# Patient Record
Sex: FEMALE | Race: BLACK OR AFRICAN AMERICAN | Employment: OTHER | URBAN - METROPOLITAN AREA
[De-identification: names, ages, dates, MRNs, and addresses within clinical notes are randomized per-mention and may not be internally consistent; named-entity substitution may affect disease eponyms.]

---

## 2017-03-29 PROBLEM — I50.9 CHF WITH UNKNOWN LVEF (HCC): Status: ACTIVE | Noted: 2017-03-29

## 2017-03-29 PROBLEM — J96.00 ACUTE RESPIRATORY FAILURE (HCC): Status: ACTIVE | Noted: 2017-03-29

## 2017-03-31 LAB
LEFT VENTRICULAR EJECTION FRACTION HIGH VALUE: 65 %
LEFT VENTRICULAR EJECTION FRACTION MODE: NORMAL
LV EF: 60 %

## 2017-04-17 PROBLEM — R00.1 SINUS BRADYCARDIA: Status: ACTIVE | Noted: 2017-04-17

## 2017-04-17 PROBLEM — I50.32 CHRONIC DIASTOLIC CONGESTIVE HEART FAILURE (HCC): Status: ACTIVE | Noted: 2017-04-17

## 2017-04-17 PROBLEM — I50.9 CHF WITH UNKNOWN LVEF (HCC): Status: RESOLVED | Noted: 2017-03-29 | Resolved: 2017-04-17

## 2017-05-03 PROBLEM — R00.1 SINUS BRADYCARDIA: Status: RESOLVED | Noted: 2017-04-17 | Resolved: 2017-05-03

## 2017-05-03 PROBLEM — I50.33 ACUTE ON CHRONIC DIASTOLIC CONGESTIVE HEART FAILURE (HCC): Status: ACTIVE | Noted: 2017-05-03

## 2017-05-03 PROBLEM — J96.00 ACUTE RESPIRATORY FAILURE (HCC): Status: RESOLVED | Noted: 2017-03-29 | Resolved: 2017-05-03

## 2017-05-03 PROBLEM — I50.32 CHRONIC DIASTOLIC CONGESTIVE HEART FAILURE (HCC): Status: RESOLVED | Noted: 2017-04-17 | Resolved: 2017-05-03

## 2017-05-18 PROBLEM — I16.1 HYPERTENSIVE EMERGENCY: Status: ACTIVE | Noted: 2017-05-18

## 2017-09-01 PROBLEM — I10 HTN (HYPERTENSION): Status: ACTIVE | Noted: 2017-09-01

## 2017-09-01 PROBLEM — E11.9 TYPE 2 DIABETES MELLITUS WITHOUT COMPLICATION, WITHOUT LONG-TERM CURRENT USE OF INSULIN (HCC): Status: ACTIVE | Noted: 2017-09-01

## 2017-09-01 PROBLEM — R07.9 CHEST PAIN: Status: ACTIVE | Noted: 2017-09-01

## 2017-09-02 PROBLEM — I11.9 HYPERTENSIVE HEART DISEASE: Status: ACTIVE | Noted: 2017-09-02

## 2017-09-02 PROBLEM — R00.1 BRADYCARDIA: Status: ACTIVE | Noted: 2017-09-02

## 2017-09-02 PROBLEM — E03.9 HYPOTHYROIDISM: Status: ACTIVE | Noted: 2017-09-02

## 2017-09-12 PROBLEM — I11.9 HYPERTENSIVE HEART DISEASE: Chronic | Status: ACTIVE | Noted: 2017-09-02

## 2017-09-12 PROBLEM — I10 HTN (HYPERTENSION), BENIGN: Chronic | Status: ACTIVE | Noted: 2017-09-01

## 2017-09-12 PROBLEM — I50.32 CHRONIC DIASTOLIC CHF (CONGESTIVE HEART FAILURE) (HCC): Status: ACTIVE | Noted: 2017-05-03

## 2017-09-12 PROBLEM — I20.0 UNSTABLE ANGINA (HCC): Status: ACTIVE | Noted: 2017-09-12

## 2017-09-12 PROBLEM — I50.32 CHRONIC DIASTOLIC CHF (CONGESTIVE HEART FAILURE) (HCC): Chronic | Status: ACTIVE | Noted: 2017-05-03

## 2017-09-12 PROBLEM — E03.9 HYPOTHYROIDISM: Chronic | Status: ACTIVE | Noted: 2017-09-02

## 2017-09-12 PROBLEM — R07.9 CHEST PAIN: Status: ACTIVE | Noted: 2017-09-12

## 2017-09-13 PROBLEM — R07.9 CHEST PAIN: Status: ACTIVE | Noted: 2017-09-13

## 2017-09-13 PROBLEM — I20.0 UNSTABLE ANGINA (HCC): Status: RESOLVED | Noted: 2017-09-12 | Resolved: 2017-09-13

## 2017-10-20 PROBLEM — M15.9 PRIMARY OSTEOARTHRITIS INVOLVING MULTIPLE JOINTS: Status: ACTIVE | Noted: 2017-10-20

## 2017-10-20 PROBLEM — R42 DIZZINESS: Status: ACTIVE | Noted: 2017-10-20

## 2017-10-20 PROBLEM — D72.819 LEUKOPENIA: Status: ACTIVE | Noted: 2017-10-20

## 2017-10-20 PROBLEM — R26.81 UNSTEADINESS: Status: ACTIVE | Noted: 2017-10-20

## 2017-10-20 PROBLEM — I49.5 SSS (SICK SINUS SYNDROME) (HCC): Status: ACTIVE | Noted: 2017-10-20

## 2017-10-20 PROBLEM — M75.52 BILATERAL SHOULDER BURSITIS: Status: ACTIVE | Noted: 2017-10-20

## 2017-10-20 PROBLEM — M54.12 CERVICAL RADICULOPATHY: Status: ACTIVE | Noted: 2017-10-20

## 2017-10-20 PROBLEM — I34.0 NON-RHEUMATIC MITRAL REGURGITATION: Status: ACTIVE | Noted: 2017-10-20

## 2017-10-20 PROBLEM — I51.7 LVH (LEFT VENTRICULAR HYPERTROPHY): Status: ACTIVE | Noted: 2017-10-20

## 2017-10-20 PROBLEM — Z78.9 ASPIRIN INTOLERANCE: Status: ACTIVE | Noted: 2017-10-20

## 2017-10-20 PROBLEM — M75.51 BILATERAL SHOULDER BURSITIS: Status: ACTIVE | Noted: 2017-10-20

## 2017-10-20 PROBLEM — N18.30 CKD (CHRONIC KIDNEY DISEASE) STAGE 3, GFR 30-59 ML/MIN (HCC): Status: ACTIVE | Noted: 2017-10-20

## 2018-03-22 ENCOUNTER — OFFICE VISIT (OUTPATIENT)
Dept: CARDIOLOGY CLINIC | Age: 83
End: 2018-03-22
Payer: MEDICARE

## 2018-03-22 VITALS
HEIGHT: 63 IN | BODY MASS INDEX: 27.64 KG/M2 | DIASTOLIC BLOOD PRESSURE: 60 MMHG | RESPIRATION RATE: 16 BRPM | WEIGHT: 156 LBS | SYSTOLIC BLOOD PRESSURE: 130 MMHG | HEART RATE: 62 BPM

## 2018-03-22 DIAGNOSIS — I49.5 SSS (SICK SINUS SYNDROME) (HCC): ICD-10-CM

## 2018-03-22 DIAGNOSIS — I50.32 CHRONIC DIASTOLIC CHF (CONGESTIVE HEART FAILURE) (HCC): Chronic | ICD-10-CM

## 2018-03-22 DIAGNOSIS — Z01.810 PREOP CARDIOVASCULAR EXAM: Primary | ICD-10-CM

## 2018-03-22 PROCEDURE — G8399 PT W/DXA RESULTS DOCUMENT: HCPCS | Performed by: NURSE PRACTITIONER

## 2018-03-22 PROCEDURE — 1123F ACP DISCUSS/DSCN MKR DOCD: CPT | Performed by: NURSE PRACTITIONER

## 2018-03-22 PROCEDURE — 99213 OFFICE O/P EST LOW 20 MIN: CPT | Performed by: NURSE PRACTITIONER

## 2018-03-22 PROCEDURE — G8427 DOCREV CUR MEDS BY ELIG CLIN: HCPCS | Performed by: NURSE PRACTITIONER

## 2018-03-22 PROCEDURE — 93000 ELECTROCARDIOGRAM COMPLETE: CPT | Performed by: NURSE PRACTITIONER

## 2018-03-22 PROCEDURE — G8419 CALC BMI OUT NRM PARAM NOF/U: HCPCS | Performed by: NURSE PRACTITIONER

## 2018-03-22 PROCEDURE — 1090F PRES/ABSN URINE INCON ASSESS: CPT | Performed by: NURSE PRACTITIONER

## 2018-03-22 PROCEDURE — G8484 FLU IMMUNIZE NO ADMIN: HCPCS | Performed by: NURSE PRACTITIONER

## 2018-03-22 PROCEDURE — 1036F TOBACCO NON-USER: CPT | Performed by: NURSE PRACTITIONER

## 2018-03-22 PROCEDURE — 4040F PNEUMOC VAC/ADMIN/RCVD: CPT | Performed by: NURSE PRACTITIONER

## 2018-03-22 RX ORDER — CLONIDINE HYDROCHLORIDE 0.2 MG/1
0.2 TABLET ORAL 2 TIMES DAILY
COMMUNITY

## 2018-03-22 RX ORDER — LEVOTHYROXINE SODIUM 0.05 MG/1
50 TABLET ORAL DAILY
COMMUNITY

## 2018-03-22 RX ORDER — HYDRALAZINE HYDROCHLORIDE 50 MG/1
50 TABLET, FILM COATED ORAL EVERY 8 HOURS
COMMUNITY
End: 2021-01-01

## 2018-03-22 NOTE — PROGRESS NOTES
OFFICE VISIT        PRIMARY CARE PHYSICIAN:    Lian Calvo MD     ALLERGIES / SENSITIVITIES:    Allergies   Allergen Reactions    Aspirin       REVIEWED MEDICATIONS:      Current Outpatient Prescriptions:     levothyroxine (SYNTHROID) 50 MCG tablet, Take 50 mcg by mouth Daily, Disp: , Rfl:     cloNIDine (CATAPRES) 0.2 MG tablet, Take 0.2 mg by mouth 2 times daily, Disp: , Rfl:     hydrALAZINE (APRESOLINE) 50 MG tablet, Take 50 mg by mouth every 8 hours, Disp: , Rfl:     acetaminophen (TYLENOL) 500 MG tablet, Take 500 mg by mouth every 6 hours as needed for Pain, Disp: , Rfl:     furosemide (LASIX) 20 MG tablet, Take 1 tablet by mouth every other day, Disp: 15 tablet, Rfl: 0    metFORMIN (GLUCOPHAGE) 500 MG tablet, Take 500 mg by mouth 2 times daily (with meals), Disp: , Rfl:     linagliptin (TRADJENTA) 5 MG tablet, Take 1 tablet by mouth daily, Disp: 30 tablet, Rfl: 0    losartan (COZAAR) 100 MG tablet, Take 1 tablet by mouth daily, Disp: 30 tablet, Rfl: 0    potassium chloride (KLOR-CON M) 10 MEQ extended release tablet, Take 1 tablet by mouth daily, Disp: 30 tablet, Rfl: 0    amLODIPine (NORVASC) 10 MG tablet, Take 10 mg by mouth daily, Disp: , Rfl:     S: REASON FOR VISIT:    Hypertension. Valvular heart disease. Chronic diastolic congestive heart failure. Sick sinus syndrome. Suraj Nam is an 81-year-old lady with cardiovascular history as described below. She is followed here by Dr. Maryanna Fothergill. She is here today with her son. She developed pain in her right knee continues to be seen by orthopedic surgery soon. She is using Tylenol for knee pain and can go up 4 steps with a lot of difficulty. She has no chest pain with exertion, palpitations, dyspnea, orthopnea, swelling of the lower extremities, dizziness, presyncope, syncope. She has a sharp pain which is very brief in her left chest occasionally. It can occur with activity and rest and only last a few seconds.         REVIEW OF SYSTEMS: bunionectomy 05/29/2012.  5.  History of cervical radiculopathy. 6.  History of bilateral shoulder bursitis. 7.  Hypothyroidism: On replacement therapy. 8.  Suspect dementia. 9.  Medical noncompliance. FAMILY HISTORY:  Noncontributory. SOCIAL HISTORY:  Denies smoking. Denies alcohol or illicit drug use.      O:  COMPLETE PHYSICAL EXAM:      /60   Pulse 62   Resp 16   Ht 5' 3\" (1.6 m)   Wt 156 lb (70.8 kg)   BMI 27.63 kg/m²      General:  Elderly lady in no acute distress. HEENT: Normocephalic and atraumatic head. No JVD. No carotid bruits. Carotid upstrokes normal bilaterally. No thyromegaly. Sclerae not icteric. No xanthelasmas. Mucous membranes moist.  Chest:  Symmetrical and nontender. No deformities. Lungs:  Clear to auscultation bilaterally. Heart:  Normal S1 and S2. No S3 or S4. No murmurs or rubs. Abdomen: Soft, nontender without organomegaly or masses. No bruits. Normal bowel sounds. Extremities: Trace pitting peritibial and ankle edema bilaterally. Normal pulses. Skin:  Normal turgor. No rashes or ulcers noted. Neurologic: Oriented x3. No motor or sensory deficits detected. REVIEW OF DIAGNOSTIC TESTS:    EKG today sinus rhythm with PACs and nonconducted PACs and LVH and nonspecific ST T wave changes  Labs from December 19, 2017 reviewed     ASSESSMENT / DIAGNOSIS:   1. History of heart failure with preserved EF. She is compensated today . 2.  Mild mitral regurgitation. By 2-D echocardiogram March 2017    3. Chronic kidney disease. 4.  Sick sinus syndrome and history of sinus bradycardia and Wenckebach (while on verapamil and high dose clonidine). 5.  Chronic dizziness/unsteadiness on feet: Doubt secondary to bradycardia. 6.  Suspect dementia. 7.  Medical noncompliance. 8.  Leukopenia. 9.  Anemia. 10.  Hypertension/LVH. blood pressure is well-controlled today   11. Diabetes mellitus. 12.  Hypothyroidism: On replacement therapy.   13.  Osteoarthritis/

## 2018-05-25 ENCOUNTER — HOSPITAL ENCOUNTER (OUTPATIENT)
Age: 83
Discharge: HOME | End: 2018-05-25
Payer: MEDICARE

## 2018-05-25 ENCOUNTER — OFFICE VISIT (OUTPATIENT)
Dept: CARDIOLOGY CLINIC | Age: 83
End: 2018-05-25
Payer: MEDICARE

## 2018-05-25 VITALS
BODY MASS INDEX: 27.64 KG/M2 | HEIGHT: 63 IN | RESPIRATION RATE: 20 BRPM | SYSTOLIC BLOOD PRESSURE: 146 MMHG | DIASTOLIC BLOOD PRESSURE: 60 MMHG | HEART RATE: 55 BPM | WEIGHT: 156 LBS

## 2018-05-25 DIAGNOSIS — Z78.9 ASPIRIN INTOLERANCE: ICD-10-CM

## 2018-05-25 DIAGNOSIS — I49.5 SSS (SICK SINUS SYNDROME) (HCC): ICD-10-CM

## 2018-05-25 DIAGNOSIS — I38 VALVULAR HEART DISEASE: ICD-10-CM

## 2018-05-25 DIAGNOSIS — I50.32 CHRONIC DIASTOLIC CONGESTIVE HEART FAILURE (HCC): ICD-10-CM

## 2018-05-25 DIAGNOSIS — R07.9 CHEST PAIN, UNSPECIFIED TYPE: Primary | ICD-10-CM

## 2018-05-25 PROCEDURE — 83735 ASSAY OF MAGNESIUM: CPT

## 2018-05-25 PROCEDURE — G8427 DOCREV CUR MEDS BY ELIG CLIN: HCPCS | Performed by: NURSE PRACTITIONER

## 2018-05-25 PROCEDURE — 84443 ASSAY THYROID STIM HORMONE: CPT

## 2018-05-25 PROCEDURE — 1036F TOBACCO NON-USER: CPT | Performed by: NURSE PRACTITIONER

## 2018-05-25 PROCEDURE — 82306 VITAMIN D 25 HYDROXY: CPT

## 2018-05-25 PROCEDURE — 1123F ACP DISCUSS/DSCN MKR DOCD: CPT | Performed by: NURSE PRACTITIONER

## 2018-05-25 PROCEDURE — 93000 ELECTROCARDIOGRAM COMPLETE: CPT | Performed by: INTERNAL MEDICINE

## 2018-05-25 PROCEDURE — 83036 HEMOGLOBIN GLYCOSYLATED A1C: CPT

## 2018-05-25 PROCEDURE — 1090F PRES/ABSN URINE INCON ASSESS: CPT | Performed by: NURSE PRACTITIONER

## 2018-05-25 PROCEDURE — G8399 PT W/DXA RESULTS DOCUMENT: HCPCS | Performed by: NURSE PRACTITIONER

## 2018-05-25 PROCEDURE — 80061 LIPID PANEL: CPT

## 2018-05-25 PROCEDURE — 85027 COMPLETE CBC AUTOMATED: CPT

## 2018-05-25 PROCEDURE — 83695 ASSAY OF LIPOPROTEIN(A): CPT

## 2018-05-25 PROCEDURE — 36415 COLL VENOUS BLD VENIPUNCTURE: CPT

## 2018-05-25 PROCEDURE — G8419 CALC BMI OUT NRM PARAM NOF/U: HCPCS | Performed by: NURSE PRACTITIONER

## 2018-05-25 PROCEDURE — 80053 COMPREHEN METABOLIC PANEL: CPT

## 2018-05-25 PROCEDURE — 99213 OFFICE O/P EST LOW 20 MIN: CPT | Performed by: NURSE PRACTITIONER

## 2018-05-25 PROCEDURE — 4040F PNEUMOC VAC/ADMIN/RCVD: CPT | Performed by: NURSE PRACTITIONER

## 2018-05-29 LAB
ALBUMIN SERPL-MCNC: 4.1 G/DL (ref 3.5–5.2)
ALP BLD-CCNC: 62 U/L (ref 35–104)
ALT SERPL-CCNC: 8 U/L (ref 0–32)
ANION GAP SERPL CALCULATED.3IONS-SCNC: 14 MMOL/L (ref 7–16)
AST SERPL-CCNC: 16 U/L (ref 0–31)
BILIRUB SERPL-MCNC: 0.5 MG/DL (ref 0–1.2)
BUN BLDV-MCNC: 26 MG/DL (ref 8–23)
CALCIUM SERPL-MCNC: 9.1 MG/DL (ref 8.6–10.2)
CHLORIDE BLD-SCNC: 104 MMOL/L (ref 98–107)
CHOLESTEROL, TOTAL: 152 MG/DL (ref 0–199)
CO2: 25 MMOL/L (ref 22–29)
CREAT SERPL-MCNC: 1.3 MG/DL (ref 0.5–1)
GFR AFRICAN AMERICAN: 47
GFR NON-AFRICAN AMERICAN: 47 ML/MIN/1.73
GLUCOSE BLD-MCNC: 171 MG/DL (ref 74–109)
HBA1C MFR BLD: 6.5 % (ref 4.8–5.9)
HCT VFR BLD CALC: 33.6 % (ref 34–48)
HDLC SERPL-MCNC: 80 MG/DL
HEMOGLOBIN: 10.6 G/DL (ref 11.5–15.5)
LDL CHOLESTEROL CALCULATED: 58 MG/DL (ref 0–99)
MAGNESIUM: 1.9 MG/DL (ref 1.6–2.6)
MCH RBC QN AUTO: 27.7 PG (ref 26–35)
MCHC RBC AUTO-ENTMCNC: 31.5 % (ref 32–34.5)
MCV RBC AUTO: 88 FL (ref 80–99.9)
PDW BLD-RTO: 13.2 FL (ref 11.5–15)
PLATELET # BLD: 164 E9/L (ref 130–450)
PMV BLD AUTO: 10.3 FL (ref 7–12)
POTASSIUM SERPL-SCNC: 4.4 MMOL/L (ref 3.5–5)
RBC # BLD: 3.82 E12/L (ref 3.5–5.5)
SODIUM BLD-SCNC: 143 MMOL/L (ref 132–146)
TOTAL PROTEIN: 7.5 G/DL (ref 6.4–8.3)
TRIGL SERPL-MCNC: 69 MG/DL (ref 0–149)
TSH SERPL DL<=0.05 MIU/L-ACNC: 1.8 UIU/ML (ref 0.27–4.2)
VITAMIN D 25-HYDROXY: 8 NG/ML (ref 30–100)
VLDLC SERPL CALC-MCNC: 14 MG/DL
WBC # BLD: 4.5 E9/L (ref 4.5–11.5)

## 2018-05-30 LAB — LIPOPROTEIN (A): 14 MG/DL

## 2018-06-15 ENCOUNTER — HOSPITAL ENCOUNTER (EMERGENCY)
Age: 83
Discharge: HOME | End: 2018-06-15
Attending: EMERGENCY MEDICINE
Payer: MEDICARE

## 2018-06-15 ENCOUNTER — APPOINTMENT (OUTPATIENT)
Dept: CT IMAGING | Age: 83
End: 2018-06-15
Payer: MEDICARE

## 2018-06-15 VITALS
WEIGHT: 156 LBS | HEART RATE: 65 BPM | SYSTOLIC BLOOD PRESSURE: 156 MMHG | BODY MASS INDEX: 27.64 KG/M2 | HEIGHT: 63 IN | RESPIRATION RATE: 18 BRPM | OXYGEN SATURATION: 100 % | DIASTOLIC BLOOD PRESSURE: 77 MMHG | TEMPERATURE: 98.2 F

## 2018-06-15 DIAGNOSIS — K59.00 CONSTIPATION, UNSPECIFIED CONSTIPATION TYPE: Primary | ICD-10-CM

## 2018-06-15 DIAGNOSIS — K56.41 FECAL IMPACTION IN RECTUM (HCC): ICD-10-CM

## 2018-06-15 LAB
ALBUMIN SERPL-MCNC: 4.2 G/DL (ref 3.5–5.2)
ALP BLD-CCNC: 62 U/L (ref 35–104)
ALT SERPL-CCNC: 8 U/L (ref 0–32)
ANION GAP SERPL CALCULATED.3IONS-SCNC: 13 MMOL/L (ref 7–16)
AST SERPL-CCNC: 18 U/L (ref 0–31)
BACTERIA: ABNORMAL /HPF
BASOPHILS ABSOLUTE: 0.01 E9/L (ref 0–0.2)
BASOPHILS RELATIVE PERCENT: 0.2 % (ref 0–2)
BILIRUB SERPL-MCNC: 0.4 MG/DL (ref 0–1.2)
BILIRUBIN URINE: NEGATIVE
BLOOD, URINE: ABNORMAL
BUN BLDV-MCNC: 22 MG/DL (ref 8–23)
CALCIUM SERPL-MCNC: 9.3 MG/DL (ref 8.6–10.2)
CHLORIDE BLD-SCNC: 103 MMOL/L (ref 98–107)
CLARITY: CLEAR
CO2: 25 MMOL/L (ref 22–29)
COLOR: YELLOW
CREAT SERPL-MCNC: 1 MG/DL (ref 0.5–1)
EKG ATRIAL RATE: 85 BPM
EKG P AXIS: 58 DEGREES
EKG P-R INTERVAL: 146 MS
EKG Q-T INTERVAL: 386 MS
EKG QRS DURATION: 110 MS
EKG QTC CALCULATION (BAZETT): 459 MS
EKG R AXIS: -44 DEGREES
EKG T AXIS: 71 DEGREES
EKG VENTRICULAR RATE: 85 BPM
EOSINOPHILS ABSOLUTE: 0.07 E9/L (ref 0.05–0.5)
EOSINOPHILS RELATIVE PERCENT: 1.5 % (ref 0–6)
GFR AFRICAN AMERICAN: >60
GFR NON-AFRICAN AMERICAN: >60 ML/MIN/1.73
GLUCOSE BLD-MCNC: 130 MG/DL (ref 74–109)
GLUCOSE URINE: NEGATIVE MG/DL
HCT VFR BLD CALC: 35.7 % (ref 34–48)
HEMOGLOBIN: 11.3 G/DL (ref 11.5–15.5)
IMMATURE GRANULOCYTES #: 0.02 E9/L
IMMATURE GRANULOCYTES %: 0.4 % (ref 0–5)
KETONES, URINE: NEGATIVE MG/DL
LACTIC ACID: 1.5 MMOL/L (ref 0.5–2.2)
LEUKOCYTE ESTERASE, URINE: NEGATIVE
LIPASE: 20 U/L (ref 13–60)
LYMPHOCYTES ABSOLUTE: 1.09 E9/L (ref 1.5–4)
LYMPHOCYTES RELATIVE PERCENT: 22.8 % (ref 20–42)
MCH RBC QN AUTO: 27.4 PG (ref 26–35)
MCHC RBC AUTO-ENTMCNC: 31.7 % (ref 32–34.5)
MCV RBC AUTO: 86.4 FL (ref 80–99.9)
MONOCYTES ABSOLUTE: 0.54 E9/L (ref 0.1–0.95)
MONOCYTES RELATIVE PERCENT: 11.3 % (ref 2–12)
NEUTROPHILS ABSOLUTE: 3.06 E9/L (ref 1.8–7.3)
NEUTROPHILS RELATIVE PERCENT: 63.8 % (ref 43–80)
NITRITE, URINE: NEGATIVE
PDW BLD-RTO: 12.8 FL (ref 11.5–15)
PH UA: 6.5 (ref 5–9)
PLATELET # BLD: 155 E9/L (ref 130–450)
PMV BLD AUTO: 10.2 FL (ref 7–12)
POTASSIUM SERPL-SCNC: 3.6 MMOL/L (ref 3.5–5)
PROTEIN UA: 30 MG/DL
RBC # BLD: 4.13 E12/L (ref 3.5–5.5)
RBC UA: ABNORMAL /HPF (ref 0–2)
RENAL EPITHELIAL, UA: ABNORMAL /HPF
SODIUM BLD-SCNC: 141 MMOL/L (ref 132–146)
SPECIFIC GRAVITY UA: 1.01 (ref 1–1.03)
TOTAL PROTEIN: 7.9 G/DL (ref 6.4–8.3)
TROPONIN: <0.01 NG/ML (ref 0–0.03)
UROBILINOGEN, URINE: 0.2 E.U./DL
WBC # BLD: 4.8 E9/L (ref 4.5–11.5)
WBC UA: ABNORMAL /HPF (ref 0–5)

## 2018-06-15 PROCEDURE — 83605 ASSAY OF LACTIC ACID: CPT

## 2018-06-15 PROCEDURE — 6360000002 HC RX W HCPCS: Performed by: STUDENT IN AN ORGANIZED HEALTH CARE EDUCATION/TRAINING PROGRAM

## 2018-06-15 PROCEDURE — 6360000004 HC RX CONTRAST MEDICATION: Performed by: RADIOLOGY

## 2018-06-15 PROCEDURE — 74177 CT ABD & PELVIS W/CONTRAST: CPT

## 2018-06-15 PROCEDURE — 36415 COLL VENOUS BLD VENIPUNCTURE: CPT

## 2018-06-15 PROCEDURE — 80053 COMPREHEN METABOLIC PANEL: CPT

## 2018-06-15 PROCEDURE — 84484 ASSAY OF TROPONIN QUANT: CPT

## 2018-06-15 PROCEDURE — 81001 URINALYSIS AUTO W/SCOPE: CPT

## 2018-06-15 PROCEDURE — 96374 THER/PROPH/DIAG INJ IV PUSH: CPT

## 2018-06-15 PROCEDURE — 83690 ASSAY OF LIPASE: CPT

## 2018-06-15 PROCEDURE — 93005 ELECTROCARDIOGRAM TRACING: CPT | Performed by: EMERGENCY MEDICINE

## 2018-06-15 PROCEDURE — 99285 EMERGENCY DEPT VISIT HI MDM: CPT

## 2018-06-15 PROCEDURE — 85025 COMPLETE CBC W/AUTO DIFF WBC: CPT

## 2018-06-15 PROCEDURE — 2580000003 HC RX 258: Performed by: RADIOLOGY

## 2018-06-15 PROCEDURE — 6370000000 HC RX 637 (ALT 250 FOR IP): Performed by: STUDENT IN AN ORGANIZED HEALTH CARE EDUCATION/TRAINING PROGRAM

## 2018-06-15 RX ORDER — POLYETHYLENE GLYCOL 3350 17 G/17G
17 POWDER, FOR SOLUTION ORAL DAILY
Qty: 510 G | Refills: 0 | Status: SHIPPED | OUTPATIENT
Start: 2018-06-15 | End: 2018-07-15

## 2018-06-15 RX ORDER — SODIUM CHLORIDE 0.9 % (FLUSH) 0.9 %
10 SYRINGE (ML) INJECTION
Status: COMPLETED | OUTPATIENT
Start: 2018-06-15 | End: 2018-06-15

## 2018-06-15 RX ORDER — HYDRALAZINE HYDROCHLORIDE 20 MG/ML
10 INJECTION INTRAMUSCULAR; INTRAVENOUS ONCE
Status: COMPLETED | OUTPATIENT
Start: 2018-06-15 | End: 2018-06-15

## 2018-06-15 RX ORDER — ACETAMINOPHEN 500 MG
1000 TABLET ORAL ONCE
Status: COMPLETED | OUTPATIENT
Start: 2018-06-15 | End: 2018-06-15

## 2018-06-15 RX ADMIN — IOPAMIDOL 110 ML: 755 INJECTION, SOLUTION INTRAVENOUS at 07:36

## 2018-06-15 RX ADMIN — HYDRALAZINE HYDROCHLORIDE 10 MG: 20 INJECTION INTRAMUSCULAR; INTRAVENOUS at 06:51

## 2018-06-15 RX ADMIN — Medication 10 ML: at 07:35

## 2018-06-15 RX ADMIN — ACETAMINOPHEN 1000 MG: 500 TABLET, FILM COATED ORAL at 08:45

## 2018-06-15 ASSESSMENT — ENCOUNTER SYMPTOMS
ABDOMINAL PAIN: 1
ABDOMINAL DISTENTION: 0
BLOOD IN STOOL: 0
CHEST TIGHTNESS: 0
EYE PAIN: 0
CONSTIPATION: 0
BACK PAIN: 0
RHINORRHEA: 0
COUGH: 0
NAUSEA: 1
HEMATOCHEZIA: 0
EYE REDNESS: 0
DIARRHEA: 0
SORE THROAT: 0
WHEEZING: 0
TROUBLE SWALLOWING: 0
SHORTNESS OF BREATH: 0
PHOTOPHOBIA: 0
SINUS PRESSURE: 0
VOMITING: 0

## 2018-06-15 ASSESSMENT — PAIN DESCRIPTION - PAIN TYPE: TYPE: ACUTE PAIN

## 2018-06-15 ASSESSMENT — PAIN SCALES - GENERAL
PAINLEVEL_OUTOF10: 5
PAINLEVEL_OUTOF10: 7

## 2018-06-15 ASSESSMENT — PAIN DESCRIPTION - LOCATION: LOCATION: ABDOMEN;CHEST

## 2018-06-15 ASSESSMENT — PAIN DESCRIPTION - DESCRIPTORS: DESCRIPTORS: SQUEEZING

## 2018-06-19 ENCOUNTER — HOSPITAL ENCOUNTER (OUTPATIENT)
Age: 83
Discharge: HOME | End: 2018-06-19
Payer: MEDICARE

## 2018-06-19 PROCEDURE — 36415 COLL VENOUS BLD VENIPUNCTURE: CPT

## 2018-06-19 PROCEDURE — 80048 BASIC METABOLIC PNL TOTAL CA: CPT

## 2018-06-20 LAB
ANION GAP SERPL CALCULATED.3IONS-SCNC: 12 MMOL/L (ref 7–16)
BUN BLDV-MCNC: 26 MG/DL (ref 8–23)
CALCIUM SERPL-MCNC: 9.6 MG/DL (ref 8.6–10.2)
CHLORIDE BLD-SCNC: 108 MMOL/L (ref 98–107)
CO2: 25 MMOL/L (ref 22–29)
CREAT SERPL-MCNC: 1.1 MG/DL (ref 0.5–1)
GFR AFRICAN AMERICAN: 58
GFR NON-AFRICAN AMERICAN: 58 ML/MIN/1.73
GLUCOSE BLD-MCNC: 125 MG/DL (ref 74–109)
POTASSIUM SERPL-SCNC: 4.3 MMOL/L (ref 3.5–5)
SODIUM BLD-SCNC: 145 MMOL/L (ref 132–146)

## 2018-08-02 ENCOUNTER — HOSPITAL ENCOUNTER (EMERGENCY)
Age: 83
Discharge: HOME | End: 2018-08-02
Payer: MEDICARE

## 2018-08-02 ENCOUNTER — APPOINTMENT (OUTPATIENT)
Dept: GENERAL RADIOLOGY | Age: 83
End: 2018-08-02
Payer: MEDICARE

## 2018-08-02 VITALS
OXYGEN SATURATION: 96 % | HEART RATE: 83 BPM | HEIGHT: 62 IN | SYSTOLIC BLOOD PRESSURE: 164 MMHG | TEMPERATURE: 98.2 F | RESPIRATION RATE: 17 BRPM | BODY MASS INDEX: 33.13 KG/M2 | DIASTOLIC BLOOD PRESSURE: 78 MMHG | WEIGHT: 180 LBS

## 2018-08-02 DIAGNOSIS — M25.561 CHRONIC PAIN OF RIGHT KNEE: Primary | ICD-10-CM

## 2018-08-02 DIAGNOSIS — G89.29 CHRONIC PAIN OF RIGHT KNEE: Primary | ICD-10-CM

## 2018-08-02 PROCEDURE — 6360000002 HC RX W HCPCS: Performed by: NURSE PRACTITIONER

## 2018-08-02 PROCEDURE — 99283 EMERGENCY DEPT VISIT LOW MDM: CPT

## 2018-08-02 PROCEDURE — 96372 THER/PROPH/DIAG INJ SC/IM: CPT

## 2018-08-02 PROCEDURE — 73562 X-RAY EXAM OF KNEE 3: CPT

## 2018-08-02 RX ORDER — KETOROLAC TROMETHAMINE 30 MG/ML
30 INJECTION, SOLUTION INTRAMUSCULAR; INTRAVENOUS ONCE
Status: COMPLETED | OUTPATIENT
Start: 2018-08-02 | End: 2018-08-02

## 2018-08-02 RX ORDER — HYDROCODONE BITARTRATE AND ACETAMINOPHEN 5; 325 MG/1; MG/1
1 TABLET ORAL EVERY 8 HOURS PRN
Qty: 12 TABLET | Refills: 0 | Status: SHIPPED | OUTPATIENT
Start: 2018-08-02 | End: 2018-08-05

## 2018-08-02 RX ADMIN — KETOROLAC TROMETHAMINE 30 MG: 30 INJECTION, SOLUTION INTRAMUSCULAR at 12:53

## 2018-08-02 ASSESSMENT — PAIN DESCRIPTION - PAIN TYPE: TYPE: ACUTE PAIN

## 2018-08-02 ASSESSMENT — PAIN DESCRIPTION - DESCRIPTORS: DESCRIPTORS: ACHING

## 2018-08-02 ASSESSMENT — PAIN SCALES - GENERAL: PAINLEVEL_OUTOF10: 7

## 2018-08-02 ASSESSMENT — PAIN DESCRIPTION - ORIENTATION: ORIENTATION: RIGHT

## 2018-08-02 ASSESSMENT — PAIN DESCRIPTION - ONSET: ONSET: ON-GOING

## 2018-08-02 ASSESSMENT — PAIN DESCRIPTION - FREQUENCY: FREQUENCY: CONTINUOUS

## 2018-08-02 ASSESSMENT — PAIN DESCRIPTION - LOCATION: LOCATION: KNEE

## 2018-08-04 NOTE — ED PROVIDER NOTES
[facility]       Department of Emergency Medicine   ED  Provider Note  Admit Date/RoomTime: 8/2/2018 12:46 PM  ED Room: 46/[facility]-1  Chief Complaint   Knee Pain (right x 2 years, dx arthritis, no new injury)    History of Present Illness   Source of history provided by:  patient. History/Exam Limitations: none. Rolando Barker is a 81 y.o. old female who has a past medical history of:   Past Medical History:   Diagnosis Date    (HFpEF) heart failure with preserved ejection fraction ([facility] [address] 05/26/2017    3/31/17- echo- LVEF 60-65%, mild LVH, mild MR    Arthritis     CHF (congestive heart failure) (HCC)     Diabetes mellitus ([facility] [address]     Hypertension     Hypothyroidism     Thyroid disease     presents to the emergency department by private vehicle, for stiffness and swelling to right knee  which occured a few day(s) prior to arrival.  The complaint occurred as a result of no trauma, she has a history of OA, she saw Dr. Ara Ward and had injections one time without improvement months ago. Since onset the symptoms have been moderate in degree. Her pain is aggraveated by movement, use and palpation and relieved by nothing. Tetanus Status: up to date. Her weight bearing ability:  Normal. She denies any recent injury. ROS    Pertinent positives and negatives are stated within HPI, all other systems reviewed and are negative. Past Surgical History:   Procedure Laterality Date    FOOT SURGERY      both   Social History:  reports that she has never smoked. She has never used smokeless tobacco. She reports that she does not drink alcohol or use drugs. Family History: family history is not on file.    Allergies: Aspirin    Physical Exam          ED Triage Vitals [08/02/18 1244]   BP Temp Temp Source Pulse Resp SpO2 Height Weight   (!) 164/78 98.2 °F (36.8 °C) Temporal 83 17 96 % 5' 2\" (1.575 m) 180 lb (81.6 kg)       Oxygen Saturation Interpretation: Normal.    Constitutional:  Alert, development consistent with age.  Neck:  Normal ROM. Supple. Knee:  Right medial, patellar:             Tenderness:  moderate. Swelling/Effusion: Moderate. Deformity: no.              ROM: diminished range with pain. Skin:  no erythema, rash or wounds noted. Crepitus: Positive. Hip:            Tenderness:  none. Swelling: None. Deformity: no.              ROM: full range of motion. Skin:  no erythema, rash or wounds noted. Joint(s) Below: ankle. Tenderness:  none. Swelling: No.              Deformity: no.             ROM: full range of motion. Skin:  no erythema, rash or wounds noted. Neurovascular: Motor deficit: none. Sensory deficit: none. Pulse deficit: none. Capillary refill: normal.  Gait:  normal.  Lymphatics: No lymphangitis or adenopathy noted. Neurological:  Oriented. Motor functions intact. Lab / Imaging Results   (All laboratory and radiology results have been personally reviewed by myself)  Labs:  No results found for this visit on 08/02/18. Imaging: All Radiology results interpreted by Radiologist unless otherwise noted. XR KNEE RIGHT (3 VIEWS)   Final Result   1. No acute fracture or dislocation. 2. Mild to moderate degenerative joint disease of the right knee more   pronounced at lateral femorotibial compartment. ED Course / Medical Decision Making     Medications   ketorolac (TORADOL) injection 30 mg (30 mg Intramuscular Given 8/2/18 1253)        Consult(s):   None    Procedure(s):   none. Medical Decision Making:    Films were obtained based on positive suspicion for bony injury as per history/physical findings, negative for any acute findings. No erythema or warmth to suggest septic joint.  Plan is subsequently for symptom control, limited use as tolerated  with appropriate outpatient follow-up with

## 2018-10-05 ENCOUNTER — HOSPITAL ENCOUNTER (OUTPATIENT)
Age: 83
Discharge: HOME | End: 2018-10-05
Payer: MEDICARE

## 2018-10-05 LAB
ALBUMIN SERPL-MCNC: 3.9 G/DL (ref 3.5–5.2)
ALP BLD-CCNC: 67 U/L (ref 35–104)
ALT SERPL-CCNC: 10 U/L (ref 0–32)
ANION GAP SERPL CALCULATED.3IONS-SCNC: 15 MMOL/L (ref 7–16)
AST SERPL-CCNC: 17 U/L (ref 0–31)
BASOPHILS ABSOLUTE: 0.04 E9/L (ref 0–0.2)
BASOPHILS RELATIVE PERCENT: 0.9 % (ref 0–2)
BILIRUB SERPL-MCNC: 0.5 MG/DL (ref 0–1.2)
BUN BLDV-MCNC: 18 MG/DL (ref 8–23)
CALCIUM SERPL-MCNC: 9.4 MG/DL (ref 8.6–10.2)
CHLORIDE BLD-SCNC: 99 MMOL/L (ref 98–107)
CHOLESTEROL, TOTAL: 146 MG/DL (ref 0–199)
CO2: 25 MMOL/L (ref 22–29)
CREAT SERPL-MCNC: 1 MG/DL (ref 0.5–1)
EOSINOPHILS ABSOLUTE: 0.11 E9/L (ref 0.05–0.5)
EOSINOPHILS RELATIVE PERCENT: 2.6 % (ref 0–6)
GFR AFRICAN AMERICAN: >60
GFR NON-AFRICAN AMERICAN: >60 ML/MIN/1.73
GLUCOSE BLD-MCNC: 111 MG/DL (ref 74–109)
HBA1C MFR BLD: 7 % (ref 4–5.6)
HCT VFR BLD CALC: 32.7 % (ref 34–48)
HDLC SERPL-MCNC: 74 MG/DL
HEMOGLOBIN: 10.2 G/DL (ref 11.5–15.5)
HYPOCHROMIA: ABNORMAL
LDL CHOLESTEROL CALCULATED: 61 MG/DL (ref 0–99)
LYMPHOCYTES ABSOLUTE: 0.7 E9/L (ref 1.5–4)
LYMPHOCYTES RELATIVE PERCENT: 15.7 % (ref 20–42)
MAGNESIUM: 1.7 MG/DL (ref 1.6–2.6)
MCH RBC QN AUTO: 27.1 PG (ref 26–35)
MCHC RBC AUTO-ENTMCNC: 31.2 % (ref 32–34.5)
MCV RBC AUTO: 86.7 FL (ref 80–99.9)
MONOCYTES ABSOLUTE: 0.26 E9/L (ref 0.1–0.95)
MONOCYTES RELATIVE PERCENT: 6.1 % (ref 2–12)
NEUTROPHILS ABSOLUTE: 3.3 E9/L (ref 1.8–7.3)
NEUTROPHILS RELATIVE PERCENT: 74.8 % (ref 43–80)
NUCLEATED RED BLOOD CELLS: 0.9 /100 WBC
PDW BLD-RTO: 14 FL (ref 11.5–15)
PLATELET # BLD: 179 E9/L (ref 130–450)
PMV BLD AUTO: 10.4 FL (ref 7–12)
POTASSIUM SERPL-SCNC: 3.7 MMOL/L (ref 3.5–5)
RBC # BLD: 3.77 E12/L (ref 3.5–5.5)
SEDIMENTATION RATE, ERYTHROCYTE: 61 MM/HR (ref 0–20)
SODIUM BLD-SCNC: 139 MMOL/L (ref 132–146)
TOTAL PROTEIN: 7.6 G/DL (ref 6.4–8.3)
TRIGL SERPL-MCNC: 56 MG/DL (ref 0–149)
TSH SERPL DL<=0.05 MIU/L-ACNC: 1.91 UIU/ML (ref 0.27–4.2)
VITAMIN D 25-HYDROXY: 14 NG/ML (ref 30–100)
VLDLC SERPL CALC-MCNC: 11 MG/DL
WBC # BLD: 4.4 E9/L (ref 4.5–11.5)

## 2018-10-05 PROCEDURE — 36415 COLL VENOUS BLD VENIPUNCTURE: CPT

## 2018-10-05 PROCEDURE — 80061 LIPID PANEL: CPT

## 2018-10-05 PROCEDURE — 85025 COMPLETE CBC W/AUTO DIFF WBC: CPT

## 2018-10-05 PROCEDURE — 83735 ASSAY OF MAGNESIUM: CPT

## 2018-10-05 PROCEDURE — 85651 RBC SED RATE NONAUTOMATED: CPT

## 2018-10-05 PROCEDURE — 83036 HEMOGLOBIN GLYCOSYLATED A1C: CPT

## 2018-10-05 PROCEDURE — 80053 COMPREHEN METABOLIC PANEL: CPT

## 2018-10-05 PROCEDURE — 83695 ASSAY OF LIPOPROTEIN(A): CPT

## 2018-10-05 PROCEDURE — 84443 ASSAY THYROID STIM HORMONE: CPT

## 2018-10-05 PROCEDURE — 82306 VITAMIN D 25 HYDROXY: CPT

## 2018-10-08 LAB — LIPOPROTEIN (A): 16 MG/DL

## 2018-10-15 ENCOUNTER — OFFICE VISIT (OUTPATIENT)
Dept: CARDIOLOGY CLINIC | Age: 83
End: 2018-10-15
Payer: MEDICARE

## 2018-10-15 VITALS
BODY MASS INDEX: 27.29 KG/M2 | SYSTOLIC BLOOD PRESSURE: 122 MMHG | DIASTOLIC BLOOD PRESSURE: 62 MMHG | HEART RATE: 80 BPM | HEIGHT: 63 IN | RESPIRATION RATE: 16 BRPM | WEIGHT: 154 LBS | OXYGEN SATURATION: 97 %

## 2018-10-15 DIAGNOSIS — I51.7 LVH (LEFT VENTRICULAR HYPERTROPHY): ICD-10-CM

## 2018-10-15 DIAGNOSIS — D72.819 LEUKOPENIA, UNSPECIFIED TYPE: ICD-10-CM

## 2018-10-15 DIAGNOSIS — R26.81 UNSTEADINESS: ICD-10-CM

## 2018-10-15 DIAGNOSIS — Z01.818 PREOPERATIVE CLEARANCE: ICD-10-CM

## 2018-10-15 DIAGNOSIS — I50.32 CHRONIC DIASTOLIC CHF (CONGESTIVE HEART FAILURE) (HCC): Primary | Chronic | ICD-10-CM

## 2018-10-15 DIAGNOSIS — I34.0 NON-RHEUMATIC MITRAL REGURGITATION: ICD-10-CM

## 2018-10-15 DIAGNOSIS — M54.12 CERVICAL RADICULOPATHY: ICD-10-CM

## 2018-10-15 DIAGNOSIS — E11.9 TYPE 2 DIABETES MELLITUS WITHOUT COMPLICATION, WITHOUT LONG-TERM CURRENT USE OF INSULIN (HCC): ICD-10-CM

## 2018-10-15 DIAGNOSIS — N18.2 CKD (CHRONIC KIDNEY DISEASE) STAGE 2, GFR 60-89 ML/MIN: ICD-10-CM

## 2018-10-15 DIAGNOSIS — M75.51 BILATERAL SHOULDER BURSITIS: ICD-10-CM

## 2018-10-15 DIAGNOSIS — M15.9 PRIMARY OSTEOARTHRITIS INVOLVING MULTIPLE JOINTS: ICD-10-CM

## 2018-10-15 DIAGNOSIS — I49.5 SSS (SICK SINUS SYNDROME) (HCC): ICD-10-CM

## 2018-10-15 DIAGNOSIS — Z78.9 ASPIRIN INTOLERANCE: ICD-10-CM

## 2018-10-15 DIAGNOSIS — M75.52 BILATERAL SHOULDER BURSITIS: ICD-10-CM

## 2018-10-15 DIAGNOSIS — I10 ESSENTIAL HYPERTENSION: ICD-10-CM

## 2018-10-15 DIAGNOSIS — R42 DIZZINESS: ICD-10-CM

## 2018-10-15 DIAGNOSIS — D64.9 ANEMIA, UNSPECIFIED TYPE: ICD-10-CM

## 2018-10-15 DIAGNOSIS — E03.9 HYPOTHYROIDISM, UNSPECIFIED TYPE: Chronic | ICD-10-CM

## 2018-10-15 PROCEDURE — 1101F PT FALLS ASSESS-DOCD LE1/YR: CPT | Performed by: INTERNAL MEDICINE

## 2018-10-15 PROCEDURE — 1123F ACP DISCUSS/DSCN MKR DOCD: CPT | Performed by: INTERNAL MEDICINE

## 2018-10-15 PROCEDURE — G8427 DOCREV CUR MEDS BY ELIG CLIN: HCPCS | Performed by: INTERNAL MEDICINE

## 2018-10-15 PROCEDURE — 1090F PRES/ABSN URINE INCON ASSESS: CPT | Performed by: INTERNAL MEDICINE

## 2018-10-15 PROCEDURE — 93000 ELECTROCARDIOGRAM COMPLETE: CPT | Performed by: INTERNAL MEDICINE

## 2018-10-15 PROCEDURE — 1036F TOBACCO NON-USER: CPT | Performed by: INTERNAL MEDICINE

## 2018-10-15 PROCEDURE — G8484 FLU IMMUNIZE NO ADMIN: HCPCS | Performed by: INTERNAL MEDICINE

## 2018-10-15 PROCEDURE — 4040F PNEUMOC VAC/ADMIN/RCVD: CPT | Performed by: INTERNAL MEDICINE

## 2018-10-15 PROCEDURE — G8419 CALC BMI OUT NRM PARAM NOF/U: HCPCS | Performed by: INTERNAL MEDICINE

## 2018-10-15 PROCEDURE — G8399 PT W/DXA RESULTS DOCUMENT: HCPCS | Performed by: INTERNAL MEDICINE

## 2018-10-15 PROCEDURE — 99214 OFFICE O/P EST MOD 30 MIN: CPT | Performed by: INTERNAL MEDICINE

## 2018-10-15 RX ORDER — GLIMEPIRIDE 1 MG/1
1 TABLET ORAL
COMMUNITY
End: 2021-01-01 | Stop reason: DRUGHIGH

## 2018-10-16 ENCOUNTER — ANESTHESIA EVENT (OUTPATIENT)
Dept: OPERATING ROOM | Age: 83
End: 2018-10-16

## 2018-10-17 ENCOUNTER — HOSPITAL ENCOUNTER (OUTPATIENT)
Dept: PREADMISSION TESTING | Age: 83
Discharge: HOME | End: 2018-10-17
Payer: MEDICARE

## 2018-10-17 VITALS
SYSTOLIC BLOOD PRESSURE: 172 MMHG | WEIGHT: 154 LBS | HEART RATE: 53 BPM | HEIGHT: 64 IN | TEMPERATURE: 97.8 F | RESPIRATION RATE: 16 BRPM | BODY MASS INDEX: 26.29 KG/M2 | DIASTOLIC BLOOD PRESSURE: 74 MMHG | OXYGEN SATURATION: 99 %

## 2018-10-17 LAB
ANION GAP SERPL CALCULATED.3IONS-SCNC: 10 MMOL/L (ref 7–16)
BUN BLDV-MCNC: 21 MG/DL (ref 8–23)
CALCIUM SERPL-MCNC: 9.6 MG/DL (ref 8.6–10.2)
CHLORIDE BLD-SCNC: 106 MMOL/L (ref 98–107)
CO2: 26 MMOL/L (ref 22–29)
CREAT SERPL-MCNC: 1 MG/DL (ref 0.5–1)
GFR AFRICAN AMERICAN: >60
GFR NON-AFRICAN AMERICAN: >60 ML/MIN/1.73
GLUCOSE BLD-MCNC: 108 MG/DL (ref 74–109)
HCT VFR BLD CALC: 33.5 % (ref 34–48)
HEMOGLOBIN: 10.7 G/DL (ref 11.5–15.5)
MCH RBC QN AUTO: 28.5 PG (ref 26–35)
MCHC RBC AUTO-ENTMCNC: 31.9 % (ref 32–34.5)
MCV RBC AUTO: 89.3 FL (ref 80–99.9)
PDW BLD-RTO: 14.3 FL (ref 11.5–15)
PLATELET # BLD: 173 E9/L (ref 130–450)
PMV BLD AUTO: 10.4 FL (ref 7–12)
POTASSIUM REFLEX MAGNESIUM: 4.2 MMOL/L (ref 3.5–5)
RBC # BLD: 3.75 E12/L (ref 3.5–5.5)
SODIUM BLD-SCNC: 142 MMOL/L (ref 132–146)
WBC # BLD: 4.2 E9/L (ref 4.5–11.5)

## 2018-10-17 PROCEDURE — 36415 COLL VENOUS BLD VENIPUNCTURE: CPT

## 2018-10-17 PROCEDURE — 87081 CULTURE SCREEN ONLY: CPT

## 2018-10-17 PROCEDURE — 80048 BASIC METABOLIC PNL TOTAL CA: CPT

## 2018-10-17 PROCEDURE — 85027 COMPLETE CBC AUTOMATED: CPT

## 2018-10-17 RX ORDER — LIDOCAINE HYDROCHLORIDE 10 MG/ML
10 INJECTION, SOLUTION INFILTRATION; PERINEURAL
Status: CANCELLED | OUTPATIENT
Start: 2018-10-17 | End: 2018-10-17

## 2018-10-17 RX ORDER — DEXAMETHASONE SODIUM PHOSPHATE 4 MG/ML
4 INJECTION, SOLUTION INTRA-ARTICULAR; INTRALESIONAL; INTRAMUSCULAR; INTRAVENOUS; SOFT TISSUE ONCE
Status: CANCELLED | OUTPATIENT
Start: 2018-10-17 | End: 2018-10-17

## 2018-10-17 RX ORDER — FENTANYL CITRATE 50 UG/ML
100 INJECTION, SOLUTION INTRAMUSCULAR; INTRAVENOUS ONCE
Status: CANCELLED | OUTPATIENT
Start: 2018-10-17 | End: 2018-10-17

## 2018-10-17 RX ORDER — ROPIVACAINE HYDROCHLORIDE 5 MG/ML
30 INJECTION, SOLUTION EPIDURAL; INFILTRATION; PERINEURAL
Status: CANCELLED | OUTPATIENT
Start: 2018-10-17 | End: 2018-10-17

## 2018-10-17 RX ORDER — ACETAMINOPHEN 500 MG
1000 TABLET ORAL ONCE
Status: CANCELLED | OUTPATIENT
Start: 2018-10-17 | End: 2018-10-17

## 2018-10-17 RX ORDER — MIDAZOLAM HYDROCHLORIDE 1 MG/ML
1 INJECTION INTRAMUSCULAR; INTRAVENOUS EVERY 5 MIN PRN
Status: CANCELLED | OUTPATIENT
Start: 2018-10-17

## 2018-10-17 RX ORDER — CELECOXIB 100 MG/1
200 CAPSULE ORAL ONCE
Status: CANCELLED | OUTPATIENT
Start: 2018-10-17 | End: 2018-10-17

## 2018-10-17 RX ORDER — OXYCODONE HYDROCHLORIDE 5 MG/1
5 TABLET ORAL ONCE
Status: CANCELLED | OUTPATIENT
Start: 2018-10-17 | End: 2018-10-17

## 2018-10-17 RX ORDER — GABAPENTIN 100 MG/1
200 CAPSULE ORAL ONCE
Status: CANCELLED | OUTPATIENT
Start: 2018-10-17 | End: 2018-10-17

## 2018-10-17 ASSESSMENT — KOOS JR
STRAIGHTENING KNEE FULLY: 3
GOING UP OR DOWN STAIRS: 3
HOW SEVERE IS YOUR KNEE STIFFNESS AFTER FIRST WAKING IN MORNING: 3
BENDING TO THE FLOOR TO PICK UP OBJECT: 4
RISING FROM SITTING: 3
TWISING OR PIVOTING ON KNEE: 4
STANDING UPRIGHT: 3

## 2018-10-17 NOTE — ANESTHESIA PRE PROCEDURE
disease     sees Dr. Benjie Garcia        Past Surgical History:        Procedure Laterality Date    FOOT SURGERY      both       Social History:    Social History   Substance Use Topics    Smoking status: Never Smoker    Smokeless tobacco: Never Used    Alcohol use No                                Counseling given: Not Answered      Vital Signs (Current):   Vitals:    10/17/18 1111   BP: (!) 172/74   Pulse: 53   Resp: 16   Temp: 97.8 °F (36.6 °C)   TempSrc: Oral   SpO2: 99%   Weight: 154 lb (69.9 kg)   Height: 5' 4\" (1.626 m)                                              BP Readings from Last 3 Encounters:   10/17/18 (!) 172/74   10/15/18 122/62   08/02/18 (!) 164/78       NPO Status:                                                                                 BMI:   Wt Readings from Last 3 Encounters:   10/17/18 154 lb (69.9 kg)   10/15/18 154 lb (69.9 kg)   08/02/18 180 lb (81.6 kg)     Body mass index is 26.43 kg/m². CBC:   Lab Results   Component Value Date    WBC 4.4 10/05/2018    RBC 3.77 10/05/2018    RBC 3.68 12/19/2017    HGB 10.2 10/05/2018    HCT 32.7 10/05/2018    MCV 86.7 10/05/2018    RDW 14.0 10/05/2018     10/05/2018       CMP:   Lab Results   Component Value Date     10/05/2018    K 3.7 10/05/2018    CL 99 10/05/2018    CO2 25 10/05/2018    BUN 18 10/05/2018    CREATININE 1.0 10/05/2018    GFRAA >60 10/05/2018    LABGLOM >60 10/05/2018    GLUCOSE 111 10/05/2018    GLUCOSE 77 04/18/2012    PROT 7.6 10/05/2018    CALCIUM 9.4 10/05/2018    BILITOT 0.5 10/05/2018    ALKPHOS 67 10/05/2018    AST 17 10/05/2018    ALT 10 10/05/2018       POC Tests: No results for input(s): POCGLU, POCNA, POCK, POCCL, POCBUN, POCHEMO, POCHCT in the last 72 hours.     Coags:   Lab Results   Component Value Date    PROTIME 11.5 09/12/2017    INR 1.1 09/12/2017    APTT 26.3 09/12/2017       HCG (If Applicable): No results found for: PREGTESTUR, PREGSERUM, HCG, HCGQUANT     ABGs: No results found for:

## 2018-10-17 NOTE — PROGRESS NOTES
after surgery we would greatly appreciate your comments    [] Parent/guardian of a minor must accompany their child and remain on the premises  the entire time they are under our care     [] Pediatric patients may bring favorite toy, blanket or comfort item with them    [] A caregiver or family member must remain with the patient during their stay if they are mentally handicapped, have dementia, disoriented or unable to use a call light or would be a safety concern if left unattended    [x] Please notify surgeon if you develop any illness between now and time of surgery (cold, cough, sore throat, fever, nausea, vomiting) or any signs of infections  including skin, wounds, and dental.    [x] Other instructions    EDUCATIONAL MATERIALS PROVIDED:    [x] PAT Preoperative Education Packet/Booklet     [x] Medication List    [] Fluoroscopy Information Pamphlet    [] Transfusion bracelet applied with instructions    [x] Joint replacement video reviewed    [x] Shower with antibacterial soap and use CHG wipes provided the evening before surgery as instructed

## 2018-10-18 ENCOUNTER — PREP FOR PROCEDURE (OUTPATIENT)
Dept: ORTHOPEDIC SURGERY | Age: 83
End: 2018-10-18

## 2018-10-18 DIAGNOSIS — M17.11 PRIMARY OSTEOARTHRITIS OF RIGHT KNEE: Primary | ICD-10-CM

## 2018-10-18 RX ORDER — SODIUM CHLORIDE, SODIUM LACTATE, POTASSIUM CHLORIDE, CALCIUM CHLORIDE 600; 310; 30; 20 MG/100ML; MG/100ML; MG/100ML; MG/100ML
INJECTION, SOLUTION INTRAVENOUS CONTINUOUS
Status: CANCELLED | OUTPATIENT
Start: 2018-10-18

## 2018-10-19 ENCOUNTER — HOSPITAL ENCOUNTER (OUTPATIENT)
Age: 83
Setting detail: OBSERVATION
Discharge: HOME | End: 2018-10-20
Attending: EMERGENCY MEDICINE | Admitting: INTERNAL MEDICINE
Payer: MEDICARE

## 2018-10-19 ENCOUNTER — APPOINTMENT (OUTPATIENT)
Dept: GENERAL RADIOLOGY | Age: 83
End: 2018-10-19
Payer: MEDICARE

## 2018-10-19 ENCOUNTER — HOSPITAL ENCOUNTER (OUTPATIENT)
Age: 83
Discharge: HOME | End: 2018-10-19
Payer: MEDICARE

## 2018-10-19 DIAGNOSIS — R07.9 CHEST PAIN, UNSPECIFIED TYPE: Primary | ICD-10-CM

## 2018-10-19 LAB
ALBUMIN SERPL-MCNC: 4.1 G/DL (ref 3.5–5.2)
ALP BLD-CCNC: 74 U/L (ref 35–104)
ALT SERPL-CCNC: 12 U/L (ref 0–32)
ANION GAP SERPL CALCULATED.3IONS-SCNC: 13 MMOL/L (ref 7–16)
AST SERPL-CCNC: 20 U/L (ref 0–31)
BASOPHILS ABSOLUTE: 0.01 E9/L (ref 0–0.2)
BASOPHILS ABSOLUTE: 0.01 E9/L (ref 0–0.2)
BASOPHILS RELATIVE PERCENT: 0.2 % (ref 0–2)
BASOPHILS RELATIVE PERCENT: 0.2 % (ref 0–2)
BILIRUB SERPL-MCNC: 0.5 MG/DL (ref 0–1.2)
BUN BLDV-MCNC: 19 MG/DL (ref 8–23)
CALCIUM SERPL-MCNC: 10 MG/DL (ref 8.6–10.2)
CHLORIDE BLD-SCNC: 103 MMOL/L (ref 98–107)
CO2: 24 MMOL/L (ref 22–29)
CREAT SERPL-MCNC: 1 MG/DL (ref 0.5–1)
EKG ATRIAL RATE: 64 BPM
EKG P AXIS: 69 DEGREES
EKG P-R INTERVAL: 132 MS
EKG Q-T INTERVAL: 436 MS
EKG QRS DURATION: 112 MS
EKG QTC CALCULATION (BAZETT): 449 MS
EKG R AXIS: -33 DEGREES
EKG T AXIS: 54 DEGREES
EKG VENTRICULAR RATE: 64 BPM
EOSINOPHILS ABSOLUTE: 0.08 E9/L (ref 0.05–0.5)
EOSINOPHILS ABSOLUTE: 0.09 E9/L (ref 0.05–0.5)
EOSINOPHILS RELATIVE PERCENT: 1.8 % (ref 0–6)
EOSINOPHILS RELATIVE PERCENT: 1.9 % (ref 0–6)
FERRITIN: 60 NG/ML
FOLATE: 18 NG/ML (ref 4.8–24.2)
GFR AFRICAN AMERICAN: >60
GFR NON-AFRICAN AMERICAN: >60 ML/MIN/1.73
GLUCOSE BLD-MCNC: 78 MG/DL (ref 74–109)
HCT VFR BLD CALC: 33 % (ref 34–48)
HCT VFR BLD CALC: 35 % (ref 34–48)
HEMOGLOBIN: 10.3 G/DL (ref 11.5–15.5)
HEMOGLOBIN: 10.8 G/DL (ref 11.5–15.5)
IMMATURE GRANULOCYTES #: 0.01 E9/L
IMMATURE GRANULOCYTES #: 0.03 E9/L
IMMATURE GRANULOCYTES %: 0.2 % (ref 0–5)
IMMATURE GRANULOCYTES %: 0.6 % (ref 0–5)
IRON SATURATION: 15 % (ref 15–50)
IRON: 38 MCG/DL (ref 37–145)
LYMPHOCYTES ABSOLUTE: 0.75 E9/L (ref 1.5–4)
LYMPHOCYTES ABSOLUTE: 0.95 E9/L (ref 1.5–4)
LYMPHOCYTES RELATIVE PERCENT: 17.2 % (ref 20–42)
LYMPHOCYTES RELATIVE PERCENT: 19.7 % (ref 20–42)
MCH RBC QN AUTO: 27.1 PG (ref 26–35)
MCH RBC QN AUTO: 27.8 PG (ref 26–35)
MCHC RBC AUTO-ENTMCNC: 30.9 % (ref 32–34.5)
MCHC RBC AUTO-ENTMCNC: 31.2 % (ref 32–34.5)
MCV RBC AUTO: 87.7 FL (ref 80–99.9)
MCV RBC AUTO: 88.9 FL (ref 80–99.9)
MONOCYTES ABSOLUTE: 0.63 E9/L (ref 0.1–0.95)
MONOCYTES ABSOLUTE: 0.66 E9/L (ref 0.1–0.95)
MONOCYTES RELATIVE PERCENT: 13.7 % (ref 2–12)
MONOCYTES RELATIVE PERCENT: 14.5 % (ref 2–12)
MRSA CULTURE ONLY: NORMAL
NEUTROPHILS ABSOLUTE: 2.87 E9/L (ref 1.8–7.3)
NEUTROPHILS ABSOLUTE: 3.09 E9/L (ref 1.8–7.3)
NEUTROPHILS RELATIVE PERCENT: 63.9 % (ref 43–80)
NEUTROPHILS RELATIVE PERCENT: 66.1 % (ref 43–80)
PDW BLD-RTO: 14.1 FL (ref 11.5–15)
PDW BLD-RTO: 14.2 FL (ref 11.5–15)
PLATELET # BLD: 181 E9/L (ref 130–450)
PLATELET # BLD: 182 E9/L (ref 130–450)
PMV BLD AUTO: 10 FL (ref 7–12)
PMV BLD AUTO: 9.7 FL (ref 7–12)
POTASSIUM SERPL-SCNC: 4 MMOL/L (ref 3.5–5)
RBC # BLD: 3.71 E12/L (ref 3.5–5.5)
RBC # BLD: 3.99 E12/L (ref 3.5–5.5)
SODIUM BLD-SCNC: 140 MMOL/L (ref 132–146)
TOTAL IRON BINDING CAPACITY: 256 MCG/DL (ref 250–450)
TOTAL PROTEIN: 8.3 G/DL (ref 6.4–8.3)
TROPONIN: <0.01 NG/ML (ref 0–0.03)
VITAMIN B-12: >2000 PG/ML (ref 211–946)
WBC # BLD: 4.4 E9/L (ref 4.5–11.5)
WBC # BLD: 4.8 E9/L (ref 4.5–11.5)

## 2018-10-19 PROCEDURE — 82746 ASSAY OF FOLIC ACID SERUM: CPT

## 2018-10-19 PROCEDURE — 99285 EMERGENCY DEPT VISIT HI MDM: CPT

## 2018-10-19 PROCEDURE — 83550 IRON BINDING TEST: CPT

## 2018-10-19 PROCEDURE — 93005 ELECTROCARDIOGRAM TRACING: CPT | Performed by: PHYSICIAN ASSISTANT

## 2018-10-19 PROCEDURE — 36415 COLL VENOUS BLD VENIPUNCTURE: CPT

## 2018-10-19 PROCEDURE — 83540 ASSAY OF IRON: CPT

## 2018-10-19 PROCEDURE — 84439 ASSAY OF FREE THYROXINE: CPT

## 2018-10-19 PROCEDURE — 82728 ASSAY OF FERRITIN: CPT

## 2018-10-19 PROCEDURE — 85025 COMPLETE CBC W/AUTO DIFF WBC: CPT

## 2018-10-19 PROCEDURE — 6370000000 HC RX 637 (ALT 250 FOR IP): Performed by: EMERGENCY MEDICINE

## 2018-10-19 PROCEDURE — 84443 ASSAY THYROID STIM HORMONE: CPT

## 2018-10-19 PROCEDURE — 85651 RBC SED RATE NONAUTOMATED: CPT

## 2018-10-19 PROCEDURE — 84484 ASSAY OF TROPONIN QUANT: CPT

## 2018-10-19 PROCEDURE — 82607 VITAMIN B-12: CPT

## 2018-10-19 PROCEDURE — 80053 COMPREHEN METABOLIC PANEL: CPT

## 2018-10-19 PROCEDURE — 71045 X-RAY EXAM CHEST 1 VIEW: CPT

## 2018-10-19 PROCEDURE — 94761 N-INVAS EAR/PLS OXIMETRY MLT: CPT

## 2018-10-19 RX ORDER — ASPIRIN 81 MG/1
324 TABLET, CHEWABLE ORAL ONCE
Status: COMPLETED | OUTPATIENT
Start: 2018-10-19 | End: 2018-10-19

## 2018-10-19 RX ORDER — NITROGLYCERIN 0.4 MG/1
0.4 TABLET SUBLINGUAL EVERY 5 MIN PRN
Status: DISCONTINUED | OUTPATIENT
Start: 2018-10-19 | End: 2018-10-20 | Stop reason: HOSPADM

## 2018-10-19 RX ADMIN — NITROGLYCERIN 0.4 MG: 0.4 TABLET, ORALLY DISINTEGRATING SUBLINGUAL at 22:35

## 2018-10-19 RX ADMIN — ASPIRIN 81 MG CHEWABLE TABLET 324 MG: 81 TABLET CHEWABLE at 22:35

## 2018-10-19 ASSESSMENT — PAIN DESCRIPTION - LOCATION: LOCATION: CHEST

## 2018-10-19 ASSESSMENT — ENCOUNTER SYMPTOMS
ABDOMINAL DISTENTION: 0
VOMITING: 0
RHINORRHEA: 0
ABDOMINAL PAIN: 0
DIARRHEA: 0
EYE DISCHARGE: 0
EYE REDNESS: 0
SINUS PRESSURE: 0
WHEEZING: 0
BLOOD IN STOOL: 0
EYE PAIN: 0
COUGH: 0
NAUSEA: 0
BACK PAIN: 0
SHORTNESS OF BREATH: 0
SORE THROAT: 0

## 2018-10-19 ASSESSMENT — PAIN DESCRIPTION - PAIN TYPE: TYPE: ACUTE PAIN

## 2018-10-19 ASSESSMENT — PAIN SCALES - GENERAL: PAINLEVEL_OUTOF10: 7

## 2018-10-19 NOTE — CARE COORDINATION
Social Work:    Social service met with Mrs. Kathleen Bello and her son Tito Robert in [place] on October 17th. Patient and son were provided information about social work role, PT/OT evaluations after surgery, the goal of returning home with home care therapy, durable medical equipment recommended, skilled rehab. if necessary and eligible under insurance guidelines, and hospital length of stay. Mrs. Kathleen Bello resides alone and prefers [facility].      Electronically signed by JANELLE Hernandez on 10/19/2018 at 4:49 PM

## 2018-10-20 VITALS
TEMPERATURE: 96.8 F | HEART RATE: 70 BPM | BODY MASS INDEX: 25.03 KG/M2 | SYSTOLIC BLOOD PRESSURE: 143 MMHG | OXYGEN SATURATION: 96 % | DIASTOLIC BLOOD PRESSURE: 69 MMHG | HEIGHT: 64 IN | WEIGHT: 146.6 LBS | RESPIRATION RATE: 16 BRPM

## 2018-10-20 LAB
CK MB: 2.9 NG/ML (ref 0–4.3)
PRO-BNP: 394 PG/ML (ref 0–450)
SEDIMENTATION RATE, ERYTHROCYTE: 62 MM/HR (ref 0–20)
T4 FREE: 1.39 NG/DL (ref 0.93–1.7)
T4 FREE: 1.45 NG/DL (ref 0.93–1.7)
TOTAL CK: 184 U/L (ref 20–180)
TSH SERPL DL<=0.05 MIU/L-ACNC: 1.56 UIU/ML (ref 0.27–4.2)
TSH SERPL DL<=0.05 MIU/L-ACNC: 3.22 UIU/ML (ref 0.27–4.2)

## 2018-10-20 PROCEDURE — 2580000003 HC RX 258: Performed by: INTERNAL MEDICINE

## 2018-10-20 PROCEDURE — 82550 ASSAY OF CK (CPK): CPT

## 2018-10-20 PROCEDURE — 6360000002 HC RX W HCPCS: Performed by: INTERNAL MEDICINE

## 2018-10-20 PROCEDURE — 84443 ASSAY THYROID STIM HORMONE: CPT

## 2018-10-20 PROCEDURE — G0378 HOSPITAL OBSERVATION PER HR: HCPCS

## 2018-10-20 PROCEDURE — 83880 ASSAY OF NATRIURETIC PEPTIDE: CPT

## 2018-10-20 PROCEDURE — 99214 OFFICE O/P EST MOD 30 MIN: CPT | Performed by: INTERNAL MEDICINE

## 2018-10-20 PROCEDURE — 6370000000 HC RX 637 (ALT 250 FOR IP): Performed by: INTERNAL MEDICINE

## 2018-10-20 PROCEDURE — 82553 CREATINE MB FRACTION: CPT

## 2018-10-20 PROCEDURE — 36415 COLL VENOUS BLD VENIPUNCTURE: CPT

## 2018-10-20 PROCEDURE — 96372 THER/PROPH/DIAG INJ SC/IM: CPT

## 2018-10-20 PROCEDURE — 84439 ASSAY OF FREE THYROXINE: CPT

## 2018-10-20 RX ORDER — LEVOTHYROXINE SODIUM 0.05 MG/1
50 TABLET ORAL DAILY
Status: DISCONTINUED | OUTPATIENT
Start: 2018-10-20 | End: 2018-10-20 | Stop reason: HOSPADM

## 2018-10-20 RX ORDER — ACETAMINOPHEN 325 MG/1
650 TABLET ORAL EVERY 4 HOURS PRN
Status: DISCONTINUED | OUTPATIENT
Start: 2018-10-20 | End: 2018-10-20 | Stop reason: SDUPTHER

## 2018-10-20 RX ORDER — GLIMEPIRIDE 2 MG/1
1 TABLET ORAL
Status: DISCONTINUED | OUTPATIENT
Start: 2018-10-20 | End: 2018-10-20 | Stop reason: HOSPADM

## 2018-10-20 RX ORDER — MORPHINE SULFATE 2 MG/ML
2 INJECTION, SOLUTION INTRAMUSCULAR; INTRAVENOUS EVERY 4 HOURS PRN
Status: DISCONTINUED | OUTPATIENT
Start: 2018-10-20 | End: 2018-10-20 | Stop reason: HOSPADM

## 2018-10-20 RX ORDER — CLONIDINE HYDROCHLORIDE 0.2 MG/1
0.2 TABLET ORAL 2 TIMES DAILY
Status: DISCONTINUED | OUTPATIENT
Start: 2018-10-20 | End: 2018-10-20 | Stop reason: HOSPADM

## 2018-10-20 RX ORDER — FUROSEMIDE 20 MG/1
20 TABLET ORAL EVERY OTHER DAY
Status: DISCONTINUED | OUTPATIENT
Start: 2018-10-20 | End: 2018-10-20 | Stop reason: HOSPADM

## 2018-10-20 RX ORDER — LOSARTAN POTASSIUM 50 MG/1
100 TABLET ORAL DAILY
Status: DISCONTINUED | OUTPATIENT
Start: 2018-10-20 | End: 2018-10-20 | Stop reason: HOSPADM

## 2018-10-20 RX ORDER — HYDRALAZINE HYDROCHLORIDE 50 MG/1
50 TABLET, FILM COATED ORAL EVERY 8 HOURS
Status: DISCONTINUED | OUTPATIENT
Start: 2018-10-20 | End: 2018-10-20 | Stop reason: HOSPADM

## 2018-10-20 RX ORDER — SODIUM CHLORIDE 0.9 % (FLUSH) 0.9 %
10 SYRINGE (ML) INJECTION EVERY 12 HOURS SCHEDULED
Status: DISCONTINUED | OUTPATIENT
Start: 2018-10-20 | End: 2018-10-20 | Stop reason: HOSPADM

## 2018-10-20 RX ORDER — ACETAMINOPHEN 500 MG
500 TABLET ORAL EVERY 6 HOURS PRN
Status: DISCONTINUED | OUTPATIENT
Start: 2018-10-20 | End: 2018-10-20 | Stop reason: HOSPADM

## 2018-10-20 RX ORDER — AMLODIPINE BESYLATE 10 MG/1
10 TABLET ORAL DAILY
Status: DISCONTINUED | OUTPATIENT
Start: 2018-10-20 | End: 2018-10-20 | Stop reason: HOSPADM

## 2018-10-20 RX ORDER — SODIUM CHLORIDE 0.9 % (FLUSH) 0.9 %
10 SYRINGE (ML) INJECTION EVERY 12 HOURS SCHEDULED
Status: DISCONTINUED | OUTPATIENT
Start: 2018-10-20 | End: 2018-10-20 | Stop reason: SDUPTHER

## 2018-10-20 RX ORDER — HYDROCHLOROTHIAZIDE 25 MG/1
25 TABLET ORAL DAILY
Qty: 30 TABLET | Refills: 3 | Status: ON HOLD | OUTPATIENT
Start: 2018-10-20 | End: 2020-01-01 | Stop reason: HOSPADM

## 2018-10-20 RX ORDER — SODIUM CHLORIDE 0.9 % (FLUSH) 0.9 %
10 SYRINGE (ML) INJECTION PRN
Status: DISCONTINUED | OUTPATIENT
Start: 2018-10-20 | End: 2018-10-20 | Stop reason: SDUPTHER

## 2018-10-20 RX ORDER — SODIUM CHLORIDE 0.9 % (FLUSH) 0.9 %
10 SYRINGE (ML) INJECTION PRN
Status: DISCONTINUED | OUTPATIENT
Start: 2018-10-20 | End: 2018-10-20 | Stop reason: HOSPADM

## 2018-10-20 RX ORDER — ONDANSETRON 2 MG/ML
4 INJECTION INTRAMUSCULAR; INTRAVENOUS EVERY 6 HOURS PRN
Status: DISCONTINUED | OUTPATIENT
Start: 2018-10-20 | End: 2018-10-20 | Stop reason: HOSPADM

## 2018-10-20 RX ORDER — ASPIRIN 81 MG/1
81 TABLET, CHEWABLE ORAL DAILY
Status: DISCONTINUED | OUTPATIENT
Start: 2018-10-21 | End: 2018-10-20 | Stop reason: HOSPADM

## 2018-10-20 RX ORDER — POTASSIUM CHLORIDE 750 MG/1
10 TABLET, EXTENDED RELEASE ORAL DAILY
Status: DISCONTINUED | OUTPATIENT
Start: 2018-10-20 | End: 2018-10-20 | Stop reason: HOSPADM

## 2018-10-20 RX ORDER — HYDROCODONE BITARTRATE AND ACETAMINOPHEN 5; 325 MG/1; MG/1
2 TABLET ORAL EVERY 4 HOURS PRN
Status: DISCONTINUED | OUTPATIENT
Start: 2018-10-20 | End: 2018-10-20 | Stop reason: HOSPADM

## 2018-10-20 RX ORDER — HYDROCODONE BITARTRATE AND ACETAMINOPHEN 5; 325 MG/1; MG/1
1 TABLET ORAL EVERY 4 HOURS PRN
Status: DISCONTINUED | OUTPATIENT
Start: 2018-10-20 | End: 2018-10-20 | Stop reason: HOSPADM

## 2018-10-20 RX ADMIN — CLONIDINE HYDROCHLORIDE 0.2 MG: 0.2 TABLET ORAL at 08:53

## 2018-10-20 RX ADMIN — METFORMIN HYDROCHLORIDE 500 MG: 500 TABLET ORAL at 17:23

## 2018-10-20 RX ADMIN — HYDRALAZINE HYDROCHLORIDE 50 MG: 50 TABLET, FILM COATED ORAL at 07:36

## 2018-10-20 RX ADMIN — METFORMIN HYDROCHLORIDE 500 MG: 500 TABLET ORAL at 08:54

## 2018-10-20 RX ADMIN — LEVOTHYROXINE SODIUM 50 MCG: 50 TABLET ORAL at 07:36

## 2018-10-20 RX ADMIN — POTASSIUM CHLORIDE 10 MEQ: 750 TABLET, EXTENDED RELEASE ORAL at 08:54

## 2018-10-20 RX ADMIN — GLIMEPIRIDE 1 MG: 2 TABLET ORAL at 07:37

## 2018-10-20 RX ADMIN — Medication 10 ML: at 08:53

## 2018-10-20 RX ADMIN — ENOXAPARIN SODIUM 40 MG: 40 INJECTION SUBCUTANEOUS at 08:53

## 2018-10-20 RX ADMIN — FUROSEMIDE 20 MG: 20 TABLET ORAL at 08:53

## 2018-10-20 RX ADMIN — HYDRALAZINE HYDROCHLORIDE 50 MG: 50 TABLET, FILM COATED ORAL at 11:47

## 2018-10-20 RX ADMIN — AMLODIPINE BESYLATE 10 MG: 10 TABLET ORAL at 08:53

## 2018-10-20 RX ADMIN — LOSARTAN POTASSIUM 100 MG: 50 TABLET, FILM COATED ORAL at 08:54

## 2018-10-20 ASSESSMENT — PAIN SCALES - GENERAL
PAINLEVEL_OUTOF10: 0
PAINLEVEL_OUTOF10: 0

## 2018-10-20 NOTE — CONSULTS
Consults   Dictated  Non cardiac ysmptoms  Ok for discharge  Would add daily thiazide diureitc for BP control instead of qod lasix.

## 2018-10-20 NOTE — DISCHARGE SUMMARY
Admitted for evaluation of chest pain   Negative enzymes  Addition of hctz for better bp control  Seen buy cards   No testing planned   [id number] [address] for home

## 2018-10-20 NOTE — H&P
tablet by mouth daily  potassium chloride (KLOR-CON M) 10 MEQ extended release tablet, Take 1 tablet by mouth daily  amLODIPine (NORVASC) 10 MG tablet, Take 10 mg by mouth daily Instructed to take am of procedure    Allergies:  Patient has no known allergies. Social History:   TOBACCO:   reports that she has never smoked. She has never used smokeless tobacco.  ETOH:   reports that she does not drink alcohol. MARITAL STATUS:    OCCUPATION:      Family History:   Family History   Problem Relation Age of Onset    No Known Problems Mother     No Known Problems Father        REVIEW OF SYSTEMS:    General ROS: positive for  - fatigue and malaise  Hematological and Lymphatic ROS: negative  Endocrine ROS: negative  Respiratory ROS: no cough, shortness of breath, or wheezing  Cardiovascular ROS: no chest pain or dyspnea on exertion  Gastrointestinal ROS: no abdominal pain, change in bowel habits, or black or bloody stools  Genito-Urinary ROS: no dysuria, trouble voiding, or hematuria  Neurological ROS: no TIA or stroke symptoms  negative    Vitals:  BP (!) 143/69   Pulse 70   Temp 96.8 °F (36 °C) (Oral)   Resp 16   Ht 5' 4\" (1.626 m)   Wt 146 lb 9.6 oz (66.5 kg)   SpO2 96%   BMI 25.16 kg/m²     PHYSICAL EXAM:  General:  Awake, alert, oriented X 3. Well developed, well nourished, well groomed. No apparent distress. HEENT:  Normocephalic, atraumatic. Pupils equal, round, reactive to light. No scleral icterus. No conjunctival injection. Normal lips, teeth, and gums. No nasal discharge. Neck:  Supple  Heart:  RRR, no murmurs, gallops, rubs, carotid upstroke normal, no carotid bruits  Lungs:  CTA bilaterally, bilat symmetrical expansion, no wheeze, rales, or rhonchi  Abdomen:   Bowel sounds present, soft, nontender, no masses, no organomegaly, no peritoneal signs  Extremities:  No clubbing, cyanosis, or edema  Skin:  Warm and dry, no open lesions or rash  Neuro:  Cranial nerves 2-12 intact, no focal deficits  Breast: deferred  Rectal: deferred  Genitalia:  deferred      DATA:     Recent Labs      10/19/18   1615  10/19/18   1700   WBC  4.4*  4.8   HGB  10.3*  10.8*   PLT  182  181     Recent Labs      10/19/18   1700   NA  140   K  4.0   BUN  19   CREATININE  1.0     Recent Labs      10/19/18   1700   PROT  8.3     Recent Labs      10/19/18   1700   AST  20   ALT  12   ALKPHOS  74   BILITOT  0.5     No results for input(s): BNP in the last 72 hours. Recent Labs      10/19/18   1700  10/20/18   0347   CKTOTAL   --   184*   CKMB   --   2.9   TROPONINI  <0.01   --        ASSESSMENT:      Principal Problem:    Chest pain  Resolved Problems:    * No resolved hospital problems.  *        PLAN:    Admitted to tele for evaluation for chest pain   Negative enzymes and ekg   Cards eval done , no stress planned as pt had one earlier this year  adjust med   [id number] [address] for dischrge    F/ou pcp and cards as scheduled         Devin Manuel MD  10/20/2018  3:10 PM

## 2018-10-20 NOTE — ED PROVIDER NOTES
(L) 20.0 - 42.0 %    Monocytes % 13.7 (H) 2.0 - 12.0 %    Eosinophils % 1.9 0.0 - 6.0 %    Basophils % 0.2 0.0 - 2.0 %    Neutrophils # 3.09 1.80 - 7.30 E9/L    Immature Granulocytes # 0.03 E9/L    Lymphocytes # 0.95 (L) 1.50 - 4.00 E9/L    Monocytes # 0.66 0.10 - 0.95 E9/L    Eosinophils # 0.09 0.05 - 0.50 E9/L    Basophils # 0.01 0.00 - 0.20 E9/L   Troponin   Result Value Ref Range    Troponin <0.01 0.00 - 0.03 ng/mL   TSH without Reflex   Result Value Ref Range    TSH 1.560 0.270 - 4.200 uIU/mL   T4, Free   Result Value Ref Range    T4 Free 1.45 0.93 - 1.70 ng/dL       RADIOLOGY:  XR CHEST PORTABLE   Final Result   Cardiomegaly. No acute pulmonary process. ------------------------- NURSING NOTES AND VITALS REVIEWED ---------------------------  Date / Time Roomed:  10/19/2018  9:50 PM  ED Bed Assignment:  17A/17A-17    The nursing notes within the ED encounter and vital signs as below have been reviewed.      Patient Vitals for the past 24 hrs:   BP Temp Temp src Pulse Resp SpO2 Height Weight   10/20/18 0025 (!) 182/98 98.6 °F (37 °C) Oral 59 16 100 % - -   10/19/18 2245 (!) 137/95 98 °F (36.7 °C) - 66 14 100 % - -   10/19/18 2235 (!) 152/86 98 °F (36.7 °C) Oral 70 14 99 % - -   10/19/18 2157 - - - 65 18 99 % - -   10/19/18 1652 (!) 156/93 97.4 °F (36.3 °C) Temporal 65 18 98 % 5' 4\" (1.626 m) 154 lb (69.9 kg)       Oxygen Saturation Interpretation: Normal    ------------------------------------------ PROGRESS NOTES ------------------------------------------  Re-evaluation(s):  Time: 10:40 PM  Patients symptoms are improving  Repeat physical examination is not changed    Time: 12:40 AM  Patients symptoms show no change  Repeat physical examination is not changed    Time: 1:50 AM  Patients symptoms show no change  Repeat physical examination is not changed    Counseling:  I have spoken with the patient and discussed todays results, in addition to providing specific details for the plan of care and

## 2018-10-22 ASSESSMENT — ENCOUNTER SYMPTOMS
ORTHOPNEA: 1
GASTROINTESTINAL NEGATIVE: 1
SHORTNESS OF BREATH: 1
EYES NEGATIVE: 1
COUGH: 1

## 2018-11-02 ENCOUNTER — TELEPHONE (OUTPATIENT)
Dept: CARDIOLOGY CLINIC | Age: 83
End: 2018-11-02

## 2018-11-04 ENCOUNTER — APPOINTMENT (OUTPATIENT)
Dept: GENERAL RADIOLOGY | Age: 83
End: 2018-11-04
Payer: MEDICARE

## 2018-11-04 ENCOUNTER — APPOINTMENT (OUTPATIENT)
Dept: ULTRASOUND IMAGING | Age: 83
End: 2018-11-04
Payer: MEDICARE

## 2018-11-04 ENCOUNTER — HOSPITAL ENCOUNTER (EMERGENCY)
Age: 83
Discharge: HOME | End: 2018-11-04
Payer: MEDICARE

## 2018-11-04 VITALS
OXYGEN SATURATION: 100 % | RESPIRATION RATE: 18 BRPM | HEART RATE: 82 BPM | WEIGHT: 150 LBS | BODY MASS INDEX: 28.32 KG/M2 | DIASTOLIC BLOOD PRESSURE: 83 MMHG | SYSTOLIC BLOOD PRESSURE: 142 MMHG | HEIGHT: 61 IN | TEMPERATURE: 97.8 F

## 2018-11-04 DIAGNOSIS — L03.115 CELLULITIS OF LEG, RIGHT: Primary | ICD-10-CM

## 2018-11-04 PROCEDURE — 99283 EMERGENCY DEPT VISIT LOW MDM: CPT

## 2018-11-04 PROCEDURE — 73610 X-RAY EXAM OF ANKLE: CPT

## 2018-11-04 PROCEDURE — 93971 EXTREMITY STUDY: CPT

## 2018-11-04 RX ORDER — ACETAMINOPHEN AND CODEINE PHOSPHATE 300; 30 MG/1; MG/1
1 TABLET ORAL EVERY 6 HOURS PRN
Qty: 12 TABLET | Refills: 0 | Status: SHIPPED | OUTPATIENT
Start: 2018-11-04 | End: 2018-11-07

## 2018-11-04 RX ORDER — CEPHALEXIN 500 MG/1
500 CAPSULE ORAL 3 TIMES DAILY
Qty: 21 CAPSULE | Refills: 0 | Status: SHIPPED | OUTPATIENT
Start: 2018-11-04 | End: 2018-11-11

## 2018-11-04 ASSESSMENT — PAIN DESCRIPTION - ORIENTATION: ORIENTATION: RIGHT

## 2018-11-04 ASSESSMENT — PAIN DESCRIPTION - PAIN TYPE: TYPE: ACUTE PAIN

## 2018-11-04 ASSESSMENT — PAIN DESCRIPTION - LOCATION: LOCATION: LEG;KNEE

## 2018-11-04 NOTE — ED PROVIDER NOTES
[facility]       Department of Emergency Medicine   ED  Provider Note  Admit Date/RoomTime: 11/4/2018  1:07 PM  ED Room: 36/36  MRN: [id number]  Chief Complaint: Leg Pain (Left leg pain and left knee pain. Scheduled for a knee replacement. At this time no new injury. Swelling noted. )       History of Present Illness   Source of history provided by:  patient. History/Exam Limitations: none. Chanda Stevens is a 81 y.o. female who has a past medical history of:   Past Medical History:   Diagnosis Date    (HFpEF) heart failure with preserved ejection fraction ([facility] [address] 05/26/2017    3/31/17- echo- LVEF 60-65%, mild LVH, mild MR    Arthritis     CHF (congestive heart failure) (HCC)     Diabetes mellitus (HCC)     GERD (gastroesophageal reflux disease)     [place] (hard of hearing)     Hypertension     Hypothyroidism     Thyroid disease     Valvular heart disease     sees Dr. Conectta Scales     presents to the ED by private car With her son for right leg pain. Patient states that she was told she needs a right knee replacement. She states that she developed right leg swelling and pain over the distal right leg over the past one or 2 weeks. She denies any new fall or injury. She does not normally have swelling in the legs. She denies any fevers or chills. Nothing seems to make it better. She states the palpation over the area that is tender more weightbearing makes the pain worse. She denies any new pain of the knee. It is all in her lower leg. ROS    Pertinent positives and negatives are stated within HPI, all other systems reviewed and are negative. Past Surgical History:   Procedure Laterality Date    FOOT SURGERY      both   Social History:  reports that she has never smoked. She has never used smokeless tobacco. She reports that she does not drink alcohol or use drugs. Family History: family history includes No Known Problems in her father and mother.   Allergies: Aspirin    Physical Exam   Oxygen Saturation Interpretation: Normal.   ED Triage Vitals [11/04/18 1305]   BP Temp Temp Source Pulse Resp SpO2 Height Weight   (!) 142/83 97.8 °F (36.6 °C) Temporal 82 18 100 % 5' 1\" (1.549 m) 150 lb (68 kg)       Physical Exam  · Constitutional/General: Alert and oriented x3, well appearing, non toxic  · HEENT:  NC/NT. PERRLA,  Airway patent. · Neck: Supple, full ROM, non tender to palpation in the midline, no stridor, no crepitus, no meningeal signs  · Respiratory: Lungs clear to auscultation bilaterally, no wheezes, rales, or rhonchi. Not in respiratory distress  · CV:  Regular rate. Regular rhythm. No murmurs, gallops, or rubs. 2+ distal pulses  · Chest: No chest wall tenderness  · GI:  Abdomen Soft, Non tender, Non distended. +BS. No rebound, guarding, or rigidity. No pulsatile masses. · Musculoskeletal: Moves all extremities x 4. Warm and well perfused, no clubbing, cyanosis, or edema. Capillary refill <3 seconds. 2+ dorsalis pedis pulse on the left foot. 2+ pitting edema to the left leg. No calf tenderness, swelling or erythema. Patient has extreme pain to palpation over the right distal medial lower leg. There is overlying erythema and some mild warmth to the area. No fluctuance or sign of abscess formation at this time. · Integument: skin warm and dry. No rashes. · Lymphatic: no lymphadenopathy noted  · Neurologic: GCS 15, no focal deficits, symmetric strength 5/5 in the upper and lower extremities bilaterally  · Psychiatric: Normal Affect    Lab / Imaging Results   (All laboratory and radiology results have been personally reviewed by myself)  Labs:  No results found for this visit on 11/04/18. Imaging: All Radiology results interpreted by Radiologist unless otherwise noted. US DUP LOWER EXTREMITY RIGHT PATI   Final Result   No evidence of a DVT in the right lower extremity. XR ANKLE RIGHT (MIN 3 VIEWS)   Final Result   Nonspecific soft tissue edema of the ankle.           ED Course /

## 2018-11-05 ENCOUNTER — TELEPHONE (OUTPATIENT)
Dept: OTHER | Facility: CLINIC | Age: 83
End: 2018-11-05

## 2018-12-09 ENCOUNTER — APPOINTMENT (OUTPATIENT)
Dept: GENERAL RADIOLOGY | Age: 83
End: 2018-12-09
Payer: MEDICARE

## 2018-12-09 ENCOUNTER — HOSPITAL ENCOUNTER (EMERGENCY)
Age: 83
Discharge: HOME | End: 2018-12-09
Attending: EMERGENCY MEDICINE
Payer: MEDICARE

## 2018-12-09 ENCOUNTER — APPOINTMENT (OUTPATIENT)
Dept: ULTRASOUND IMAGING | Age: 83
End: 2018-12-09
Payer: MEDICARE

## 2018-12-09 VITALS
SYSTOLIC BLOOD PRESSURE: 180 MMHG | TEMPERATURE: 97.9 F | HEART RATE: 88 BPM | BODY MASS INDEX: 28.32 KG/M2 | HEIGHT: 61 IN | OXYGEN SATURATION: 99 % | RESPIRATION RATE: 16 BRPM | WEIGHT: 150 LBS | DIASTOLIC BLOOD PRESSURE: 60 MMHG

## 2018-12-09 DIAGNOSIS — M25.561 CHRONIC PAIN OF RIGHT KNEE: Primary | ICD-10-CM

## 2018-12-09 DIAGNOSIS — M79.89 RIGHT LEG SWELLING: ICD-10-CM

## 2018-12-09 DIAGNOSIS — G89.29 CHRONIC PAIN OF RIGHT KNEE: Primary | ICD-10-CM

## 2018-12-09 PROCEDURE — 93971 EXTREMITY STUDY: CPT

## 2018-12-09 PROCEDURE — 73610 X-RAY EXAM OF ANKLE: CPT

## 2018-12-09 PROCEDURE — 6370000000 HC RX 637 (ALT 250 FOR IP): Performed by: NURSE PRACTITIONER

## 2018-12-09 PROCEDURE — 99283 EMERGENCY DEPT VISIT LOW MDM: CPT

## 2018-12-09 PROCEDURE — 73564 X-RAY EXAM KNEE 4 OR MORE: CPT

## 2018-12-09 RX ORDER — HYDROCODONE BITARTRATE AND ACETAMINOPHEN 5; 325 MG/1; MG/1
1 TABLET ORAL ONCE
Status: COMPLETED | OUTPATIENT
Start: 2018-12-09 | End: 2018-12-09

## 2018-12-09 RX ADMIN — HYDROCODONE BITARTRATE AND ACETAMINOPHEN 1 TABLET: 5; 325 TABLET ORAL at 16:45

## 2018-12-09 ASSESSMENT — PAIN DESCRIPTION - DESCRIPTORS: DESCRIPTORS: ACHING

## 2018-12-09 ASSESSMENT — PAIN DESCRIPTION - LOCATION: LOCATION: LEG;FOOT;KNEE

## 2018-12-09 ASSESSMENT — PAIN DESCRIPTION - ORIENTATION: ORIENTATION: RIGHT

## 2018-12-09 ASSESSMENT — PAIN SCALES - GENERAL: PAINLEVEL_OUTOF10: 10

## 2018-12-09 ASSESSMENT — PAIN DESCRIPTION - PAIN TYPE: TYPE: CHRONIC PAIN

## 2018-12-09 ASSESSMENT — PAIN DESCRIPTION - FREQUENCY: FREQUENCY: CONTINUOUS

## 2018-12-09 ASSESSMENT — PAIN DESCRIPTION - PROGRESSION: CLINICAL_PROGRESSION: GRADUALLY WORSENING

## 2018-12-09 NOTE — ED PROVIDER NOTES
ED Attending  CC: Silvana       Department of Emergency Medicine   ED  Provider Note  Admit Date/RoomTime: 12/9/2018  3:31 PM  ED Room: 17A/17A-17   Chief Complaint:   Knee Pain (complaining of right knee, leg and foot pain chronic problem increased pain today denies new injury )    History of Present Illness   Source of history provided by:  patient. History/Exam Limitations: none. Mary Delaney is a 81 y.o. old female presenting to the emergency department by private vehicle, for Right ankle and knee pain Of which is an acute on chronic issue. Patient has been seen multiple times for this knee pain. Patient states that this is secondary to arthritis. Patient states that she was supposed to have surgery on this however her blood counts are low and did not have this performed. Patient now reporting mild swelling to the right lower extremity and pain surrounding the proximal right ankle. Patient denies any fever. Patient denies any direct injury or trauma. Since onset the symptoms have been marked in degree with ability to bear weight, but with some pain and swelling. Her pain is aggraveated by standing, walking and relieved by rest. Patient denies fever, chills, night sweats, shortness of breath, chest pain, abdominal pain, nausea or vomiting. ROS    Pertinent positives and negatives are stated within HPI, all other systems reviewed and are negative. Past Surgical History:   Procedure Laterality Date    FOOT SURGERY      both   Social History:  reports that she has never smoked. She has never used smokeless tobacco. She reports that she does not drink alcohol or use drugs. Family History: family history includes No Known Problems in her father and mother.    Allergies: Aspirin    Physical Exam           ED Triage Vitals [12/09/18 1520]   BP Temp Temp Source Pulse Resp SpO2 Height Weight   -- 97.9 °F (36.6 °C) Temporal 85 14 99 % 5' 1\" (1.549 m) 150 lb (68 kg)       Oxygen Saturation Interpretation: Normal.    Constitutional:  Alert, development consistent with age. NAD  Neck:  Normal ROM. Supple. No midline or paraspinal tenderness. No step-offs or deformities. No lymphadenopathy. No meningeal signs. Ankle:  Right Anterior, Posterior, Lateral and Medial:              Tenderness:  moderate tenderness noted to palpation of the skin to the lower extremity. No calf tenderness. Negative Homans sign. Swelling: Moderate. Patient has +1 pitting edema noted to the right lower extremity. Deformity: no.             ROM: full range with pain. Skin:  Patient has no signs of cellulitis. There is no discharge or drainage. This area is tender to palpation. No fluctuant and induration. Knee:              Tenderness:  Right knee is tender globally. No joint laxity. Mild crepitus. Swelling: Mild. Deformity: no.             ROM: full range with pain. Skin:  no erythema, rash or wounds noted. No signs of infection. Foot:              Tenderness:  mild. Swelling: Mild. Deformity: no.             ROM: full range of motion. Skin:  no erythema, rash or wounds noted. Neurovascular: Motor deficit: none. Patient has full range of motion in all cardinal directions to the right lower extremity. Sensory deficit:   none. Patient has equal sensation noted to the bilateral lower extremities. Pulse deficit: none. Patient has plus one DP pulses noted to the right foot. Capillary refill: normal. Patient's capillary refill is brisk, less than 3 seconds noted to all digits of the right foot. Gait:  Stable with cane. Lymphatics: No lymphangitis or adenopathy noted. Neurological:  Oriented. Motor functions intact. Cardiac: Regular rate and rhythm. Lungs: Clear to auscultation bilaterally.     Lab / Imaging Results   (All laboratory and radiology results have been personally

## 2019-01-03 ENCOUNTER — HOSPITAL ENCOUNTER (OUTPATIENT)
Age: 84
Discharge: HOME | End: 2019-01-03
Payer: MEDICARE

## 2019-01-03 LAB
BASOPHILS ABSOLUTE: 0 E9/L (ref 0–0.2)
BASOPHILS RELATIVE PERCENT: 0 % (ref 0–2)
EOSINOPHILS ABSOLUTE: 0.04 E9/L (ref 0.05–0.5)
EOSINOPHILS RELATIVE PERCENT: 0.8 % (ref 0–6)
FERRITIN: 58 NG/ML
FOLATE: 16.5 NG/ML (ref 4.8–24.2)
HCT VFR BLD CALC: 34.1 % (ref 34–48)
HEMOGLOBIN: 10.5 G/DL (ref 11.5–15.5)
IMMATURE GRANULOCYTES #: 0.01 E9/L
IMMATURE GRANULOCYTES %: 0.2 % (ref 0–5)
IRON SATURATION: 15 % (ref 15–50)
IRON: 44 MCG/DL (ref 37–145)
LYMPHOCYTES ABSOLUTE: 0.64 E9/L (ref 1.5–4)
LYMPHOCYTES RELATIVE PERCENT: 13.6 % (ref 20–42)
MCH RBC QN AUTO: 27 PG (ref 26–35)
MCHC RBC AUTO-ENTMCNC: 30.8 % (ref 32–34.5)
MCV RBC AUTO: 87.7 FL (ref 80–99.9)
MONOCYTES ABSOLUTE: 0.57 E9/L (ref 0.1–0.95)
MONOCYTES RELATIVE PERCENT: 12.1 % (ref 2–12)
NEUTROPHILS ABSOLUTE: 3.45 E9/L (ref 1.8–7.3)
NEUTROPHILS RELATIVE PERCENT: 73.3 % (ref 43–80)
PDW BLD-RTO: 13.6 FL (ref 11.5–15)
PLATELET # BLD: 183 E9/L (ref 130–450)
PMV BLD AUTO: 10.5 FL (ref 7–12)
RBC # BLD: 3.89 E12/L (ref 3.5–5.5)
TOTAL IRON BINDING CAPACITY: 286 MCG/DL (ref 250–450)
VITAMIN B-12: >2000 PG/ML (ref 211–946)
WBC # BLD: 4.7 E9/L (ref 4.5–11.5)

## 2019-01-03 PROCEDURE — 83540 ASSAY OF IRON: CPT

## 2019-01-03 PROCEDURE — 82607 VITAMIN B-12: CPT

## 2019-01-03 PROCEDURE — 85025 COMPLETE CBC W/AUTO DIFF WBC: CPT

## 2019-01-03 PROCEDURE — 36415 COLL VENOUS BLD VENIPUNCTURE: CPT

## 2019-01-03 PROCEDURE — 83550 IRON BINDING TEST: CPT

## 2019-01-03 PROCEDURE — 82728 ASSAY OF FERRITIN: CPT

## 2019-01-03 PROCEDURE — 82746 ASSAY OF FOLIC ACID SERUM: CPT

## 2019-02-15 ENCOUNTER — APPOINTMENT (OUTPATIENT)
Dept: CT IMAGING | Age: 84
End: 2019-02-15
Payer: MEDICARE

## 2019-02-15 ENCOUNTER — HOSPITAL ENCOUNTER (EMERGENCY)
Age: 84
Discharge: HOME | End: 2019-02-16
Attending: EMERGENCY MEDICINE
Payer: MEDICARE

## 2019-02-15 DIAGNOSIS — Z87.81 H/O CERVICAL FRACTURE: Primary | ICD-10-CM

## 2019-02-15 LAB
ALBUMIN SERPL-MCNC: 4.2 G/DL (ref 3.5–5.2)
ALP BLD-CCNC: 77 U/L (ref 35–104)
ALT SERPL-CCNC: 11 U/L (ref 0–32)
ANION GAP SERPL CALCULATED.3IONS-SCNC: 10 MMOL/L (ref 7–16)
AST SERPL-CCNC: 16 U/L (ref 0–31)
BILIRUB SERPL-MCNC: 0.4 MG/DL (ref 0–1.2)
BUN BLDV-MCNC: 25 MG/DL (ref 8–23)
CALCIUM SERPL-MCNC: 9.3 MG/DL (ref 8.6–10.2)
CHLORIDE BLD-SCNC: 106 MMOL/L (ref 98–107)
CO2: 25 MMOL/L (ref 22–29)
CREAT SERPL-MCNC: 1 MG/DL (ref 0.5–1)
GFR AFRICAN AMERICAN: >60
GFR NON-AFRICAN AMERICAN: >60 ML/MIN/1.73
GLUCOSE BLD-MCNC: 278 MG/DL (ref 74–99)
MAGNESIUM: 1.9 MG/DL (ref 1.6–2.6)
POTASSIUM SERPL-SCNC: 4.1 MMOL/L (ref 3.5–5)
SODIUM BLD-SCNC: 141 MMOL/L (ref 132–146)
TOTAL PROTEIN: 7.8 G/DL (ref 6.4–8.3)

## 2019-02-15 PROCEDURE — 85025 COMPLETE CBC W/AUTO DIFF WBC: CPT

## 2019-02-15 PROCEDURE — 99284 EMERGENCY DEPT VISIT MOD MDM: CPT

## 2019-02-15 PROCEDURE — 83735 ASSAY OF MAGNESIUM: CPT

## 2019-02-15 PROCEDURE — 36415 COLL VENOUS BLD VENIPUNCTURE: CPT

## 2019-02-15 PROCEDURE — 72125 CT NECK SPINE W/O DYE: CPT

## 2019-02-15 PROCEDURE — 80053 COMPREHEN METABOLIC PANEL: CPT

## 2019-02-15 PROCEDURE — 70450 CT HEAD/BRAIN W/O DYE: CPT

## 2019-02-15 ASSESSMENT — PAIN SCALES - GENERAL: PAINLEVEL_OUTOF10: 9

## 2019-02-15 ASSESSMENT — PAIN DESCRIPTION - PAIN TYPE: TYPE: ACUTE PAIN

## 2019-02-15 ASSESSMENT — PAIN DESCRIPTION - DESCRIPTORS: DESCRIPTORS: DISCOMFORT

## 2019-02-15 ASSESSMENT — PAIN DESCRIPTION - LOCATION: LOCATION: HEAD;NECK

## 2019-02-16 VITALS
BODY MASS INDEX: 25.61 KG/M2 | HEART RATE: 90 BPM | RESPIRATION RATE: 17 BRPM | DIASTOLIC BLOOD PRESSURE: 84 MMHG | WEIGHT: 150 LBS | TEMPERATURE: 98.4 F | SYSTOLIC BLOOD PRESSURE: 180 MMHG | OXYGEN SATURATION: 98 % | HEIGHT: 64 IN

## 2019-02-16 LAB
ANISOCYTOSIS: ABNORMAL
BASOPHILS ABSOLUTE: 0.01 E9/L (ref 0–0.2)
BASOPHILS RELATIVE PERCENT: 0.2 % (ref 0–2)
EOSINOPHILS ABSOLUTE: 0.02 E9/L (ref 0.05–0.5)
EOSINOPHILS RELATIVE PERCENT: 0.5 % (ref 0–6)
HCT VFR BLD CALC: 31.9 % (ref 34–48)
HEMOGLOBIN: 10 G/DL (ref 11.5–15.5)
IMMATURE GRANULOCYTES #: 0.02 E9/L
IMMATURE GRANULOCYTES %: 0.5 % (ref 0–5)
LYMPHOCYTES ABSOLUTE: 0.44 E9/L (ref 1.5–4)
LYMPHOCYTES RELATIVE PERCENT: 10.3 % (ref 20–42)
MCH RBC QN AUTO: 27.5 PG (ref 26–35)
MCHC RBC AUTO-ENTMCNC: 31.3 % (ref 32–34.5)
MCV RBC AUTO: 87.6 FL (ref 80–99.9)
MONOCYTES ABSOLUTE: 0.47 E9/L (ref 0.1–0.95)
MONOCYTES RELATIVE PERCENT: 11 % (ref 2–12)
NEUTROPHILS ABSOLUTE: 3.33 E9/L (ref 1.8–7.3)
NEUTROPHILS RELATIVE PERCENT: 77.5 % (ref 43–80)
OVALOCYTES: ABNORMAL
PDW BLD-RTO: 13.7 FL (ref 11.5–15)
PLATELET # BLD: 148 E9/L (ref 130–450)
PMV BLD AUTO: 10.4 FL (ref 7–12)
POIKILOCYTES: ABNORMAL
RBC # BLD: 3.64 E12/L (ref 3.5–5.5)
WBC # BLD: 4.3 E9/L (ref 4.5–11.5)

## 2019-02-16 PROCEDURE — 6360000002 HC RX W HCPCS: Performed by: NURSE PRACTITIONER

## 2019-02-16 PROCEDURE — 96372 THER/PROPH/DIAG INJ SC/IM: CPT

## 2019-02-16 PROCEDURE — 6370000000 HC RX 637 (ALT 250 FOR IP): Performed by: NURSE PRACTITIONER

## 2019-02-16 RX ORDER — ORPHENADRINE CITRATE 30 MG/ML
60 INJECTION INTRAMUSCULAR; INTRAVENOUS ONCE
Status: COMPLETED | OUTPATIENT
Start: 2019-02-16 | End: 2019-02-16

## 2019-02-16 RX ORDER — TIZANIDINE 2 MG/1
2 TABLET ORAL NIGHTLY PRN
Qty: 10 TABLET | Refills: 0 | Status: SHIPPED | OUTPATIENT
Start: 2019-02-16 | End: 2021-01-01 | Stop reason: ALTCHOICE

## 2019-02-16 RX ORDER — OXYCODONE HYDROCHLORIDE AND ACETAMINOPHEN 5; 325 MG/1; MG/1
1 TABLET ORAL ONCE
Status: COMPLETED | OUTPATIENT
Start: 2019-02-16 | End: 2019-02-16

## 2019-02-16 RX ADMIN — ORPHENADRINE CITRATE 60 MG: 30 INJECTION, SOLUTION INTRAMUSCULAR; INTRAVENOUS at 02:15

## 2019-02-16 RX ADMIN — OXYCODONE AND ACETAMINOPHEN 1 TABLET: 5; 325 TABLET ORAL at 02:15

## 2019-02-22 LAB
EKG ATRIAL RATE: 78 BPM
EKG P AXIS: 60 DEGREES
EKG P-R INTERVAL: 160 MS
EKG Q-T INTERVAL: 396 MS
EKG QRS DURATION: 110 MS
EKG QTC CALCULATION (BAZETT): 451 MS
EKG R AXIS: -20 DEGREES
EKG T AXIS: 53 DEGREES
EKG VENTRICULAR RATE: 78 BPM

## 2019-02-27 ENCOUNTER — INITIAL CONSULT (OUTPATIENT)
Dept: NEUROSURGERY | Age: 84
End: 2019-02-27
Payer: MEDICARE

## 2019-02-27 VITALS
SYSTOLIC BLOOD PRESSURE: 134 MMHG | WEIGHT: 150 LBS | BODY MASS INDEX: 25.61 KG/M2 | HEART RATE: 62 BPM | DIASTOLIC BLOOD PRESSURE: 64 MMHG | HEIGHT: 64 IN

## 2019-02-27 DIAGNOSIS — Z87.81 H/O CERVICAL FRACTURE: ICD-10-CM

## 2019-02-27 DIAGNOSIS — G89.29 CHRONIC MIDLINE POSTERIOR NECK PAIN: Primary | ICD-10-CM

## 2019-02-27 DIAGNOSIS — M54.2 CHRONIC MIDLINE POSTERIOR NECK PAIN: Primary | ICD-10-CM

## 2019-02-27 PROCEDURE — 1101F PT FALLS ASSESS-DOCD LE1/YR: CPT | Performed by: PHYSICIAN ASSISTANT

## 2019-02-27 PROCEDURE — G8419 CALC BMI OUT NRM PARAM NOF/U: HCPCS | Performed by: PHYSICIAN ASSISTANT

## 2019-02-27 PROCEDURE — G8484 FLU IMMUNIZE NO ADMIN: HCPCS | Performed by: PHYSICIAN ASSISTANT

## 2019-02-27 PROCEDURE — 99203 OFFICE O/P NEW LOW 30 MIN: CPT | Performed by: PHYSICIAN ASSISTANT

## 2019-02-27 PROCEDURE — 1123F ACP DISCUSS/DSCN MKR DOCD: CPT | Performed by: PHYSICIAN ASSISTANT

## 2019-02-27 PROCEDURE — 1090F PRES/ABSN URINE INCON ASSESS: CPT | Performed by: PHYSICIAN ASSISTANT

## 2019-02-27 PROCEDURE — G8427 DOCREV CUR MEDS BY ELIG CLIN: HCPCS | Performed by: PHYSICIAN ASSISTANT

## 2019-02-27 PROCEDURE — G8399 PT W/DXA RESULTS DOCUMENT: HCPCS | Performed by: PHYSICIAN ASSISTANT

## 2019-02-27 PROCEDURE — 1036F TOBACCO NON-USER: CPT | Performed by: PHYSICIAN ASSISTANT

## 2019-02-27 PROCEDURE — 4040F PNEUMOC VAC/ADMIN/RCVD: CPT | Performed by: PHYSICIAN ASSISTANT

## 2019-02-27 ASSESSMENT — ENCOUNTER SYMPTOMS
TROUBLE SWALLOWING: 0
ABDOMINAL PAIN: 0
PHOTOPHOBIA: 0
SHORTNESS OF BREATH: 0

## 2019-03-08 ENCOUNTER — HOSPITAL ENCOUNTER (OUTPATIENT)
Age: 84
Discharge: HOME | End: 2019-03-08
Payer: MEDICARE

## 2019-03-08 LAB
ALBUMIN SERPL-MCNC: 4.1 G/DL (ref 3.5–5.2)
ALP BLD-CCNC: 74 U/L (ref 35–104)
ALT SERPL-CCNC: 11 U/L (ref 0–32)
ANION GAP SERPL CALCULATED.3IONS-SCNC: 10 MMOL/L (ref 7–16)
AST SERPL-CCNC: 16 U/L (ref 0–31)
BASOPHILS ABSOLUTE: 0 E9/L (ref 0–0.2)
BASOPHILS RELATIVE PERCENT: 0.3 % (ref 0–2)
BILIRUB SERPL-MCNC: 0.5 MG/DL (ref 0–1.2)
BUN BLDV-MCNC: 22 MG/DL (ref 8–23)
CALCIUM SERPL-MCNC: 9.6 MG/DL (ref 8.6–10.2)
CHLORIDE BLD-SCNC: 105 MMOL/L (ref 98–107)
CHOLESTEROL, TOTAL: 141 MG/DL (ref 0–199)
CO2: 26 MMOL/L (ref 22–29)
CREAT SERPL-MCNC: 0.9 MG/DL (ref 0.5–1)
EOSINOPHILS ABSOLUTE: 0 E9/L (ref 0.05–0.5)
EOSINOPHILS RELATIVE PERCENT: 0.3 % (ref 0–6)
GFR AFRICAN AMERICAN: >60
GFR NON-AFRICAN AMERICAN: >60 ML/MIN/1.73
GLUCOSE BLD-MCNC: 99 MG/DL (ref 74–99)
HBA1C MFR BLD: 6.3 % (ref 4–5.6)
HCT VFR BLD CALC: 33.5 % (ref 34–48)
HDLC SERPL-MCNC: 81 MG/DL
HEMOGLOBIN: 10.2 G/DL (ref 11.5–15.5)
LDL CHOLESTEROL CALCULATED: 51 MG/DL (ref 0–99)
LYMPHOCYTES ABSOLUTE: 0.59 E9/L (ref 1.5–4)
LYMPHOCYTES RELATIVE PERCENT: 15.8 % (ref 20–42)
MCH RBC QN AUTO: 26.8 PG (ref 26–35)
MCHC RBC AUTO-ENTMCNC: 30.4 % (ref 32–34.5)
MCV RBC AUTO: 88.2 FL (ref 80–99.9)
MONOCYTES ABSOLUTE: 0.22 E9/L (ref 0.1–0.95)
MONOCYTES RELATIVE PERCENT: 6.1 % (ref 2–12)
MYELOCYTE PERCENT: 1.8 % (ref 0–0)
NEUTROPHILS ABSOLUTE: 2.89 E9/L (ref 1.8–7.3)
NEUTROPHILS RELATIVE PERCENT: 76.3 % (ref 43–80)
OVALOCYTES: ABNORMAL
PDW BLD-RTO: 13.9 FL (ref 11.5–15)
PLATELET # BLD: 168 E9/L (ref 130–450)
PMV BLD AUTO: 10.3 FL (ref 7–12)
POIKILOCYTES: ABNORMAL
POTASSIUM SERPL-SCNC: 4.5 MMOL/L (ref 3.5–5)
RBC # BLD: 3.8 E12/L (ref 3.5–5.5)
SEDIMENTATION RATE, ERYTHROCYTE: 40 MM/HR (ref 0–20)
SODIUM BLD-SCNC: 141 MMOL/L (ref 132–146)
TOTAL PROTEIN: 7.6 G/DL (ref 6.4–8.3)
TRIGL SERPL-MCNC: 44 MG/DL (ref 0–149)
VLDLC SERPL CALC-MCNC: 9 MG/DL
WBC # BLD: 3.7 E9/L (ref 4.5–11.5)

## 2019-03-08 PROCEDURE — 85651 RBC SED RATE NONAUTOMATED: CPT

## 2019-03-08 PROCEDURE — 85025 COMPLETE CBC W/AUTO DIFF WBC: CPT

## 2019-03-08 PROCEDURE — 80053 COMPREHEN METABOLIC PANEL: CPT

## 2019-03-08 PROCEDURE — 83036 HEMOGLOBIN GLYCOSYLATED A1C: CPT

## 2019-03-08 PROCEDURE — 80061 LIPID PANEL: CPT

## 2019-03-08 PROCEDURE — 36415 COLL VENOUS BLD VENIPUNCTURE: CPT

## 2019-03-09 ENCOUNTER — PREP FOR PROCEDURE (OUTPATIENT)
Dept: SURGERY | Age: 84
End: 2019-03-09

## 2019-03-09 DIAGNOSIS — M17.11 PRIMARY OSTEOARTHRITIS OF RIGHT KNEE: Primary | ICD-10-CM

## 2019-03-09 RX ORDER — SODIUM CHLORIDE, SODIUM LACTATE, POTASSIUM CHLORIDE, CALCIUM CHLORIDE 600; 310; 30; 20 MG/100ML; MG/100ML; MG/100ML; MG/100ML
INJECTION, SOLUTION INTRAVENOUS CONTINUOUS
Status: CANCELLED | OUTPATIENT
Start: 2019-03-09

## 2019-03-14 ENCOUNTER — ANESTHESIA EVENT (OUTPATIENT)
Dept: OPERATING ROOM | Age: 84
DRG: 470 | End: 2019-03-14
Payer: MEDICARE

## 2019-03-14 ENCOUNTER — HOSPITAL ENCOUNTER (OUTPATIENT)
Dept: PREADMISSION TESTING | Age: 84
Discharge: HOME | End: 2019-03-14

## 2019-03-14 DIAGNOSIS — M17.11 PRIMARY OSTEOARTHRITIS OF RIGHT KNEE: ICD-10-CM

## 2019-03-18 ENCOUNTER — HOSPITAL ENCOUNTER (OUTPATIENT)
Dept: PREADMISSION TESTING | Age: 84
Discharge: HOME | DRG: 470 | End: 2019-03-18
Payer: MEDICARE

## 2019-03-18 VITALS
HEART RATE: 66 BPM | HEIGHT: 62 IN | SYSTOLIC BLOOD PRESSURE: 143 MMHG | BODY MASS INDEX: 27.97 KG/M2 | RESPIRATION RATE: 16 BRPM | WEIGHT: 152 LBS | OXYGEN SATURATION: 98 % | DIASTOLIC BLOOD PRESSURE: 65 MMHG | TEMPERATURE: 98.2 F

## 2019-03-18 DIAGNOSIS — M17.11 PRIMARY OSTEOARTHRITIS OF RIGHT KNEE: ICD-10-CM

## 2019-03-18 LAB
BILIRUBIN URINE: NEGATIVE
BLOOD, URINE: NEGATIVE
CLARITY: CLEAR
COLOR: YELLOW
GLUCOSE URINE: NEGATIVE MG/DL
HCT VFR BLD CALC: 33.6 % (ref 34–48)
HEMOGLOBIN: 10.3 G/DL (ref 11.5–15.5)
KETONES, URINE: ABNORMAL MG/DL
LEUKOCYTE ESTERASE, URINE: NEGATIVE
MCH RBC QN AUTO: 27.6 PG (ref 26–35)
MCHC RBC AUTO-ENTMCNC: 30.7 % (ref 32–34.5)
MCV RBC AUTO: 90.1 FL (ref 80–99.9)
NITRITE, URINE: NEGATIVE
PDW BLD-RTO: 14 FL (ref 11.5–15)
PH UA: 6 (ref 5–9)
PLATELET # BLD: 156 E9/L (ref 130–450)
PMV BLD AUTO: 10.1 FL (ref 7–12)
PROTEIN UA: NEGATIVE MG/DL
RBC # BLD: 3.73 E12/L (ref 3.5–5.5)
SPECIFIC GRAVITY UA: 1.01 (ref 1–1.03)
UROBILINOGEN, URINE: 0.2 E.U./DL
WBC # BLD: 4.6 E9/L (ref 4.5–11.5)

## 2019-03-18 PROCEDURE — 36415 COLL VENOUS BLD VENIPUNCTURE: CPT

## 2019-03-18 PROCEDURE — 81003 URINALYSIS AUTO W/O SCOPE: CPT

## 2019-03-18 PROCEDURE — 87081 CULTURE SCREEN ONLY: CPT

## 2019-03-18 PROCEDURE — 85027 COMPLETE CBC AUTOMATED: CPT

## 2019-03-18 RX ORDER — CELECOXIB 100 MG/1
200 CAPSULE ORAL ONCE
Status: CANCELLED | OUTPATIENT
Start: 2019-03-18 | End: 2019-03-18

## 2019-03-18 RX ORDER — OXYCODONE HYDROCHLORIDE 5 MG/1
5 TABLET ORAL ONCE
Status: CANCELLED | OUTPATIENT
Start: 2019-03-18 | End: 2019-03-18

## 2019-03-18 RX ORDER — ACETAMINOPHEN 500 MG
1000 TABLET ORAL ONCE
Status: CANCELLED | OUTPATIENT
Start: 2019-03-18 | End: 2019-03-18

## 2019-03-18 RX ORDER — MIDAZOLAM HYDROCHLORIDE 1 MG/ML
1 INJECTION INTRAMUSCULAR; INTRAVENOUS EVERY 5 MIN PRN
Status: CANCELLED | OUTPATIENT
Start: 2019-03-18

## 2019-03-18 RX ORDER — LIDOCAINE HYDROCHLORIDE 10 MG/ML
10 INJECTION, SOLUTION INFILTRATION; PERINEURAL
Status: CANCELLED | OUTPATIENT
Start: 2019-03-18 | End: 2019-03-18

## 2019-03-18 RX ORDER — GABAPENTIN 100 MG/1
200 CAPSULE ORAL ONCE
Status: CANCELLED | OUTPATIENT
Start: 2019-03-18 | End: 2019-03-18

## 2019-03-18 RX ORDER — FENTANYL CITRATE 50 UG/ML
100 INJECTION, SOLUTION INTRAMUSCULAR; INTRAVENOUS ONCE
Status: CANCELLED | OUTPATIENT
Start: 2019-03-18 | End: 2019-03-18

## 2019-03-18 RX ORDER — DEXAMETHASONE SODIUM PHOSPHATE 4 MG/ML
4 INJECTION, SOLUTION INTRA-ARTICULAR; INTRALESIONAL; INTRAMUSCULAR; INTRAVENOUS; SOFT TISSUE ONCE
Status: CANCELLED | OUTPATIENT
Start: 2019-03-18 | End: 2019-03-18

## 2019-03-18 RX ORDER — ROPIVACAINE HYDROCHLORIDE 5 MG/ML
30 INJECTION, SOLUTION EPIDURAL; INFILTRATION; PERINEURAL
Status: CANCELLED | OUTPATIENT
Start: 2019-03-18 | End: 2019-03-18

## 2019-03-18 RX ORDER — NYSTATIN 100000 U/G
CREAM TOPICAL
COMMUNITY
Start: 2019-01-08 | End: 2021-01-01

## 2019-03-18 RX ORDER — AZELASTINE HYDROCHLORIDE 0.5 MG/ML
1 SOLUTION/ DROPS OPHTHALMIC 2 TIMES DAILY
Status: ON HOLD | COMMUNITY
Start: 2019-02-14 | End: 2020-01-01 | Stop reason: ALTCHOICE

## 2019-03-18 ASSESSMENT — PAIN DESCRIPTION - PAIN TYPE: TYPE: CHRONIC PAIN

## 2019-03-18 ASSESSMENT — KOOS JR
HOW SEVERE IS YOUR KNEE STIFFNESS AFTER FIRST WAKING IN MORNING: 2
STANDING UPRIGHT: 3
TWISING OR PIVOTING ON KNEE: 4
BENDING TO THE FLOOR TO PICK UP OBJECT: 4
GOING UP OR DOWN STAIRS: 4
RISING FROM SITTING: 3
STRAIGHTENING KNEE FULLY: 2

## 2019-03-18 ASSESSMENT — PAIN SCALES - GENERAL: PAINLEVEL_OUTOF10: 6

## 2019-03-18 ASSESSMENT — PAIN - FUNCTIONAL ASSESSMENT: PAIN_FUNCTIONAL_ASSESSMENT: PREVENTS OR INTERFERES WITH MANY ACTIVE NOT PASSIVE ACTIVITIES

## 2019-03-18 ASSESSMENT — PAIN DESCRIPTION - ORIENTATION: ORIENTATION: RIGHT

## 2019-03-18 ASSESSMENT — PAIN DESCRIPTION - PROGRESSION: CLINICAL_PROGRESSION: GRADUALLY WORSENING

## 2019-03-18 ASSESSMENT — PAIN DESCRIPTION - LOCATION: LOCATION: KNEE

## 2019-03-18 ASSESSMENT — PAIN DESCRIPTION - ONSET: ONSET: ON-GOING

## 2019-03-18 ASSESSMENT — PAIN DESCRIPTION - DESCRIPTORS: DESCRIPTORS: DISCOMFORT

## 2019-03-18 ASSESSMENT — PAIN DESCRIPTION - FREQUENCY: FREQUENCY: CONTINUOUS

## 2019-03-20 LAB — MRSA CULTURE ONLY: NORMAL

## 2019-03-21 ENCOUNTER — ANESTHESIA (OUTPATIENT)
Dept: OPERATING ROOM | Age: 84
DRG: 470 | End: 2019-03-21
Payer: MEDICARE

## 2019-03-21 ENCOUNTER — APPOINTMENT (OUTPATIENT)
Dept: GENERAL RADIOLOGY | Age: 84
DRG: 470 | End: 2019-03-21
Attending: ORTHOPAEDIC SURGERY
Payer: MEDICARE

## 2019-03-21 ENCOUNTER — HOSPITAL ENCOUNTER (INPATIENT)
Age: 84
LOS: 3 days | Discharge: NURSING, REHAB OR LONG-TERM CARE | DRG: 470 | End: 2019-03-24
Attending: ORTHOPAEDIC SURGERY | Admitting: ORTHOPAEDIC SURGERY
Payer: MEDICARE

## 2019-03-21 VITALS — TEMPERATURE: 97.5 F | DIASTOLIC BLOOD PRESSURE: 70 MMHG | SYSTOLIC BLOOD PRESSURE: 142 MMHG | OXYGEN SATURATION: 95 %

## 2019-03-21 DIAGNOSIS — Z96.651 PRESENCE OF RIGHT ARTIFICIAL KNEE JOINT: ICD-10-CM

## 2019-03-21 DIAGNOSIS — M17.11 PRIMARY OSTEOARTHRITIS OF RIGHT KNEE: Primary | ICD-10-CM

## 2019-03-21 LAB — METER GLUCOSE: 102 MG/DL (ref 74–99)

## 2019-03-21 PROCEDURE — 2500000003 HC RX 250 WO HCPCS: Performed by: ORTHOPAEDIC SURGERY

## 2019-03-21 PROCEDURE — 2580000003 HC RX 258: Performed by: NURSE ANESTHETIST, CERTIFIED REGISTERED

## 2019-03-21 PROCEDURE — 88305 TISSUE EXAM BY PATHOLOGIST: CPT

## 2019-03-21 PROCEDURE — 1200000000 HC SEMI PRIVATE

## 2019-03-21 PROCEDURE — 6370000000 HC RX 637 (ALT 250 FOR IP): Performed by: ANESTHESIOLOGY

## 2019-03-21 PROCEDURE — 6360000002 HC RX W HCPCS: Performed by: PHYSICIAN ASSISTANT

## 2019-03-21 PROCEDURE — 88311 DECALCIFY TISSUE: CPT

## 2019-03-21 PROCEDURE — 6360000002 HC RX W HCPCS: Performed by: ANESTHESIOLOGY

## 2019-03-21 PROCEDURE — 3600000005 HC SURGERY LEVEL 5 BASE: Performed by: ORTHOPAEDIC SURGERY

## 2019-03-21 PROCEDURE — 7100000000 HC PACU RECOVERY - FIRST 15 MIN: Performed by: ORTHOPAEDIC SURGERY

## 2019-03-21 PROCEDURE — 82962 GLUCOSE BLOOD TEST: CPT

## 2019-03-21 PROCEDURE — 3E0T3BZ INTRODUCTION OF ANESTHETIC AGENT INTO PERIPHERAL NERVES AND PLEXI, PERCUTANEOUS APPROACH: ICD-10-PCS | Performed by: ORTHOPAEDIC SURGERY

## 2019-03-21 PROCEDURE — 6360000002 HC RX W HCPCS: Performed by: NURSE ANESTHETIST, CERTIFIED REGISTERED

## 2019-03-21 PROCEDURE — C1729 CATH, DRAINAGE: HCPCS | Performed by: ORTHOPAEDIC SURGERY

## 2019-03-21 PROCEDURE — 3700000000 HC ANESTHESIA ATTENDED CARE: Performed by: ORTHOPAEDIC SURGERY

## 2019-03-21 PROCEDURE — 2580000003 HC RX 258: Performed by: ORTHOPAEDIC SURGERY

## 2019-03-21 PROCEDURE — C1776 JOINT DEVICE (IMPLANTABLE): HCPCS | Performed by: ORTHOPAEDIC SURGERY

## 2019-03-21 PROCEDURE — 6360000002 HC RX W HCPCS: Performed by: ORTHOPAEDIC SURGERY

## 2019-03-21 PROCEDURE — 3600000015 HC SURGERY LEVEL 5 ADDTL 15MIN: Performed by: ORTHOPAEDIC SURGERY

## 2019-03-21 PROCEDURE — 2500000003 HC RX 250 WO HCPCS: Performed by: NURSE ANESTHETIST, CERTIFIED REGISTERED

## 2019-03-21 PROCEDURE — 64447 NJX AA&/STRD FEMORAL NRV IMG: CPT | Performed by: ANESTHESIOLOGY

## 2019-03-21 PROCEDURE — 0SRC0J9 REPLACEMENT OF RIGHT KNEE JOINT WITH SYNTHETIC SUBSTITUTE, CEMENTED, OPEN APPROACH: ICD-10-PCS | Performed by: ORTHOPAEDIC SURGERY

## 2019-03-21 PROCEDURE — 73560 X-RAY EXAM OF KNEE 1 OR 2: CPT

## 2019-03-21 PROCEDURE — 6370000000 HC RX 637 (ALT 250 FOR IP): Performed by: ORTHOPAEDIC SURGERY

## 2019-03-21 PROCEDURE — 7100000001 HC PACU RECOVERY - ADDTL 15 MIN: Performed by: ORTHOPAEDIC SURGERY

## 2019-03-21 PROCEDURE — 2709999900 HC NON-CHARGEABLE SUPPLY: Performed by: ORTHOPAEDIC SURGERY

## 2019-03-21 PROCEDURE — C1713 ANCHOR/SCREW BN/BN,TIS/BN: HCPCS | Performed by: ORTHOPAEDIC SURGERY

## 2019-03-21 PROCEDURE — 3700000001 HC ADD 15 MINUTES (ANESTHESIA): Performed by: ORTHOPAEDIC SURGERY

## 2019-03-21 DEVICE — PEG BNE FIX DST FEM KNEE CO CHROM MOLYBDENUM ALLY VANGUARD: Type: IMPLANTABLE DEVICE | Site: KNEE | Status: FUNCTIONAL

## 2019-03-21 DEVICE — COMPONENT PAT DIA31MM THK8MM KNEE TI ALLY S STL UHMWPE 3 PEG: Type: IMPLANTABLE DEVICE | Site: KNEE | Status: FUNCTIONAL

## 2019-03-21 DEVICE — IMPLANTABLE DEVICE: Type: IMPLANTABLE DEVICE | Site: KNEE | Status: FUNCTIONAL

## 2019-03-21 DEVICE — CEMENT BNE 40GM W/ GENT HI VISC RADPQ FOR REV SURG: Type: IMPLANTABLE DEVICE | Site: KNEE | Status: FUNCTIONAL

## 2019-03-21 DEVICE — IMPL KNEE FEM COMP INTERLOK 62.5MM: Type: IMPLANTABLE DEVICE | Site: KNEE | Status: FUNCTIONAL

## 2019-03-21 DEVICE — TRAY TIB L71MM KNEE CO CHROM I BEAM MOD INTLOK CEM VANGUARD: Type: IMPLANTABLE DEVICE | Site: KNEE | Status: FUNCTIONAL

## 2019-03-21 RX ORDER — OXYCODONE HYDROCHLORIDE 5 MG/1
5 TABLET ORAL ONCE
Status: COMPLETED | OUTPATIENT
Start: 2019-03-21 | End: 2019-03-21

## 2019-03-21 RX ORDER — PROPOFOL 10 MG/ML
INJECTION, EMULSION INTRAVENOUS PRN
Status: DISCONTINUED | OUTPATIENT
Start: 2019-03-21 | End: 2019-03-21 | Stop reason: SDUPTHER

## 2019-03-21 RX ORDER — ROPIVACAINE HYDROCHLORIDE 5 MG/ML
INJECTION, SOLUTION EPIDURAL; INFILTRATION; PERINEURAL
Status: COMPLETED | OUTPATIENT
Start: 2019-03-21 | End: 2019-03-21

## 2019-03-21 RX ORDER — SODIUM CHLORIDE 9 MG/ML
INJECTION, SOLUTION INTRAVENOUS CONTINUOUS
Status: DISCONTINUED | OUTPATIENT
Start: 2019-03-21 | End: 2019-03-24 | Stop reason: HOSPADM

## 2019-03-21 RX ORDER — BUPIVACAINE HYDROCHLORIDE 7.5 MG/ML
INJECTION, SOLUTION INTRASPINAL PRN
Status: DISCONTINUED | OUTPATIENT
Start: 2019-03-21 | End: 2019-03-21 | Stop reason: SDUPTHER

## 2019-03-21 RX ORDER — HYDRALAZINE HYDROCHLORIDE 20 MG/ML
INJECTION INTRAMUSCULAR; INTRAVENOUS PRN
Status: DISCONTINUED | OUTPATIENT
Start: 2019-03-21 | End: 2019-03-21 | Stop reason: SDUPTHER

## 2019-03-21 RX ORDER — PROMETHAZINE HYDROCHLORIDE 25 MG/ML
6.25 INJECTION, SOLUTION INTRAMUSCULAR; INTRAVENOUS
Status: DISCONTINUED | OUTPATIENT
Start: 2019-03-21 | End: 2019-03-21 | Stop reason: HOSPADM

## 2019-03-21 RX ORDER — SODIUM CHLORIDE 0.9 % (FLUSH) 0.9 %
10 SYRINGE (ML) INJECTION EVERY 12 HOURS SCHEDULED
Status: DISCONTINUED | OUTPATIENT
Start: 2019-03-21 | End: 2019-03-24 | Stop reason: HOSPADM

## 2019-03-21 RX ORDER — FENTANYL CITRATE 50 UG/ML
100 INJECTION, SOLUTION INTRAMUSCULAR; INTRAVENOUS ONCE
Status: COMPLETED | OUTPATIENT
Start: 2019-03-21 | End: 2019-03-21

## 2019-03-21 RX ORDER — SODIUM CHLORIDE 0.9 % (FLUSH) 0.9 %
10 SYRINGE (ML) INJECTION PRN
Status: DISCONTINUED | OUTPATIENT
Start: 2019-03-21 | End: 2019-03-24 | Stop reason: HOSPADM

## 2019-03-21 RX ORDER — PROPOFOL 10 MG/ML
INJECTION, EMULSION INTRAVENOUS CONTINUOUS PRN
Status: DISCONTINUED | OUTPATIENT
Start: 2019-03-21 | End: 2019-03-21 | Stop reason: SDUPTHER

## 2019-03-21 RX ORDER — LIDOCAINE HYDROCHLORIDE 10 MG/ML
10 INJECTION, SOLUTION INFILTRATION; PERINEURAL
Status: DISCONTINUED | OUTPATIENT
Start: 2019-03-21 | End: 2019-03-21 | Stop reason: HOSPADM

## 2019-03-21 RX ORDER — ONDANSETRON 2 MG/ML
INJECTION INTRAMUSCULAR; INTRAVENOUS PRN
Status: DISCONTINUED | OUTPATIENT
Start: 2019-03-21 | End: 2019-03-21 | Stop reason: SDUPTHER

## 2019-03-21 RX ORDER — DOCUSATE SODIUM 100 MG/1
100 CAPSULE, LIQUID FILLED ORAL 2 TIMES DAILY
Status: DISCONTINUED | OUTPATIENT
Start: 2019-03-21 | End: 2019-03-24 | Stop reason: HOSPADM

## 2019-03-21 RX ORDER — DEXAMETHASONE SODIUM PHOSPHATE 4 MG/ML
INJECTION, SOLUTION INTRA-ARTICULAR; INTRALESIONAL; INTRAMUSCULAR; INTRAVENOUS; SOFT TISSUE PRN
Status: DISCONTINUED | OUTPATIENT
Start: 2019-03-21 | End: 2019-03-21 | Stop reason: SDUPTHER

## 2019-03-21 RX ORDER — ROPIVACAINE HYDROCHLORIDE 5 MG/ML
30 INJECTION, SOLUTION EPIDURAL; INFILTRATION; PERINEURAL
Status: COMPLETED | OUTPATIENT
Start: 2019-03-21 | End: 2019-03-21

## 2019-03-21 RX ORDER — CELECOXIB 100 MG/1
200 CAPSULE ORAL ONCE
Status: COMPLETED | OUTPATIENT
Start: 2019-03-21 | End: 2019-03-21

## 2019-03-21 RX ORDER — HYDROCHLOROTHIAZIDE 25 MG/1
25 TABLET ORAL DAILY
Status: DISCONTINUED | OUTPATIENT
Start: 2019-03-21 | End: 2019-03-24 | Stop reason: HOSPADM

## 2019-03-21 RX ORDER — CLONIDINE HYDROCHLORIDE 0.2 MG/1
0.2 TABLET ORAL 2 TIMES DAILY
Status: DISCONTINUED | OUTPATIENT
Start: 2019-03-21 | End: 2019-03-24 | Stop reason: HOSPADM

## 2019-03-21 RX ORDER — AMLODIPINE BESYLATE 10 MG/1
10 TABLET ORAL DAILY
Status: DISCONTINUED | OUTPATIENT
Start: 2019-03-22 | End: 2019-03-24 | Stop reason: HOSPADM

## 2019-03-21 RX ORDER — MIDAZOLAM HYDROCHLORIDE 1 MG/ML
1 INJECTION INTRAMUSCULAR; INTRAVENOUS EVERY 5 MIN PRN
Status: DISCONTINUED | OUTPATIENT
Start: 2019-03-21 | End: 2019-03-21 | Stop reason: HOSPADM

## 2019-03-21 RX ORDER — LOSARTAN POTASSIUM 50 MG/1
100 TABLET ORAL DAILY
Status: DISCONTINUED | OUTPATIENT
Start: 2019-03-21 | End: 2019-03-24 | Stop reason: HOSPADM

## 2019-03-21 RX ORDER — SODIUM CHLORIDE, SODIUM LACTATE, POTASSIUM CHLORIDE, CALCIUM CHLORIDE 600; 310; 30; 20 MG/100ML; MG/100ML; MG/100ML; MG/100ML
INJECTION, SOLUTION INTRAVENOUS CONTINUOUS
Status: DISCONTINUED | OUTPATIENT
Start: 2019-03-21 | End: 2019-03-21

## 2019-03-21 RX ORDER — DEXAMETHASONE SODIUM PHOSPHATE 10 MG/ML
4 INJECTION, SOLUTION INTRAMUSCULAR; INTRAVENOUS ONCE
Status: COMPLETED | OUTPATIENT
Start: 2019-03-21 | End: 2019-03-21

## 2019-03-21 RX ORDER — FENTANYL CITRATE 50 UG/ML
INJECTION, SOLUTION INTRAMUSCULAR; INTRAVENOUS PRN
Status: DISCONTINUED | OUTPATIENT
Start: 2019-03-21 | End: 2019-03-21 | Stop reason: SDUPTHER

## 2019-03-21 RX ORDER — MORPHINE SULFATE 2 MG/ML
2 INJECTION, SOLUTION INTRAMUSCULAR; INTRAVENOUS EVERY 4 HOURS PRN
Status: ACTIVE | OUTPATIENT
Start: 2019-03-21 | End: 2019-03-22

## 2019-03-21 RX ORDER — SENNA AND DOCUSATE SODIUM 50; 8.6 MG/1; MG/1
2 TABLET, FILM COATED ORAL NIGHTLY
Status: DISCONTINUED | OUTPATIENT
Start: 2019-03-21 | End: 2019-03-24 | Stop reason: HOSPADM

## 2019-03-21 RX ORDER — FENTANYL CITRATE 50 UG/ML
50 INJECTION, SOLUTION INTRAMUSCULAR; INTRAVENOUS EVERY 5 MIN PRN
Status: DISCONTINUED | OUTPATIENT
Start: 2019-03-21 | End: 2019-03-21 | Stop reason: HOSPADM

## 2019-03-21 RX ORDER — ONDANSETRON 2 MG/ML
4 INJECTION INTRAMUSCULAR; INTRAVENOUS EVERY 6 HOURS PRN
Status: DISCONTINUED | OUTPATIENT
Start: 2019-03-21 | End: 2019-03-24 | Stop reason: HOSPADM

## 2019-03-21 RX ORDER — VANCOMYCIN HYDROCHLORIDE 1 G/20ML
INJECTION, POWDER, LYOPHILIZED, FOR SOLUTION INTRAVENOUS PRN
Status: DISCONTINUED | OUTPATIENT
Start: 2019-03-21 | End: 2019-03-21 | Stop reason: ALTCHOICE

## 2019-03-21 RX ORDER — HYDRALAZINE HYDROCHLORIDE 50 MG/1
50 TABLET, FILM COATED ORAL EVERY 8 HOURS
Status: DISCONTINUED | OUTPATIENT
Start: 2019-03-21 | End: 2019-03-24 | Stop reason: HOSPADM

## 2019-03-21 RX ORDER — MEPERIDINE HYDROCHLORIDE 25 MG/ML
12.5 INJECTION INTRAMUSCULAR; INTRAVENOUS; SUBCUTANEOUS EVERY 10 MIN PRN
Status: DISCONTINUED | OUTPATIENT
Start: 2019-03-21 | End: 2019-03-21 | Stop reason: HOSPADM

## 2019-03-21 RX ORDER — GABAPENTIN 100 MG/1
200 CAPSULE ORAL ONCE
Status: COMPLETED | OUTPATIENT
Start: 2019-03-21 | End: 2019-03-21

## 2019-03-21 RX ORDER — LEVOTHYROXINE SODIUM 0.05 MG/1
50 TABLET ORAL DAILY
Status: DISCONTINUED | OUTPATIENT
Start: 2019-03-21 | End: 2019-03-24 | Stop reason: HOSPADM

## 2019-03-21 RX ORDER — CEFAZOLIN SODIUM 2 G/50ML
2 SOLUTION INTRAVENOUS
Status: COMPLETED | OUTPATIENT
Start: 2019-03-21 | End: 2019-03-21

## 2019-03-21 RX ORDER — HYDROCODONE BITARTRATE AND ACETAMINOPHEN 5; 325 MG/1; MG/1
2 TABLET ORAL EVERY 4 HOURS PRN
Status: DISCONTINUED | OUTPATIENT
Start: 2019-03-21 | End: 2019-03-24 | Stop reason: HOSPADM

## 2019-03-21 RX ORDER — OXYCODONE HYDROCHLORIDE AND ACETAMINOPHEN 5; 325 MG/1; MG/1
1 TABLET ORAL
Status: DISCONTINUED | OUTPATIENT
Start: 2019-03-21 | End: 2019-03-21 | Stop reason: HOSPADM

## 2019-03-21 RX ORDER — HYDROCODONE BITARTRATE AND ACETAMINOPHEN 5; 325 MG/1; MG/1
1 TABLET ORAL EVERY 4 HOURS PRN
Status: DISCONTINUED | OUTPATIENT
Start: 2019-03-21 | End: 2019-03-24 | Stop reason: HOSPADM

## 2019-03-21 RX ORDER — ACETAMINOPHEN 500 MG
1000 TABLET ORAL ONCE
Status: COMPLETED | OUTPATIENT
Start: 2019-03-21 | End: 2019-03-21

## 2019-03-21 RX ORDER — SODIUM CHLORIDE 9 MG/ML
INJECTION, SOLUTION INTRAVENOUS CONTINUOUS PRN
Status: DISCONTINUED | OUTPATIENT
Start: 2019-03-21 | End: 2019-03-21 | Stop reason: SDUPTHER

## 2019-03-21 RX ORDER — CEFAZOLIN SODIUM 2 G/50ML
2 SOLUTION INTRAVENOUS EVERY 8 HOURS
Status: COMPLETED | OUTPATIENT
Start: 2019-03-21 | End: 2019-03-22

## 2019-03-21 RX ORDER — TIZANIDINE 4 MG/1
2 TABLET ORAL NIGHTLY PRN
Status: DISCONTINUED | OUTPATIENT
Start: 2019-03-21 | End: 2019-03-24 | Stop reason: HOSPADM

## 2019-03-21 RX ORDER — GLIMEPIRIDE 2 MG/1
1 TABLET ORAL
Status: DISCONTINUED | OUTPATIENT
Start: 2019-03-22 | End: 2019-03-24 | Stop reason: HOSPADM

## 2019-03-21 RX ADMIN — HYDRALAZINE HYDROCHLORIDE 6 MG: 20 INJECTION INTRAMUSCULAR; INTRAVENOUS at 12:50

## 2019-03-21 RX ADMIN — DEXAMETHASONE SODIUM PHOSPHATE 8 MG: 4 INJECTION, SOLUTION INTRAMUSCULAR; INTRAVENOUS at 12:20

## 2019-03-21 RX ADMIN — FENTANYL CITRATE 25 MCG: 50 INJECTION, SOLUTION INTRAMUSCULAR; INTRAVENOUS at 12:59

## 2019-03-21 RX ADMIN — TRANEXAMIC ACID 1000 MG: 1 INJECTION, SOLUTION INTRAVENOUS at 14:38

## 2019-03-21 RX ADMIN — ONDANSETRON HYDROCHLORIDE 4 MG: 2 INJECTION, SOLUTION INTRAMUSCULAR; INTRAVENOUS at 13:30

## 2019-03-21 RX ADMIN — HYDRALAZINE HYDROCHLORIDE 4 MG: 20 INJECTION INTRAMUSCULAR; INTRAVENOUS at 13:00

## 2019-03-21 RX ADMIN — HYDRALAZINE HYDROCHLORIDE 50 MG: 50 TABLET, FILM COATED ORAL at 18:23

## 2019-03-21 RX ADMIN — BUPIVACAINE HYDROCHLORIDE IN DEXTROSE 1.8 ML: 7.5 INJECTION, SOLUTION SUBARACHNOID at 11:54

## 2019-03-21 RX ADMIN — ROPIVACAINE HYDROCHLORIDE 30 ML: 5 INJECTION, SOLUTION EPIDURAL; INFILTRATION; PERINEURAL at 11:03

## 2019-03-21 RX ADMIN — SODIUM CHLORIDE: 9 INJECTION, SOLUTION INTRAVENOUS at 11:42

## 2019-03-21 RX ADMIN — SODIUM CHLORIDE: 9 INJECTION, SOLUTION INTRAVENOUS at 13:10

## 2019-03-21 RX ADMIN — FENTANYL CITRATE 25 MCG: 50 INJECTION, SOLUTION INTRAMUSCULAR; INTRAVENOUS at 12:50

## 2019-03-21 RX ADMIN — CEFAZOLIN SODIUM 2 G: 2 SOLUTION INTRAVENOUS at 19:02

## 2019-03-21 RX ADMIN — PROPOFOL 30 MG: 10 INJECTION, EMULSION INTRAVENOUS at 12:28

## 2019-03-21 RX ADMIN — HYDRALAZINE HYDROCHLORIDE 6 MG: 20 INJECTION INTRAMUSCULAR; INTRAVENOUS at 12:33

## 2019-03-21 RX ADMIN — FENTANYL CITRATE 25 MCG: 50 INJECTION, SOLUTION INTRAMUSCULAR; INTRAVENOUS at 12:38

## 2019-03-21 RX ADMIN — FENTANYL CITRATE 50 MCG: 50 INJECTION INTRAMUSCULAR; INTRAVENOUS at 14:47

## 2019-03-21 RX ADMIN — CELECOXIB 200 MG: 100 CAPSULE ORAL at 10:31

## 2019-03-21 RX ADMIN — GABAPENTIN 200 MG: 100 CAPSULE ORAL at 10:31

## 2019-03-21 RX ADMIN — MIDAZOLAM HYDROCHLORIDE 0.5 MG: 1 INJECTION, SOLUTION INTRAMUSCULAR; INTRAVENOUS at 10:55

## 2019-03-21 RX ADMIN — ROPIVACAINE HYDROCHLORIDE 30 ML: 5 INJECTION, SOLUTION EPIDURAL; INFILTRATION; PERINEURAL at 11:00

## 2019-03-21 RX ADMIN — HYDRALAZINE HYDROCHLORIDE 4 MG: 20 INJECTION INTRAMUSCULAR; INTRAVENOUS at 12:39

## 2019-03-21 RX ADMIN — PROPOFOL 50 MCG/KG/MIN: 10 INJECTION, EMULSION INTRAVENOUS at 12:01

## 2019-03-21 RX ADMIN — DEXAMETHASONE SODIUM PHOSPHATE 4 MG: 10 INJECTION INTRAMUSCULAR; INTRAVENOUS at 11:00

## 2019-03-21 RX ADMIN — PROPOFOL 60 MG: 10 INJECTION, EMULSION INTRAVENOUS at 12:01

## 2019-03-21 RX ADMIN — FENTANYL CITRATE 25 MCG: 50 INJECTION, SOLUTION INTRAMUSCULAR; INTRAVENOUS at 12:35

## 2019-03-21 RX ADMIN — OXYCODONE HYDROCHLORIDE 5 MG: 5 TABLET ORAL at 10:30

## 2019-03-21 RX ADMIN — CEFAZOLIN SODIUM 2 G: 2 SOLUTION INTRAVENOUS at 11:42

## 2019-03-21 RX ADMIN — SODIUM CHLORIDE: 9 INJECTION, SOLUTION INTRAVENOUS at 18:11

## 2019-03-21 RX ADMIN — DOCUSATE SODIUM 100 MG: 100 CAPSULE, LIQUID FILLED ORAL at 21:27

## 2019-03-21 RX ADMIN — FENTANYL CITRATE 25 MCG: 50 INJECTION INTRAMUSCULAR; INTRAVENOUS at 10:55

## 2019-03-21 RX ADMIN — SENNOSIDES AND DOCUSATE SODIUM 2 TABLET: 8.6; 5 TABLET ORAL at 21:27

## 2019-03-21 RX ADMIN — ACETAMINOPHEN 1000 MG: 500 TABLET ORAL at 10:31

## 2019-03-21 RX ADMIN — CLONIDINE HYDROCHLORIDE 0.2 MG: 0.2 TABLET ORAL at 21:27

## 2019-03-21 ASSESSMENT — PULMONARY FUNCTION TESTS
PIF_VALUE: 0
PIF_VALUE: 0
PIF_VALUE: 1
PIF_VALUE: 0
PIF_VALUE: 0
PIF_VALUE: 1
PIF_VALUE: 1
PIF_VALUE: 0
PIF_VALUE: 1
PIF_VALUE: 0
PIF_VALUE: 1
PIF_VALUE: 0
PIF_VALUE: 0
PIF_VALUE: 1
PIF_VALUE: 0
PIF_VALUE: 1
PIF_VALUE: 0
PIF_VALUE: 1
PIF_VALUE: 0
PIF_VALUE: 1
PIF_VALUE: 0
PIF_VALUE: 1
PIF_VALUE: 1
PIF_VALUE: 0
PIF_VALUE: 1
PIF_VALUE: 0
PIF_VALUE: 1
PIF_VALUE: 0
PIF_VALUE: 1
PIF_VALUE: 0
PIF_VALUE: 1
PIF_VALUE: 0
PIF_VALUE: 1
PIF_VALUE: 1
PIF_VALUE: 0
PIF_VALUE: 1
PIF_VALUE: 0
PIF_VALUE: 0
PIF_VALUE: 1
PIF_VALUE: 0
PIF_VALUE: 0
PIF_VALUE: 1
PIF_VALUE: 0
PIF_VALUE: 1
PIF_VALUE: 1
PIF_VALUE: 0
PIF_VALUE: 0
PIF_VALUE: 1
PIF_VALUE: 1
PIF_VALUE: 0
PIF_VALUE: 0
PIF_VALUE: 1
PIF_VALUE: 0
PIF_VALUE: 1
PIF_VALUE: 1
PIF_VALUE: 0
PIF_VALUE: 1
PIF_VALUE: 0
PIF_VALUE: 1
PIF_VALUE: 0
PIF_VALUE: 1
PIF_VALUE: 0
PIF_VALUE: 0
PIF_VALUE: 1
PIF_VALUE: 0
PIF_VALUE: 1
PIF_VALUE: 0
PIF_VALUE: 1
PIF_VALUE: 0
PIF_VALUE: 1
PIF_VALUE: 0
PIF_VALUE: 1
PIF_VALUE: 0
PIF_VALUE: 1
PIF_VALUE: 1
PIF_VALUE: 0
PIF_VALUE: 1
PIF_VALUE: 0
PIF_VALUE: 1
PIF_VALUE: 1
PIF_VALUE: 0
PIF_VALUE: 1
PIF_VALUE: 0
PIF_VALUE: 0
PIF_VALUE: 1
PIF_VALUE: 0
PIF_VALUE: 1
PIF_VALUE: 1
PIF_VALUE: 0

## 2019-03-21 ASSESSMENT — PAIN DESCRIPTION - PAIN TYPE
TYPE: SURGICAL PAIN

## 2019-03-21 ASSESSMENT — PAIN DESCRIPTION - ORIENTATION
ORIENTATION: RIGHT

## 2019-03-21 ASSESSMENT — PAIN DESCRIPTION - FREQUENCY
FREQUENCY: CONTINUOUS

## 2019-03-21 ASSESSMENT — PAIN DESCRIPTION - DESCRIPTORS
DESCRIPTORS: ACHING

## 2019-03-21 ASSESSMENT — PAIN DESCRIPTION - LOCATION
LOCATION: KNEE

## 2019-03-21 ASSESSMENT — PAIN SCALES - GENERAL
PAINLEVEL_OUTOF10: 5
PAINLEVEL_OUTOF10: 5
PAINLEVEL_OUTOF10: 0
PAINLEVEL_OUTOF10: 0
PAINLEVEL_OUTOF10: 4
PAINLEVEL_OUTOF10: 6
PAINLEVEL_OUTOF10: 0
PAINLEVEL_OUTOF10: 5

## 2019-03-22 PROBLEM — D62 ACUTE BLOOD LOSS ANEMIA: Status: ACTIVE | Noted: 2019-03-22

## 2019-03-22 LAB
ANION GAP SERPL CALCULATED.3IONS-SCNC: 8 MMOL/L (ref 7–16)
BUN BLDV-MCNC: 26 MG/DL (ref 8–23)
CALCIUM SERPL-MCNC: 8.4 MG/DL (ref 8.6–10.2)
CHLORIDE BLD-SCNC: 107 MMOL/L (ref 98–107)
CO2: 23 MMOL/L (ref 22–29)
CREAT SERPL-MCNC: 0.9 MG/DL (ref 0.5–1)
FERRITIN: 54 NG/ML
GFR AFRICAN AMERICAN: >60
GFR NON-AFRICAN AMERICAN: >60 ML/MIN/1.73
GLUCOSE BLD-MCNC: 216 MG/DL (ref 74–99)
HBA1C MFR BLD: 6.1 % (ref 4–5.6)
HCT VFR BLD CALC: 26.1 % (ref 34–48)
HEMOGLOBIN: 8.1 G/DL (ref 11.5–15.5)
IRON SATURATION: 29 % (ref 15–50)
IRON: 73 MCG/DL (ref 37–145)
MCH RBC QN AUTO: 27.7 PG (ref 26–35)
MCHC RBC AUTO-ENTMCNC: 31 % (ref 32–34.5)
MCV RBC AUTO: 89.4 FL (ref 80–99.9)
METER GLUCOSE: 175 MG/DL (ref 74–99)
METER GLUCOSE: 250 MG/DL (ref 74–99)
METER GLUCOSE: 66 MG/DL (ref 74–99)
METER GLUCOSE: 84 MG/DL (ref 74–99)
METER GLUCOSE: 94 MG/DL (ref 74–99)
PDW BLD-RTO: 14 FL (ref 11.5–15)
PLATELET # BLD: 138 E9/L (ref 130–450)
PMV BLD AUTO: 10.7 FL (ref 7–12)
POTASSIUM SERPL-SCNC: 5 MMOL/L (ref 3.5–5)
RBC # BLD: 2.92 E12/L (ref 3.5–5.5)
SODIUM BLD-SCNC: 138 MMOL/L (ref 132–146)
TOTAL IRON BINDING CAPACITY: 249 MCG/DL (ref 250–450)
WBC # BLD: 7.2 E9/L (ref 4.5–11.5)

## 2019-03-22 PROCEDURE — 80048 BASIC METABOLIC PNL TOTAL CA: CPT

## 2019-03-22 PROCEDURE — 82728 ASSAY OF FERRITIN: CPT

## 2019-03-22 PROCEDURE — 85027 COMPLETE CBC AUTOMATED: CPT

## 2019-03-22 PROCEDURE — 6370000000 HC RX 637 (ALT 250 FOR IP): Performed by: ORTHOPAEDIC SURGERY

## 2019-03-22 PROCEDURE — 1200000000 HC SEMI PRIVATE

## 2019-03-22 PROCEDURE — 97530 THERAPEUTIC ACTIVITIES: CPT

## 2019-03-22 PROCEDURE — 97165 OT EVAL LOW COMPLEX 30 MIN: CPT

## 2019-03-22 PROCEDURE — 36415 COLL VENOUS BLD VENIPUNCTURE: CPT

## 2019-03-22 PROCEDURE — 2500000003 HC RX 250 WO HCPCS: Performed by: ORTHOPAEDIC SURGERY

## 2019-03-22 PROCEDURE — 97110 THERAPEUTIC EXERCISES: CPT

## 2019-03-22 PROCEDURE — 6370000000 HC RX 637 (ALT 250 FOR IP): Performed by: INTERNAL MEDICINE

## 2019-03-22 PROCEDURE — 97535 SELF CARE MNGMENT TRAINING: CPT

## 2019-03-22 PROCEDURE — 83550 IRON BINDING TEST: CPT

## 2019-03-22 PROCEDURE — 83540 ASSAY OF IRON: CPT

## 2019-03-22 PROCEDURE — 82962 GLUCOSE BLOOD TEST: CPT

## 2019-03-22 PROCEDURE — 97161 PT EVAL LOW COMPLEX 20 MIN: CPT

## 2019-03-22 PROCEDURE — 6360000002 HC RX W HCPCS: Performed by: ORTHOPAEDIC SURGERY

## 2019-03-22 PROCEDURE — 83036 HEMOGLOBIN GLYCOSYLATED A1C: CPT

## 2019-03-22 PROCEDURE — 2700000000 HC OXYGEN THERAPY PER DAY

## 2019-03-22 RX ORDER — DEXTROSE MONOHYDRATE 25 G/50ML
12.5 INJECTION, SOLUTION INTRAVENOUS PRN
Status: DISCONTINUED | OUTPATIENT
Start: 2019-03-22 | End: 2019-03-24 | Stop reason: HOSPADM

## 2019-03-22 RX ORDER — NICOTINE POLACRILEX 4 MG
15 LOZENGE BUCCAL PRN
Status: DISCONTINUED | OUTPATIENT
Start: 2019-03-22 | End: 2019-03-24 | Stop reason: HOSPADM

## 2019-03-22 RX ORDER — DEXTROSE MONOHYDRATE 50 MG/ML
100 INJECTION, SOLUTION INTRAVENOUS PRN
Status: DISCONTINUED | OUTPATIENT
Start: 2019-03-22 | End: 2019-03-24 | Stop reason: HOSPADM

## 2019-03-22 RX ADMIN — HYDROCODONE BITARTRATE AND ACETAMINOPHEN 2 TABLET: 5; 325 TABLET ORAL at 21:16

## 2019-03-22 RX ADMIN — HYDRALAZINE HYDROCHLORIDE 50 MG: 50 TABLET, FILM COATED ORAL at 09:30

## 2019-03-22 RX ADMIN — CLONIDINE HYDROCHLORIDE 0.2 MG: 0.2 TABLET ORAL at 21:15

## 2019-03-22 RX ADMIN — SENNOSIDES AND DOCUSATE SODIUM 2 TABLET: 8.6; 5 TABLET ORAL at 21:16

## 2019-03-22 RX ADMIN — FAMOTIDINE 20 MG: 10 INJECTION, SOLUTION INTRAVENOUS at 09:30

## 2019-03-22 RX ADMIN — HYDRALAZINE HYDROCHLORIDE 50 MG: 50 TABLET, FILM COATED ORAL at 17:44

## 2019-03-22 RX ADMIN — INSULIN LISPRO 1 UNITS: 100 INJECTION, SOLUTION INTRAVENOUS; SUBCUTANEOUS at 21:17

## 2019-03-22 RX ADMIN — LEVOTHYROXINE SODIUM 50 MCG: 50 TABLET ORAL at 06:14

## 2019-03-22 RX ADMIN — APIXABAN 2.5 MG: 2.5 TABLET, FILM COATED ORAL at 21:15

## 2019-03-22 RX ADMIN — GLIMEPIRIDE 1 MG: 2 TABLET ORAL at 06:13

## 2019-03-22 RX ADMIN — AMLODIPINE BESYLATE 10 MG: 10 TABLET ORAL at 09:30

## 2019-03-22 RX ADMIN — HYDRALAZINE HYDROCHLORIDE 50 MG: 50 TABLET, FILM COATED ORAL at 03:29

## 2019-03-22 RX ADMIN — CLONIDINE HYDROCHLORIDE 0.2 MG: 0.2 TABLET ORAL at 09:30

## 2019-03-22 RX ADMIN — HYDROCHLOROTHIAZIDE 25 MG: 25 TABLET ORAL at 09:30

## 2019-03-22 RX ADMIN — HYDROCODONE BITARTRATE AND ACETAMINOPHEN 2 TABLET: 5; 325 TABLET ORAL at 06:14

## 2019-03-22 RX ADMIN — DOCUSATE SODIUM 100 MG: 100 CAPSULE, LIQUID FILLED ORAL at 21:15

## 2019-03-22 RX ADMIN — LOSARTAN POTASSIUM 100 MG: 50 TABLET, FILM COATED ORAL at 09:30

## 2019-03-22 RX ADMIN — CEFAZOLIN SODIUM 2 G: 2 SOLUTION INTRAVENOUS at 03:29

## 2019-03-22 RX ADMIN — APIXABAN 2.5 MG: 2.5 TABLET, FILM COATED ORAL at 09:30

## 2019-03-22 RX ADMIN — DOCUSATE SODIUM 100 MG: 100 CAPSULE, LIQUID FILLED ORAL at 09:30

## 2019-03-22 RX ADMIN — INSULIN LISPRO 6 UNITS: 100 INJECTION, SOLUTION INTRAVENOUS; SUBCUTANEOUS at 09:29

## 2019-03-22 RX ADMIN — HYDROCODONE BITARTRATE AND ACETAMINOPHEN 2 TABLET: 5; 325 TABLET ORAL at 12:24

## 2019-03-22 ASSESSMENT — PAIN DESCRIPTION - PAIN TYPE
TYPE: SURGICAL PAIN

## 2019-03-22 ASSESSMENT — PAIN DESCRIPTION - DESCRIPTORS
DESCRIPTORS: ACHING
DESCRIPTORS: DISCOMFORT;SORE;TENDER
DESCRIPTORS: ACHING

## 2019-03-22 ASSESSMENT — PAIN SCALES - GENERAL
PAINLEVEL_OUTOF10: 0
PAINLEVEL_OUTOF10: 7
PAINLEVEL_OUTOF10: 0
PAINLEVEL_OUTOF10: 7
PAINLEVEL_OUTOF10: 0
PAINLEVEL_OUTOF10: 0
PAINLEVEL_OUTOF10: 7
PAINLEVEL_OUTOF10: 0

## 2019-03-22 ASSESSMENT — PAIN DESCRIPTION - ORIENTATION
ORIENTATION: LEFT
ORIENTATION: RIGHT
ORIENTATION: RIGHT

## 2019-03-22 ASSESSMENT — PAIN DESCRIPTION - LOCATION
LOCATION: KNEE

## 2019-03-22 ASSESSMENT — PAIN DESCRIPTION - FREQUENCY
FREQUENCY: CONTINUOUS
FREQUENCY: CONTINUOUS
FREQUENCY: INTERMITTENT

## 2019-03-22 ASSESSMENT — PAIN - FUNCTIONAL ASSESSMENT
PAIN_FUNCTIONAL_ASSESSMENT: ACTIVITIES ARE NOT PREVENTED
PAIN_FUNCTIONAL_ASSESSMENT: PREVENTS OR INTERFERES SOME ACTIVE ACTIVITIES AND ADLS
PAIN_FUNCTIONAL_ASSESSMENT: ACTIVITIES ARE NOT PREVENTED
PAIN_FUNCTIONAL_ASSESSMENT: PREVENTS OR INTERFERES SOME ACTIVE ACTIVITIES AND ADLS
PAIN_FUNCTIONAL_ASSESSMENT: ACTIVITIES ARE NOT PREVENTED

## 2019-03-22 ASSESSMENT — PAIN DESCRIPTION - ONSET
ONSET: ON-GOING

## 2019-03-22 ASSESSMENT — PAIN DESCRIPTION - PROGRESSION
CLINICAL_PROGRESSION: GRADUALLY WORSENING
CLINICAL_PROGRESSION: GRADUALLY WORSENING

## 2019-03-23 LAB
ANION GAP SERPL CALCULATED.3IONS-SCNC: 7 MMOL/L (ref 7–16)
BUN BLDV-MCNC: 27 MG/DL (ref 8–23)
CALCIUM SERPL-MCNC: 8.4 MG/DL (ref 8.6–10.2)
CHLORIDE BLD-SCNC: 107 MMOL/L (ref 98–107)
CO2: 24 MMOL/L (ref 22–29)
CREAT SERPL-MCNC: 1 MG/DL (ref 0.5–1)
GFR AFRICAN AMERICAN: >60
GFR NON-AFRICAN AMERICAN: >60 ML/MIN/1.73
GLUCOSE BLD-MCNC: 86 MG/DL (ref 74–99)
HCT VFR BLD CALC: 24.7 % (ref 34–48)
HEMOGLOBIN: 7.6 G/DL (ref 11.5–15.5)
MCH RBC QN AUTO: 27.4 PG (ref 26–35)
MCHC RBC AUTO-ENTMCNC: 30.8 % (ref 32–34.5)
MCV RBC AUTO: 89.2 FL (ref 80–99.9)
METER GLUCOSE: 167 MG/DL (ref 74–99)
METER GLUCOSE: 208 MG/DL (ref 74–99)
METER GLUCOSE: 76 MG/DL (ref 74–99)
METER GLUCOSE: 85 MG/DL (ref 74–99)
PDW BLD-RTO: 14 FL (ref 11.5–15)
PLATELET # BLD: 121 E9/L (ref 130–450)
PMV BLD AUTO: 10.8 FL (ref 7–12)
POTASSIUM SERPL-SCNC: 4.2 MMOL/L (ref 3.5–5)
RBC # BLD: 2.77 E12/L (ref 3.5–5.5)
SODIUM BLD-SCNC: 138 MMOL/L (ref 132–146)
WBC # BLD: 4.2 E9/L (ref 4.5–11.5)

## 2019-03-23 PROCEDURE — 80048 BASIC METABOLIC PNL TOTAL CA: CPT

## 2019-03-23 PROCEDURE — 97535 SELF CARE MNGMENT TRAINING: CPT

## 2019-03-23 PROCEDURE — 97140 MANUAL THERAPY 1/> REGIONS: CPT

## 2019-03-23 PROCEDURE — 82962 GLUCOSE BLOOD TEST: CPT

## 2019-03-23 PROCEDURE — 97530 THERAPEUTIC ACTIVITIES: CPT

## 2019-03-23 PROCEDURE — 6370000000 HC RX 637 (ALT 250 FOR IP): Performed by: ORTHOPAEDIC SURGERY

## 2019-03-23 PROCEDURE — 36415 COLL VENOUS BLD VENIPUNCTURE: CPT

## 2019-03-23 PROCEDURE — 2500000003 HC RX 250 WO HCPCS: Performed by: ORTHOPAEDIC SURGERY

## 2019-03-23 PROCEDURE — 6370000000 HC RX 637 (ALT 250 FOR IP): Performed by: INTERNAL MEDICINE

## 2019-03-23 PROCEDURE — 1200000000 HC SEMI PRIVATE

## 2019-03-23 PROCEDURE — 85027 COMPLETE CBC AUTOMATED: CPT

## 2019-03-23 PROCEDURE — 97116 GAIT TRAINING THERAPY: CPT

## 2019-03-23 PROCEDURE — 97110 THERAPEUTIC EXERCISES: CPT

## 2019-03-23 PROCEDURE — 2580000003 HC RX 258: Performed by: ORTHOPAEDIC SURGERY

## 2019-03-23 RX ORDER — HYDROCODONE BITARTRATE AND ACETAMINOPHEN 5; 325 MG/1; MG/1
1 TABLET ORAL EVERY 6 HOURS PRN
Qty: 28 TABLET | Refills: 0 | Status: SHIPPED | OUTPATIENT
Start: 2019-03-23 | End: 2019-03-30

## 2019-03-23 RX ORDER — SENNA AND DOCUSATE SODIUM 50; 8.6 MG/1; MG/1
2 TABLET, FILM COATED ORAL NIGHTLY
Qty: 60 TABLET | Refills: 0 | Status: SHIPPED | OUTPATIENT
Start: 2019-03-23 | End: 2019-04-22

## 2019-03-23 RX ADMIN — APIXABAN 2.5 MG: 2.5 TABLET, FILM COATED ORAL at 20:47

## 2019-03-23 RX ADMIN — HYDRALAZINE HYDROCHLORIDE 50 MG: 50 TABLET, FILM COATED ORAL at 20:46

## 2019-03-23 RX ADMIN — SENNOSIDES AND DOCUSATE SODIUM 2 TABLET: 8.6; 5 TABLET ORAL at 20:47

## 2019-03-23 RX ADMIN — AMLODIPINE BESYLATE 10 MG: 10 TABLET ORAL at 08:55

## 2019-03-23 RX ADMIN — INSULIN LISPRO 4 UNITS: 100 INJECTION, SOLUTION INTRAVENOUS; SUBCUTANEOUS at 12:38

## 2019-03-23 RX ADMIN — HYDROCODONE BITARTRATE AND ACETAMINOPHEN 2 TABLET: 5; 325 TABLET ORAL at 02:02

## 2019-03-23 RX ADMIN — HYDROCODONE BITARTRATE AND ACETAMINOPHEN 2 TABLET: 5; 325 TABLET ORAL at 20:55

## 2019-03-23 RX ADMIN — INSULIN LISPRO 1 UNITS: 100 INJECTION, SOLUTION INTRAVENOUS; SUBCUTANEOUS at 20:47

## 2019-03-23 RX ADMIN — APIXABAN 2.5 MG: 2.5 TABLET, FILM COATED ORAL at 08:55

## 2019-03-23 RX ADMIN — HYDRALAZINE HYDROCHLORIDE 50 MG: 50 TABLET, FILM COATED ORAL at 08:56

## 2019-03-23 RX ADMIN — CLONIDINE HYDROCHLORIDE 0.2 MG: 0.2 TABLET ORAL at 08:56

## 2019-03-23 RX ADMIN — DOCUSATE SODIUM 100 MG: 100 CAPSULE, LIQUID FILLED ORAL at 20:46

## 2019-03-23 RX ADMIN — LEVOTHYROXINE SODIUM 50 MCG: 50 TABLET ORAL at 06:29

## 2019-03-23 RX ADMIN — HYDROCODONE BITARTRATE AND ACETAMINOPHEN 2 TABLET: 5; 325 TABLET ORAL at 06:23

## 2019-03-23 RX ADMIN — CLONIDINE HYDROCHLORIDE 0.2 MG: 0.2 TABLET ORAL at 20:47

## 2019-03-23 RX ADMIN — Medication 10 ML: at 08:55

## 2019-03-23 RX ADMIN — DOCUSATE SODIUM 100 MG: 100 CAPSULE, LIQUID FILLED ORAL at 08:55

## 2019-03-23 RX ADMIN — FAMOTIDINE 20 MG: 10 INJECTION, SOLUTION INTRAVENOUS at 08:55

## 2019-03-23 RX ADMIN — HYDROCODONE BITARTRATE AND ACETAMINOPHEN 2 TABLET: 5; 325 TABLET ORAL at 16:38

## 2019-03-23 RX ADMIN — HYDROCODONE BITARTRATE AND ACETAMINOPHEN 2 TABLET: 5; 325 TABLET ORAL at 12:38

## 2019-03-23 RX ADMIN — GLIMEPIRIDE 1 MG: 2 TABLET ORAL at 06:24

## 2019-03-23 RX ADMIN — HYDROCHLOROTHIAZIDE 25 MG: 25 TABLET ORAL at 08:56

## 2019-03-23 RX ADMIN — LOSARTAN POTASSIUM 100 MG: 50 TABLET, FILM COATED ORAL at 08:55

## 2019-03-23 RX ADMIN — Medication 10 ML: at 21:33

## 2019-03-23 ASSESSMENT — PAIN SCALES - GENERAL
PAINLEVEL_OUTOF10: 0
PAINLEVEL_OUTOF10: 7
PAINLEVEL_OUTOF10: 7
PAINLEVEL_OUTOF10: 0
PAINLEVEL_OUTOF10: 7
PAINLEVEL_OUTOF10: 9
PAINLEVEL_OUTOF10: 0
PAINLEVEL_OUTOF10: 7
PAINLEVEL_OUTOF10: 0

## 2019-03-23 ASSESSMENT — PAIN DESCRIPTION - ORIENTATION
ORIENTATION: RIGHT

## 2019-03-23 ASSESSMENT — PAIN DESCRIPTION - DESCRIPTORS
DESCRIPTORS: ACHING;DISCOMFORT
DESCRIPTORS: ACHING;DISCOMFORT;PRESSURE
DESCRIPTORS: ACHING
DESCRIPTORS: ACHING;DISCOMFORT

## 2019-03-23 ASSESSMENT — PAIN DESCRIPTION - PROGRESSION
CLINICAL_PROGRESSION: NOT CHANGED
CLINICAL_PROGRESSION: NOT CHANGED
CLINICAL_PROGRESSION: GRADUALLY WORSENING

## 2019-03-23 ASSESSMENT — PAIN DESCRIPTION - LOCATION
LOCATION: KNEE

## 2019-03-23 ASSESSMENT — PAIN DESCRIPTION - ONSET
ONSET: ON-GOING

## 2019-03-23 ASSESSMENT — PAIN - FUNCTIONAL ASSESSMENT
PAIN_FUNCTIONAL_ASSESSMENT: PREVENTS OR INTERFERES SOME ACTIVE ACTIVITIES AND ADLS
PAIN_FUNCTIONAL_ASSESSMENT: ACTIVITIES ARE NOT PREVENTED
PAIN_FUNCTIONAL_ASSESSMENT: PREVENTS OR INTERFERES SOME ACTIVE ACTIVITIES AND ADLS
PAIN_FUNCTIONAL_ASSESSMENT: PREVENTS OR INTERFERES SOME ACTIVE ACTIVITIES AND ADLS

## 2019-03-23 ASSESSMENT — PAIN DESCRIPTION - PAIN TYPE
TYPE: SURGICAL PAIN

## 2019-03-23 ASSESSMENT — PAIN DESCRIPTION - FREQUENCY
FREQUENCY: CONTINUOUS
FREQUENCY: INTERMITTENT

## 2019-03-24 VITALS
SYSTOLIC BLOOD PRESSURE: 116 MMHG | OXYGEN SATURATION: 96 % | BODY MASS INDEX: 30.22 KG/M2 | RESPIRATION RATE: 18 BRPM | HEART RATE: 75 BPM | HEIGHT: 62 IN | WEIGHT: 164.2 LBS | TEMPERATURE: 98 F | DIASTOLIC BLOOD PRESSURE: 70 MMHG

## 2019-03-24 LAB
ANION GAP SERPL CALCULATED.3IONS-SCNC: 9 MMOL/L (ref 7–16)
BUN BLDV-MCNC: 23 MG/DL (ref 8–23)
CALCIUM SERPL-MCNC: 8.7 MG/DL (ref 8.6–10.2)
CHLORIDE BLD-SCNC: 104 MMOL/L (ref 98–107)
CO2: 25 MMOL/L (ref 22–29)
CREAT SERPL-MCNC: 0.9 MG/DL (ref 0.5–1)
GFR AFRICAN AMERICAN: >60
GFR NON-AFRICAN AMERICAN: >60 ML/MIN/1.73
GLUCOSE BLD-MCNC: 119 MG/DL (ref 74–99)
HCT VFR BLD CALC: 28 % (ref 34–48)
HEMOGLOBIN: 8.6 G/DL (ref 11.5–15.5)
MCH RBC QN AUTO: 27.5 PG (ref 26–35)
MCHC RBC AUTO-ENTMCNC: 30.7 % (ref 32–34.5)
MCV RBC AUTO: 89.5 FL (ref 80–99.9)
METER GLUCOSE: 109 MG/DL (ref 74–99)
PDW BLD-RTO: 14.2 FL (ref 11.5–15)
PLATELET # BLD: 145 E9/L (ref 130–450)
PMV BLD AUTO: 10.4 FL (ref 7–12)
POTASSIUM SERPL-SCNC: 4.5 MMOL/L (ref 3.5–5)
RBC # BLD: 3.13 E12/L (ref 3.5–5.5)
SODIUM BLD-SCNC: 138 MMOL/L (ref 132–146)
WBC # BLD: 6.9 E9/L (ref 4.5–11.5)

## 2019-03-24 PROCEDURE — 85027 COMPLETE CBC AUTOMATED: CPT

## 2019-03-24 PROCEDURE — 80048 BASIC METABOLIC PNL TOTAL CA: CPT

## 2019-03-24 PROCEDURE — 6370000000 HC RX 637 (ALT 250 FOR IP): Performed by: ORTHOPAEDIC SURGERY

## 2019-03-24 PROCEDURE — 97110 THERAPEUTIC EXERCISES: CPT

## 2019-03-24 PROCEDURE — 82962 GLUCOSE BLOOD TEST: CPT

## 2019-03-24 PROCEDURE — 97140 MANUAL THERAPY 1/> REGIONS: CPT

## 2019-03-24 PROCEDURE — 36415 COLL VENOUS BLD VENIPUNCTURE: CPT

## 2019-03-24 PROCEDURE — 2500000003 HC RX 250 WO HCPCS: Performed by: ORTHOPAEDIC SURGERY

## 2019-03-24 PROCEDURE — 97530 THERAPEUTIC ACTIVITIES: CPT

## 2019-03-24 PROCEDURE — 97116 GAIT TRAINING THERAPY: CPT

## 2019-03-24 RX ADMIN — GLIMEPIRIDE 1 MG: 2 TABLET ORAL at 06:49

## 2019-03-24 RX ADMIN — HYDROCHLOROTHIAZIDE 25 MG: 25 TABLET ORAL at 08:28

## 2019-03-24 RX ADMIN — LEVOTHYROXINE SODIUM 50 MCG: 50 TABLET ORAL at 06:49

## 2019-03-24 RX ADMIN — HYDRALAZINE HYDROCHLORIDE 50 MG: 50 TABLET, FILM COATED ORAL at 04:05

## 2019-03-24 RX ADMIN — AMLODIPINE BESYLATE 10 MG: 10 TABLET ORAL at 08:28

## 2019-03-24 RX ADMIN — CLONIDINE HYDROCHLORIDE 0.2 MG: 0.2 TABLET ORAL at 08:28

## 2019-03-24 RX ADMIN — FAMOTIDINE 20 MG: 10 INJECTION, SOLUTION INTRAVENOUS at 08:28

## 2019-03-24 RX ADMIN — APIXABAN 2.5 MG: 2.5 TABLET, FILM COATED ORAL at 08:28

## 2019-03-24 RX ADMIN — HYDROCODONE BITARTRATE AND ACETAMINOPHEN 2 TABLET: 5; 325 TABLET ORAL at 04:05

## 2019-03-24 RX ADMIN — HYDROCODONE BITARTRATE AND ACETAMINOPHEN 2 TABLET: 5; 325 TABLET ORAL at 08:28

## 2019-03-24 ASSESSMENT — PAIN DESCRIPTION - FREQUENCY
FREQUENCY: CONTINUOUS
FREQUENCY: CONTINUOUS

## 2019-03-24 ASSESSMENT — PAIN DESCRIPTION - LOCATION
LOCATION: KNEE
LOCATION: KNEE

## 2019-03-24 ASSESSMENT — PAIN SCALES - GENERAL
PAINLEVEL_OUTOF10: 3
PAINLEVEL_OUTOF10: 0
PAINLEVEL_OUTOF10: 8
PAINLEVEL_OUTOF10: 7

## 2019-03-24 ASSESSMENT — PAIN DESCRIPTION - ONSET
ONSET: ON-GOING
ONSET: ON-GOING

## 2019-03-24 ASSESSMENT — PAIN DESCRIPTION - ORIENTATION
ORIENTATION: RIGHT
ORIENTATION: RIGHT

## 2019-03-24 ASSESSMENT — PAIN DESCRIPTION - DESCRIPTORS
DESCRIPTORS: ACHING
DESCRIPTORS: ACHING

## 2019-03-24 ASSESSMENT — PAIN DESCRIPTION - PROGRESSION
CLINICAL_PROGRESSION: GRADUALLY IMPROVING
CLINICAL_PROGRESSION: GRADUALLY WORSENING

## 2019-03-24 ASSESSMENT — PAIN - FUNCTIONAL ASSESSMENT
PAIN_FUNCTIONAL_ASSESSMENT: ACTIVITIES ARE NOT PREVENTED
PAIN_FUNCTIONAL_ASSESSMENT: PREVENTS OR INTERFERES SOME ACTIVE ACTIVITIES AND ADLS

## 2019-03-24 ASSESSMENT — PAIN DESCRIPTION - PAIN TYPE
TYPE: SURGICAL PAIN
TYPE: SURGICAL PAIN

## 2019-03-28 ENCOUNTER — POST-OP TELEPHONE (OUTPATIENT)
Dept: ORTHOPEDICS UNIT | Age: 84
End: 2019-03-28

## 2019-04-01 ENCOUNTER — HOSPITAL ENCOUNTER (EMERGENCY)
Age: 84
Discharge: HOME | End: 2019-04-02
Attending: EMERGENCY MEDICINE
Payer: MEDICARE

## 2019-04-01 VITALS
RESPIRATION RATE: 18 BRPM | SYSTOLIC BLOOD PRESSURE: 153 MMHG | OXYGEN SATURATION: 96 % | HEIGHT: 62 IN | TEMPERATURE: 97.1 F | DIASTOLIC BLOOD PRESSURE: 85 MMHG | BODY MASS INDEX: 30.18 KG/M2 | WEIGHT: 164 LBS | HEART RATE: 70 BPM

## 2019-04-01 DIAGNOSIS — D64.9 POSTOPERATIVE ANEMIA: Primary | ICD-10-CM

## 2019-04-01 LAB
ABO/RH: NORMAL
ANTIBODY SCREEN: NORMAL
APTT: 30.2 SEC (ref 24.5–35.1)
BASOPHILS ABSOLUTE: 0.01 E9/L (ref 0–0.2)
BASOPHILS RELATIVE PERCENT: 0.2 % (ref 0–2)
EOSINOPHILS ABSOLUTE: 0.06 E9/L (ref 0.05–0.5)
EOSINOPHILS RELATIVE PERCENT: 0.9 % (ref 0–6)
HCT VFR BLD CALC: 26.1 % (ref 34–48)
HEMOGLOBIN: 7.9 G/DL (ref 11.5–15.5)
IMMATURE GRANULOCYTES #: 0.05 E9/L
IMMATURE GRANULOCYTES %: 0.8 % (ref 0–5)
INR BLD: 1.1
LYMPHOCYTES ABSOLUTE: 0.66 E9/L (ref 1.5–4)
LYMPHOCYTES RELATIVE PERCENT: 10.1 % (ref 20–42)
MCH RBC QN AUTO: 27.1 PG (ref 26–35)
MCHC RBC AUTO-ENTMCNC: 30.3 % (ref 32–34.5)
MCV RBC AUTO: 89.4 FL (ref 80–99.9)
MONOCYTES ABSOLUTE: 0.7 E9/L (ref 0.1–0.95)
MONOCYTES RELATIVE PERCENT: 10.7 % (ref 2–12)
NEUTROPHILS ABSOLUTE: 5.06 E9/L (ref 1.8–7.3)
NEUTROPHILS RELATIVE PERCENT: 77.3 % (ref 43–80)
PDW BLD-RTO: 14.1 FL (ref 11.5–15)
PLATELET # BLD: 289 E9/L (ref 130–450)
PMV BLD AUTO: 9.3 FL (ref 7–12)
PROTHROMBIN TIME: 12.7 SEC (ref 9.3–12.4)
RBC # BLD: 2.92 E12/L (ref 3.5–5.5)
WBC # BLD: 6.5 E9/L (ref 4.5–11.5)

## 2019-04-01 PROCEDURE — 85610 PROTHROMBIN TIME: CPT

## 2019-04-01 PROCEDURE — 86901 BLOOD TYPING SEROLOGIC RH(D): CPT

## 2019-04-01 PROCEDURE — 85025 COMPLETE CBC W/AUTO DIFF WBC: CPT

## 2019-04-01 PROCEDURE — 6370000000 HC RX 637 (ALT 250 FOR IP): Performed by: EMERGENCY MEDICINE

## 2019-04-01 PROCEDURE — 86900 BLOOD TYPING SEROLOGIC ABO: CPT

## 2019-04-01 PROCEDURE — 85730 THROMBOPLASTIN TIME PARTIAL: CPT

## 2019-04-01 PROCEDURE — 36415 COLL VENOUS BLD VENIPUNCTURE: CPT

## 2019-04-01 PROCEDURE — 99283 EMERGENCY DEPT VISIT LOW MDM: CPT

## 2019-04-01 PROCEDURE — 86850 RBC ANTIBODY SCREEN: CPT

## 2019-04-01 RX ORDER — HYDROCODONE BITARTRATE AND ACETAMINOPHEN 5; 325 MG/1; MG/1
1 TABLET ORAL ONCE
Status: COMPLETED | OUTPATIENT
Start: 2019-04-01 | End: 2019-04-01

## 2019-04-01 RX ADMIN — HYDROCODONE BITARTRATE AND ACETAMINOPHEN 1 TABLET: 5; 325 TABLET ORAL at 22:45

## 2019-04-01 ASSESSMENT — PAIN DESCRIPTION - LOCATION: LOCATION: HEAD

## 2019-04-01 ASSESSMENT — PAIN SCALES - GENERAL
PAINLEVEL_OUTOF10: 4
PAINLEVEL_OUTOF10: 3

## 2019-04-01 ASSESSMENT — PAIN DESCRIPTION - PAIN TYPE: TYPE: ACUTE PAIN

## 2019-04-01 NOTE — ED PROVIDER NOTES
Department of Emergency Medicine   ED  Provider Note  Admit Date/RoomTime: 4/1/2019  7:53 PM  ED Room: 13/13 4/1/19  8:03 PM    History of Present Illness:   Bakari Smith is a 81 y.o. female presenting to the ED for anemia, beginning today. The complaint has been persistent, moderate in severity, and worsened by nothing. Sensitivity EMS from nursing home for acute anemia. Per lab work patient's hemoglobin is 6.7. She is status post total right knee replacement on 3/21. Preoperatively her hemoglobin was 10.1 which dropped to 7.6. She was placed on eliquis post operatively for dvt prevention. She does report some lightheadedness today. She denies black or tarry stools. Review of Systems:   Pertinent positives and negatives are stated within HPI, all other systems reviewed and are negative.          --------------------------------------------- PAST HISTORY ---------------------------------------------  Past Medical History:  has a past medical history of (HFpEF) heart failure with preserved ejection fraction ([address] Arthritis, Bursitis, Cervical radiculopathy, CHF (congestive heart failure) ([address] CKD (chronic kidney disease) stage 3, GFR 30-59 ml/min ([address] Diabetes mellitus ([address] GERD (gastroesophageal reflux disease), [place] (hard of hearing), Hypertension, Hypothyroidism, SSS (sick sinus syndrome) ([address] Thyroid disease, Unsteadiness, and Valvular heart disease. Past Surgical History:  has a past surgical history that includes Foot surgery and Total knee arthroplasty (Right, 3/21/2019). Social History:  reports that she has never smoked. She has never used smokeless tobacco. She reports that she does not drink alcohol or use drugs. Family History: family history includes No Known Problems in her father and mother. The patients home medications have been reviewed.     Allergies: Aspirin    -------------------------------------------------- RESULTS -------------------------------------------------  All laboratory and radiology results have been personally reviewed by myself   LABS:  Results for orders placed or performed during the hospital encounter of 04/01/19   CBC Auto Differential   Result Value Ref Range    WBC 6.5 4.5 - 11.5 E9/L    RBC 2.92 (L) 3.50 - 5.50 E12/L    Hemoglobin 7.9 (L) 11.5 - 15.5 g/dL    Hematocrit 26.1 (L) 34.0 - 48.0 %    MCV 89.4 80.0 - 99.9 fL    MCH 27.1 26.0 - 35.0 pg    MCHC 30.3 (L) 32.0 - 34.5 %    RDW 14.1 11.5 - 15.0 fL    Platelets 442 432 - 846 E9/L    MPV 9.3 7.0 - 12.0 fL    Neutrophils % 77.3 43.0 - 80.0 %    Immature Granulocytes % 0.8 0.0 - 5.0 %    Lymphocytes % 10.1 (L) 20.0 - 42.0 %    Monocytes % 10.7 2.0 - 12.0 %    Eosinophils % 0.9 0.0 - 6.0 %    Basophils % 0.2 0.0 - 2.0 %    Neutrophils # 5.06 1.80 - 7.30 E9/L    Immature Granulocytes # 0.05 E9/L    Lymphocytes # 0.66 (L) 1.50 - 4.00 E9/L    Monocytes # 0.70 0.10 - 0.95 E9/L    Eosinophils # 0.06 0.05 - 0.50 E9/L    Basophils # 0.01 0.00 - 0.20 E9/L   APTT   Result Value Ref Range    aPTT 30.2 24.5 - 35.1 sec   Protime-INR   Result Value Ref Range    Protime 12.7 (H) 9.3 - 12.4 sec    INR 1.1    TYPE AND SCREEN   Result Value Ref Range    ABO/Rh A POS     Antibody Screen NEG        RADIOLOGY:  Interpreted by Radiologist.  No orders to display       ------------------------- NURSING NOTES AND VITALS REVIEWED ---------------------------   The nursing notes within the ED encounter and vital signs as below have been reviewed.    BP (!) 153/85   Pulse 70   Temp 97.1 °F (36.2 °C) (Temporal)   Resp 18   Ht 5' 2\" (1.575 m)   Wt 164 lb (74.4 kg)   SpO2 96%   BMI 30.00 kg/m²   Oxygen Saturation Interpretation: Normal      ---------------------------------------------------PHYSICAL EXAM--------------------------------------    Constitutional/General: Alert and oriented x3, well appearing, non toxic in NAD  Head: Normocephalic and atraumatic  Eyes: PERRL, EOMI, conjunctiva normal, sclera non icteric  Mouth: Oropharynx clear, handling secretions, no trismus, no asymmetry of the posterior oropharynx or uvular edema  Neck: Supple, full ROM, non tender to palpation in the midline, no stridor, no crepitus, no meningeal signs  Respiratory: Lungs clear to auscultation bilaterally, no wheezes, rales, or rhonchi. Not in respiratory distress  Cardiovascular:  Regular rate. Regular rhythm. No murmurs, gallops, or rubs. 2+ distal pulses  Chest: No chest wall tenderness  GI:  Abdomen Soft, Non tender, Non distended. +BS. No organomegaly, no palpable masses,  No rebound, guarding, or rigidity. Musculoskeletal: Moves all extremities x 4. Warm and well perfused, no clubbing, cyanosis. Capillary refill <3 seconds Edema, tenderness to right knee and lower leg. Dressing in place to knee. Integument: skin warm and dry. No rashes. Lymphatic: no lymphadenopathy noted  Neurologic: GCS 15, no focal deficits, symmetric strength 5/5 in the upper and lower extremities bilaterally  Psychiatric: Normal Affect      ------------------------------ ED COURSE/MEDICAL DECISION MAKING----------------------  Medications   HYDROcodone-acetaminophen (NORCO) 5-325 MG per tablet 1 tablet (1 tablet Oral Given 4/1/19 5096)         Medical Decision Making:    Patient prsented for anemia with outpatient lab showing hg 6.7 however it is over 7 here. She does not require transfusion, she is safe for return to her nursing home. Counseling: The emergency provider has spoken with the patient and discussed todays results, in addition to providing specific details for the plan of care and counseling regarding the diagnosis and prognosis. Questions are answered at this time and they are agreeable with the plan.      --------------------------------- IMPRESSION AND DISPOSITION ---------------------------------    IMPRESSION  1.  Postoperative anemia        DISPOSITION  Disposition: Discharge to nursing home  Patient condition is stable               Cookie Ventura DO  Resident  04/01/19 [id number]

## 2019-04-01 NOTE — ED NOTES
Bed: 13  Expected date:   Expected time:   Means of arrival:   Comments:  ems     Lucille Desai RN  04/01/19 [id number]

## 2019-04-02 NOTE — ED NOTES
Assist to bathroom. Steady gait. Nourishment provided.  MD aware of request for pain medication     Charlotte Davis RN  04/01/19 0389

## 2019-07-31 ENCOUNTER — HOSPITAL ENCOUNTER (OUTPATIENT)
Age: 84
Discharge: HOME | End: 2019-07-31
Payer: MEDICARE

## 2019-07-31 LAB
ALBUMIN SERPL-MCNC: 4.1 G/DL (ref 3.5–5.2)
ALP BLD-CCNC: 86 U/L (ref 35–104)
ALT SERPL-CCNC: 10 U/L (ref 0–32)
ANION GAP SERPL CALCULATED.3IONS-SCNC: 14 MMOL/L (ref 7–16)
AST SERPL-CCNC: 17 U/L (ref 0–31)
BILIRUB SERPL-MCNC: 0.6 MG/DL (ref 0–1.2)
BUN BLDV-MCNC: 20 MG/DL (ref 8–23)
CALCIUM SERPL-MCNC: 9.5 MG/DL (ref 8.6–10.2)
CHLORIDE BLD-SCNC: 105 MMOL/L (ref 98–107)
CHOLESTEROL, TOTAL: 155 MG/DL (ref 0–199)
CO2: 23 MMOL/L (ref 22–29)
CREAT SERPL-MCNC: 1.1 MG/DL (ref 0.5–1)
GFR AFRICAN AMERICAN: 57
GFR NON-AFRICAN AMERICAN: 57 ML/MIN/1.73
GLUCOSE BLD-MCNC: 159 MG/DL (ref 74–99)
HBA1C MFR BLD: 6.8 % (ref 4–5.6)
HCT VFR BLD CALC: 32.4 % (ref 34–48)
HDLC SERPL-MCNC: 78 MG/DL
HEMOGLOBIN: 9.8 G/DL (ref 11.5–15.5)
LDL CHOLESTEROL CALCULATED: 68 MG/DL (ref 0–99)
MCH RBC QN AUTO: 25 PG (ref 26–35)
MCHC RBC AUTO-ENTMCNC: 30.2 % (ref 32–34.5)
MCV RBC AUTO: 82.7 FL (ref 80–99.9)
PDW BLD-RTO: 17.3 FL (ref 11.5–15)
PLATELET # BLD: 159 E9/L (ref 130–450)
PMV BLD AUTO: 10.1 FL (ref 7–12)
POTASSIUM SERPL-SCNC: 4.4 MMOL/L (ref 3.5–5)
RBC # BLD: 3.92 E12/L (ref 3.5–5.5)
SODIUM BLD-SCNC: 142 MMOL/L (ref 132–146)
TOTAL PROTEIN: 7.6 G/DL (ref 6.4–8.3)
TRIGL SERPL-MCNC: 47 MG/DL (ref 0–149)
VITAMIN D 25-HYDROXY: 13 NG/ML (ref 30–100)
VLDLC SERPL CALC-MCNC: 9 MG/DL
WBC # BLD: 2.8 E9/L (ref 4.5–11.5)

## 2019-07-31 PROCEDURE — 80061 LIPID PANEL: CPT

## 2019-07-31 PROCEDURE — 83036 HEMOGLOBIN GLYCOSYLATED A1C: CPT

## 2019-07-31 PROCEDURE — 36415 COLL VENOUS BLD VENIPUNCTURE: CPT

## 2019-07-31 PROCEDURE — 83695 ASSAY OF LIPOPROTEIN(A): CPT

## 2019-07-31 PROCEDURE — 82306 VITAMIN D 25 HYDROXY: CPT

## 2019-07-31 PROCEDURE — 80053 COMPREHEN METABOLIC PANEL: CPT

## 2019-07-31 PROCEDURE — 85027 COMPLETE CBC AUTOMATED: CPT

## 2019-08-02 LAB — LIPOPROTEIN (A): 16 MG/DL

## 2019-10-04 ENCOUNTER — HOSPITAL ENCOUNTER (OUTPATIENT)
Dept: GENERAL RADIOLOGY | Age: 84
Discharge: HOME | End: 2019-10-06
Payer: MEDICARE

## 2019-10-04 DIAGNOSIS — N63.0 BREAST LUMP: ICD-10-CM

## 2019-10-04 DIAGNOSIS — Z13.9 VISIT FOR SCREENING: ICD-10-CM

## 2019-10-04 PROCEDURE — G0279 TOMOSYNTHESIS, MAMMO: HCPCS

## 2019-10-16 ENCOUNTER — OFFICE VISIT (OUTPATIENT)
Dept: CARDIOLOGY CLINIC | Age: 84
End: 2019-10-16
Payer: MEDICARE

## 2019-10-16 VITALS
SYSTOLIC BLOOD PRESSURE: 158 MMHG | DIASTOLIC BLOOD PRESSURE: 70 MMHG | HEIGHT: 63 IN | BODY MASS INDEX: 27.11 KG/M2 | RESPIRATION RATE: 16 BRPM | HEART RATE: 79 BPM | WEIGHT: 153 LBS

## 2019-10-16 DIAGNOSIS — Z88.6 ASPIRIN SENSITIVITY: ICD-10-CM

## 2019-10-16 DIAGNOSIS — M54.12 CERVICAL RADICULOPATHY: ICD-10-CM

## 2019-10-16 DIAGNOSIS — E03.9 HYPOTHYROIDISM, UNSPECIFIED TYPE: ICD-10-CM

## 2019-10-16 DIAGNOSIS — I11.0 HYPERTENSIVE LEFT VENTRICULAR HYPERTROPHY WITH HEART FAILURE (HCC): ICD-10-CM

## 2019-10-16 DIAGNOSIS — R42 DIZZINESS: ICD-10-CM

## 2019-10-16 DIAGNOSIS — I50.32 CHRONIC DIASTOLIC CHF (CONGESTIVE HEART FAILURE) (HCC): Primary | Chronic | ICD-10-CM

## 2019-10-16 DIAGNOSIS — I10 ESSENTIAL HYPERTENSION: ICD-10-CM

## 2019-10-16 DIAGNOSIS — M17.0 PRIMARY OSTEOARTHRITIS OF BOTH KNEES: ICD-10-CM

## 2019-10-16 DIAGNOSIS — I49.5 SSS (SICK SINUS SYNDROME) (HCC): ICD-10-CM

## 2019-10-16 DIAGNOSIS — D64.9 CHRONIC ANEMIA: ICD-10-CM

## 2019-10-16 DIAGNOSIS — E11.8 DM (DIABETES MELLITUS), TYPE 2 WITH COMPLICATIONS (HCC): ICD-10-CM

## 2019-10-16 DIAGNOSIS — M75.52 BILATERAL SHOULDER BURSITIS: ICD-10-CM

## 2019-10-16 DIAGNOSIS — F03.90 DEMENTIA WITHOUT BEHAVIORAL DISTURBANCE, UNSPECIFIED DEMENTIA TYPE: ICD-10-CM

## 2019-10-16 DIAGNOSIS — R26.81 UNSTEADY GAIT: ICD-10-CM

## 2019-10-16 DIAGNOSIS — N18.30 CKD (CHRONIC KIDNEY DISEASE) STAGE 3, GFR 30-59 ML/MIN (HCC): ICD-10-CM

## 2019-10-16 DIAGNOSIS — D72.819 LEUKOPENIA, UNSPECIFIED TYPE: ICD-10-CM

## 2019-10-16 DIAGNOSIS — I34.0 NONRHEUMATIC MITRAL VALVE REGURGITATION: ICD-10-CM

## 2019-10-16 DIAGNOSIS — Z96.651 STATUS POST RIGHT KNEE REPLACEMENT: ICD-10-CM

## 2019-10-16 DIAGNOSIS — Z91.199 MEDICAL NON-COMPLIANCE: ICD-10-CM

## 2019-10-16 DIAGNOSIS — M75.51 BILATERAL SHOULDER BURSITIS: ICD-10-CM

## 2019-10-16 PROCEDURE — G8399 PT W/DXA RESULTS DOCUMENT: HCPCS | Performed by: INTERNAL MEDICINE

## 2019-10-16 PROCEDURE — G8427 DOCREV CUR MEDS BY ELIG CLIN: HCPCS | Performed by: INTERNAL MEDICINE

## 2019-10-16 PROCEDURE — G8484 FLU IMMUNIZE NO ADMIN: HCPCS | Performed by: INTERNAL MEDICINE

## 2019-10-16 PROCEDURE — 1036F TOBACCO NON-USER: CPT | Performed by: INTERNAL MEDICINE

## 2019-10-16 PROCEDURE — G8419 CALC BMI OUT NRM PARAM NOF/U: HCPCS | Performed by: INTERNAL MEDICINE

## 2019-10-16 PROCEDURE — 93000 ELECTROCARDIOGRAM COMPLETE: CPT | Performed by: INTERNAL MEDICINE

## 2019-10-16 PROCEDURE — 4040F PNEUMOC VAC/ADMIN/RCVD: CPT | Performed by: INTERNAL MEDICINE

## 2019-10-16 PROCEDURE — 99214 OFFICE O/P EST MOD 30 MIN: CPT | Performed by: INTERNAL MEDICINE

## 2019-10-16 PROCEDURE — 1123F ACP DISCUSS/DSCN MKR DOCD: CPT | Performed by: INTERNAL MEDICINE

## 2019-10-16 PROCEDURE — 1090F PRES/ABSN URINE INCON ASSESS: CPT | Performed by: INTERNAL MEDICINE

## 2019-10-21 ENCOUNTER — TELEPHONE (OUTPATIENT)
Dept: ONCOLOGY | Age: 84
End: 2019-10-21

## 2019-10-25 ENCOUNTER — HOSPITAL ENCOUNTER (OUTPATIENT)
Dept: GENERAL RADIOLOGY | Age: 84
Discharge: HOME | End: 2019-10-27
Payer: MEDICARE

## 2019-10-25 DIAGNOSIS — N63.20 BREAST MASS, LEFT: ICD-10-CM

## 2019-10-25 PROCEDURE — 76642 ULTRASOUND BREAST LIMITED: CPT

## 2020-01-01 ENCOUNTER — APPOINTMENT (OUTPATIENT)
Dept: GENERAL RADIOLOGY | Age: 85
DRG: 291 | End: 2020-01-01
Payer: MEDICARE

## 2020-01-01 ENCOUNTER — TELEPHONE (OUTPATIENT)
Dept: PAIN MANAGEMENT | Age: 85
End: 2020-01-01

## 2020-01-01 ENCOUNTER — HOSPITAL ENCOUNTER (OUTPATIENT)
Dept: GENERAL RADIOLOGY | Age: 85
Discharge: HOME | End: 2020-05-23
Payer: COMMERCIAL

## 2020-01-01 ENCOUNTER — OFFICE VISIT (OUTPATIENT)
Dept: CARDIOLOGY CLINIC | Age: 85
End: 2020-01-01
Payer: MEDICARE

## 2020-01-01 ENCOUNTER — APPOINTMENT (OUTPATIENT)
Dept: ULTRASOUND IMAGING | Age: 85
DRG: 291 | End: 2020-01-01
Payer: MEDICARE

## 2020-01-01 ENCOUNTER — OFFICE VISIT (OUTPATIENT)
Dept: NEUROLOGY | Age: 85
End: 2020-01-01
Payer: MEDICARE

## 2020-01-01 ENCOUNTER — HOSPITAL ENCOUNTER (OUTPATIENT)
Age: 85
DRG: 291 | End: 2020-01-01
Payer: MEDICARE

## 2020-01-01 ENCOUNTER — APPOINTMENT (OUTPATIENT)
Dept: CT IMAGING | Age: 85
End: 2020-01-01
Payer: MEDICARE

## 2020-01-01 ENCOUNTER — HOSPITAL ENCOUNTER (OUTPATIENT)
Age: 85
Discharge: HOME | End: 2020-07-27
Payer: MEDICARE

## 2020-01-01 ENCOUNTER — OFFICE VISIT (OUTPATIENT)
Dept: NEUROSURGERY | Age: 85
End: 2020-01-01
Payer: COMMERCIAL

## 2020-01-01 ENCOUNTER — PROCEDURE VISIT (OUTPATIENT)
Dept: NEUROLOGY | Age: 85
End: 2020-01-01
Payer: MEDICARE

## 2020-01-01 ENCOUNTER — OFFICE VISIT (OUTPATIENT)
Dept: PAIN MANAGEMENT | Age: 85
End: 2020-01-01
Payer: MEDICARE

## 2020-01-01 ENCOUNTER — HOSPITAL ENCOUNTER (OUTPATIENT)
Dept: GENERAL RADIOLOGY | Age: 85
Discharge: HOME | DRG: 291 | End: 2020-06-20
Payer: MEDICARE

## 2020-01-01 ENCOUNTER — HOSPITAL ENCOUNTER (INPATIENT)
Age: 85
LOS: 1 days | Discharge: HOME | DRG: 291 | End: 2020-06-21
Attending: EMERGENCY MEDICINE | Admitting: INTERNAL MEDICINE
Payer: MEDICARE

## 2020-01-01 ENCOUNTER — TELEPHONE (OUTPATIENT)
Dept: NEUROSURGERY | Age: 85
End: 2020-01-01

## 2020-01-01 ENCOUNTER — OFFICE VISIT (OUTPATIENT)
Dept: NEUROSURGERY | Age: 85
End: 2020-01-01
Payer: MEDICARE

## 2020-01-01 ENCOUNTER — HOSPITAL ENCOUNTER (OUTPATIENT)
Age: 85
Discharge: HOME | End: 2020-05-23
Payer: COMMERCIAL

## 2020-01-01 ENCOUNTER — APPOINTMENT (OUTPATIENT)
Dept: CT IMAGING | Age: 85
DRG: 291 | End: 2020-01-01
Payer: MEDICARE

## 2020-01-01 ENCOUNTER — HOSPITAL ENCOUNTER (OUTPATIENT)
Age: 85
Discharge: HOME | End: 2020-12-04
Payer: MEDICARE

## 2020-01-01 ENCOUNTER — HOSPITAL ENCOUNTER (EMERGENCY)
Age: 85
Discharge: HOME | End: 2020-08-31
Payer: MEDICARE

## 2020-01-01 ENCOUNTER — CARE COORDINATION (OUTPATIENT)
Dept: CASE MANAGEMENT | Age: 85
End: 2020-01-01

## 2020-01-01 ENCOUNTER — HOSPITAL ENCOUNTER (OUTPATIENT)
Dept: GENERAL RADIOLOGY | Age: 85
Discharge: HOME | End: 2020-12-04
Payer: MEDICARE

## 2020-01-01 ENCOUNTER — PROCEDURE VISIT (OUTPATIENT)
Dept: PAIN MANAGEMENT | Age: 85
End: 2020-01-01
Payer: MEDICARE

## 2020-01-01 ENCOUNTER — HOSPITAL ENCOUNTER (OUTPATIENT)
Dept: GENERAL RADIOLOGY | Age: 85
Discharge: HOME | End: 2020-12-05
Payer: MEDICARE

## 2020-01-01 ENCOUNTER — HOSPITAL ENCOUNTER (OUTPATIENT)
Age: 85
Discharge: HOME | End: 2020-12-05
Payer: MEDICARE

## 2020-01-01 VITALS
HEART RATE: 85 BPM | SYSTOLIC BLOOD PRESSURE: 146 MMHG | WEIGHT: 159 LBS | DIASTOLIC BLOOD PRESSURE: 76 MMHG | BODY MASS INDEX: 28.17 KG/M2 | TEMPERATURE: 98 F | HEIGHT: 63 IN

## 2020-01-01 VITALS
SYSTOLIC BLOOD PRESSURE: 164 MMHG | HEART RATE: 88 BPM | BODY MASS INDEX: 25.75 KG/M2 | DIASTOLIC BLOOD PRESSURE: 85 MMHG | TEMPERATURE: 97 F | OXYGEN SATURATION: 97 % | RESPIRATION RATE: 18 BRPM | WEIGHT: 150 LBS

## 2020-01-01 VITALS
HEART RATE: 50 BPM | HEIGHT: 64 IN | TEMPERATURE: 97.9 F | SYSTOLIC BLOOD PRESSURE: 142 MMHG | BODY MASS INDEX: 25.61 KG/M2 | WEIGHT: 150 LBS | DIASTOLIC BLOOD PRESSURE: 74 MMHG

## 2020-01-01 VITALS
HEIGHT: 63 IN | WEIGHT: 159 LBS | SYSTOLIC BLOOD PRESSURE: 166 MMHG | HEART RATE: 66 BPM | BODY MASS INDEX: 28.17 KG/M2 | TEMPERATURE: 99.4 F | DIASTOLIC BLOOD PRESSURE: 83 MMHG

## 2020-01-01 VITALS
OXYGEN SATURATION: 98 % | TEMPERATURE: 98.1 F | RESPIRATION RATE: 16 BRPM | BODY MASS INDEX: 25.61 KG/M2 | HEART RATE: 60 BPM | WEIGHT: 150 LBS | HEIGHT: 64 IN | SYSTOLIC BLOOD PRESSURE: 130 MMHG | DIASTOLIC BLOOD PRESSURE: 62 MMHG

## 2020-01-01 VITALS
HEART RATE: 81 BPM | TEMPERATURE: 97 F | WEIGHT: 150 LBS | OXYGEN SATURATION: 100 % | RESPIRATION RATE: 16 BRPM | HEIGHT: 63 IN | BODY MASS INDEX: 26.58 KG/M2 | DIASTOLIC BLOOD PRESSURE: 81 MMHG | SYSTOLIC BLOOD PRESSURE: 169 MMHG

## 2020-01-01 VITALS
TEMPERATURE: 96.9 F | SYSTOLIC BLOOD PRESSURE: 177 MMHG | BODY MASS INDEX: 25.61 KG/M2 | WEIGHT: 150 LBS | RESPIRATION RATE: 16 BRPM | HEART RATE: 71 BPM | DIASTOLIC BLOOD PRESSURE: 86 MMHG | HEIGHT: 64 IN | OXYGEN SATURATION: 97 %

## 2020-01-01 VITALS
HEIGHT: 64 IN | BODY MASS INDEX: 26.46 KG/M2 | TEMPERATURE: 98.1 F | DIASTOLIC BLOOD PRESSURE: 98 MMHG | SYSTOLIC BLOOD PRESSURE: 180 MMHG | WEIGHT: 155 LBS | HEART RATE: 86 BPM | RESPIRATION RATE: 16 BRPM

## 2020-01-01 VITALS
SYSTOLIC BLOOD PRESSURE: 124 MMHG | BODY MASS INDEX: 25.61 KG/M2 | WEIGHT: 150 LBS | HEART RATE: 64 BPM | RESPIRATION RATE: 18 BRPM | HEIGHT: 64 IN | OXYGEN SATURATION: 96 % | TEMPERATURE: 98 F | DIASTOLIC BLOOD PRESSURE: 72 MMHG

## 2020-01-01 VITALS
HEIGHT: 64 IN | BODY MASS INDEX: 27.14 KG/M2 | WEIGHT: 159 LBS | HEART RATE: 63 BPM | RESPIRATION RATE: 16 BRPM | OXYGEN SATURATION: 98 % | TEMPERATURE: 96.3 F | DIASTOLIC BLOOD PRESSURE: 74 MMHG | SYSTOLIC BLOOD PRESSURE: 156 MMHG

## 2020-01-01 VITALS
HEART RATE: 74 BPM | SYSTOLIC BLOOD PRESSURE: 180 MMHG | DIASTOLIC BLOOD PRESSURE: 70 MMHG | TEMPERATURE: 98.5 F | RESPIRATION RATE: 18 BRPM

## 2020-01-01 VITALS
DIASTOLIC BLOOD PRESSURE: 80 MMHG | RESPIRATION RATE: 18 BRPM | HEIGHT: 63 IN | WEIGHT: 158 LBS | SYSTOLIC BLOOD PRESSURE: 152 MMHG | BODY MASS INDEX: 28 KG/M2 | HEART RATE: 70 BPM

## 2020-01-01 LAB
ALBUMIN SERPL-MCNC: 4.2 G/DL (ref 3.5–5.2)
ALBUMIN SERPL-MCNC: 4.3 G/DL (ref 3.5–5.2)
ALP BLD-CCNC: 80 U/L (ref 35–104)
ALP BLD-CCNC: 83 U/L (ref 35–104)
ALT SERPL-CCNC: 10 U/L (ref 0–32)
ALT SERPL-CCNC: 11 U/L (ref 0–32)
ANION GAP SERPL CALCULATED.3IONS-SCNC: 13 MMOL/L (ref 7–16)
ANION GAP SERPL CALCULATED.3IONS-SCNC: 15 MMOL/L (ref 7–16)
ANION GAP SERPL CALCULATED.3IONS-SCNC: 15 MMOL/L (ref 7–16)
AST SERPL-CCNC: 18 U/L (ref 0–31)
AST SERPL-CCNC: 24 U/L (ref 0–31)
BACTERIA: NORMAL /HPF
BASOPHILS ABSOLUTE: 0.01 E9/L (ref 0–0.2)
BASOPHILS ABSOLUTE: 0.01 E9/L (ref 0–0.2)
BASOPHILS RELATIVE PERCENT: 0.2 % (ref 0–2)
BASOPHILS RELATIVE PERCENT: 0.4 % (ref 0–2)
BILIRUB SERPL-MCNC: 0.5 MG/DL (ref 0–1.2)
BILIRUB SERPL-MCNC: 0.6 MG/DL (ref 0–1.2)
BILIRUBIN URINE: NEGATIVE
BLOOD, URINE: NEGATIVE
BUN BLDV-MCNC: 21 MG/DL (ref 8–23)
BUN BLDV-MCNC: 27 MG/DL (ref 8–23)
BUN BLDV-MCNC: 29 MG/DL (ref 8–23)
CALCIUM SERPL-MCNC: 8.9 MG/DL (ref 8.6–10.2)
CALCIUM SERPL-MCNC: 9.6 MG/DL (ref 8.6–10.2)
CALCIUM SERPL-MCNC: 9.7 MG/DL (ref 8.6–10.2)
CHLORIDE BLD-SCNC: 102 MMOL/L (ref 98–107)
CHLORIDE BLD-SCNC: 104 MMOL/L (ref 98–107)
CHLORIDE BLD-SCNC: 105 MMOL/L (ref 98–107)
CHOLESTEROL, TOTAL: 126 MG/DL (ref 0–199)
CHOLESTEROL, TOTAL: 131 MG/DL (ref 0–199)
CLARITY: CLEAR
CO2: 23 MMOL/L (ref 22–29)
CO2: 25 MMOL/L (ref 22–29)
CO2: 25 MMOL/L (ref 22–29)
COLOR: YELLOW
CREAT SERPL-MCNC: 0.9 MG/DL (ref 0.5–1)
CREAT SERPL-MCNC: 1.2 MG/DL (ref 0.5–1)
CREAT SERPL-MCNC: 1.3 MG/DL (ref 0.5–1)
EKG ATRIAL RATE: 90 BPM
EKG P AXIS: 62 DEGREES
EKG P-R INTERVAL: 136 MS
EKG Q-T INTERVAL: 374 MS
EKG QRS DURATION: 104 MS
EKG QTC CALCULATION (BAZETT): 457 MS
EKG R AXIS: -53 DEGREES
EKG T AXIS: 87 DEGREES
EKG VENTRICULAR RATE: 90 BPM
EOSINOPHILS ABSOLUTE: 0.09 E9/L (ref 0.05–0.5)
EOSINOPHILS ABSOLUTE: 0.11 E9/L (ref 0.05–0.5)
EOSINOPHILS RELATIVE PERCENT: 2.1 % (ref 0–6)
EOSINOPHILS RELATIVE PERCENT: 3.9 % (ref 0–6)
EPITHELIAL CELLS, UA: NORMAL /HPF
GFR AFRICAN AMERICAN: 47
GFR AFRICAN AMERICAN: 52
GFR AFRICAN AMERICAN: >60
GFR NON-AFRICAN AMERICAN: 47 ML/MIN/1.73
GFR NON-AFRICAN AMERICAN: 52 ML/MIN/1.73
GFR NON-AFRICAN AMERICAN: >60 ML/MIN/1.73
GLUCOSE BLD-MCNC: 104 MG/DL (ref 74–99)
GLUCOSE BLD-MCNC: 161 MG/DL (ref 74–99)
GLUCOSE BLD-MCNC: 95 MG/DL (ref 74–99)
GLUCOSE URINE: NEGATIVE MG/DL
HBA1C MFR BLD: 6.6 % (ref 4–5.6)
HCT VFR BLD CALC: 32.7 % (ref 34–48)
HCT VFR BLD CALC: 34.1 % (ref 34–48)
HCT VFR BLD CALC: 36.5 % (ref 34–48)
HDLC SERPL-MCNC: 70 MG/DL
HDLC SERPL-MCNC: 73 MG/DL
HEMOGLOBIN: 10.1 G/DL (ref 11.5–15.5)
HEMOGLOBIN: 10.6 G/DL (ref 11.5–15.5)
HEMOGLOBIN: 11.1 G/DL (ref 11.5–15.5)
IMMATURE GRANULOCYTES #: 0.01 E9/L
IMMATURE GRANULOCYTES #: 0.01 E9/L
IMMATURE GRANULOCYTES %: 0.2 % (ref 0–5)
IMMATURE GRANULOCYTES %: 0.4 % (ref 0–5)
INR BLD: 1
IRON SATURATION: 32 % (ref 15–50)
IRON: 101 MCG/DL (ref 37–145)
KETONES, URINE: NEGATIVE MG/DL
LDL CHOLESTEROL CALCULATED: 48 MG/DL (ref 0–99)
LDL CHOLESTEROL CALCULATED: 49 MG/DL (ref 0–99)
LEUKOCYTE ESTERASE, URINE: ABNORMAL
LIPOPROTEIN (A): 19 MG/DL
LV EF: 75 %
LVEF MODALITY: NORMAL
LYMPHOCYTES ABSOLUTE: 0.62 E9/L (ref 1.5–4)
LYMPHOCYTES ABSOLUTE: 0.72 E9/L (ref 1.5–4)
LYMPHOCYTES RELATIVE PERCENT: 14.3 % (ref 20–42)
LYMPHOCYTES RELATIVE PERCENT: 25.7 % (ref 20–42)
MAGNESIUM: 1.9 MG/DL (ref 1.6–2.6)
MAGNESIUM: 2.1 MG/DL (ref 1.6–2.6)
MCH RBC QN AUTO: 27.4 PG (ref 26–35)
MCH RBC QN AUTO: 27.7 PG (ref 26–35)
MCH RBC QN AUTO: 27.9 PG (ref 26–35)
MCHC RBC AUTO-ENTMCNC: 30.4 % (ref 32–34.5)
MCHC RBC AUTO-ENTMCNC: 30.9 % (ref 32–34.5)
MCHC RBC AUTO-ENTMCNC: 31.1 % (ref 32–34.5)
MCV RBC AUTO: 89.3 FL (ref 80–99.9)
MCV RBC AUTO: 90.1 FL (ref 80–99.9)
MCV RBC AUTO: 90.3 FL (ref 80–99.9)
METER GLUCOSE: 102 MG/DL (ref 74–99)
METER GLUCOSE: 106 MG/DL (ref 74–99)
METER GLUCOSE: 112 MG/DL (ref 74–99)
METER GLUCOSE: 132 MG/DL (ref 74–99)
METER GLUCOSE: 137 MG/DL (ref 74–99)
METER GLUCOSE: 162 MG/DL (ref 74–99)
MONOCYTES ABSOLUTE: 0.47 E9/L (ref 0.1–0.95)
MONOCYTES ABSOLUTE: 0.53 E9/L (ref 0.1–0.95)
MONOCYTES RELATIVE PERCENT: 10.9 % (ref 2–12)
MONOCYTES RELATIVE PERCENT: 18.9 % (ref 2–12)
NEUTROPHILS ABSOLUTE: 1.42 E9/L (ref 1.8–7.3)
NEUTROPHILS ABSOLUTE: 3.13 E9/L (ref 1.8–7.3)
NEUTROPHILS RELATIVE PERCENT: 50.7 % (ref 43–80)
NEUTROPHILS RELATIVE PERCENT: 72.3 % (ref 43–80)
NITRITE, URINE: NEGATIVE
PDW BLD-RTO: 13.6 FL (ref 11.5–15)
PDW BLD-RTO: 13.7 FL (ref 11.5–15)
PDW BLD-RTO: 13.8 FL (ref 11.5–15)
PH UA: 8 (ref 5–9)
PLATELET # BLD: 153 E9/L (ref 130–450)
PLATELET # BLD: 155 E9/L (ref 130–450)
PLATELET # BLD: 163 E9/L (ref 130–450)
PMV BLD AUTO: 10.6 FL (ref 7–12)
PMV BLD AUTO: 10.7 FL (ref 7–12)
PMV BLD AUTO: 10.8 FL (ref 7–12)
POTASSIUM REFLEX MAGNESIUM: 4.2 MMOL/L (ref 3.5–5)
POTASSIUM SERPL-SCNC: 4.1 MMOL/L (ref 3.5–5)
POTASSIUM SERPL-SCNC: 4.1 MMOL/L (ref 3.5–5)
PRO-BNP: 240 PG/ML (ref 0–450)
PROTEIN UA: NEGATIVE MG/DL
PROTHROMBIN TIME: 11.4 SEC (ref 9.3–12.4)
RBC # BLD: 3.62 E12/L (ref 3.5–5.5)
RBC # BLD: 3.82 E12/L (ref 3.5–5.5)
RBC # BLD: 4.05 E12/L (ref 3.5–5.5)
RBC UA: NORMAL /HPF (ref 0–2)
SODIUM BLD-SCNC: 140 MMOL/L (ref 132–146)
SODIUM BLD-SCNC: 142 MMOL/L (ref 132–146)
SODIUM BLD-SCNC: 145 MMOL/L (ref 132–146)
SPECIFIC GRAVITY UA: 1.01 (ref 1–1.03)
T4 TOTAL: 6.9 MCG/DL (ref 4.5–11.7)
TOTAL IRON BINDING CAPACITY: 319 MCG/DL (ref 250–450)
TOTAL PROTEIN: 7.9 G/DL (ref 6.4–8.3)
TOTAL PROTEIN: 8.1 G/DL (ref 6.4–8.3)
TRIGL SERPL-MCNC: 40 MG/DL (ref 0–149)
TRIGL SERPL-MCNC: 45 MG/DL (ref 0–149)
TROPONIN: <0.01 NG/ML (ref 0–0.03)
TSH SERPL DL<=0.05 MIU/L-ACNC: 1.67 UIU/ML (ref 0.27–4.2)
TSH SERPL DL<=0.05 MIU/L-ACNC: 3.48 UIU/ML (ref 0.27–4.2)
UROBILINOGEN, URINE: 0.2 E.U./DL
VLDLC SERPL CALC-MCNC: 8 MG/DL
VLDLC SERPL CALC-MCNC: 9 MG/DL
WBC # BLD: 2.8 E9/L (ref 4.5–11.5)
WBC # BLD: 4.3 E9/L (ref 4.5–11.5)
WBC # BLD: 4.5 E9/L (ref 4.5–11.5)
WBC UA: NORMAL /HPF (ref 0–5)

## 2020-01-01 PROCEDURE — 96376 TX/PRO/DX INJ SAME DRUG ADON: CPT

## 2020-01-01 PROCEDURE — 2580000003 HC RX 258: Performed by: RADIOLOGY

## 2020-01-01 PROCEDURE — 96372 THER/PROPH/DIAG INJ SC/IM: CPT

## 2020-01-01 PROCEDURE — 72040 X-RAY EXAM NECK SPINE 2-3 VW: CPT

## 2020-01-01 PROCEDURE — 4040F PNEUMOC VAC/ADMIN/RCVD: CPT | Performed by: NEUROLOGICAL SURGERY

## 2020-01-01 PROCEDURE — 6370000000 HC RX 637 (ALT 250 FOR IP): Performed by: INTERNAL MEDICINE

## 2020-01-01 PROCEDURE — 76942 ECHO GUIDE FOR BIOPSY: CPT | Performed by: PAIN MEDICINE

## 2020-01-01 PROCEDURE — 2580000003 HC RX 258: Performed by: PHYSICIAN ASSISTANT

## 2020-01-01 PROCEDURE — 99213 OFFICE O/P EST LOW 20 MIN: CPT | Performed by: PAIN MEDICINE

## 2020-01-01 PROCEDURE — G0378 HOSPITAL OBSERVATION PER HR: HCPCS

## 2020-01-01 PROCEDURE — 36415 COLL VENOUS BLD VENIPUNCTURE: CPT

## 2020-01-01 PROCEDURE — 6370000000 HC RX 637 (ALT 250 FOR IP): Performed by: PHYSICIAN ASSISTANT

## 2020-01-01 PROCEDURE — 99223 1ST HOSP IP/OBS HIGH 75: CPT | Performed by: INTERNAL MEDICINE

## 2020-01-01 PROCEDURE — 99285 EMERGENCY DEPT VISIT HI MDM: CPT

## 2020-01-01 PROCEDURE — 99213 OFFICE O/P EST LOW 20 MIN: CPT | Performed by: INTERNAL MEDICINE

## 2020-01-01 PROCEDURE — 6370000000 HC RX 637 (ALT 250 FOR IP): Performed by: EMERGENCY MEDICINE

## 2020-01-01 PROCEDURE — 82962 GLUCOSE BLOOD TEST: CPT

## 2020-01-01 PROCEDURE — G8399 PT W/DXA RESULTS DOCUMENT: HCPCS | Performed by: NEUROLOGICAL SURGERY

## 2020-01-01 PROCEDURE — 80053 COMPREHEN METABOLIC PANEL: CPT

## 2020-01-01 PROCEDURE — 70450 CT HEAD/BRAIN W/O DYE: CPT

## 2020-01-01 PROCEDURE — 81001 URINALYSIS AUTO W/SCOPE: CPT

## 2020-01-01 PROCEDURE — 72110 X-RAY EXAM L-2 SPINE 4/>VWS: CPT

## 2020-01-01 PROCEDURE — 93306 TTE W/DOPPLER COMPLETE: CPT

## 2020-01-01 PROCEDURE — 93005 ELECTROCARDIOGRAM TRACING: CPT | Performed by: EMERGENCY MEDICINE

## 2020-01-01 PROCEDURE — 99204 OFFICE O/P NEW MOD 45 MIN: CPT | Performed by: PSYCHIATRY & NEUROLOGY

## 2020-01-01 PROCEDURE — 84436 ASSAY OF TOTAL THYROXINE: CPT

## 2020-01-01 PROCEDURE — 99214 OFFICE O/P EST MOD 30 MIN: CPT | Performed by: NEUROLOGICAL SURGERY

## 2020-01-01 PROCEDURE — 85025 COMPLETE CBC W/AUTO DIFF WBC: CPT

## 2020-01-01 PROCEDURE — 84443 ASSAY THYROID STIM HORMONE: CPT

## 2020-01-01 PROCEDURE — G8417 CALC BMI ABV UP PARAM F/U: HCPCS | Performed by: NEUROLOGICAL SURGERY

## 2020-01-01 PROCEDURE — 72050 X-RAY EXAM NECK SPINE 4/5VWS: CPT

## 2020-01-01 PROCEDURE — 94640 AIRWAY INHALATION TREATMENT: CPT

## 2020-01-01 PROCEDURE — 80048 BASIC METABOLIC PNL TOTAL CA: CPT

## 2020-01-01 PROCEDURE — 72125 CT NECK SPINE W/O DYE: CPT

## 2020-01-01 PROCEDURE — 93010 ELECTROCARDIOGRAM REPORT: CPT | Performed by: INTERNAL MEDICINE

## 2020-01-01 PROCEDURE — 85027 COMPLETE CBC AUTOMATED: CPT

## 2020-01-01 PROCEDURE — 71045 X-RAY EXAM CHEST 1 VIEW: CPT

## 2020-01-01 PROCEDURE — 2140000000 HC CCU INTERMEDIATE R&B

## 2020-01-01 PROCEDURE — 99213 OFFICE O/P EST LOW 20 MIN: CPT | Performed by: NEUROLOGICAL SURGERY

## 2020-01-01 PROCEDURE — 20553 NJX 1/MLT TRIGGER POINTS 3/>: CPT | Performed by: PAIN MEDICINE

## 2020-01-01 PROCEDURE — 6360000002 HC RX W HCPCS: Performed by: EMERGENCY MEDICINE

## 2020-01-01 PROCEDURE — 99282 EMERGENCY DEPT VISIT SF MDM: CPT

## 2020-01-01 PROCEDURE — 84484 ASSAY OF TROPONIN QUANT: CPT

## 2020-01-01 PROCEDURE — 71275 CT ANGIOGRAPHY CHEST: CPT

## 2020-01-01 PROCEDURE — 96374 THER/PROPH/DIAG INJ IV PUSH: CPT

## 2020-01-01 PROCEDURE — L0170 CERVICAL COLLAR MOLDED TO PT: HCPCS | Performed by: PAIN MEDICINE

## 2020-01-01 PROCEDURE — 6360000002 HC RX W HCPCS: Performed by: PHYSICIAN ASSISTANT

## 2020-01-01 PROCEDURE — 6360000004 HC RX CONTRAST MEDICATION: Performed by: RADIOLOGY

## 2020-01-01 PROCEDURE — 80061 LIPID PANEL: CPT

## 2020-01-01 PROCEDURE — 99283 EMERGENCY DEPT VISIT LOW MDM: CPT

## 2020-01-01 PROCEDURE — 83036 HEMOGLOBIN GLYCOSYLATED A1C: CPT

## 2020-01-01 PROCEDURE — 20553 NJX 1/MLT TRIGGER POINTS 3/>: CPT | Performed by: PSYCHIATRY & NEUROLOGY

## 2020-01-01 PROCEDURE — 93000 ELECTROCARDIOGRAM COMPLETE: CPT | Performed by: INTERNAL MEDICINE

## 2020-01-01 PROCEDURE — 83550 IRON BINDING TEST: CPT

## 2020-01-01 PROCEDURE — 94760 N-INVAS EAR/PLS OXIMETRY 1: CPT

## 2020-01-01 PROCEDURE — 83540 ASSAY OF IRON: CPT

## 2020-01-01 PROCEDURE — 83735 ASSAY OF MAGNESIUM: CPT

## 2020-01-01 PROCEDURE — 85610 PROTHROMBIN TIME: CPT

## 2020-01-01 PROCEDURE — 6370000000 HC RX 637 (ALT 250 FOR IP): Performed by: NURSE PRACTITIONER

## 2020-01-01 PROCEDURE — 99213 OFFICE O/P EST LOW 20 MIN: CPT | Performed by: PHYSICIAN ASSISTANT

## 2020-01-01 PROCEDURE — 83695 ASSAY OF LIPOPROTEIN(A): CPT

## 2020-01-01 PROCEDURE — 6360000002 HC RX W HCPCS: Performed by: INTERNAL MEDICINE

## 2020-01-01 PROCEDURE — APPSS60 APP SPLIT SHARED TIME 46-60 MINUTES: Performed by: CLINICAL NURSE SPECIALIST

## 2020-01-01 PROCEDURE — G8427 DOCREV CUR MEDS BY ELIG CLIN: HCPCS | Performed by: NEUROLOGICAL SURGERY

## 2020-01-01 PROCEDURE — 99204 OFFICE O/P NEW MOD 45 MIN: CPT | Performed by: PAIN MEDICINE

## 2020-01-01 PROCEDURE — 1090F PRES/ABSN URINE INCON ASSESS: CPT | Performed by: NEUROLOGICAL SURGERY

## 2020-01-01 PROCEDURE — 1036F TOBACCO NON-USER: CPT | Performed by: NEUROLOGICAL SURGERY

## 2020-01-01 PROCEDURE — 83880 ASSAY OF NATRIURETIC PEPTIDE: CPT

## 2020-01-01 PROCEDURE — 93970 EXTREMITY STUDY: CPT

## 2020-01-01 PROCEDURE — 1123F ACP DISCUSS/DSCN MKR DOCD: CPT | Performed by: NEUROLOGICAL SURGERY

## 2020-01-01 RX ORDER — HYDRALAZINE HYDROCHLORIDE 50 MG/1
50 TABLET, FILM COATED ORAL EVERY 8 HOURS
Status: DISCONTINUED | OUTPATIENT
Start: 2020-01-01 | End: 2020-01-01 | Stop reason: HOSPADM

## 2020-01-01 RX ORDER — AZELASTINE HYDROCHLORIDE 0.5 MG/ML
1 SOLUTION/ DROPS OPHTHALMIC 2 TIMES DAILY
Status: DISCONTINUED | OUTPATIENT
Start: 2020-01-01 | End: 2020-01-01 | Stop reason: HOSPADM

## 2020-01-01 RX ORDER — NICOTINE POLACRILEX 4 MG
15 LOZENGE BUCCAL PRN
Status: DISCONTINUED | OUTPATIENT
Start: 2020-01-01 | End: 2020-01-01 | Stop reason: HOSPADM

## 2020-01-01 RX ORDER — SODIUM CHLORIDE 0.9 % (FLUSH) 0.9 %
10 SYRINGE (ML) INJECTION PRN
Status: DISCONTINUED | OUTPATIENT
Start: 2020-01-01 | End: 2020-01-01 | Stop reason: HOSPADM

## 2020-01-01 RX ORDER — FUROSEMIDE 10 MG/ML
40 INJECTION INTRAMUSCULAR; INTRAVENOUS 2 TIMES DAILY
Status: COMPLETED | OUTPATIENT
Start: 2020-01-01 | End: 2020-01-01

## 2020-01-01 RX ORDER — BUPIVACAINE HYDROCHLORIDE 2.5 MG/ML
10 INJECTION, SOLUTION EPIDURAL; INFILTRATION; INTRACAUDAL ONCE
Status: COMPLETED | OUTPATIENT
Start: 2020-01-01 | End: 2020-01-01

## 2020-01-01 RX ORDER — TRAMADOL HYDROCHLORIDE 50 MG/1
50 TABLET ORAL ONCE
Status: COMPLETED | OUTPATIENT
Start: 2020-01-01 | End: 2020-01-01

## 2020-01-01 RX ORDER — IPRATROPIUM BROMIDE AND ALBUTEROL SULFATE 2.5; .5 MG/3ML; MG/3ML
1 SOLUTION RESPIRATORY (INHALATION)
Status: DISCONTINUED | OUTPATIENT
Start: 2020-01-01 | End: 2020-01-01 | Stop reason: HOSPADM

## 2020-01-01 RX ORDER — ONDANSETRON 2 MG/ML
4 INJECTION INTRAMUSCULAR; INTRAVENOUS EVERY 6 HOURS PRN
Status: DISCONTINUED | OUTPATIENT
Start: 2020-01-01 | End: 2020-01-01 | Stop reason: HOSPADM

## 2020-01-01 RX ORDER — AMOXICILLIN 500 MG/1
500 CAPSULE ORAL 3 TIMES DAILY
Qty: 21 CAPSULE | Refills: 0 | Status: SHIPPED | OUTPATIENT
Start: 2020-01-01 | End: 2020-01-01

## 2020-01-01 RX ORDER — AMOXICILLIN 250 MG/1
500 CAPSULE ORAL ONCE
Status: COMPLETED | OUTPATIENT
Start: 2020-01-01 | End: 2020-01-01

## 2020-01-01 RX ORDER — CLONIDINE HYDROCHLORIDE 0.2 MG/1
0.2 TABLET ORAL 2 TIMES DAILY
Status: DISCONTINUED | OUTPATIENT
Start: 2020-01-01 | End: 2020-01-01 | Stop reason: HOSPADM

## 2020-01-01 RX ORDER — FUROSEMIDE 40 MG/1
40 TABLET ORAL DAILY
Qty: 60 TABLET | Refills: 3 | Status: ON HOLD
Start: 2020-01-01 | End: 2021-01-01 | Stop reason: HOSPADM

## 2020-01-01 RX ORDER — MELOXICAM 7.5 MG/1
15 TABLET ORAL DAILY
Status: DISCONTINUED | OUTPATIENT
Start: 2020-01-01 | End: 2020-01-01 | Stop reason: HOSPADM

## 2020-01-01 RX ORDER — POLYETHYLENE GLYCOL 3350 17 G/17G
17 POWDER, FOR SOLUTION ORAL DAILY PRN
Status: DISCONTINUED | OUTPATIENT
Start: 2020-01-01 | End: 2020-01-01 | Stop reason: HOSPADM

## 2020-01-01 RX ORDER — ACETAMINOPHEN AND CODEINE PHOSPHATE 300; 30 MG/1; MG/1
TABLET ORAL
COMMUNITY
Start: 2020-01-01

## 2020-01-01 RX ORDER — DEXTROSE MONOHYDRATE 50 MG/ML
100 INJECTION, SOLUTION INTRAVENOUS PRN
Status: DISCONTINUED | OUTPATIENT
Start: 2020-01-01 | End: 2020-01-01 | Stop reason: HOSPADM

## 2020-01-01 RX ORDER — BUPIVACAINE HYDROCHLORIDE 5 MG/ML
3 INJECTION, SOLUTION PERINEURAL ONCE
Status: COMPLETED | OUTPATIENT
Start: 2020-01-01 | End: 2020-01-01

## 2020-01-01 RX ORDER — METOPROLOL SUCCINATE 25 MG/1
25 TABLET, EXTENDED RELEASE ORAL DAILY
Qty: 30 TABLET | Refills: 3 | Status: SHIPPED | OUTPATIENT
Start: 2020-01-01

## 2020-01-01 RX ORDER — LEVOTHYROXINE SODIUM 0.05 MG/1
50 TABLET ORAL DAILY
Status: DISCONTINUED | OUTPATIENT
Start: 2020-01-01 | End: 2020-01-01 | Stop reason: HOSPADM

## 2020-01-01 RX ORDER — ACETAMINOPHEN 325 MG/1
650 TABLET ORAL EVERY 6 HOURS PRN
Status: DISCONTINUED | OUTPATIENT
Start: 2020-01-01 | End: 2020-01-01 | Stop reason: HOSPADM

## 2020-01-01 RX ORDER — POTASSIUM CHLORIDE 750 MG/1
TABLET, FILM COATED, EXTENDED RELEASE ORAL
COMMUNITY
Start: 2020-01-01 | End: 2021-01-01 | Stop reason: SDUPTHER

## 2020-01-01 RX ORDER — MELOXICAM 7.5 MG/1
7.5 TABLET ORAL DAILY
Qty: 30 TABLET | Refills: 2 | Status: ON HOLD
Start: 2020-01-01 | End: 2020-01-01 | Stop reason: ALTCHOICE

## 2020-01-01 RX ORDER — METOPROLOL SUCCINATE 25 MG/1
25 TABLET, EXTENDED RELEASE ORAL DAILY
Status: DISCONTINUED | OUTPATIENT
Start: 2020-01-01 | End: 2020-01-01 | Stop reason: HOSPADM

## 2020-01-01 RX ORDER — MELOXICAM 15 MG/1
15 TABLET ORAL DAILY
Qty: 30 TABLET | Refills: 0 | Status: ON HOLD
Start: 2020-01-01 | End: 2021-01-01 | Stop reason: HOSPADM

## 2020-01-01 RX ORDER — AMLODIPINE BESYLATE 10 MG/1
10 TABLET ORAL DAILY
Status: DISCONTINUED | OUTPATIENT
Start: 2020-01-01 | End: 2020-01-01 | Stop reason: HOSPADM

## 2020-01-01 RX ORDER — SODIUM CHLORIDE 0.9 % (FLUSH) 0.9 %
10 SYRINGE (ML) INJECTION
Status: COMPLETED | OUTPATIENT
Start: 2020-01-01 | End: 2020-01-01

## 2020-01-01 RX ORDER — SODIUM CHLORIDE 0.9 % (FLUSH) 0.9 %
10 SYRINGE (ML) INJECTION EVERY 12 HOURS SCHEDULED
Status: DISCONTINUED | OUTPATIENT
Start: 2020-01-01 | End: 2020-01-01 | Stop reason: HOSPADM

## 2020-01-01 RX ORDER — POTASSIUM CHLORIDE 7.45 MG/ML
10 INJECTION INTRAVENOUS PRN
Status: DISCONTINUED | OUTPATIENT
Start: 2020-01-01 | End: 2020-01-01 | Stop reason: HOSPADM

## 2020-01-01 RX ORDER — DEXTROSE MONOHYDRATE 25 G/50ML
12.5 INJECTION, SOLUTION INTRAVENOUS PRN
Status: DISCONTINUED | OUTPATIENT
Start: 2020-01-01 | End: 2020-01-01 | Stop reason: HOSPADM

## 2020-01-01 RX ORDER — TRAMADOL HYDROCHLORIDE 50 MG/1
50 TABLET ORAL EVERY 8 HOURS PRN
Qty: 9 TABLET | Refills: 0 | Status: SHIPPED | OUTPATIENT
Start: 2020-01-01 | End: 2020-01-01

## 2020-01-01 RX ORDER — POTASSIUM CHLORIDE 20 MEQ/1
40 TABLET, EXTENDED RELEASE ORAL PRN
Status: DISCONTINUED | OUTPATIENT
Start: 2020-01-01 | End: 2020-01-01 | Stop reason: HOSPADM

## 2020-01-01 RX ORDER — FUROSEMIDE 10 MG/ML
40 INJECTION INTRAMUSCULAR; INTRAVENOUS ONCE
Status: COMPLETED | OUTPATIENT
Start: 2020-01-01 | End: 2020-01-01

## 2020-01-01 RX ORDER — ACETAMINOPHEN 650 MG/1
650 SUPPOSITORY RECTAL EVERY 6 HOURS PRN
Status: DISCONTINUED | OUTPATIENT
Start: 2020-01-01 | End: 2020-01-01 | Stop reason: HOSPADM

## 2020-01-01 RX ORDER — LOSARTAN POTASSIUM 50 MG/1
100 TABLET ORAL DAILY
Status: DISCONTINUED | OUTPATIENT
Start: 2020-01-01 | End: 2020-01-01 | Stop reason: HOSPADM

## 2020-01-01 RX ORDER — CLONIDINE HYDROCHLORIDE 0.1 MG/1
0.2 TABLET ORAL ONCE
Status: COMPLETED | OUTPATIENT
Start: 2020-01-01 | End: 2020-01-01

## 2020-01-01 RX ORDER — PROMETHAZINE HYDROCHLORIDE 25 MG/1
12.5 TABLET ORAL EVERY 6 HOURS PRN
Status: DISCONTINUED | OUTPATIENT
Start: 2020-01-01 | End: 2020-01-01 | Stop reason: HOSPADM

## 2020-01-01 RX ORDER — TIZANIDINE 4 MG/1
2 TABLET ORAL NIGHTLY PRN
Status: DISCONTINUED | OUTPATIENT
Start: 2020-01-01 | End: 2020-01-01 | Stop reason: HOSPADM

## 2020-01-01 RX ORDER — AMLODIPINE BESYLATE 5 MG/1
10 TABLET ORAL ONCE
Status: COMPLETED | OUTPATIENT
Start: 2020-01-01 | End: 2020-01-01

## 2020-01-01 RX ORDER — FUROSEMIDE 40 MG/1
40 TABLET ORAL DAILY
Status: DISCONTINUED | OUTPATIENT
Start: 2020-01-01 | End: 2020-01-01 | Stop reason: HOSPADM

## 2020-01-01 RX ORDER — LIDOCAINE HYDROCHLORIDE 20 MG/ML
3 INJECTION, SOLUTION INFILTRATION; PERINEURAL ONCE
Status: COMPLETED | OUTPATIENT
Start: 2020-01-01 | End: 2020-01-01

## 2020-01-01 RX ADMIN — IOPAMIDOL 60 ML: 755 INJECTION, SOLUTION INTRAVENOUS at 09:35

## 2020-01-01 RX ADMIN — FUROSEMIDE 40 MG: 10 INJECTION, SOLUTION INTRAMUSCULAR; INTRAVENOUS at 10:09

## 2020-01-01 RX ADMIN — AMLODIPINE BESYLATE 10 MG: 10 TABLET ORAL at 13:32

## 2020-01-01 RX ADMIN — IPRATROPIUM BROMIDE AND ALBUTEROL SULFATE 1 AMPULE: .5; 3 SOLUTION RESPIRATORY (INHALATION) at 16:19

## 2020-01-01 RX ADMIN — MELOXICAM 15 MG: 7.5 TABLET ORAL at 08:52

## 2020-01-01 RX ADMIN — FUROSEMIDE 40 MG: 10 INJECTION, SOLUTION INTRAMUSCULAR; INTRAVENOUS at 17:39

## 2020-01-01 RX ADMIN — IPRATROPIUM BROMIDE AND ALBUTEROL SULFATE 1 AMPULE: .5; 3 SOLUTION RESPIRATORY (INHALATION) at 13:04

## 2020-01-01 RX ADMIN — METOPROLOL SUCCINATE 25 MG: 25 TABLET, EXTENDED RELEASE ORAL at 13:32

## 2020-01-01 RX ADMIN — BUPIVACAINE HYDROCHLORIDE 25 MG: 2.5 INJECTION, SOLUTION EPIDURAL; INFILTRATION; INTRACAUDAL at 15:59

## 2020-01-01 RX ADMIN — TRAMADOL HYDROCHLORIDE 50 MG: 50 TABLET, FILM COATED ORAL at 18:20

## 2020-01-01 RX ADMIN — SODIUM CHLORIDE, PRESERVATIVE FREE 10 ML: 5 INJECTION INTRAVENOUS at 08:54

## 2020-01-01 RX ADMIN — HYDRALAZINE HYDROCHLORIDE 50 MG: 50 TABLET, FILM COATED ORAL at 13:32

## 2020-01-01 RX ADMIN — AMOXICILLIN 500 MG: 250 CAPSULE ORAL at 20:30

## 2020-01-01 RX ADMIN — HYDRALAZINE HYDROCHLORIDE 50 MG: 50 TABLET, FILM COATED ORAL at 21:15

## 2020-01-01 RX ADMIN — HYDRALAZINE HYDROCHLORIDE 50 MG: 50 TABLET, FILM COATED ORAL at 12:37

## 2020-01-01 RX ADMIN — MELOXICAM 15 MG: 7.5 TABLET ORAL at 15:44

## 2020-01-01 RX ADMIN — CLONIDINE HYDROCHLORIDE 0.2 MG: 0.2 TABLET ORAL at 13:32

## 2020-01-01 RX ADMIN — ACETAMINOPHEN 650 MG: 325 TABLET ORAL at 15:09

## 2020-01-01 RX ADMIN — CLONIDINE HYDROCHLORIDE 0.2 MG: 0.1 TABLET ORAL at 07:02

## 2020-01-01 RX ADMIN — AMLODIPINE BESYLATE 10 MG: 5 TABLET ORAL at 07:02

## 2020-01-01 RX ADMIN — AMLODIPINE BESYLATE 10 MG: 10 TABLET ORAL at 08:53

## 2020-01-01 RX ADMIN — INSULIN LISPRO 1 UNITS: 100 INJECTION, SOLUTION INTRAVENOUS; SUBCUTANEOUS at 12:31

## 2020-01-01 RX ADMIN — CLONIDINE HYDROCHLORIDE 0.2 MG: 0.2 TABLET ORAL at 21:14

## 2020-01-01 RX ADMIN — ENOXAPARIN SODIUM 40 MG: 40 INJECTION SUBCUTANEOUS at 08:53

## 2020-01-01 RX ADMIN — FUROSEMIDE 40 MG: 40 TABLET ORAL at 08:53

## 2020-01-01 RX ADMIN — ENOXAPARIN SODIUM 40 MG: 40 INJECTION SUBCUTANEOUS at 13:33

## 2020-01-01 RX ADMIN — CLONIDINE HYDROCHLORIDE 0.2 MG: 0.2 TABLET ORAL at 08:53

## 2020-01-01 RX ADMIN — LOSARTAN POTASSIUM 100 MG: 50 TABLET, FILM COATED ORAL at 08:53

## 2020-01-01 RX ADMIN — LOSARTAN POTASSIUM 100 MG: 50 TABLET, FILM COATED ORAL at 13:34

## 2020-01-01 RX ADMIN — Medication 10 ML: at 09:35

## 2020-01-01 RX ADMIN — LEVOTHYROXINE SODIUM 50 MCG: 0.05 TABLET ORAL at 06:02

## 2020-01-01 RX ADMIN — IPRATROPIUM BROMIDE AND ALBUTEROL SULFATE 1 AMPULE: .5; 3 SOLUTION RESPIRATORY (INHALATION) at 15:45

## 2020-01-01 RX ADMIN — SODIUM CHLORIDE, PRESERVATIVE FREE 10 ML: 5 INJECTION INTRAVENOUS at 21:19

## 2020-01-01 RX ADMIN — BUPIVACAINE HYDROCHLORIDE 15 MG: 5 INJECTION, SOLUTION PERINEURAL at 15:20

## 2020-01-01 RX ADMIN — LIDOCAINE HYDROCHLORIDE 3 ML: 20 INJECTION, SOLUTION INFILTRATION; PERINEURAL at 15:20

## 2020-01-01 ASSESSMENT — ENCOUNTER SYMPTOMS
RESPIRATORY NEGATIVE: 1
EYE PAIN: 0
NAUSEA: 0
COLOR CHANGE: 0
PHOTOPHOBIA: 0
ALLERGIC/IMMUNOLOGIC NEGATIVE: 1
PHOTOPHOBIA: 0
TROUBLE SWALLOWING: 0
CONSTIPATION: 0
SHORTNESS OF BREATH: 0
ABDOMINAL DISTENTION: 0
DIARRHEA: 0
GASTROINTESTINAL NEGATIVE: 1
FACIAL SWELLING: 0
VISUAL CHANGE: 0
NAUSEA: 0
EYES NEGATIVE: 1
RHINORRHEA: 0
TROUBLE SWALLOWING: 0
PHOTOPHOBIA: 0
SHORTNESS OF BREATH: 0
COUGH: 0
EYE REDNESS: 0
VOMITING: 0
CHEST TIGHTNESS: 0

## 2020-01-01 ASSESSMENT — PAIN SCALES - GENERAL
PAINLEVEL_OUTOF10: 6
PAINLEVEL_OUTOF10: 0
PAINLEVEL_OUTOF10: 0
PAINLEVEL_OUTOF10: 3
PAINLEVEL_OUTOF10: 6
PAINLEVEL_OUTOF10: 0
PAINLEVEL_OUTOF10: 3
PAINLEVEL_OUTOF10: 0
PAINLEVEL_OUTOF10: 3

## 2020-01-01 ASSESSMENT — PAIN DESCRIPTION - DESCRIPTORS
DESCRIPTORS: ACHING;DISCOMFORT;NAGGING
DESCRIPTORS: ACHING;DISCOMFORT;NAGGING

## 2020-01-01 ASSESSMENT — PAIN DESCRIPTION - PAIN TYPE
TYPE: ACUTE PAIN
TYPE: CHRONIC PAIN
TYPE: ACUTE PAIN

## 2020-01-01 ASSESSMENT — PAIN DESCRIPTION - ONSET
ONSET: GRADUAL
ONSET: GRADUAL

## 2020-01-01 ASSESSMENT — PAIN DESCRIPTION - ORIENTATION
ORIENTATION: RIGHT
ORIENTATION: MID

## 2020-01-01 ASSESSMENT — PAIN - FUNCTIONAL ASSESSMENT
PAIN_FUNCTIONAL_ASSESSMENT: ACTIVITIES ARE NOT PREVENTED
PAIN_FUNCTIONAL_ASSESSMENT: ACTIVITIES ARE NOT PREVENTED

## 2020-01-01 ASSESSMENT — PAIN DESCRIPTION - PROGRESSION
CLINICAL_PROGRESSION: GRADUALLY WORSENING
CLINICAL_PROGRESSION: GRADUALLY WORSENING

## 2020-01-01 ASSESSMENT — PAIN DESCRIPTION - LOCATION
LOCATION: CHEST
LOCATION: NECK;HEAD
LOCATION: FOOT

## 2020-01-01 ASSESSMENT — PAIN DESCRIPTION - FREQUENCY
FREQUENCY: INTERMITTENT
FREQUENCY: INTERMITTENT

## 2020-01-06 ENCOUNTER — HOSPITAL ENCOUNTER (OUTPATIENT)
Dept: GENERAL RADIOLOGY | Age: 85
Discharge: HOME | End: 2020-01-08
Payer: MEDICARE

## 2020-01-06 ENCOUNTER — HOSPITAL ENCOUNTER (OUTPATIENT)
Age: 85
Discharge: HOME | End: 2020-01-08
Payer: MEDICARE

## 2020-01-06 PROCEDURE — 72040 X-RAY EXAM NECK SPINE 2-3 VW: CPT

## 2020-01-10 ENCOUNTER — OFFICE VISIT (OUTPATIENT)
Dept: NEUROSURGERY | Age: 85
End: 2020-01-10
Payer: MEDICARE

## 2020-01-10 VITALS
HEIGHT: 63 IN | SYSTOLIC BLOOD PRESSURE: 174 MMHG | BODY MASS INDEX: 28.17 KG/M2 | WEIGHT: 159 LBS | HEART RATE: 66 BPM | DIASTOLIC BLOOD PRESSURE: 82 MMHG

## 2020-01-10 PROCEDURE — G8484 FLU IMMUNIZE NO ADMIN: HCPCS | Performed by: PHYSICIAN ASSISTANT

## 2020-01-10 PROCEDURE — G8427 DOCREV CUR MEDS BY ELIG CLIN: HCPCS | Performed by: PHYSICIAN ASSISTANT

## 2020-01-10 PROCEDURE — G8417 CALC BMI ABV UP PARAM F/U: HCPCS | Performed by: PHYSICIAN ASSISTANT

## 2020-01-10 PROCEDURE — 1036F TOBACCO NON-USER: CPT | Performed by: PHYSICIAN ASSISTANT

## 2020-01-10 PROCEDURE — G8399 PT W/DXA RESULTS DOCUMENT: HCPCS | Performed by: PHYSICIAN ASSISTANT

## 2020-01-10 PROCEDURE — 4040F PNEUMOC VAC/ADMIN/RCVD: CPT | Performed by: PHYSICIAN ASSISTANT

## 2020-01-10 PROCEDURE — 1123F ACP DISCUSS/DSCN MKR DOCD: CPT | Performed by: PHYSICIAN ASSISTANT

## 2020-01-10 PROCEDURE — 99214 OFFICE O/P EST MOD 30 MIN: CPT | Performed by: PHYSICIAN ASSISTANT

## 2020-01-10 PROCEDURE — 1090F PRES/ABSN URINE INCON ASSESS: CPT | Performed by: PHYSICIAN ASSISTANT

## 2020-01-10 ASSESSMENT — ENCOUNTER SYMPTOMS
PHOTOPHOBIA: 0
SHORTNESS OF BREATH: 0
BACK PAIN: 1
TROUBLE SWALLOWING: 0
ABDOMINAL PAIN: 0

## 2020-01-10 NOTE — PROGRESS NOTES
kidney disease) stage 3, GFR 30-59 ml/min (HCC)     Diabetes mellitus (HCC)     GERD (gastroesophageal reflux disease)     [place] (hard of hearing)     Hypertension     Hypothyroidism     SSS (sick sinus syndrome) ([facility])     stable, per epic note.  Thyroid disease     Unsteadiness     Valvular heart disease     sees Dr. Gio Guerrero      Past Surgical History:   Procedure Laterality Date    FOOT SURGERY      both    TOTAL KNEE ARTHROPLASTY Right 3/21/2019    RIGHT KNEE TOTAL ARTHROPLASTY (ILENE)++ADDUCTOR CANAL BLOCK++ performed by Dakota Gray MD at [facility] OR      Family History   Problem Relation Age of Onset    No Known Problems Mother     No Known Problems Father       Social History     Socioeconomic History    Marital status:       Spouse name: Not on file    Number of children: Not on file    Years of education: Not on file    Highest education level: Not on file   Occupational History    Not on file   Social Needs    Financial resource strain: Not on file    Food insecurity:     Worry: Not on file     Inability: Not on file    Transportation needs:     Medical: Not on file     Non-medical: Not on file   Tobacco Use    Smoking status: Never Smoker    Smokeless tobacco: Never Used   Substance and Sexual Activity    Alcohol use: No    Drug use: No    Sexual activity: Not on file   Lifestyle    Physical activity:     Days per week: Not on file     Minutes per session: Not on file    Stress: Not on file   Relationships    Social connections:     Talks on phone: Not on file     Gets together: Not on file     Attends Taoist service: Not on file     Active member of club or organization: Not on file     Attends meetings of clubs or organizations: Not on file     Relationship status: Not on file    Intimate partner violence:     Fear of current or ex partner: Not on file     Emotionally abused: Not on file     Physically abused: Not on file     Forced sexual activity: Not on file Other Topics Concern    Not on file   Social History Narrative    ** Merged History Encounter **        Drinks occ pop; no other caffeine. Medications:   Current Outpatient Medications   Medication Sig Dispense Refill    azelastine (OPTIVAR) 0.05 % ophthalmic solution Place 1 drop into both eyes 2 times daily Use morning od OR.  nystatin (MYCOSTATIN) 246455 UNIT/GM cream       tiZANidine (ZANAFLEX) 2 MG tablet Take 1 tablet by mouth nightly as needed (muscle spapsms) 10 tablet 0    Compression Stockings MISC by Does not apply route Measure and fit to patient 1 each 0    hydrochlorothiazide (HYDRODIURIL) 25 MG tablet Take 1 tablet by mouth daily (Patient taking differently: Take 25 mg by mouth every other day ) 30 tablet 3    glimepiride (AMARYL) 1 MG tablet Take 1 mg by mouth every morning (before breakfast)      levothyroxine (SYNTHROID) 50 MCG tablet Take 50 mcg by mouth Daily      cloNIDine (CATAPRES) 0.2 MG tablet Take 0.2 mg by mouth 2 times daily Instructed to take am of procedure      hydrALAZINE (APRESOLINE) 50 MG tablet Take 50 mg by mouth every 8 hours Instructed to take am of procedure      acetaminophen (TYLENOL) 500 MG tablet Take 500 mg by mouth every 6 hours as needed for Pain      metFORMIN (GLUCOPHAGE) 500 MG tablet Take 500 mg by mouth 2 times daily (with meals)      losartan (COZAAR) 100 MG tablet Take 1 tablet by mouth daily 30 tablet 0    potassium chloride (KLOR-CON M) 10 MEQ extended release tablet Take 1 tablet by mouth daily 30 tablet 0    amLODIPine (NORVASC) 10 MG tablet Take 10 mg by mouth daily Instructed to take am of procedure       No current facility-administered medications for this visit. Allergies: Allergies as of 01/10/2020 - Review Complete 01/10/2020   Allergen Reaction Noted    Aspirin Other (See Comments) 04/18/2012          Review of Systems   Constitutional: Negative for fever. HENT: Negative for trouble swallowing.     Eyes: C4 on C5,   and C5 on C6. Prior C1 right posterior arch fracture on CT scans are not visualized   on the current study.          Assessment:   Chronic neck pain   Chronic right C1 posterior arch fx    Plan:   -Obtain MRI cervical spine to evaluate for stenosis  -Cervical flexion/extension x-rays ordered to evaluate for any instability   -Return to neurosurgery clinic with Dr. Laurita Brennan after completion of imaging to discuss results and further treatment plan  -Call/return sooner if symptoms worsen or new issues arise in the interim     Qi Dougherty PA-C

## 2020-01-22 ENCOUNTER — HOSPITAL ENCOUNTER (OUTPATIENT)
Dept: MRI IMAGING | Age: 85
Discharge: HOME | End: 2020-01-24
Payer: MEDICARE

## 2020-01-22 PROCEDURE — 72141 MRI NECK SPINE W/O DYE: CPT

## 2020-04-09 ENCOUNTER — TELEPHONE (OUTPATIENT)
Dept: PHARMACY | Facility: CLINIC | Age: 85
End: 2020-04-09

## 2020-04-09 NOTE — TELEPHONE ENCOUNTER
CLINICAL PHARMACY: ADHERENCE REVIEW AND 90 DAY SUPPLY  Identified care gap per [facility]; fills at [facility]: Diabetes adherence    PHELPS: Provider    Per [address] [phone]  losartan (COZAAR) 100 MG last filled on 4/4/20 for a 30 day supply; SIG:   qd; last picked up on 4/4/20. 0 refills remaining. Billed through [employer] # [id number] [facility])    metFORMIN (GLUCOPHAGE) 500 MG last filled on 4/1/20 for a 60 day supply; SIG: BID; 0 refills remaining. [id number] last filled on 4/1/20 for a 30 day supply; SIG:QD; 0 refills    Melonie at [place] was very familiar with patient. Had offered 90 day supply in the past and never accepted. States she's easily confused. BP Readings from Last 3 Encounters:   01/10/20 (!) 174/82   10/16/19 (!) 158/70   04/01/19 (!) 153/85     CrCl cannot be calculated (Patient's most recent lab result is older than the maximum 120 days allowed. ). Lab Results   Component Value Date    LABA1C 6.8 (H) 07/31/2019    LABA1C 6.1 (H) 03/22/2019    LABA1C 6.3 (H) 03/08/2019       Lab Results   Component Value Date    CHOL 155 07/31/2019    TRIG 47 07/31/2019    HDL 78 07/31/2019    LDLCHOLESTEROL 73 12/19/2017    LDLCALC 68 07/31/2019     ALT   Date Value Ref Range Status   07/31/2019 10 0 - 32 U/L Final     AST   Date Value Ref Range Status   07/31/2019 17 0 - 31 U/L Final     The ASCVD Risk score (Darinel Roy, et al., 2013) failed to calculate for the following reasons: The 2013 ASCVD risk score is only valid for ages 36 to 78     PLAN  Attempting to reach patient to review.  Left message asking for return call.

## 2020-05-21 NOTE — PROGRESS NOTES
Extraocular movements intact. Conjunctiva/sclera: Conjunctivae normal.      Pupils: Pupils are equal, round, and reactive to light. Pulmonary:      Effort: Pulmonary effort is normal. No respiratory distress. Abdominal:      General: Abdomen is flat. There is no distension. Musculoskeletal:         General: No swelling, tenderness, deformity or signs of injury. Right lower leg: No edema. Left lower leg: No edema. Skin:     Capillary Refill: Capillary refill takes less than 2 seconds. Coloration: Skin is not jaundiced or pale. Findings: No bruising, erythema, lesion or rash. Neurological:      General: No focal deficit present. Mental Status: She is alert and oriented to person, place, and time. GCS: GCS eye subscore is 4. GCS verbal subscore is 5. GCS motor subscore is 6. Cranial Nerves: No cranial nerve deficit, dysarthria or facial asymmetry. Sensory: Sensation is intact. No sensory deficit. Motor: Motor function is intact. No weakness, tremor, atrophy, abnormal muscle tone, seizure activity or pronator drift. Coordination: Romberg sign negative. Coordination normal. Finger-Nose-Finger Test and Heel to [facility] Test normal. Rapid alternating movements normal.      Gait: Gait normal.      Deep Tendon Reflexes: Reflexes normal. Babinski sign absent on the right side. Babinski sign absent on the left side. Reflex Scores:       Tricep reflexes are 2+ on the right side and 2+ on the left side. Bicep reflexes are 2+ on the right side and 2+ on the left side. Brachioradialis reflexes are 2+ on the right side and 2+ on the left side. Patellar reflexes are 2+ on the right side and 2+ on the left side. Achilles reflexes are 2+ on the right side and 2+ on the left side. Psychiatric:         Mood and Affect: Mood normal.         Behavior: Behavior normal.         Thought Content:  Thought content normal.         Judgment: Judgment

## 2020-06-20 PROBLEM — I50.9 HF (HEART FAILURE) (HCC): Status: ACTIVE | Noted: 2020-01-01

## 2020-06-20 NOTE — CONSULTS
Patient seen and examined. Chart, labs, imaging studies, EKG and rhythm strips reviewed. Full consult to follow. We are asked to see the patient regarding \"CHF\"     IMPRESSION:  1. Subjective feeling of \"heart racing\". History of sick sinus syndrome and history of sinus bradycardia and Wenckebach (while on verapamil and high dose clonidine). 2. Bilateral lower extremity edema with reported weight gain, likely dependent  3. Lying flat without shortness of breath. CXR not consistent with congestive heart failure and proBNP only 240  4. History of heart failure with preserved EF. 5. Mild mitral regurgitation by 2-D echocardiogram March 2017    6. Chronic dizziness/unsteadiness on feet  7. Hypertension/LVH. blood pressure not adequately controlled   8. Diabetes mellitus. 9. Chronic anemia/leukopenia  10. Osteoarthritis s/p total right knee arthoplasty in 3/2019  11. Cervical radiculopathy. Chronic right posterior arch fracture of C1  12. Bursitis of both shoulders. 13. Hypothyroidism:  On replacement therapy. 14. Sensitivity to aspirin: Causes GI upset  15. Suspect dementia  16. Medical noncompliance. PLAN:   1. Change IV Lasix to PO  2. Discontinue Hydrochlorothiazide as part of home medical regimen   3. Agree with adding Hydralazine: monitor blood pressure and adjust dose accordingly  4. Add low dose beta blocker: monitor heart rate  5. Rest of cardiac medications same  6. Add ANGIE hose  7. Will review echo when done (ordered by primary service)  8. May be discharged home (from cardiology standpoint) in AM  9. Cardiology will sign off: please call if needed    Thank you for allowing me to participate in your patient's care. Please feel free to contact me if you have any questions or concerns.     Electronically signed by Conrad Cm MD on 6/20/2020 at 12:56 PM

## 2020-06-20 NOTE — ED PROVIDER NOTES
Is a 41-year-old female that presents to the emergency department due to having palpitations and rapid heart rate that she states has occurred on and off for months however was persistent this past night. She also states that over the last 2 weeks her lower extremities bilaterally have been swelling increasingly which is new for her. The history is provided by the patient. No  was used. Symptoms are worsened by nothing       Symptoms are improved by nothing    Denies any associated chest pain, shortness of breath, cough, fever    Review of Systems   Constitutional: Negative for activity change, chills, diaphoresis and fatigue. HENT: Negative for congestion, facial swelling, hearing loss and rhinorrhea. Eyes: Negative for photophobia, pain and redness. Respiratory: Negative for cough, chest tightness and shortness of breath. Cardiovascular: Positive for palpitations and leg swelling. Negative for chest pain. Gastrointestinal: Negative for abdominal distention, constipation, diarrhea and nausea. Genitourinary: Negative for difficulty urinating, dysuria, frequency and hematuria. Musculoskeletal: Negative for arthralgias, joint swelling and myalgias. Skin: Negative for color change, pallor and rash. Neurological: Negative for light-headedness, numbness and headaches. Hematological: Negative for adenopathy. Physical Exam  Vitals signs and nursing note reviewed. Constitutional:       Appearance: She is well-developed. She is ill-appearing. HENT:      Head: Normocephalic and atraumatic. Right Ear: Tympanic membrane normal.      Left Ear: Tympanic membrane normal.      Nose: Nose normal. No congestion. Mouth/Throat:      Mouth: Mucous membranes are moist.      Pharynx: Oropharynx is clear. Eyes:      Pupils: Pupils are equal, round, and reactive to light. Neck:      Musculoskeletal: Normal range of motion and neck supple.    Cardiovascular: disease. Past Surgical History:  has a past surgical history that includes Foot surgery and Total knee arthroplasty (Right, 3/21/2019). Social History:  reports that she has never smoked. She has never used smokeless tobacco. She reports that she does not drink alcohol or use drugs. Family History: family history includes No Known Problems in her father and mother. The patients home medications have been reviewed.     Allergies: Aspirin    -------------------------------------------------- RESULTS -------------------------------------------------    LABS:  Results for orders placed or performed during the hospital encounter of 06/20/20   CBC Auto Differential   Result Value Ref Range    WBC 4.3 (L) 4.5 - 11.5 E9/L    RBC 3.82 3.50 - 5.50 E12/L    Hemoglobin 10.6 (L) 11.5 - 15.5 g/dL    Hematocrit 34.1 34.0 - 48.0 %    MCV 89.3 80.0 - 99.9 fL    MCH 27.7 26.0 - 35.0 pg    MCHC 31.1 (L) 32.0 - 34.5 %    RDW 13.8 11.5 - 15.0 fL    Platelets 355 027 - 548 E9/L    MPV 10.6 7.0 - 12.0 fL    Neutrophils % 72.3 43.0 - 80.0 %    Immature Granulocytes % 0.2 0.0 - 5.0 %    Lymphocytes % 14.3 (L) 20.0 - 42.0 %    Monocytes % 10.9 2.0 - 12.0 %    Eosinophils % 2.1 0.0 - 6.0 %    Basophils % 0.2 0.0 - 2.0 %    Neutrophils Absolute 3.13 1.80 - 7.30 E9/L    Immature Granulocytes # 0.01 E9/L    Lymphocytes Absolute 0.62 (L) 1.50 - 4.00 E9/L    Monocytes Absolute 0.47 0.10 - 0.95 E9/L    Eosinophils Absolute 0.09 0.05 - 0.50 E9/L    Basophils Absolute 0.01 0.00 - 0.20 E9/L   Comprehensive Metabolic Panel w/ Reflex to MG   Result Value Ref Range    Sodium 142 132 - 146 mmol/L    Potassium reflex Magnesium 4.2 3.5 - 5.0 mmol/L    Chloride 104 98 - 107 mmol/L    CO2 23 22 - 29 mmol/L    Anion Gap 15 7 - 16 mmol/L    Glucose 161 (H) 74 - 99 mg/dL    BUN 21 8 - 23 mg/dL    CREATININE 0.9 0.5 - 1.0 mg/dL    GFR Non-African American >60 >=60 mL/min/1.73    GFR African American >60     Calcium 9.6 8.6 - 10.2 mg/dL    Total Protein 7.9 6.4 - 8.3 g/dL    Alb 4.3 3.5 - 5.2 g/dL    Total Bilirubin 0.5 0.0 - 1.2 mg/dL    Alkaline Phosphatase 80 35 - 104 U/L    ALT 11 0 - 32 U/L    AST 24 0 - 31 U/L   Troponin   Result Value Ref Range    Troponin <0.01 0.00 - 0.03 ng/mL   Brain Natriuretic Peptide   Result Value Ref Range    Pro- 0 - 450 pg/mL   Urinalysis, reflex to microscopic   Result Value Ref Range    Color, UA Yellow Straw/Yellow    Clarity, UA Clear Clear    Glucose, Ur Negative Negative mg/dL    Bilirubin Urine Negative Negative    Ketones, Urine Negative Negative mg/dL    Specific Gravity, UA 1.015 1.005 - 1.030    Blood, Urine Negative Negative    pH, UA 8.0 5.0 - 9.0    Protein, UA Negative Negative mg/dL    Urobilinogen, Urine 0.2 <2.0 E.U./dL    Nitrite, Urine Negative Negative    Leukocyte Esterase, Urine SMALL (A) Negative   T4   Result Value Ref Range    T4, Total 6.9 4.5 - 11.7 mcg/dL   TSH without Reflex   Result Value Ref Range    TSH 3.480 0.270 - 4.200 uIU/mL   Magnesium   Result Value Ref Range    Magnesium 1.9 1.6 - 2.6 mg/dL   Microscopic Urinalysis   Result Value Ref Range    WBC, UA 1-3 0 - 5 /HPF    RBC, UA NONE 0 - 2 /HPF    Epithelial Cells, UA FEW /HPF    Bacteria, UA NONE SEEN None Seen /HPF   EKG 12 Lead   Result Value Ref Range    Ventricular Rate 90 BPM    Atrial Rate 90 BPM    P-R Interval 136 ms    QRS Duration 104 ms    Q-T Interval 374 ms    QTc Calculation (Bazett) 457 ms    P Axis 62 degrees    R Axis -53 degrees    T Axis 87 degrees       RADIOLOGY:  CTA CHEST W CONTRAST   Final Result      1. No acute pulmonary embolism or intrathoracic disease process is   identified. 2. Stable cardiomegaly. 3. Atherosclerotic calcifications of the coronary arteries and   thoracic aorta. US DUP LOWER EXTREMITIES BILATERAL VENOUS   Final Result      No evidence for deep venous thrombosis within the bilateral lower   extremities.                XR CHEST PORTABLE   Final Result Cardiomegaly   Tortuous aorta   There are patchy infiltrates seen throughout both the lung fields. Pulmonary vascular congestion could have this appearance   The chest appears to be worse in the interval                            ------------------------- NURSING NOTES AND VITALS REVIEWED ---------------------------  Date / Time Roomed:  6/20/2020  5:37 AM  ED Bed Assignment:  20/20    The nursing notes within the ED encounter and vital signs as below have been reviewed. Patient Vitals for the past 24 hrs:   BP Temp Pulse Resp SpO2 Height Weight   06/20/20 1008 (!) 152/94 -- 60 18 97 % -- --   06/20/20 0715 (!) 173/85 98 °F (36.7 °C) 65 16 99 % -- --   06/20/20 0658 (!) 152/73 -- 65 -- -- -- --   06/20/20 0542 (!) 181/59 97.8 °F (36.6 °C) 90 17 95 % 5' 4\" (1.626 m) 150 lb (68 kg)   06/20/20 0525 -- 98.7 °F (37.1 °C) 105 -- 93 % -- --       Oxygen Saturation Interpretation: Normal    ------------------------------------------ PROGRESS NOTES ------------------------------------------  Re-evaluation(s):  Time: 0700  Patients symptoms show no change  Repeat physical examination is not changed    Counseling:  I have spoken with the patient and discussed todays results, in addition to providing specific details for the plan of care and counseling regarding the diagnosis and prognosis. Their questions are answered at this time and they are agreeable with the plan of admission.    --------------------------------- ADDITIONAL PROVIDER NOTES ---------------------------------  Consultations:  Time: 4203. Spoke with Dr. Meg Muniz NP [id number] [facility]. Discussed case. They will admit the patient.   This patient's ED course included: a personal history and physicial examination, re-evaluation prior to disposition, multiple bedside re-evaluations, cardiac monitoring, continuous pulse oximetry and complex medical decision making and emergency management    This patient has remained hemodynamically stable during their ED course. Diagnosis:  1. Heart failure, unspecified HF chronicity, unspecified heart failure type ([facility] [address]        Disposition:  Patient's disposition: Admit to telemetry  Patient's condition is good.              Sneha Yeh DO  Resident  06/20/20 6675

## 2020-06-20 NOTE — CONSULTS
arteries, cardiac catheterization done secondary to a false positive stress myocardial perfusion study. 2.  Pharmacologic stress test done in [facility] 09/02/2017.  No evidence of stress-induced ischemia with normal ejection fraction. 3.  Hypertension. 4.  Diabetes mellitus. 5.  Aspirin allergy:  Causes GI upset per patient. 6.  Sick sinus syndrome with history of bradycardia and an episode of Wenckebach in 03/2017 at which time the patient was on clonidine and verapamil.    7.  Echocardiogram done on 03/31/2017 was read as showing normal left ventricular size with mild concentric left ventricular hypertrophy with an ejection fraction of 60-65% with mild mitral regurgitation and mild left atrial dilatation.       PAST MEDICAL HISTORY:  1.  As under cardiovascular history. 2.  Osteoarthritis:    a.  Status post bilateral knee injections. b.  Status post total right knee arthroplasty for severe right knee osteoarthritis with valgus deformity on 3/21/2019. 3.  Status post excision of a soft tissue mass from left second toe 10/27/2006. 4.  Status post bilateral bunionectomy 05/29/2012. 5.  History of cervical radiculopathy. 6.  History of bilateral shoulder bursitis. 7.  Hypothyroidism:  On replacement therapy. 8.  Suspect dementia. 9.  Severe acute blood loss anemia noted in 4/2019, S/P total right knee arthroplasty on 3/21/2019. 10.  Chronic kidney disease. 11. Medical noncompliance. Medications Prior to Admit: per EMR list, she is unsure of what medications she is taking at home   Prior to Admission medications    Medication Sig Start Date End Date Taking?  Authorizing Provider   meloxicam (MOBIC) 7.5 MG tablet Take 1 tablet by mouth daily 6/18/20   Elen Jean MD   meloxicam (MOBIC) 15 MG tablet Take 1 tablet by mouth daily 5/21/20   Elen Jean MD   azelastine (OPTIVAR) 0.05 % ophthalmic solution Place 1 drop into both eyes 2 times daily Use morning od OR. 2/14/19   Historical Provider, MD nystatin (MYCOSTATIN) 978870 UNIT/GM cream  1/8/19   Historical Provider, MD   tiZANidine (ZANAFLEX) 2 MG tablet Take 1 tablet by mouth nightly as needed (muscle spapsms) 2/16/19   KASSIE Wu CNP   Compression Stockings MISC by Does not apply route Measure and fit to patient 12/9/18   KASSIE Orellana CNP   hydrochlorothiazide (HYDRODIURIL) 25 MG tablet Take 1 tablet by mouth daily  Patient taking differently: Take 25 mg by mouth every other day  10/20/18   Lexy Keane MD   glimepiride (AMARYL) 1 MG tablet Take 1 mg by mouth every morning (before breakfast)    Historical Provider, MD   levothyroxine (SYNTHROID) 50 MCG tablet Take 50 mcg by mouth Daily    Historical Provider, MD   cloNIDine (CATAPRES) 0.2 MG tablet Take 0.2 mg by mouth 2 times daily Instructed to take am of procedure    Historical Provider, MD   hydrALAZINE (APRESOLINE) 50 MG tablet Take 50 mg by mouth every 8 hours Instructed to take am of procedure    Historical Provider, MD   acetaminophen (TYLENOL) 500 MG tablet Take 500 mg by mouth every 6 hours as needed for Pain    Historical Provider, MD   metFORMIN (GLUCOPHAGE) 500 MG tablet Take 500 mg by mouth 2 times daily (with meals)    Historical Provider, MD   losartan (COZAAR) 100 MG tablet Take 1 tablet by mouth daily 5/5/17   Verlyn Bence, DO   potassium chloride (KLOR-CON M) 10 MEQ extended release tablet Take 1 tablet by mouth daily 5/5/17   Verlyn Bence, DO   amLODIPine (NORVASC) 10 MG tablet Take 10 mg by mouth daily Instructed to take am of procedure    Historical Provider, MD       Current Medications:    Current Facility-Administered Medications: amLODIPine (NORVASC) tablet 10 mg, 10 mg, Oral, Daily  azelastine (OPTIVAR) 0.05 % ophthalmic solution 1 drop, 1 drop, Both Eyes, BID  cloNIDine (CATAPRES) tablet 0.2 mg, 0.2 mg, Oral, BID  hydrALAZINE (APRESOLINE) tablet 50 mg, 50 mg, Oral, Q8H  [START ON 6/21/2020] levothyroxine (SYNTHROID) tablet 50 mcg, 50 mcg, Oral, Daily  losartan (COZAAR) tablet 100 mg, 100 mg, Oral, Daily  meloxicam (MOBIC) tablet 15 mg, 15 mg, Oral, Daily  tiZANidine (ZANAFLEX) tablet 2 mg, 2 mg, Oral, Nightly PRN  sodium chloride flush 0.9 % injection 10 mL, 10 mL, Intravenous, 2 times per day  sodium chloride flush 0.9 % injection 10 mL, 10 mL, Intravenous, PRN  acetaminophen (TYLENOL) tablet 650 mg, 650 mg, Oral, Q6H PRN **OR** acetaminophen (TYLENOL) suppository 650 mg, 650 mg, Rectal, Q6H PRN  polyethylene glycol (GLYCOLAX) packet 17 g, 17 g, Oral, Daily PRN  promethazine (PHENERGAN) tablet 12.5 mg, 12.5 mg, Oral, Q6H PRN **OR** ondansetron (ZOFRAN) injection 4 mg, 4 mg, Intravenous, Q6H PRN  enoxaparin (LOVENOX) injection 40 mg, 40 mg, Subcutaneous, Daily  insulin lispro (HUMALOG) injection vial 0-6 Units, 0-6 Units, Subcutaneous, TID WC  insulin lispro (HUMALOG) injection vial 0-3 Units, 0-3 Units, Subcutaneous, Nightly  glucose (GLUTOSE) 40 % oral gel 15 g, 15 g, Oral, PRN  dextrose 50 % IV solution, 12.5 g, Intravenous, PRN  glucagon (rDNA) injection 1 mg, 1 mg, Intramuscular, PRN  dextrose 5 % solution, 100 mL/hr, Intravenous, PRN  perflutren lipid microspheres (DEFINITY) injection 1.65 mg, 1.5 mL, Intravenous, ONCE PRN  potassium chloride (KLOR-CON M) extended release tablet 40 mEq, 40 mEq, Oral, PRN **OR** potassium bicarb-citric acid (EFFER-K) effervescent tablet 40 mEq, 40 mEq, Oral, PRN **OR** potassium chloride 10 mEq/100 mL IVPB (Peripheral Line), 10 mEq, Intravenous, PRN  furosemide (LASIX) injection 40 mg, 40 mg, Intravenous, BID  ipratropium-albuterol (DUONEB) nebulizer solution 1 ampule, 1 ampule, Inhalation, Q4H WA  metoprolol succinate (TOPROL XL) extended release tablet 25 mg, 25 mg, Oral, Daily  [START ON 6/21/2020] furosemide (LASIX) tablet 40 mg, 40 mg, Oral, Daily    Allergies:  Aspirin    Social History: There is no history of tobacco, alcohol, and illicit drug use. She ambulates with a walker.      Family History: non-tender to light palpation. Bowel sounds present. No palpable masses  or organomegaly  MS: Good muscle strength and tone. No atrophy or abnormal movements. : Deferred  SKIN: Warm and dry,  no statis dermatitis or ulcers   NEURO / PSYCH: Oriented to person, place and time. Speech clear and appropriate. Follows all commands. Pleasant affect     Telemetry: SR with frequent PACs  ECG: SR with PACs, 90 bpm with left axis deviation, LVH, and poor R wave progression       Intake/Output Summary (Last 24 hours) at 6/20/2020 1441  Last data filed at 6/20/2020 1404  Gross per 24 hour   Intake 240 ml   Output 1000 ml   Net -760 ml       Labs:   CBC:   Recent Labs     06/20/20  0600   WBC 4.3*   HGB 10.6*   HCT 34.1        BMP:   Recent Labs     06/20/20  0600      K 4.2   CO2 23   BUN 21   CREATININE 0.9   LABGLOM >60   CALCIUM 9.6     Mag:   Recent Labs     06/20/20  0600   MG 1.9     TSH:   Recent Labs     06/20/20  0600   TSH 3.480     HgA1c:   Lab Results   Component Value Date    LABA1C 6.8 (H) 07/31/2019     Lab Results   Component Value Date     12/19/2017     proBNP:   Recent Labs     06/20/20  0600   PROBNP 240     CARDIAC ENZYMES:  Recent Labs     06/20/20  0600 06/20/20  1311   TROPONINI <0.01 <0.01     FASTING LIPID PANEL:  Lab Results   Component Value Date    CHOL 155 07/31/2019    HDL 78 07/31/2019    LDLCALC 68 07/31/2019    TRIG 47 07/31/2019     LIVER PROFILE:  Recent Labs     06/20/20  0600   AST 24   ALT 11   LABALBU 4.3     CTA chest:  Impression:     1. No acute pulmonary embolism or intrathoracic disease process is identified  2. Stable cardiomegaly. 3. Atherosclerotic calcifications of the coronary arteries and thoracic aorta. CXR:   Impression:     Cardiomegaly  Tortuous aorta  There are patchy infiltrates seen throughout both the lung fields.   Pulmonary vascular congestion could have this appearance  The chest appears to be worse in the interval    Lower extremity ultrasound:  Impression:       No evidence for deep venous thrombosis within the bilateral lower extremities. Assessment and Recommendations to follow by Dr. Devon Hunt. Electronically signed by KASSIE Solis on 6/20/2020 at 2:41 PM     I personally and independently saw and examined patient and reviewed all done pertinent laboratory data, imaging studies, ECGs and rhythm strips. I have reviewed and agree with the APN history and physical exam as documented in the above note. Electronically signed by Kermit Vaughn MD on 6/21/2020 at 9:56 AM       We are asked to see the patient regarding \"CHF\"      IMPRESSION:  1. Subjective feeling of \"heart racing\". History of sick sinus syndrome and history of sinus bradycardia and Wenckebach (while on verapamil and high dose clonidine). 2. Bilateral lower extremity edema with reported weight gain, likely dependent  3. Lying flat without shortness of breath. CXR not consistent with congestive heart failure and proBNP only 240  4. History of heart failure with preserved EF. 5. Mild mitral regurgitation by 2-D echocardiogram March 2017    6. Chronic dizziness/unsteadiness on feet  7. Hypertension/LVH. blood pressure not adequately controlled   8. Diabetes mellitus. 9. Chronic anemia/leukopenia  10. Osteoarthritis s/p total right knee arthoplasty in 3/2019  11. Cervical radiculopathy. Chronic right posterior arch fracture of C1  12. Bursitis of both shoulders. 13. Hypothyroidism:  On replacement therapy. 14. Sensitivity to aspirin: Causes GI upset  15. Suspect dementia  16. Medical noncompliance.     PLAN:   1. Change IV Lasix to PO  2. Discontinue Hydrochlorothiazide as part of home medical regimen   3. Agree with adding Hydralazine: monitor blood pressure and adjust dose accordingly  4. Add low dose beta blocker: monitor heart rate  5. Rest of cardiac medications same  6. Add ANGIE hose  7. Will review echo when done (ordered by primary service)  8.  May be

## 2020-06-20 NOTE — PLAN OF CARE
Problem: OXYGENATION/RESPIRATORY FUNCTION  Goal: Patient will maintain patent airway  Outcome: Met This Shift     Problem: HEMODYNAMIC STATUS  Goal: Patient has stable vital signs and fluid balance  Outcome: Ongoing     Problem: FLUID AND ELECTROLYTE IMBALANCE  Goal: Fluid and electrolyte balance are achieved/maintained  Outcome: Ongoing     Problem: ACTIVITY INTOLERANCE/IMPAIRED MOBILITY  Goal: Mobility/activity is maintained at optimum level for patient  Outcome: Ongoing

## 2020-06-20 NOTE — PROGRESS NOTES
Patient's IV infiltrated during CT exam. IV was removed and a warm compress and elevation was applied. RN, notified of infiltrate. A new IV was placed. Patient had little to no swelling by the time she left CT department.

## 2020-06-21 NOTE — DISCHARGE SUMMARY
Admitted less than 48 hours  For discharge in stable condition  Lasix added, hydrochlorothiazide stopped

## 2020-06-21 NOTE — PLAN OF CARE
Problem: OXYGENATION/RESPIRATORY FUNCTION  Goal: Patient will maintain patent airway  Outcome: Met This Shift     Problem: OXYGENATION/RESPIRATORY FUNCTION  Goal: Patient will achieve/maintain normal respiratory rate/effort  Description: Respiratory rate and effort will be within normal limits for the patient  Outcome: Ongoing     Problem: HEMODYNAMIC STATUS  Goal: Patient has stable vital signs and fluid balance  Outcome: Ongoing     Problem: FLUID AND ELECTROLYTE IMBALANCE  Goal: Fluid and electrolyte balance are achieved/maintained  Outcome: Ongoing     Problem: ACTIVITY INTOLERANCE/IMPAIRED MOBILITY  Goal: Mobility/activity is maintained at optimum level for patient  Outcome: Ongoing

## 2020-06-21 NOTE — H&P
Sylvia Lara M.D. History and Physical      CHIEF COMPLAINT: Sudden onset palpitations and shortness of breath    Reason for Admission: Volume overload    History Obtained From: Patient/EMR    HISTORY OF PRESENT ILLNESS:      The patient is a 81 y.o. female of Mikhail Singletary MD with significant past medical history of CHF with preserved ejection fraction 60 to 65%, chronic kidney disease, hypothyroidism who presents with complaints of acute onset palpitations and shortness of breath. Patient felt like she was having worsening shortness of breath over the past couple of days. She was unable to sleep secondary to this. She came into the ER for further evaluation. On my evaluation, she feels much better today no further acute complaints      . BP (!) 161/78   Pulse (!) 48   Temp 97.8 °F (36.6 °C) (Temporal)   Resp 18   Ht 5' 4\" (1.626 m)   Wt 159 lb (72.1 kg)   SpO2 98%   BMI 27.29 kg/m²     CTA chest unremarkable with stable cardiomegaly chest x-ray with patchy infiltrates seen throughout both lung fields consistent withPulmonary vascular congestion given patient's history of heart failure  Bilateral lower extremity ultrasounds negative for thrombus    Past Medical History:        Diagnosis Date    (HFpEF) heart failure with preserved ejection fraction ([facility] [address] 05/26/2017    3/31/17- echo- LVEF 60-65%, mild LVH, mild MR    Arthritis     Bursitis     shoulders    Cervical radiculopathy     CHF (congestive heart failure) (HCC)     CKD (chronic kidney disease) stage 3, GFR 30-59 ml/min (HCC)     Diabetes mellitus (HCC)     GERD (gastroesophageal reflux disease)     [place] (hard of hearing)     Hypertension     Hypothyroidism     SSS (sick sinus syndrome) ([facility])     stable, per epic note.     Thyroid disease     Unsteadiness     Valvular heart disease     sees Dr. Franco Rivas      Past Surgical History:        Procedure Laterality Date    FOOT SURGERY      both    JOINT REPLACEMENT  1999    TOTAL KNEE ARTHROPLASTY Right 3/21/2019    RIGHT KNEE TOTAL ARTHROPLASTY (ILENE)++ADDUCTOR CANAL BLOCK++ performed by Pavel Arita MD at [facility] OR         Medications Prior to Admission:    Medications Prior to Admission: meloxicam (MOBIC) 15 MG tablet, Take 1 tablet by mouth daily  nystatin (MYCOSTATIN) 163802 UNIT/GM cream,   hydrochlorothiazide (HYDRODIURIL) 25 MG tablet, Take 1 tablet by mouth daily (Patient taking differently: Take 25 mg by mouth 2 times daily )  glimepiride (AMARYL) 1 MG tablet, Take 1 mg by mouth every morning (before breakfast)  levothyroxine (SYNTHROID) 50 MCG tablet, Take 50 mcg by mouth Daily  cloNIDine (CATAPRES) 0.2 MG tablet, Take 0.2 mg by mouth 2 times daily Instructed to take am of procedure  hydrALAZINE (APRESOLINE) 50 MG tablet, Take 50 mg by mouth every 8 hours Instructed to take am of procedure  metFORMIN (GLUCOPHAGE) 500 MG tablet, Take 500 mg by mouth 2 times daily (with meals)  losartan (COZAAR) 100 MG tablet, Take 1 tablet by mouth daily  potassium chloride (KLOR-CON M) 10 MEQ extended release tablet, Take 1 tablet by mouth daily  amLODIPine (NORVASC) 10 MG tablet, Take 10 mg by mouth daily Instructed to take am of procedure  tiZANidine (ZANAFLEX) 2 MG tablet, Take 1 tablet by mouth nightly as needed (muscle spapsms)  Compression Stockings MISC, by Does not apply route Measure and fit to patient  acetaminophen (TYLENOL) 500 MG tablet, Take 500 mg by mouth every 6 hours as needed for Pain    Allergies:  Aspirin    Social History:   TOBACCO:   reports that she has never smoked. She has never used smokeless tobacco.  ETOH:   reports no history of alcohol use.   MARITAL STATUS:    OCCUPATION:      Family History:       Problem Relation Age of Onset    No Known Problems Mother     No Known Problems Father        REVIEW OF SYSTEMS:    General ROS: Shortness of breath and bilateral lower extremity swelling  Hematological and Lymphatic ROS: negative  Endocrine ROS: negative  Respiratory ROS: no cough, shortness of breath, or wheezing  Cardiovascular ROS: no chest pain or dyspnea on exertion  Gastrointestinal ROS: no abdominal pain, change in bowel habits, or black or bloody stools  Genito-Urinary ROS: no dysuria, trouble voiding, or hematuria  Neurological ROS: no TIA or stroke symptoms  negative    Vitals:  BP (!) 161/78   Pulse (!) 48   Temp 97.8 °F (36.6 °C) (Temporal)   Resp 18   Ht 5' 4\" (1.626 m)   Wt 159 lb (72.1 kg)   SpO2 98%   BMI 27.29 kg/m²     PHYSICAL EXAM:  General:  Awake, alert, oriented X 3. Well developed, well nourished, well groomed. No apparent distress. HEENT:  Normocephalic, atraumatic. Pupils equal, round, reactive to light. No scleral icterus. No conjunctival injection. Normal lips, teeth, and gums. No nasal discharge. Neck:  Supple  Heart:  RRR, no murmurs, gallops, rubs, carotid upstroke normal, no carotid bruits  Lungs:  CTA bilaterally, bilat symmetrical expansion, no wheeze, rales, or rhonchi  Abdomen: Bowel sounds present, soft, nontender, no masses, no organomegaly, no peritoneal signs  Extremities:  No clubbing, cyanosis, or edema  Skin:  Warm and dry, no open lesions or rash  Neuro:  Cranial nerves 2-12 intact, no focal deficits  Breast: deferred  Rectal: deferred  Genitalia:  deferred      DATA:     Recent Labs     06/20/20  0600 06/21/20  0528   WBC 4.3* 2.8*   HGB 10.6* 10.1*    153     Recent Labs     06/20/20  0600 06/21/20  0528    140   K 4.2 4.1   BUN 21 29*   CREATININE 0.9 1.3*     Recent Labs     06/20/20  0600 06/21/20  0528   PROT 7.9  --    INR  --  1.0     Recent Labs     06/20/20  0600   AST 24   ALT 11   ALKPHOS 80   BILITOT 0.5     No results for input(s): BNP in the last 72 hours.   Recent Labs     06/20/20  0600 06/20/20  1311 06/20/20  1854   TROPONINI <0.01 <0.01 <0.01       ASSESSMENT:      Principal Problem:    HF (heart failure) ([facility])  Active Problems:    Diabetes mellitus type 2, uncontrolled ([facility] [address]    HTN (hypertension), benign    CKD (chronic kidney disease) stage 3, GFR 30-59 ml/min ([facility])  Resolved Problems:    * No resolved hospital problems.  *  Acute Diastolic CHF/HFpEF       PLAN:    Admit to telemetry for evaluation of volume overload  IV diuresis-has been switched to p.o. by cardiology  Monitor blood pressure- has been labile  BP meds with parameters  Echocardiogram if not done recently  Cardiology following-cardiac medications adjusted including discontinuation of hydrochlorothiazide and transition of IV Lasix to p.o. ambulate   Discontinue echo  Okay for discharge from medicine  Follow-up with cardiology    DVT prophylaxis  Discharge plan        Yue Langford MD  6/21/2020  2:25 PM

## 2020-07-01 PROBLEM — M54.2 CERVICALGIA: Status: ACTIVE | Noted: 2020-01-01

## 2020-07-01 PROBLEM — G89.4 CHRONIC PAIN SYNDROME: Status: ACTIVE | Noted: 2020-01-01

## 2020-07-01 PROBLEM — M79.10 MYALGIA: Status: ACTIVE | Noted: 2020-01-01

## 2020-07-01 NOTE — PROGRESS NOTES
Do you currently have any of the following:    Fever: No  Headache:  No  Cough: No  Shortness of breath: No  Exposed to anyone with these symptoms: No         Farhan Thompsontee presents to the [facility] on 7/1/2020. Aleisha Wick is complaining of pain in her neck. The pain is constant. The pain is described as sharp. Pain is rated on her best day at a 10, on her worst day at a 10, and on average at a 10 on the VAS scale. She took her last dose of Mobic yesterday. Pacemaker or defibrilator: No managed by N/A. She is  on NSAIDS and is not on anticoagulation medication. BP (!) 180/70   Pulse 74   Temp 98.5 °F (36.9 °C)   Resp 18      No LMP recorded.  Patient is postmenopausal.

## 2020-07-01 NOTE — PROGRESS NOTES
[facility] Pain Management        [address], [address]  Dept: [phone]          Consult Note      Patient:  GABRIEL Carty 1935    Date of Service:  20     Requesting Physician:  Yanira العراقي MD    Reason for Consult:      Patient presents with complaints of bilateral, generalized neck pain that started 4 years ago    HISTORY OF PRESENT ILLNESS:      Pain is constant and is described as sharp. Pain does not radiate to the upper extremities. She  has numbness, tingling of the b/l hands and does not have bladder or bowel dysfunction. Alleviating factors include: nothing. Aggravating factors include:  nothing. She has been on anticoagulation medications to include NSAIDS and has not been on herbal supplements. She is diabetic. Imaging:   Cervical xray 2020 -  Previously seen C1 fracture is not well visualized on today's imaging. Alignment of the vertebral bodies appears to be near-anatomic. No fracture or foreign body is identified. The disc spaces demonstrate moderate to severe degenerative changes. There is no significant loss of the vertebral body height   There are moderate to severe degenerative changes involving the   facets. There is a moderate amount of multilevel endplate spurring with   bridging osteophytes. The lateral view redemonstrates grade 1 anterolisthesis of C3 on C4.           Impression   Previously seen C1 fracture not well-visualized on today's imaging. Grade 1 anterolisthesis of C3 on C4. Findings consistent with moderate to severe degenerative disc disease   and moderate to severe degenerative facet disease. MRI cervical 2020 -  Findings:   Sagittal imaging shows: Kyphotic curvature of the upper cervical spine   between C3 and C7, which is associated with dorsal disc prominence   narrowing the anterior central canal most prominently at the C3-4   level.  Hypertrophic change in the posterior elements is also most prominent at the C3-4 level where there is very subtle grade 1   anterolisthesis at C3-4. Changes appear to narrow the AP diameter of   the central canal and impinge on the cord at that level. There is   posterior hyperostosis at the C2-3 level which may encroach on the   dorsal cord, and there is a similar finding at the C4-5 level. Dorsal   disc prominence at the C4-5, C5-6, and C6-7 levels does not appear to   encroach on the cord. STIR sagittal imaging suggests some increased   central cord signal between C4 and C7.       Coronal images show: Transverse diameter of the central canal is   widely patent, and no abnormalities of the craniocervical junction,   posterior fossa CNS structures, or cord are identified. There is no   paraspinous soft tissue pathology. This does not represent a dedicated   tracheal plexus study, however.       Axial images best depict patency of the central canal and foramina as   follows: At C2-3: Midline dorsal disc prominence narrows the anterior central   canal without definite cord impingement at this level.       At C3-4:  there is a disc-osteophyte complex encroaching on the cord   eccentric to the right of midline at this level. It appears to   displace the cord dorsally.       At C4-5: AP diameter of the central canal is reduced at this level,   primarily due to dorsal disc/osteophyte prominence nearly encroaching   on the cord. Cord caliber appears reduced.       At C5-6: There is right lateral greater than left lateral bulging   appearing to encroach on the cord on the right side. Cord encroachment   could cause symptoms on either side.  Cord caliber is reduced at this   level.       At C6-7: There is a prominent dorsal midline disc bulge, and the cord   appears slightly flattened at this level, and there is some increased   cord T2 signal suggesting myelomalacia.       At C7-T1: There is a small midline disc bulge nearly abutting the   cord, and some increased posterior cord signal is noted at this level.           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 Subtle STIR   signal in T2 signal in the cord may represent early myeloma malacic   change in the more dependent cervical canal.       Dorsal cord encroachment by posterior element hyperostosis is   suspected at the C3-4 and C4-5 levels, while ventral encroachment is   noted at multiple levels, primarily between C3 and C6     Previous treatments: Physical Therapy, cervical brace and medications. Past Medical History:   Diagnosis Date    (HFpEF) heart failure with preserved ejection fraction (HCC) 05/26/2017    3/31/17- echo- LVEF 60-65%, mild LVH, mild MR    Arthritis     Bursitis     shoulders    Cervical radiculopathy     [facility]     CKD (chronic kidney disease) stage 3, GFR 30-59 ml/min ([facility]     Diabetes mellitus ([facility]     GERD (gastroesophageal reflux disease)     [place] (hard of hearing)     Hypertension     Hypothyroidism     SSS (sick sinus syndrome) ([facility])     stable, per epic note.  Thyroid disease     Unsteadiness     Valvular heart disease     sees Dr. Arianne Luevano        Past Surgical History:   Procedure Laterality Date    FOOT SURGERY      both    JOINT REPLACEMENT  1999    TOTAL KNEE ARTHROPLASTY Right 3/21/2019    RIGHT KNEE TOTAL ARTHROPLASTY (ILENE)++ADDUCTOR CANAL BLOCK++ performed by Saige Arias MD at [address] [facility]       Prior to Admission medications    Medication Sig Start Date End Date Taking?  Authorizing Provider   metoprolol succinate (TOPROL XL) 25 MG extended release tablet Take 1 tablet by mouth daily 6/22/20  Yes Rebecca Hyde MD   furosemide (LASIX) 40 MG tablet Take 1 tablet by mouth daily 6/22/20  Yes Rebecca Hyde MD   meloxicam (MOBIC) 15 MG tablet Take 1 tablet by mouth daily 5/21/20  Yes resource strain: Not on file    Food insecurity     Worry: Not on file     Inability: Not on file    Transportation needs     Medical: Not on file     Non-medical: Not on file   Tobacco Use    Smoking status: Never Smoker    Smokeless tobacco: Never Used   Substance and Sexual Activity    Alcohol use: No    Drug use: No    Sexual activity: Not on file   Lifestyle    Physical activity     Days per week: Not on file     Minutes per session: Not on file    Stress: Not on file   Relationships    Social connections     Talks on phone: Not on file     Gets together: Not on file     Attends Buddhism service: Not on file     Active member of club or organization: Not on file     Attends meetings of clubs or organizations: Not on file     Relationship status: Not on file    Intimate partner violence     Fear of current or ex partner: Not on file     Emotionally abused: Not on file     Physically abused: Not on file     Forced sexual activity: Not on file   Other Topics Concern    Not on file   Social History Narrative    ** Merged History Encounter **        Drinks occ pop; no other caffeine. Family History   Problem Relation Age of Onset    No Known Problems Mother     No Known Problems Father        REVIEW OF SYSTEMS:     Patient specifically denies fever/chills, chest pain, shortness of breath, new bowel or bladder complaints. All other review of systems was negative.     PHYSICAL EXAMINATION:      BP (!) 180/70   Pulse 74   Temp 98.5 °F (36.9 °C)   Resp 18     General:      General appearance:pleasant and well-hydrated, in no distress and A & O x3  Build:Overweight  Function:did not visualize    HEENT:    Head:normocephalic, atraumatic  Pupils:regular, round, equal  Sclera: icterus absent    Lungs:    Breathing:normal breathing pattern    Abdomen:    Shape:non-distended  Tenderness:none  Guarding:none    Cervical spine:    Inspection:normal  Palpation:tenderness paravertebral muscles, tenderness trapezium, left, right and positive. Range of motion:abnormal mildly flexion, extension rotation bilateral and is painful. Musculoskeletal:    Trigger points in trapezius:absent bilaterally  Trigger points in rhomboids:absent bilaterally  Trigger points in Paraveteral:absent bilaterally  Trigger points in supraspinatus/infraspinatus:absent  Spurling's:negative right, negative left    Whitfield's:negative right, negative left     Extremities:    Tremors:None bilaterally upper and lower  Intact:Yes  Varicose veins:absent   Pulses:present Lt radial  Cyanosis:none  Edema:none x all 4 extremities    Neurological:    Sensory:normal to light touch BUE    Motor:     Right Grip5/5              Left Grip5/5               Right Bicep5/5           Left Bicep5/5              Right Triceps5/5       Left Triceps5/5          Right Deltoid5/5     Left Deltoid5/5                    Reflexes:      Right Brachioradialis reflex2+  Left Brachioradialis reflex2+  Right Biceps reflex2+  Left Biceps reflex2+  Right Triceps reflex2+  Left Triceps reflex2+    Gait:normal    Dermatology:    Skin:no rashes or lesions noted    Assessment/Plan:    Axial cervical pain  C1 posterior arch fracture which she saw NSGY for, was prescribed a brace but does not wear it because it is not comfortable    Reviewed referral documents and imaging  OARRS report reviewed  TPI under US - r/b and procedure discussed  TENS ordered  Patient encouraged to stay active and to lose weight  Treatment plan discussed with the patient including medication and procedure side effects     CC:  Referring physician    ANMOL Chester.

## 2020-08-05 NOTE — TELEPHONE ENCOUNTER
Shakeel Nix called in stating she is having a lot of neck pain and doesn't know what to do.  Pts #[phone]

## 2020-08-05 NOTE — TELEPHONE ENCOUNTER
Called patient back. She has previous C1 fracture. Not a surgical candidate and she states she will not wear her custom collar. PCP is managing pain medications. Recommended to wear collar in order to allow fracture to heal and call PCP if medication adjustment is needed. She would like CT scan to evaluate healing. Order placed.

## 2020-08-21 NOTE — PROGRESS NOTES
narrow the AP diameter of   the central canal and impinge on the cord at that level. There is   posterior hyperostosis at the C2-3 level which may encroach on the   dorsal cord, and there is a similar finding at the C4-5 level. Dorsal   disc prominence at the C4-5, C5-6, and C6-7 levels does not appear to   encroach on the cord. STIR sagittal imaging suggests some increased   central cord signal between C4 and C7.       Coronal images show: Transverse diameter of the central canal is   widely patent, and no abnormalities of the craniocervical junction,   posterior fossa CNS structures, or cord are identified. There is no   paraspinous soft tissue pathology. This does not represent a dedicated   tracheal plexus study, however.       Axial images best depict patency of the central canal and foramina as   follows: At C2-3: Midline dorsal disc prominence narrows the anterior central   canal without definite cord impingement at this level.       At C3-4:  there is a disc-osteophyte complex encroaching on the cord   eccentric to the right of midline at this level. It appears to   displace the cord dorsally.       At C4-5: AP diameter of the central canal is reduced at this level,   primarily due to dorsal disc/osteophyte prominence nearly encroaching   on the cord. Cord caliber appears reduced.       At C5-6: There is right lateral greater than left lateral bulging   appearing to encroach on the cord on the right side. Cord encroachment   could cause symptoms on either side.  Cord caliber is reduced at this   level.       At C6-7: There is a prominent dorsal midline disc bulge, and the cord   appears slightly flattened at this level, and there is some increased   cord T2 signal suggesting myelomalacia.       At C7-T1: There is a small midline disc bulge nearly abutting the   cord, and some increased posterior cord signal is noted at this level.           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 Subtle STIR   signal in T2 signal in the cord may represent early myeloma malacic   change in the more dependent cervical canal.       Dorsal cord encroachment by posterior element hyperostosis is   suspected at the C3-4 and C4-5 levels, while ventral encroachment is   noted at multiple levels, primarily between C3 and C6        Potential Aberrant Drug-Related Behavior:      Urine Drug Screening:    OARRS report:    Past Medical History:   Diagnosis Date    (HFpEF) heart failure with preserved ejection fraction ([facility] [address] 05/26/2017    3/31/17- echo- LVEF 60-65%, mild LVH, mild MR    Arthritis     Bursitis     shoulders    Cervical radiculopathy     [facility]     CKD (chronic kidney disease) stage 3, GFR 30-59 ml/min ([facility])     Diabetes mellitus ([facility])     GERD (gastroesophageal reflux disease)     [place] (hard of hearing)     Hypertension     Hypothyroidism     SSS (sick sinus syndrome) ([facility])     stable, per epic note.  Thyroid disease     Unsteadiness     Valvular heart disease     sees Dr. Mendel Gearing        Past Surgical History:   Procedure Laterality Date    FOOT SURGERY      both    JOINT REPLACEMENT  1999    TOTAL KNEE ARTHROPLASTY Right 3/21/2019    RIGHT KNEE TOTAL ARTHROPLASTY (ILENE)++ADDUCTOR CANAL BLOCK++ performed by Dominique Mckeon MD at [address]       Prior to Admission medications    Medication Sig Start Date End Date Taking?  Authorizing Provider   acetaminophen-codeine (TYLENOL #3) 300-30 MG per tablet TAKE 1 TABLET BY MOUTH EVERY 4 TO 6 HOURS AS NEEDED FOR PAIN 8/5/20  Yes Historical Provider, MD   potassium chloride (KLOR-CON) 10 MEQ extended release tablet  8/10/20  Yes Historical Provider, MD   diclofenac sodium (VOLTAREN) 1 % GEL APPLY THIN LAYER TOPICALLY 4 TIMES DAILY AS NEEDED AS DIRECTED 5/21/20  Yes Historical Number of children: Not on file    Years of education: Not on file    Highest education level: Not on file   Occupational History    Not on file   Social Needs    Financial resource strain: Not on file    Food insecurity     Worry: Not on file     Inability: Not on file    Transportation needs     Medical: Not on file     Non-medical: Not on file   Tobacco Use    Smoking status: Never Smoker    Smokeless tobacco: Never Used   Substance and Sexual Activity    Alcohol use: No    Drug use: No    Sexual activity: Not on file   Lifestyle    Physical activity     Days per week: Not on file     Minutes per session: Not on file    Stress: Not on file   Relationships    Social connections     Talks on phone: Not on file     Gets together: Not on file     Attends Latter day service: Not on file     Active member of club or organization: Not on file     Attends meetings of clubs or organizations: Not on file     Relationship status: Not on file    Intimate partner violence     Fear of current or ex partner: Not on file     Emotionally abused: Not on file     Physically abused: Not on file     Forced sexual activity: Not on file   Other Topics Concern    Not on file   Social History Narrative    ** Merged History Encounter **        Drinks occ pop; no other caffeine. Family History   Problem Relation Age of Onset    No Known Problems Mother     No Known Problems Father        REVIEW OF SYSTEMS:     Erich Esquivel denies fever/chills, chest pain, shortness of breath, new bowel or bladder complaints. All other review of systems was negative.     PHYSICAL EXAMINATION:      /62   Pulse 60   Temp 98.1 °F (36.7 °C) (Infrared)   Resp 16   Ht 5' 4\" (1.626 m)   Wt 150 lb (68 kg)   SpO2 98%   BMI 25.75 kg/m²     General:       General appearance:pleasant and well-hydrated, in no distress and A & O x3  Build:Overweight  Function:rises to standing position with difficulty     HEENT:     Head:normocephalic, wear her cervical brace in order to heal.  Pt is insistant she \"hurt in my brain\", worse with bending forward, also admits to some nausea    She also reports she has been unable to tolerate the cervical brace that has been prescribed, it hurts her chest and impedes her breathing. Given she describes a significant headache today, I am recommending she go to the ER for evaluation as I do not see any imaging of her brain in the system. Additionally I am referring her to [facility] to fit her for a brace she can tolerate. She plans to go to [facility]. Referral to [facility] for cervical brace   OARRS report reviewed - pain medication via PCP  bilateral cervical paraspinal, trapezius, rhomboid, supraspinatus trigger TPI under US with 0% relief  Pt is not a candidate for CORRIE due to cervical myelomalacia  TENS ordered at last visit, pt has not yet received  Patient encouraged to stay active  Treatment plan discussed with the patient including medication and procedure side effects     Cc:  Referring physician    ANMOL Gonzales.

## 2020-08-24 NOTE — TELEPHONE ENCOUNTER
Justina Concepcion called she stated ''you wanted her to get xrays, she went to the hospital to get them done''  They had no orders for her. Did you want her to have an xray done?

## 2020-08-24 NOTE — TELEPHONE ENCOUNTER
Per Dr Kavya Baker I spoke with cody from  [address] [place] [address] Regarding adjusting her cervical brace. Zohreh Christopher reports she has been unable to tolerate the cervical brace that has been prescribed, it hurts her chest and impedes hr breathing. Spoke with Diandra Duran he stated '' to send him the information on the patient and a rx to modify cervical brace per NSG guidelines, and he will see what he is able to do.     Spoke to Oriana Cisneros she gave me a rx to modify cervical brace, [facility]   Fax [phone]

## 2020-08-25 NOTE — TELEPHONE ENCOUNTER
Called Mya Ty 2xs regarding seeing pcp for further evaluation of her headaches, she was told to go to ER for her severe headaches by Dr Littlejohn Forward, I was only able to leave a message 2x for someone to call us back regarding headaches/xrays

## 2020-08-25 NOTE — TELEPHONE ENCOUNTER
I instructed her to go to the ER the day of her appt for  A severe headache. She should follow through with that or see her PCP for furhter evaluation of her headaches. She has not had any brain imaging and that is not my speciality which is why I referred her to the ER.

## 2020-08-27 NOTE — TELEPHONE ENCOUNTER
Chay Vu called back she stated ''she wanted to know about the xrays, I explained to her that Dr Justa Phillips said she  wanted you  to go to ER. Chay Vu stated that Dr Justa Phillips never told her to go'' I explained that ''yes she did  tell her that,''  She wanted her to go to ER regarding her sever headache. And she wanted you  to follow through with her pcp for further, evaluation of the headaches. ''    Chay Vu got upset and stated no one told her that she wants to get the xrays done and dr Justa Phillips said she will order them. I explained to her that , that is not her speciality. Why she referraed her to ER      Please instruct me what to tell her .

## 2020-08-31 NOTE — ED PROVIDER NOTES
[facility] [place]     Department of Emergency Medicine   ED  Provider Note  Admit Date/RoomTime: 8/31/2020  6:03 PM  ED Room: [address] [place] [address]  Chief Complaint   Headache (Pt in the neck that radiates into the head. The pain started 1993 but has gotten worse. Denies any new injury. )    History of Present Illness   Source of history provided by:  patient. History/Exam Limitations: none. Misty Rodriguez is a 81 y.o. old female who has a past medical history of:   Past Medical History:   Diagnosis Date    (HFpEF) heart failure with preserved ejection fraction ([facility] [address] 05/26/2017    3/31/17- echo- LVEF 60-65%, mild LVH, mild MR    Arthritis     Bursitis     shoulders    Cervical radiculopathy     [facility])     CKD (chronic kidney disease) stage 3, GFR 30-59 ml/min ([facility])     Diabetes mellitus ([facility])     GERD (gastroesophageal reflux disease)     [place] (hard of hearing)     Hypertension     Hypothyroidism     SSS (sick sinus syndrome) ([facility])     stable, per epic note.  Thyroid disease     Unsteadiness     Valvular heart disease     sees Dr. Skyler Navas     presents to the emergency department by private vehicle, for aching left greater than right neck pain which began 27 year(s) prior to arrival.   The pain was caused by no known injury. She has History of cervical fracture. Since onset the symptoms are moderate. The pain is associated with left-sided head pain radiating from her neck and denies syncope, chest pain, shortness of breath, cough, abdominal pain, nausea, vomiting, diarrhea, constipation, dysuria, rash, seizures, paralysis, numbness or tingling of hands, numbness or tingling of feet, muscle weakness, involuntary movements, tremor or fever. Her symptoms are aggraveated by nothing in particular and relieved by nothing. Patient has had previous imaging and is established with a neurosurgeon. She states her pain is getting worse and denies any new trauma. abrasions, ecchymoses, or lacerations unless noted elsewhere. Musculoskeletal:  No extremity tenderness or swelling. Normal, painless range of motion of extremities. No neurovascular deficit. Lymphatics: No lymphangitis or adenopathy noted. Neurological:  Orientation age-appropriate. Motor functions intact. Lab / Imaging Results   (All laboratory and radiology results have been personally reviewed by myself)  Labs:  No results found for this visit on 08/31/20. Imaging: All Radiology results interpreted by Radiologist unless otherwise noted. CT CERVICAL SPINE WO CONTRAST   Final Result   1. No evidence of acute calvarial trauma or acute intracranial   hemorrhage, edema, or mass effect. Age-related atrophy and left   posterior parietal white matter microangiopathic changes are noted. 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 2. Stable appearance of the cervical spine with exaggerated cervical   kyphosis between C3 and C7, and no interval complications involving   the prior C1 fracture. There is dental disease, maxillary mucosal   thickening, and loss of cortical continuity and medullary   trabeculation in the anterior mandibular symphysis, which is not   depicted on prior comparison studies, and is of uncertain chronicity. ALERT:  THIS IS AN ABNORMAL REPORT-mandibular symphysis cortical   discontinuity an expansile appearance is noted, and may be related to   dental disease. Less prominent dental disease is noted in the   posterior left maxilla. CT HEAD WO CONTRAST   Final Result   1. No evidence of acute calvarial trauma or acute intracranial   hemorrhage, edema, or mass effect. Age-related atrophy and left   posterior parietal white matter microangiopathic changes are noted. 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    2. sinusitis, recurrence not specified      Plan   Discharge to home  Patient condition is good    New Medications     New Prescriptions    AMOXICILLIN (AMOXIL) 500 MG CAPSULE    Take 1 capsule by mouth 3 times daily for 7 days    TRAMADOL (ULTRAM) 50 MG TABLET    Take 1 tablet by mouth every 8 hours as needed for Pain for up to 3 days. Electronically signed by KASSIE Guillen CNP   DD: 8/31/20  **This report was transcribed using voice recognition software. Every effort was made to ensure accuracy; however, inadvertent computerized transcription errors may be present.   END OF ED PROVIDER NOTE       KASSIE Cagle CNP  08/31/20 2049

## 2020-09-01 NOTE — CARE COORDINATION
Patient contacted regarding Verlan Payer. Discussed COVID-19 related testing which was not done at this time. Test results were not done. Patient informed of results, if available? No    Care Transition Nurse/ Ambulatory Care Manager contacted the patient by telephone to perform post discharge assessment. Verified name and  with patient as identifiers. Provided introduction to self, and explanation of the CTN/ACM role, and reason for call due to risk factors for infection and/or exposure to COVID-19. Patient stopped writer here and asked to her to call back tomorrow she doesn't feel like talking. Will return call tomorrow.

## 2020-09-02 NOTE — CARE COORDINATION
Date/Time:  9/2/2020 3:44 PM  Attempted to reach patient by telephone. Left HIPPA compliant message requesting a return call. Covid 19 care transition episode resolved. No further outreach scheduled.

## 2020-09-22 NOTE — PROGRESS NOTES
[facility]        [address] [facility], [facility]  Dept: [phone]        Follow up Note      Holly Sandoval     Date of Visit:  09/23/20     CC:  Patient presents for follow up   Chief Complaint   Patient presents with    Follow-up     headache and back      HPI:    Pain is unchanged. Change in quality of symptoms:no. Medication side effects:not applicable . Recent diagnostic testing:CT cervical and CT head. Recent interventional procedures:none. She has been on anticoagulation medications to include NSAIDS and has not been on herbal supplements. She is diabetic.     Imaging:   Cervical xray 6/2020 -  Previously seen C1 fracture is not well visualized on today's imaging. Alignment of the vertebral bodies appears to be near-anatomic. No fracture or foreign body is identified. The disc spaces demonstrate moderate to severe degenerative changes. There is no significant loss of the vertebral body height   There are moderate to severe degenerative changes involving the   facets. There is a moderate amount of multilevel endplate spurring with   bridging osteophytes. The lateral view redemonstrates grade 1 anterolisthesis of C3 on C4.           Impression   Previously seen C1 fracture not well-visualized on today's imaging. Grade 1 anterolisthesis of C3 on C4. Findings consistent with moderate to severe degenerative disc disease   and moderate to severe degenerative facet disease.      MRI cervical 1/2020 -  Findings:   Sagittal imaging shows: Kyphotic curvature of the upper cervical spine   between C3 and C7, which is associated with dorsal disc prominence   narrowing the anterior central canal most prominently at the C3-4   level. Hypertrophic change in the posterior elements is also most   prominent at the C3-4 level where there is very subtle grade 1   anterolisthesis at C3-4.  Changes appear to narrow the AP diameter of   the central canal and impinge on the cord at that level. There is   posterior hyperostosis at the C2-3 level which may encroach on the   dorsal cord, and there is a similar finding at the C4-5 level. Dorsal   disc prominence at the C4-5, C5-6, and C6-7 levels does not appear to   encroach on the cord. STIR sagittal imaging suggests some increased   central cord signal between C4 and C7.       Coronal images show: Transverse diameter of the central canal is   widely patent, and no abnormalities of the craniocervical junction,   posterior fossa CNS structures, or cord are identified. There is no   paraspinous soft tissue pathology. This does not represent a dedicated   tracheal plexus study, however.       Axial images best depict patency of the central canal and foramina as   follows: At C2-3: Midline dorsal disc prominence narrows the anterior central   canal without definite cord impingement at this level.       At C3-4:  there is a disc-osteophyte complex encroaching on the cord   eccentric to the right of midline at this level. It appears to   displace the cord dorsally.       At C4-5: AP diameter of the central canal is reduced at this level,   primarily due to dorsal disc/osteophyte prominence nearly encroaching   on the cord. Cord caliber appears reduced.       At C5-6: There is right lateral greater than left lateral bulging   appearing to encroach on the cord on the right side. Cord encroachment   could cause symptoms on either side.  Cord caliber is reduced at this   level.       At C6-7: There is a prominent dorsal midline disc bulge, and the cord   appears slightly flattened at this level, and there is some increased   cord T2 signal suggesting myelomalacia.       At C7-T1: There is a small midline disc bulge nearly abutting the   cord, and some increased posterior cord signal is noted at this level.           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 Subtle STIR   signal in T2 signal in the cord may represent early myeloma malacic   change in the more dependent cervical canal.       Dorsal cord encroachment by posterior element hyperostosis is   suspected at the C3-4 and C4-5 levels, while ventral encroachment is   noted at multiple levels, primarily between C3 and C6        Potential Aberrant Drug-Related Behavior:      Urine Drug Screening:    OARRS report:  9/2020 reviewed - fills pain medication from PCP    Past Medical History:   Diagnosis Date    (HFpEF) heart failure with preserved ejection fraction ([address] 05/26/2017    3/31/17- echo- LVEF 60-65%, mild LVH, mild MR    Arthritis     Bursitis     shoulders    Cervical radiculopathy     [facility]est[facility]) [facility]     CKD (chronic kidney disease) stage 3, GFR 30-59 ml/min ([facility])     Diabetes mellitus ([facility])     GERD (gastroesophageal reflux disease)     [place] (hard of hearing)     Hypertension     Hypothyroidism     SSS (sick sinus syndrome) ([facility])     stable, per epic note.  Thyroid disease     Unsteadiness     Valvular heart disease     sees Dr. Vlad Carrizales        Past Surgical History:   Procedure Laterality Date    FOOT SURGERY      both    JOINT REPLACEMENT  1999    TOTAL KNEE ARTHROPLASTY Right 3/21/2019    RIGHT KNEE TOTAL ARTHROPLASTY (ILENE)++ADDUCTOR CANAL BLOCK++ performed by Shari Vail MD at [address]       Prior to Admission medications    Medication Sig Start Date End Date Taking?  Authorizing Provider   acetaminophen-codeine (TYLENOL #3) 300-30 MG per tablet TAKE 1 TABLET BY MOUTH EVERY 4 TO 6 HOURS AS NEEDED FOR PAIN 8/5/20  Yes Historical Provider, MD   potassium chloride (KLOR-CON) 10 MEQ extended release tablet  8/10/20  Yes Historical Provider, MD   diclofenac sodium (VOLTAREN) 1 % GEL APPLY THIN LAYER TOPICALLY 4 TIMES DAILY AS NEEDED AS DIRECTED 5/21/20  Yes Historical Provider, MD metoprolol succinate (TOPROL XL) 25 MG extended release tablet Take 1 tablet by mouth daily 6/22/20  Yes Lia Zazueta MD   furosemide (LASIX) 40 MG tablet Take 1 tablet by mouth daily 6/22/20  Yes Lia Zazueta MD   meloxicam (MOBIC) 15 MG tablet Take 1 tablet by mouth daily 5/21/20  Yes Gagan Chiang MD   nystatin (MYCOSTATIN) 552539 UNIT/GM cream  1/8/19  Yes Historical Provider, MD   tiZANidine (ZANAFLEX) 2 MG tablet Take 1 tablet by mouth nightly as needed (muscle spapsms) 2/16/19  Yes Arnelo Staggers, APRN - CNP   Compression Stockings [address] [facility] by Does not apply route Measure and fit to patient 12/9/18  Yes KASSIE Cornejo CNP   glimepiride (AMARYL) 1 MG tablet Take 1 mg by mouth every morning (before breakfast)   Yes Historical Provider, MD   levothyroxine (SYNTHROID) 50 MCG tablet Take 50 mcg by mouth Daily   Yes Historical Provider, MD   cloNIDine (CATAPRES) 0.2 MG tablet Take 0.2 mg by mouth 2 times daily Instructed to take am of procedure   Yes Historical Provider, MD   hydrALAZINE (APRESOLINE) 50 MG tablet Take 50 mg by mouth every 8 hours Instructed to take am of procedure   Yes Historical Provider, MD   acetaminophen (TYLENOL) 500 MG tablet Take 500 mg by mouth every 6 hours as needed for Pain   Yes Historical Provider, MD   metFORMIN (GLUCOPHAGE) 500 MG tablet Take 500 mg by mouth 2 times daily (with meals)   Yes Historical Provider, MD   losartan (COZAAR) 100 MG tablet Take 1 tablet by mouth daily 5/5/17  Yes Dom Lima,    potassium chloride (KLOR-CON M) 10 MEQ extended release tablet Take 1 tablet by mouth daily 5/5/17  Yes Luz Marina Wray,    amLODIPine (NORVASC) 10 MG tablet Take 10 mg by mouth daily Instructed to take am of procedure   Yes Historical Provider, MD       Allergies   Allergen Reactions    Aspirin Other (See Comments)     \"tears up my stomach\"       Social History     Socioeconomic History    Marital status:      Spouse name: Not on file    Number of children: Not on file    Years of education: Not on file    Highest education level: Not on file   Occupational History    Not on file   Social Needs    Financial resource strain: Not on file    Food insecurity     Worry: Not on file     Inability: Not on file    Transportation needs     Medical: Not on file     Non-medical: Not on file   Tobacco Use    Smoking status: Never Smoker    Smokeless tobacco: Never Used   Substance and Sexual Activity    Alcohol use: No    Drug use: No    Sexual activity: Not on file   Lifestyle    Physical activity     Days per week: Not on file     Minutes per session: Not on file    Stress: Not on file   Relationships    Social connections     Talks on phone: Not on file     Gets together: Not on file     Attends Congregation service: Not on file     Active member of club or organization: Not on file     Attends meetings of clubs or organizations: Not on file     Relationship status: Not on file    Intimate partner violence     Fear of current or ex partner: Not on file     Emotionally abused: Not on file     Physically abused: Not on file     Forced sexual activity: Not on file   Other Topics Concern    Not on file   Social History Narrative    ** Merged History Encounter **        Drinks occ pop; no other caffeine. Family History   Problem Relation Age of Onset    No Known Problems Mother     No Known Problems Father        REVIEW OF SYSTEMS:     Bertha Paez denies fever/chills, chest pain, shortness of breath, new bowel or bladder complaints. All other review of systems was negative.     PHYSICAL EXAMINATION:      BP (!) 180/98   Pulse 86   Temp 98.1 °F (36.7 °C) (Oral)   Resp 16   Ht 5' 4\" (1.626 m)   Wt 155 lb (70.3 kg)   BMI 26.61 kg/m²     General:       General appearance:pleasant and well-hydrated, in no distress and A & O x3  Build:Overweight  Function:rises to standing position with difficulty     HEENT:     Head:normocephalic, atraumatic  Pupils:regular, round, equal  Sclera: icterus absent     Lungs:     Breathing:normal breathing pattern     Abdomen:     Shape:non-distended  Tenderness:none  Guarding:none     Cervical spine:     Inspection:+ decreased lordosis, + paraspinal muscle tension increased, no CALLI tenderness  Palpation:tenderness paravertebral muscles, tenderness trapezium, left, right and positive.   Range of motion:abnormal moderately in flexion, extension rotation bilateral and is painful.     Musculoskeletal:     Trigger points in trapezius:absent bilaterally  Trigger points in rhomboids:absent bilaterally  Trigger points in Paraveteral:absent bilaterally  Trigger points in supraspinatus/infraspinatus:absent  Spurling's:negative right, negative left    Whitfield's:negative right, negative left      Extremities:     Tremors:None bilaterally upper and lower  Intact:Yes  Varicose veins:absent   Pulses:present Lt radial  Cyanosis:none  Edema:none x all 4 extremities     Neurological:   CN II-XII grossly intact  Sensory:normal to light touch BUE     Motor:      Right Grip5/5              Left Grip5/5               Right Bicep5/5           Left Bicep5/5              Right Triceps5/5       Left Triceps5/5          Right Deltoid5/5     Left Deltoid5/5                     Reflexes:       Right Brachioradialis reflex2+  Left Brachioradialis reflex2+  Right Biceps reflex2+  Left Biceps reflex2+  Right Triceps reflex2+  Left Triceps reflex2+     Gait:normal station with walker     Dermatology:     Skin:no rashes or lesions noted     Assessment/Plan:     Axial cervical pain  C1 posterior arch fracture which she saw NSGY for, was prescribed a brace but does not wear it because it is not comfortable    Today pt reports having a significant left-sided supraorbital headache pain she has had this x 3-4 months, no relief of cervical pain with TPI  She called NSGY about the increased \"neck\" pain requesting updated imaging and a CT scan was ordered, they also again suggested she wear her cervical brace in order to heal.  Pt is insistant she \"hurt in my brain\", worse with bending forward, also admits to some nausea    She also reports she has been unable to tolerate the cervical brace that has been prescribed, it hurts her chest and impedes her breathing. Since the last visit she did eventually get to the ER, she had CT scan of the cervical and head and was found to have some healing of the C1 fracture and she was also noted to have significant dental disease and is working on finding a dentist.      Referral to neurology for further eval and tx of HA's  Referral to [facility] for cervical brace, she states she is not happy with her first brace and is trying to get her money back for it  OARRS report reviewed - pain medication via PCP  bilateral cervical paraspinal, trapezius, rhomboid, supraspinatus trigger TPI under US with 0% relief thus I do not suggest repeat  Pt is not a candidate for CORRIE due to cervical myelomalacia  TENS ordered at last visit, pt has not yet received - will help coordinate (upon further investigation at . [address], pt received her TENS unit and it is in an unopened box at home)  Patient encouraged to stay active  Treatment plan discussed with the patient including medication and procedure side effects     Cc:  Referring physician    ANMOL Quiñonez.

## 2020-09-23 PROBLEM — R51.9 INTRACTABLE HEADACHE: Status: ACTIVE | Noted: 2020-01-01

## 2020-09-23 NOTE — PROGRESS NOTES
Do you currently have any of the following:    Fever: No  Headache:  No  Cough: No  Shortness of breath: No  Exposed to anyone with these symptoms: No         Denae Cobos presents to the [facility] on 9/23/2020. Cece Oakley is complaining of pain head and back The pain is constant. The pain is described as aching, throbbing, shooting and stabbing. Pain is rated on her best day at a 6, on her worst day at a 10, and on average at a 7 on the VAS scale. She took her last dose of Tylenol with codeine  PCP     Any procedures since your last visit:     Pacemaker or defibrilator: No managed by . She is not on NSAIDS and is not on anticoagulation medications to include none and is managed by .     BP (!) 180/98   Pulse 86   Temp 98.1 °F (36.7 °C) (Oral)   Resp 16   Ht 5' 4\" (1.626 m)   Wt 155 lb (70.3 kg)   BMI 26.61 kg/m²      No LMP recorded.  Patient is postmenopausal.

## 2020-10-21 NOTE — PROGRESS NOTES
OFFICE VISIT        PRIMARY CARE PHYSICIAN:    Karen Mcgill MD       ALLERGIES / SENSITIVITIES:    Allergies   Allergen Reactions    Aspirin Other (See Comments)     \"tears up my stomach\"        REVIEWED MEDICATIONS:      Current Outpatient Medications:     acetaminophen-codeine (TYLENOL #3) 300-30 MG per tablet, TAKE 1 TABLET BY MOUTH EVERY 4 TO 6 HOURS AS NEEDED FOR PAIN, Disp: , Rfl:     potassium chloride (KLOR-CON) 10 MEQ extended release tablet, , Disp: , Rfl:     metoprolol succinate (TOPROL XL) 25 MG extended release tablet, Take 1 tablet by mouth daily, Disp: 30 tablet, Rfl: 3    furosemide (LASIX) 40 MG tablet, Take 1 tablet by mouth daily, Disp: 60 tablet, Rfl: 3    meloxicam (MOBIC) 15 MG tablet, Take 1 tablet by mouth daily, Disp: 30 tablet, Rfl: 0    nystatin (MYCOSTATIN) 297169 UNIT/GM cream, , Disp: , Rfl:     tiZANidine (ZANAFLEX) 2 MG tablet, Take 1 tablet by mouth nightly as needed (muscle spapsms), Disp: 10 tablet, Rfl: 0    Compression Stockings MISC, by Does not apply route Measure and fit to patient, Disp: 1 each, Rfl: 0    glimepiride (AMARYL) 1 MG tablet, Take 1 mg by mouth every morning (before breakfast), Disp: , Rfl:     levothyroxine (SYNTHROID) 50 MCG tablet, Take 50 mcg by mouth Daily, Disp: , Rfl:     cloNIDine (CATAPRES) 0.2 MG tablet, Take 0.2 mg by mouth 2 times daily Instructed to take am of procedure, Disp: , Rfl:     hydrALAZINE (APRESOLINE) 50 MG tablet, Take 50 mg by mouth every 8 hours Instructed to take am of procedure, Disp: , Rfl:     acetaminophen (TYLENOL) 500 MG tablet, Take 500 mg by mouth every 6 hours as needed for Pain, Disp: , Rfl:     metFORMIN (GLUCOPHAGE) 500 MG tablet, Take 500 mg by mouth 2 times daily (with meals), Disp: , Rfl:     losartan (COZAAR) 100 MG tablet, Take 1 tablet by mouth daily, Disp: 30 tablet, Rfl: 0    amLODIPine (NORVASC) 10 MG tablet, Take 10 mg by mouth daily Instructed to take am of procedure, Disp: , Rfl:    diclofenac sodium (VOLTAREN) 1 % GEL, APPLY THIN LAYER TOPICALLY 4 TIMES DAILY AS NEEDED AS DIRECTED, Disp: , Rfl:     potassium chloride (KLOR-CON M) 10 MEQ extended release tablet, Take 1 tablet by mouth daily, Disp: 30 tablet, Rfl: 0      S: REASON FOR VISIT:    Chronic diastolic congestive heart failure, valvular heart disease, sick sinus syndrome and hypertension. Zana Pope is an 52-year-old lady with cardiovascular history as described below. She is a poor historian. She was seen in the hospital in June for palpitations and reported worsening lower extremity swelling. Upon my evaluation, she was not in decompensated congestive heart failure. She was not noted to have any arrhythmias. She returns today for follow up. She continues to report occasional poorly characterized chest discomfort, which is nonexertional and which lasts a few seconds at a time with no other associated symptoms. She is limited with her activities, but denies any significant shortness of breath. She reports occasional worsening of her chronic lower extremity swelling. She denies any recent palpitations. She denies dizziness, presyncope or syncope. REVIEW OF SYSTEMS:    CONSTITUTIONAL:  Denies fevers, chills, night sweats or fatigue. HEENT:  Denies any unusual headaches.  Denies recent changes in hearing or vision.  Denies dysphagia, hoarseness, hemoptysis, hematemesis or epistaxis. ENDOCRINE:  Denies polyphagia, polydipsia or polyuria.  Denies heat or cold intolerance. MUSCULOSKELETAL:  She complains of right knee pain.  She also complains of neck pain and back pain.    SKIN:  Denies rashes, ulcers or itching. HEME/LYMPH:  Denies any palpable lymph nodes, bleeding or easy bruisability. HEART:  As above. LUNGS:  Denies cough or sputum production. GI: Denies abdominal pain, nausea, vomiting,diarrhea, constipation, rectal bleeding or tarry stools. :  Denies hematuria or dysuria.   PSYCHIATRIC:  Denies mood changes, anxiety or depression. NEUROLOGIC:  Denies memory loss, motor weakness, numbness, tingling or tremors.                    CARDIOVASCULAR HISTORY:    1.  Cardiac catheterization June 2005 (Dr. Henry Norris) showed normal left ventricular ejection fraction, normal coronary arteries, cardiac catheterization done secondary to a false positive stress myocardial perfusion study. 2.  Pharmacologic stress test done in [facility] 09/02/2017.  No evidence of stress-induced ischemia with normal ejection fraction. 3.  Hypertension. 4.  Diabetes mellitus. 5.  Aspirin allergy:  Causes GI upset per patient. 6.  Sick sinus syndrome with history of bradycardia and an episode of Wenckebach in 03/2017 at which time the patient was on clonidine and verapamil.    7.  Echocardiogram done on 03/31/2017 was read as showing normal left ventricular size with mild concentric left ventricular hypertrophy with an ejection fraction of 60-65% with mild mitral regurgitation and mild left atrial dilatation.    8.  Echocardiogram done on 6/20/2020 showed normal left ventricular size with mild concentric left ventricular hypertrophy with no regional wall motion abnormality with an estimated ejection fraction of 75 +/- 5% with stage 2 left ventricular diastolic dysfunction. Normal right ventricular size and function. Mildly dilated left atrium. Mild aortic stenosis. Mild mitral regurgitation. Mild tricuspid regurgitation. Mild pulmonary hypertension.       PAST MEDICAL HISTORY:  1.  As under cardiovascular history. 2.  Osteoarthritis:    a.  Status post bilateral knee injections. b.  Status post total right knee arthroplasty for severe right knee osteoarthritis with valgus deformity on 3/21/2019. 3.  Status post excision of a soft tissue mass from left second toe 10/27/2006. 4.  Status post bilateral bunionectomy 05/29/2012. 5.  History of cervical radiculopathy. 6.  History of bilateral shoulder bursitis.   7.  Hypothyroidism:  On replacement therapy. 8.  Suspect dementia. 9.  Severe acute blood loss anemia noted in 4/2019, S/P total right knee arthroplasty on 3/21/2019. 10.  Chronic kidney disease. 11. Medical noncompliance.     FAMILY HISTORY:  Noncontributory.     SOCIAL HISTORY:  Denies smoking.  Denies alcohol or illicit drug use. O:  COMPLETE PHYSICAL EXAM:      BP (!) 152/80   Pulse 70   Resp 18   Ht 5' 3\" (1.6 m)   Wt 158 lb (71.7 kg)   BMI 27.99 kg/m²      General:          Elderly lady in no acute distress. HEENT:           Normocephalic and atraumatic head. No JVD. No carotid bruits. Carotid upstrokes normal bilaterally.  No thyromegaly.  Sclerae not icteric.  No xanthelasmas.  Mucous membranes moist.  Chest:              Symmetrical and nontender.  No deformities. Lungs:             Clear to auscultation bilaterally. Heart:              Normal S1 and S2.  No S3 or S4.  Grade 2/6 systolic murmur heard at the right upper sternal border.    Abdomen:        Soft, nontender without organomegaly or masses.  No bruits.  Normal bowel sounds. Extremities:     1-2+ pitting peritibial and ankle edema bilaterally.  Tender shins.  Normal pulses.  Right knee replacement scar healing well. Skin:                Normal turgor. No rashes or ulcers noted. Right knee replacement scar healing well. Neurologic:      Oriented x3.  No motor or sensory deficits detected.        REVIEW OF DIAGNOSTIC TESTS:    1. Blood tests from 7/27/2020 reviewed:  BUN 27, creatinine 1.2, GFR 52, potassium 4.1, total cholesterol 131, triglycerides 45, HDL 73, LDL 49, hemoglobin 11.1, hematocrit 36.5, hemoglobin A1C 6.6, TSH normal.    2.  EKG done today showed sinus rhythm with left ventricular hypertrophy and nonspecific T wave abnormality. ASSESSMENT / DIAGNOSIS:   1.  Heart failure with preserved EF: Compensated.    2.  Valvular heart disease with mild aortic stenosis, mild mitral regurgitation, mild tricuspid regurgitation and mild pulmonary hypertension on echo in 6/2020. 3.  Chronic kidney disease. 4.  Sick sinus syndrome and history of sinus bradycardia and Wenckebach (while on verapamil and high dose clonidine). Stable  5.  Chronic dizziness/unsteadiness on feet. 6.  Suspect dementia. 7.  Medical noncompliance. 8.  Mild anemia. 9.  Hypertension/LVH. 10.  Diabetes mellitus. 11.  Hypothyroidism:  On replacement therapy. 12.  Osteoarthritis/ knees:  S/P total right knee arthroplasty in 3/2019. 13.  Cervical radiculopathy. 14.  Bursitis of both shoulders. 15.  Sensitivity to aspirin: Causes GI upset.        TREATMENT PLAN:  1. Reassure. 2.  Continue current medications. 3.  Patient instructed on when to take an extra Lasix dose for signs/symptoms of fluid overload. 4.  Follow up with Cardiology in 6 months or on a prn basis. [facility] [address] [place] [address].  [place] [phone]

## 2020-10-29 NOTE — PROGRESS NOTES
NEUROLOGY NEW PATIENT NOTE     Date: 10/29/2020  Name: Cele España  MRN: [id number]  Patient's PCP: Sulema Buenrostro MD     Dear, Dr. Sulema Buenrostro MD, Dr Elena Brink VISIT/CHIEF COMPLAINT: Headache     HISTORY OF PRESENT ILLNESS: Cele España is a 81 y.o. -American female past medical history of kyphosis of upper cervical spine, cervical spinal stenosis, C1 fracture currently is not wearing brace. Is coming in for headaches. The patient is a poor historian and is unable to provide complete history. She is accompanied by her son who does not know much about her history. The patient reports that she has been handing headaches associated with neck pain. Pain starts in the neck and then radiates all over the head. She does not wear a neck brace for a C1 fracture because of the headache. Report that that makes her headache worse. The headache is severe, 8/10, ongoing for her many months. Also reports associated dizziness with it. She has extensive dental disease and currently trying to find a dentist.  The headache is sharp dull achy throbbing type  She has been evaluated by pain medicine and neurosurgery. Currently she is not wearing a neck brace. As per pain medicine she is not a candidate for epidural steroid injections and she had 0 relief with trigger point injections. She does not have any history of headaches. No aggravating or relieving factors  No other associated symptoms  No nausea vomiting no photophobia phonophobia. MRI cervical spine:01/20    Findings:    Sagittal imaging shows: Kyphotic curvature of the upper cervical spine    between C3 and C7, which is associated with dorsal disc prominence    narrowing the anterior central canal most prominently at the C3-4    level. Hypertrophic change in the posterior elements is also most    prominent at the C3-4 level where there is very subtle grade 1    anterolisthesis at C3-4.  Changes appear to narrow the AP diameter of    the central canal and impinge on the cord at that level. There is    posterior hyperostosis at the C2-3 level which may encroach on the    dorsal cord, and there is a similar finding at the C4-5 level. Dorsal    disc prominence at the C4-5, C5-6, and C6-7 levels does not appear to    encroach on the cord. STIR sagittal imaging suggests some increased    central cord signal between C4 and C7.         Coronal images show: Transverse diameter of the central canal is    widely patent, and no abnormalities of the craniocervical junction,    posterior fossa CNS structures, or cord are identified. There is no    paraspinous soft tissue pathology. This does not represent a dedicated    tracheal plexus study, however.         Axial images best depict patency of the central canal and foramina as    follows: At C2-3: Midline dorsal disc prominence narrows the anterior central    canal without definite cord impingement at this level.         At C3-4:  there is a disc-osteophyte complex encroaching on the cord    eccentric to the right of midline at this level. It appears to    displace the cord dorsally.         At C4-5: AP diameter of the central canal is reduced at this level,    primarily due to dorsal disc/osteophyte prominence nearly encroaching    on the cord. Cord caliber appears reduced.         At C5-6: There is right lateral greater than left lateral bulging    appearing to encroach on the cord on the right side. Cord encroachment    could cause symptoms on either side.  Cord caliber is reduced at this    level.         At C6-7: There is a prominent dorsal midline disc bulge, and the cord    appears slightly flattened at this level, and there is some increased    cord T2 signal suggesting myelomalacia.         At C7-T1: There is a small midline disc bulge nearly abutting the    cord, and some increased posterior cord signal is noted at this level.              Impression    Kyphotic curvature of the cervical spinal canal with ventral and    dorsal degenerative encroachment on the cord is noted between C3 and    C7, most prominent in the right paramedian C3-4 level. Subtle STIR    signal in T2 signal in the cord may represent early myeloma malacic    change in the more dependent cervical canal.         Dorsal cord encroachment by posterior element hyperostosis is    suspected at the C3-4 and C4-5 levels, while ventral encroachment is    noted at multiple levels, primarily between C3 and C6      CT head: 8/20:    1. No evidence of acute calvarial trauma or acute intracranial    hemorrhage, edema, or mass effect. Age-related atrophy and left    posterior parietal white matter microangiopathic changes are noted. 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 2. Stable appearance of the cervical spine with exaggerated cervical    kyphosis between C3 and C7, and no interval complications involving    the prior C1 fracture. There is dental disease, maxillary mucosal    thickening, and loss of cortical continuity and medullary    trabeculation in the anterior mandibular symphysis, which is not    depicted on prior comparison studies, and is of uncertain chronicity.              ALERT:  THIS IS AN ABNORMAL REPORT-mandibular symphysis cortical    discontinuity an expansile appearance is noted, and may be related to    dental disease. Less prominent dental disease is noted in the    posterior left maxilla.         PAST MEDICAL HISTORY:   Past Medical History:   Diagnosis Date    (HFpEF) heart failure with preserved ejection fraction ([facility] [address] 05/26/2017    3/31/17- echo- LVEF 60-65%, mild LVH, mild MR    Arthritis     Bursitis     shoulders    Cervical radiculopathy     CHF (congestive heart failure) (HCC)     CKD (chronic kidney disease) stage 3, GFR 30-59 ml/min     Diabetes mellitus (HCC)     GERD (gastroesophageal reflux disease)     [place] (hard of hearing)     Hypertension     Hypothyroidism     SSS (sick sinus syndrome) ([facility] [address]     stable, per epic note.  Thyroid disease     Unsteadiness     Valvular heart disease     sees Dr. Marbin Mayorga      PAST SURGICAL HISTORY:   Past Surgical History:   Procedure Laterality Date    FOOT SURGERY      both    JOINT REPLACEMENT  1999    TOTAL KNEE ARTHROPLASTY Right 3/21/2019    RIGHT KNEE TOTAL ARTHROPLASTY (ILENE)++ADDUCTOR CANAL BLOCK++ performed by Boo Melo MD at [facility]:   Family History   Problem Relation Age of Onset    No Known Problems Mother     No Known Problems Father      SOCIAL HISTORY:   Social History     Socioeconomic History    Marital status:      Spouse name: None    Number of children: None    Years of education: None    Highest education level: None   Occupational History    None   Social Needs    Financial resource strain: None    Food insecurity     Worry: None     Inability: None    Transportation needs     Medical: None     Non-medical: None   Tobacco Use    Smoking status: Never Smoker    Smokeless tobacco: Never Used   Substance and Sexual Activity    Alcohol use: No    Drug use: No    Sexual activity: None   Lifestyle    Physical activity     Days per week: None     Minutes per session: None    Stress: None   Relationships    Social connections     Talks on phone: None     Gets together: None     Attends [employer] service: None     Active member of club or organization: None     Attends meetings of clubs or organizations: None     Relationship status: None    Intimate partner violence     Fear of current or ex partner: None     Emotionally abused: None     Physically abused: None     Forced sexual activity: None   Other Topics Concern    None   Social History Narrative    ** Merged History Encounter **        Drinks occ pop; no other caffeine.             E-Cigarettes Vaping or Juuling     Questions Responses    Vaping Use Never User    Start Date     Does device contain nicotine?      Quit Date     Vaping Type          Allergy:   Allergies   Allergen Reactions    Aspirin Other (See Comments)     \"tears up my stomach\"     MEDS:   Current Outpatient Medications:     acetaminophen-codeine (TYLENOL #3) 300-30 MG per tablet, TAKE 1 TABLET BY MOUTH EVERY 4 TO 6 HOURS AS NEEDED FOR PAIN, Disp: , Rfl:     potassium chloride (KLOR-CON) 10 MEQ extended release tablet, , Disp: , Rfl:     diclofenac sodium (VOLTAREN) 1 % GEL, APPLY THIN LAYER TOPICALLY 4 TIMES DAILY AS NEEDED AS DIRECTED, Disp: , Rfl:     metoprolol succinate (TOPROL XL) 25 MG extended release tablet, Take 1 tablet by mouth daily, Disp: 30 tablet, Rfl: 3    furosemide (LASIX) 40 MG tablet, Take 1 tablet by mouth daily, Disp: 60 tablet, Rfl: 3    meloxicam (MOBIC) 15 MG tablet, Take 1 tablet by mouth daily, Disp: 30 tablet, Rfl: 0    nystatin (MYCOSTATIN) 049303 UNIT/GM cream, , Disp: , Rfl:     tiZANidine (ZANAFLEX) 2 MG tablet, Take 1 tablet by mouth nightly as needed (muscle spapsms), Disp: 10 tablet, Rfl: 0    Compression Stockings MISC, by Does not apply route Measure and fit to patient, Disp: 1 each, Rfl: 0    glimepiride (AMARYL) 1 MG tablet, Take 1 mg by mouth every morning (before breakfast), Disp: , Rfl:     levothyroxine (SYNTHROID) 50 MCG tablet, Take 50 mcg by mouth Daily, Disp: , Rfl:     cloNIDine (CATAPRES) 0.2 MG tablet, Take 0.2 mg by mouth 2 times daily Instructed to take am of procedure, Disp: , Rfl:     hydrALAZINE (APRESOLINE) 50 MG tablet, Take 50 mg by mouth every 8 hours Instructed to take am of procedure, Disp: , Rfl:     acetaminophen (TYLENOL) 500 MG tablet, Take 500 mg by mouth every 6 hours as needed for Pain, Disp: , Rfl:     metFORMIN (GLUCOPHAGE) 500 MG tablet, Take 500 mg by mouth 2 times daily (with meals), Disp: , Rfl:     losartan (COZAAR) 100 MG tablet, Take 1 tablet by mouth daily, Disp: 30 tablet, Rfl: 0    potassium chloride (KLOR-CON M) 10 MEQ extended release tablet, Take 1 tablet by mouth daily, Disp: 30 tablet, Rfl: 0    amLODIPine (NORVASC) 10 MG tablet, Take 10 mg by mouth daily Instructed to take am of procedure, Disp: , Rfl:     REVIEW OF SYSTEMS  Review of Systems   Constitutional: Negative for appetite change, fatigue and unexpected weight change. HENT: Negative for drooling, hearing loss, tinnitus and trouble swallowing. Eyes: Negative for photophobia and visual disturbance. Respiratory: Negative for shortness of breath. Cardiovascular: Negative for palpitations. Gastrointestinal: Negative for nausea and vomiting. Endocrine: Negative for polyuria. Genitourinary: Negative for flank pain. Musculoskeletal: Negative for neck pain and neck stiffness. Skin: Negative for rash. Allergic/Immunologic: Negative for food allergies. Neurological: Positive for headaches. Negative for dizziness, tremors, seizures, syncope, speech difficulty, weakness, light-headedness and numbness. Hematological: Negative for adenopathy. Psychiatric/Behavioral: Negative for agitation, behavioral problems and sleep disturbance. PHYSICAL EXAM:   BP (!) 169/81   Pulse 81   Temp 97 °F (36.1 °C) (Temporal)   Resp 16   Ht 5' 3\" (1.6 m)   Wt 150 lb (68 kg)   SpO2 100%   BMI 26.57 kg/m²   GENERAL APPEARANCE: Alert, well-developed, well-nourished female in no acute distress. HEENT: Normocephalic and atraumatic. PERRL. Oropharynx unremarkable. PULM: Normal respiratory effort. No accessory muscle use. CV: RRR. ABDOMEN: Soft, nontender. EXTREMITIES: No obvious signs of vascular compromise. Pulses present. No cyanosis, clubbing or edema. SKIN: Clear; no rashes, lesions or skin breaks in exposed areas. NEURO:     Neurological examination     MENTAL STATUS: Patient awake and oriented to time, place, and person. Recent/remote memory normal. Attention span/concentration normal. Speech fluent.  Good comprehension, naming, and repetition. Fund of knowledge appropriate for patient's level of education. Affect normal.    CRANIAL NERVES:  CN I: Not tested. CN II: Fundoscopic exam not performed. CN III, IV, VI: Pupils equal, round and reactive to light; extra ocular movements full and intact. CN V: Facial sensation normal.  CN VII: No facial asymmetry. CN VIII:  Hearing grossly normal bilaterally. No pathologic nystagmus or skew deviation. CN IX, X: Palate elevates symmetrically. CN XI: Shoulder shrug and chin rotation equal with intact strength. CN XII: Tongue protrusion midline. MOTOR: Normal bulk. Tone normal and symmetric throughout. Strength 5/5 throughout. ABNORMAL MOVEMENTS/TREMORS: No     REFLEXES: DTRs 2+; normal and symmetric throughout. Plantar response downgoing. SENSATION: Sensation grossly intact to fine touch, pain/temperature, vibration and position. COORDINATION: Finger-to-nose and heel to shin normal for age and symmetric. Finger tapping and alternating movements normal.    STATION: Negative Romberg. GAIT:  Normal heel, toe and tandem; no ataxia.      DIAGNOSTIC TESTS:     I have personally reviewed the most recent lab results:    Sodium   Date Value Ref Range Status   07/27/2020 145 132 - 146 mmol/L Final   06/21/2020 140 132 - 146 mmol/L Final   06/20/2020 142 132 - 146 mmol/L Final     Potassium   Date Value Ref Range Status   07/27/2020 4.1 3.5 - 5.0 mmol/L Final   06/21/2020 4.1 3.5 - 5.0 mmol/L Final   07/31/2019 4.4 3.5 - 5.0 mmol/L Final     Potassium reflex Magnesium   Date Value Ref Range Status   06/20/2020 4.2 3.5 - 5.0 mmol/L Final   10/17/2018 4.2 3.5 - 5.0 mmol/L Final     Chloride   Date Value Ref Range Status   07/27/2020 105 98 - 107 mmol/L Final   06/21/2020 102 98 - 107 mmol/L Final   06/20/2020 104 98 - 107 mmol/L Final     CO2   Date Value Ref Range Status   07/27/2020 25 22 - 29 mmol/L Final   06/21/2020 25 22 - 29 mmol/L Final   06/20/2020 23 22 - 29 mmol/L Final     BUN   Date Value Ref Range Status   07/27/2020 27 (H) 8 - 23 mg/dL Final   06/21/2020 29 (H) 8 - 23 mg/dL Final   06/20/2020 21 8 - 23 mg/dL Final     CREATININE   Date Value Ref Range Status   07/27/2020 1.2 (H) 0.5 - 1.0 mg/dL Final   06/21/2020 1.3 (H) 0.5 - 1.0 mg/dL Final   06/20/2020 0.9 0.5 - 1.0 mg/dL Final     GFR Non-   Date Value Ref Range Status   07/27/2020 52 >=60 mL/min/1.73 Final     Comment:     Chronic Kidney Disease: less than 60 ml/min/1.73 sq.m. Kidney Failure: less than 15 ml/min/1.73 sq.m. Results valid for patients 18 years and older. 06/21/2020 47 >=60 mL/min/1.73 Final     Comment:     Chronic Kidney Disease: less than 60 ml/min/1.73 sq.m. Kidney Failure: less than 15 ml/min/1.73 sq.m. Results valid for patients 18 years and older. 06/20/2020 >60 >=60 mL/min/1.73 Final     Comment:     Chronic Kidney Disease: less than 60 ml/min/1.73 sq.m. Kidney Failure: less than 15 ml/min/1.73 sq.m. Results valid for patients 18 years and older.        Calcium   Date Value Ref Range Status   07/27/2020 9.7 8.6 - 10.2 mg/dL Final   06/21/2020 8.9 8.6 - 10.2 mg/dL Final   06/20/2020 9.6 8.6 - 10.2 mg/dL Final     Phosphorus   Date Value Ref Range Status   05/26/2017 3.5 2.5 - 4.5 mg/dL Final     Magnesium   Date Value Ref Range Status   06/21/2020 2.1 1.6 - 2.6 mg/dL Final   06/20/2020 1.9 1.6 - 2.6 mg/dL Final   02/15/2019 1.9 1.6 - 2.6 mg/dL Final     WBC   Date Value Ref Range Status   07/27/2020 4.5 4.5 - 11.5 E9/L Final   06/21/2020 2.8 (L) 4.5 - 11.5 E9/L Final   06/20/2020 4.3 (L) 4.5 - 11.5 E9/L Final     Hemoglobin   Date Value Ref Range Status   07/27/2020 11.1 (L) 11.5 - 15.5 g/dL Final   06/21/2020 10.1 (L) 11.5 - 15.5 g/dL Final   06/20/2020 10.6 (L) 11.5 - 15.5 g/dL Final     Hematocrit   Date Value Ref Range Status   07/27/2020 36.5 34.0 - 48.0 % Final   06/21/2020 32.7 (L) 34.0 - 48.0 % Final   06/20/2020 34.1 34.0 - 48.0 % Final     Platelets   Date Value Ref Range Status   07/27/2020 163 130 - 450 E9/L Final   06/21/2020 153 130 - 450 E9/L Final   06/20/2020 155 130 - 450 E9/L Final     Neutrophils %   Date Value Ref Range Status   06/21/2020 50.7 43.0 - 80.0 % Final   06/20/2020 72.3 43.0 - 80.0 % Final   04/01/2019 77.3 43.0 - 80.0 % Final     Monocytes %   Date Value Ref Range Status   06/21/2020 18.9 (H) 2.0 - 12.0 % Final   06/20/2020 10.9 2.0 - 12.0 % Final   04/01/2019 10.7 2.0 - 12.0 % Final     Total Protein   Date Value Ref Range Status   07/27/2020 8.1 6.4 - 8.3 g/dL Final   06/20/2020 7.9 6.4 - 8.3 g/dL Final   07/31/2019 7.6 6.4 - 8.3 g/dL Final     Total Bilirubin   Date Value Ref Range Status   07/27/2020 0.6 0.0 - 1.2 mg/dL Final   06/20/2020 0.5 0.0 - 1.2 mg/dL Final   07/31/2019 0.6 0.0 - 1.2 mg/dL Final     Alkaline Phosphatase   Date Value Ref Range Status   07/27/2020 83 35 - 104 U/L Final   06/20/2020 80 35 - 104 U/L Final   07/31/2019 86 35 - 104 U/L Final     ALT   Date Value Ref Range Status   07/27/2020 10 0 - 32 U/L Final   06/20/2020 11 0 - 32 U/L Final   07/31/2019 10 0 - 32 U/L Final     AST   Date Value Ref Range Status   07/27/2020 18 0 - 31 U/L Final   06/20/2020 24 0 - 31 U/L Final     Comment:     Specimen is slightly Hemolyzed. Result may be artificially increased. 07/31/2019 17 0 - 31 U/L Final     Lab Results   Component Value Date    INR 1.0 06/21/2020     Lab Results   Component Value Date    TRIG 45 07/27/2020    HDL 73 07/27/2020     No components found for: HGBA1C  No results found for: PROTEINCSF, GLUCCSF, WBCCSF    Controlled Substance Monitoring:    Acute and Chronic Pain Monitoring:   RX Monitoring 1/10/2020   Attestation -   Periodic Controlled Substance Monitoring No signs of potential drug abuse or diversion identified.        MEDICAL DECISION MAKING  ASSESSMENT/PLAN    Chronic migraine with status migrainosus  New daily persistent headache  Cervicalgia  Temporal pain  Occipital neuralgia  Supraorbital pain         · Etiology of the headache: Recommend, at this time it appears that her severe cervical spinal stenosis and cervical fracture, underlying dental disease is attributing to her occipital and neck pain which is triggering her headaches. · Try trigger point injections for her neck without any significant relief: performed by pain medicine. · At this time she will benefit with pericranial nerve blocks for abortive treatment of her headache. · Consider adding trophic medication for prevention of her headache like gabapentin. · If she continues to have worsening of her headache, she will also need a brain MRI. Follow-up as needed. Thank you for involving me in the care of your patient. Today, I personally spent a great amount of time directly face-to-face time with the patient, of which greater than 50% was spent in patient education, counseling,about etiology management and diagnosis of migraines, chronic daily headache, supraorbital headache, cervicalgia. Side effects of medications including gabapentin were discussed in detail with the patient, verbalizes understanding and agrees to it. And coordination of care as described above. Patient's current medication list, allergies, problem list and results of all previously ordered testing and scans were reviewed at today's visit.       Coolidge Jeans, MD    [facility]  [address] [place] [address]

## 2020-12-02 NOTE — TELEPHONE ENCOUNTER
Patient states she has terrible neck and head pain. She has also been dizzy. Recommended calling her PCP regarding dizziness and she said she did and he told her to call our office. Patient is requesting an order for an xray so she can get it done today.  Please call Meenu Foley at [phone]

## 2020-12-10 NOTE — PROGRESS NOTES
Trigger point injection    Mixture of 50/50 proportion of Lidocaine 2% and Bupivacaine 0.5% was applied as a subcutaneous injections to the trigger points in the area of 3 paraspinal muscles: Semispinalis capitis muscle, Splenius capitis and Trapezius muscles bilaterally, using 0.5cc in each point, total of 6cc. Pain and stiffness before the procedure: 9/10  Pain and stiffness after the procedure: 5/10    Date/Time: 12/10/20  Performed by: Alvarez KERN  Authorized by:DORIAN KERN  Consent: Verbal consent obtained. Written consent obtained. Risks and benefits: risks, benefits and alternatives were discussed  Consent given by: patient  Patient understanding: patient states understanding of the procedure being performed  Body area: Neck and Back  Laterality: bilateral   Sedation:  Patient sedated: no  Local Anesthetic: lidocaine 2% without epinephrine and bupivacaine 0.5% without epinephrine  Outcome: pain improved  Patient tolerance: Patient tolerated the procedure well with no immediate complications    The patient was here for pericranial nerve block and trigger injections however the pericranial nerve blocks were not performed as the patient did not wanted to take off her head cap.

## 2020-12-15 NOTE — PROGRESS NOTES
Patient is here for follow up from a hospital consult for: neck pain and headaches. Physical exam  Alert and Oriented X3  PERRLA, EOMI  QUIGLEY 4/5  Sensation intact to LT and PP  Reflexes are 2+ and symmetric    A/P: patient is here for follow up for: neck and headaches. We will refer to physical therapy and have her follow up with pain management.   She may continue with NSAIDS if beneficial.

## 2021-01-01 ENCOUNTER — APPOINTMENT (OUTPATIENT)
Dept: GENERAL RADIOLOGY | Age: 86
DRG: 280 | End: 2021-01-01
Payer: MEDICARE

## 2021-01-01 ENCOUNTER — HOSPITAL ENCOUNTER (INPATIENT)
Age: 86
LOS: 20 days | Discharge: HOSPICE | DRG: 280 | End: 2021-04-19
Attending: EMERGENCY MEDICINE | Admitting: INTERNAL MEDICINE
Payer: MEDICARE

## 2021-01-01 ENCOUNTER — APPOINTMENT (OUTPATIENT)
Dept: GENERAL RADIOLOGY | Age: 86
DRG: 291 | End: 2021-01-01
Payer: MEDICARE

## 2021-01-01 ENCOUNTER — APPOINTMENT (OUTPATIENT)
Dept: CT IMAGING | Age: 86
DRG: 291 | End: 2021-01-01
Payer: MEDICARE

## 2021-01-01 ENCOUNTER — APPOINTMENT (OUTPATIENT)
Dept: CT IMAGING | Age: 86
DRG: 280 | End: 2021-01-01
Payer: MEDICARE

## 2021-01-01 ENCOUNTER — APPOINTMENT (OUTPATIENT)
Dept: ULTRASOUND IMAGING | Age: 86
DRG: 291 | End: 2021-01-01
Payer: MEDICARE

## 2021-01-01 ENCOUNTER — OFFICE VISIT (OUTPATIENT)
Dept: CARDIOLOGY CLINIC | Age: 86
End: 2021-01-01
Payer: MEDICARE

## 2021-01-01 ENCOUNTER — HOSPITAL ENCOUNTER (INPATIENT)
Age: 86
LOS: 5 days | Discharge: HOME | DRG: 291 | End: 2021-03-08
Attending: EMERGENCY MEDICINE | Admitting: FAMILY MEDICINE
Payer: MEDICARE

## 2021-01-01 ENCOUNTER — HOSPITAL ENCOUNTER (INPATIENT)
Age: 86
LOS: 3 days | Discharge: HOME | DRG: 291 | End: 2021-03-26
Attending: EMERGENCY MEDICINE | Admitting: FAMILY MEDICINE
Payer: MEDICARE

## 2021-01-01 ENCOUNTER — TELEPHONE (OUTPATIENT)
Dept: NEUROSURGERY | Age: 86
End: 2021-01-01

## 2021-01-01 ENCOUNTER — TELEPHONE (OUTPATIENT)
Dept: CARDIOLOGY CLINIC | Age: 86
End: 2021-01-01

## 2021-01-01 ENCOUNTER — APPOINTMENT (OUTPATIENT)
Dept: MRI IMAGING | Age: 86
DRG: 291 | End: 2021-01-01
Payer: MEDICARE

## 2021-01-01 ENCOUNTER — APPOINTMENT (OUTPATIENT)
Dept: ULTRASOUND IMAGING | Age: 86
DRG: 280 | End: 2021-01-01
Payer: MEDICARE

## 2021-01-01 ENCOUNTER — TELEPHONE (OUTPATIENT)
Dept: OTHER | Facility: CLINIC | Age: 86
End: 2021-01-01

## 2021-01-01 VITALS
SYSTOLIC BLOOD PRESSURE: 123 MMHG | HEIGHT: 64 IN | RESPIRATION RATE: 20 BRPM | WEIGHT: 169.8 LBS | DIASTOLIC BLOOD PRESSURE: 60 MMHG | BODY MASS INDEX: 28.99 KG/M2 | TEMPERATURE: 99.5 F | HEART RATE: 87 BPM | OXYGEN SATURATION: 90 %

## 2021-01-01 VITALS
DIASTOLIC BLOOD PRESSURE: 52 MMHG | WEIGHT: 170.4 LBS | HEART RATE: 80 BPM | BODY MASS INDEX: 30.19 KG/M2 | SYSTOLIC BLOOD PRESSURE: 116 MMHG | HEIGHT: 63 IN | OXYGEN SATURATION: 99 % | RESPIRATION RATE: 20 BRPM

## 2021-01-01 VITALS
WEIGHT: 159.2 LBS | BODY MASS INDEX: 27.18 KG/M2 | TEMPERATURE: 98.5 F | RESPIRATION RATE: 16 BRPM | SYSTOLIC BLOOD PRESSURE: 130 MMHG | HEIGHT: 64 IN | DIASTOLIC BLOOD PRESSURE: 65 MMHG | HEART RATE: 72 BPM | OXYGEN SATURATION: 95 %

## 2021-01-01 VITALS
RESPIRATION RATE: 16 BRPM | DIASTOLIC BLOOD PRESSURE: 60 MMHG | OXYGEN SATURATION: 98 % | HEIGHT: 63 IN | BODY MASS INDEX: 29.23 KG/M2 | HEART RATE: 78 BPM | SYSTOLIC BLOOD PRESSURE: 140 MMHG | WEIGHT: 165 LBS

## 2021-01-01 VITALS
HEART RATE: 67 BPM | TEMPERATURE: 99.1 F | OXYGEN SATURATION: 94 % | WEIGHT: 168.44 LBS | SYSTOLIC BLOOD PRESSURE: 154 MMHG | RESPIRATION RATE: 18 BRPM | BODY MASS INDEX: 28.76 KG/M2 | HEIGHT: 64 IN | DIASTOLIC BLOOD PRESSURE: 74 MMHG

## 2021-01-01 DIAGNOSIS — E11.8 DM (DIABETES MELLITUS), TYPE 2 WITH COMPLICATIONS (HCC): ICD-10-CM

## 2021-01-01 DIAGNOSIS — I10 ESSENTIAL HYPERTENSION: ICD-10-CM

## 2021-01-01 DIAGNOSIS — J96.01 ACUTE RESPIRATORY FAILURE WITH HYPOXIA (HCC): Primary | ICD-10-CM

## 2021-01-01 DIAGNOSIS — E03.9 HYPOTHYROIDISM, UNSPECIFIED TYPE: ICD-10-CM

## 2021-01-01 DIAGNOSIS — R42 DIZZINESS: ICD-10-CM

## 2021-01-01 DIAGNOSIS — R07.9 CHEST PAIN, UNSPECIFIED TYPE: Primary | ICD-10-CM

## 2021-01-01 DIAGNOSIS — I50.9 CONGESTIVE HEART FAILURE OF UNKNOWN ETIOLOGY (HCC): ICD-10-CM

## 2021-01-01 DIAGNOSIS — M54.12 CERVICAL RADICULOPATHY: ICD-10-CM

## 2021-01-01 DIAGNOSIS — I38 VHD (VALVULAR HEART DISEASE): ICD-10-CM

## 2021-01-01 DIAGNOSIS — M75.51 BURSITIS OF BOTH SHOULDERS: ICD-10-CM

## 2021-01-01 DIAGNOSIS — M48.02 CERVICAL STENOSIS OF SPINAL CANAL: Primary | ICD-10-CM

## 2021-01-01 DIAGNOSIS — I50.9 CONGESTIVE HEART FAILURE, UNSPECIFIED HF CHRONICITY, UNSPECIFIED HEART FAILURE TYPE (HCC): ICD-10-CM

## 2021-01-01 DIAGNOSIS — M54.2 CHRONIC NECK PAIN: ICD-10-CM

## 2021-01-01 DIAGNOSIS — M75.52 BURSITIS OF BOTH SHOULDERS: ICD-10-CM

## 2021-01-01 DIAGNOSIS — R09.02 HYPOXIA: ICD-10-CM

## 2021-01-01 DIAGNOSIS — M17.0 PRIMARY OSTEOARTHRITIS OF BOTH KNEES: ICD-10-CM

## 2021-01-01 DIAGNOSIS — G89.29 CHRONIC NECK PAIN: ICD-10-CM

## 2021-01-01 DIAGNOSIS — Z88.6 ASPIRIN SENSITIVITY: ICD-10-CM

## 2021-01-01 DIAGNOSIS — N18.31 STAGE 3A CHRONIC KIDNEY DISEASE (HCC): ICD-10-CM

## 2021-01-01 DIAGNOSIS — I50.43 ACUTE ON CHRONIC COMBINED SYSTOLIC AND DIASTOLIC CHF (CONGESTIVE HEART FAILURE) (HCC): Primary | ICD-10-CM

## 2021-01-01 DIAGNOSIS — I50.9 ACUTE ON CHRONIC CONGESTIVE HEART FAILURE, UNSPECIFIED HEART FAILURE TYPE (HCC): Primary | ICD-10-CM

## 2021-01-01 DIAGNOSIS — R77.8 ELEVATED TROPONIN: ICD-10-CM

## 2021-01-01 DIAGNOSIS — R26.81 GAIT INSTABILITY: ICD-10-CM

## 2021-01-01 DIAGNOSIS — N17.9 AKI (ACUTE KIDNEY INJURY) (HCC): ICD-10-CM

## 2021-01-01 DIAGNOSIS — I50.32 CHRONIC HEART FAILURE WITH PRESERVED EJECTION FRACTION (HCC): Primary | ICD-10-CM

## 2021-01-01 DIAGNOSIS — Z96.651 HISTORY OF TOTAL KNEE ARTHROPLASTY, RIGHT: ICD-10-CM

## 2021-01-01 DIAGNOSIS — I49.5 SSS (SICK SINUS SYNDROME) (HCC): ICD-10-CM

## 2021-01-01 LAB
ADENOVIRUS BY PCR: NOT DETECTED
ALBUMIN SERPL-MCNC: 2.2 G/DL (ref 3.5–5.2)
ALBUMIN SERPL-MCNC: 2.4 G/DL (ref 3.5–5.2)
ALBUMIN SERPL-MCNC: 2.5 G/DL (ref 3.5–5.2)
ALBUMIN SERPL-MCNC: 2.6 G/DL (ref 3.5–5.2)
ALBUMIN SERPL-MCNC: 2.6 G/DL (ref 3.5–5.2)
ALBUMIN SERPL-MCNC: 2.7 G/DL (ref 3.5–5.2)
ALBUMIN SERPL-MCNC: 2.8 G/DL (ref 3.5–5.2)
ALBUMIN SERPL-MCNC: 2.8 G/DL (ref 3.5–5.2)
ALBUMIN SERPL-MCNC: 3.3 G/DL (ref 3.5–5.2)
ALBUMIN SERPL-MCNC: 3.4 G/DL (ref 3.5–5.2)
ALP BLD-CCNC: 47 U/L (ref 35–104)
ALP BLD-CCNC: 48 U/L (ref 35–104)
ALP BLD-CCNC: 49 U/L (ref 35–104)
ALP BLD-CCNC: 49 U/L (ref 35–104)
ALP BLD-CCNC: 50 U/L (ref 35–104)
ALP BLD-CCNC: 52 U/L (ref 35–104)
ALP BLD-CCNC: 52 U/L (ref 35–104)
ALP BLD-CCNC: 53 U/L (ref 35–104)
ALP BLD-CCNC: 54 U/L (ref 35–104)
ALP BLD-CCNC: 54 U/L (ref 35–104)
ALP BLD-CCNC: 55 U/L (ref 35–104)
ALP BLD-CCNC: 56 U/L (ref 35–104)
ALP BLD-CCNC: 56 U/L (ref 35–104)
ALP BLD-CCNC: 59 U/L (ref 35–104)
ALP BLD-CCNC: 74 U/L (ref 35–104)
ALP BLD-CCNC: 80 U/L (ref 35–104)
ALT SERPL-CCNC: 13 U/L (ref 0–32)
ALT SERPL-CCNC: 14 U/L (ref 0–32)
ALT SERPL-CCNC: 15 U/L (ref 0–32)
ALT SERPL-CCNC: 15 U/L (ref 0–32)
ALT SERPL-CCNC: 16 U/L (ref 0–32)
ALT SERPL-CCNC: 17 U/L (ref 0–32)
ALT SERPL-CCNC: 17 U/L (ref 0–32)
ALT SERPL-CCNC: 18 U/L (ref 0–32)
ALT SERPL-CCNC: 18 U/L (ref 0–32)
ALT SERPL-CCNC: 19 U/L (ref 0–32)
ALT SERPL-CCNC: 20 U/L (ref 0–32)
ALT SERPL-CCNC: 22 U/L (ref 0–32)
ALT SERPL-CCNC: 23 U/L (ref 0–32)
ALT SERPL-CCNC: 25 U/L (ref 0–32)
AMORPHOUS: ABNORMAL
AMORPHOUS: PRESENT
AMYLASE FLUID: 42 U/L
ANION GAP SERPL CALCULATED.3IONS-SCNC: 10 MMOL/L (ref 7–16)
ANION GAP SERPL CALCULATED.3IONS-SCNC: 11 MMOL/L (ref 7–16)
ANION GAP SERPL CALCULATED.3IONS-SCNC: 11 MMOL/L (ref 7–16)
ANION GAP SERPL CALCULATED.3IONS-SCNC: 12 MMOL/L (ref 7–16)
ANION GAP SERPL CALCULATED.3IONS-SCNC: 14 MMOL/L (ref 7–16)
ANION GAP SERPL CALCULATED.3IONS-SCNC: 14 MMOL/L (ref 7–16)
ANION GAP SERPL CALCULATED.3IONS-SCNC: 15 MMOL/L (ref 7–16)
ANION GAP SERPL CALCULATED.3IONS-SCNC: 4 MMOL/L (ref 7–16)
ANION GAP SERPL CALCULATED.3IONS-SCNC: 4 MMOL/L (ref 7–16)
ANION GAP SERPL CALCULATED.3IONS-SCNC: 5 MMOL/L (ref 7–16)
ANION GAP SERPL CALCULATED.3IONS-SCNC: 5 MMOL/L (ref 7–16)
ANION GAP SERPL CALCULATED.3IONS-SCNC: 6 MMOL/L (ref 7–16)
ANION GAP SERPL CALCULATED.3IONS-SCNC: 7 MMOL/L (ref 7–16)
ANION GAP SERPL CALCULATED.3IONS-SCNC: 8 MMOL/L (ref 7–16)
ANION GAP SERPL CALCULATED.3IONS-SCNC: 9 MMOL/L (ref 7–16)
ANISOCYTOSIS: ABNORMAL
ANTI-NUCLEAR ANTIBODY (ANA): NEGATIVE
APPEARANCE FLUID: NORMAL
APTT: 23.6 SEC (ref 24.5–35.1)
AST SERPL-CCNC: 22 U/L (ref 0–31)
AST SERPL-CCNC: 30 U/L (ref 0–31)
AST SERPL-CCNC: 31 U/L (ref 0–31)
AST SERPL-CCNC: 31 U/L (ref 0–31)
AST SERPL-CCNC: 34 U/L (ref 0–31)
AST SERPL-CCNC: 36 U/L (ref 0–31)
AST SERPL-CCNC: 38 U/L (ref 0–31)
AST SERPL-CCNC: 39 U/L (ref 0–31)
AST SERPL-CCNC: 40 U/L (ref 0–31)
AST SERPL-CCNC: 42 U/L (ref 0–31)
AST SERPL-CCNC: 42 U/L (ref 0–31)
AST SERPL-CCNC: 43 U/L (ref 0–31)
AST SERPL-CCNC: 45 U/L (ref 0–31)
AST SERPL-CCNC: 46 U/L (ref 0–31)
AST SERPL-CCNC: 46 U/L (ref 0–31)
AST SERPL-CCNC: 47 U/L (ref 0–31)
ATYPICAL LYMPHOCYTE RELATIVE PERCENT: 0.9 % (ref 0–4)
ATYPICAL LYMPHOCYTE RELATIVE PERCENT: 1.7 % (ref 0–4)
B.E.: -2.9 MMOL/L (ref -3–3)
B.E.: 1.2 MMOL/L (ref -3–3)
B.E.: 5 MMOL/L (ref -3–3)
BACTERIA: ABNORMAL /HPF
BASOPHILS ABSOLUTE: 0 E9/L (ref 0–0.2)
BASOPHILS ABSOLUTE: 0.01 E9/L (ref 0–0.2)
BASOPHILS ABSOLUTE: 0.02 E9/L (ref 0–0.2)
BASOPHILS ABSOLUTE: 0.03 E9/L (ref 0–0.2)
BASOPHILS ABSOLUTE: 0.03 E9/L (ref 0–0.2)
BASOPHILS ABSOLUTE: 0.04 E9/L (ref 0–0.2)
BASOPHILS RELATIVE PERCENT: 0 % (ref 0–2)
BASOPHILS RELATIVE PERCENT: 0.1 % (ref 0–2)
BASOPHILS RELATIVE PERCENT: 0.1 % (ref 0–2)
BASOPHILS RELATIVE PERCENT: 0.2 % (ref 0–2)
BASOPHILS RELATIVE PERCENT: 0.3 % (ref 0–2)
BASOPHILS RELATIVE PERCENT: 0.4 % (ref 0–2)
BETA-HYDROXYBUTYRATE: 0.14 MMOL/L (ref 0.02–0.27)
BILIRUB SERPL-MCNC: 0.3 MG/DL (ref 0–1.2)
BILIRUB SERPL-MCNC: 0.4 MG/DL (ref 0–1.2)
BILIRUB SERPL-MCNC: 0.5 MG/DL (ref 0–1.2)
BILIRUB SERPL-MCNC: 0.6 MG/DL (ref 0–1.2)
BILIRUB SERPL-MCNC: 0.8 MG/DL (ref 0–1.2)
BILIRUBIN URINE: NEGATIVE
BLOOD CULTURE, ROUTINE: NORMAL
BLOOD CULTURE, ROUTINE: NORMAL
BLOOD, URINE: ABNORMAL
BLOOD, URINE: ABNORMAL
BLOOD, URINE: NEGATIVE
BODY FLUID CULTURE, STERILE: NORMAL
BORDETELLA PARAPERTUSSIS BY PCR: NOT DETECTED
BORDETELLA PERTUSSIS BY PCR: NOT DETECTED
BUN BLDV-MCNC: 20 MG/DL (ref 8–23)
BUN BLDV-MCNC: 21 MG/DL (ref 8–23)
BUN BLDV-MCNC: 22 MG/DL (ref 8–23)
BUN BLDV-MCNC: 23 MG/DL (ref 8–23)
BUN BLDV-MCNC: 24 MG/DL (ref 8–23)
BUN BLDV-MCNC: 25 MG/DL (ref 8–23)
BUN BLDV-MCNC: 26 MG/DL (ref 8–23)
BUN BLDV-MCNC: 27 MG/DL (ref 8–23)
BUN BLDV-MCNC: 30 MG/DL (ref 8–23)
BUN BLDV-MCNC: 32 MG/DL (ref 8–23)
BUN BLDV-MCNC: 32 MG/DL (ref 8–23)
BUN BLDV-MCNC: 34 MG/DL (ref 8–23)
BUN BLDV-MCNC: 35 MG/DL (ref 8–23)
BUN BLDV-MCNC: 36 MG/DL (ref 8–23)
BUN BLDV-MCNC: 37 MG/DL (ref 8–23)
BUN BLDV-MCNC: 39 MG/DL (ref 8–23)
BUN BLDV-MCNC: 43 MG/DL (ref 8–23)
BUN BLDV-MCNC: 44 MG/DL (ref 8–23)
BUN BLDV-MCNC: 54 MG/DL (ref 8–23)
BUN BLDV-MCNC: 58 MG/DL (ref 8–23)
BUN BLDV-MCNC: 64 MG/DL (ref 8–23)
BUN BLDV-MCNC: 68 MG/DL (ref 8–23)
BUN BLDV-MCNC: 76 MG/DL (ref 8–23)
BUN BLDV-MCNC: 84 MG/DL (ref 8–23)
BUN BLDV-MCNC: 89 MG/DL (ref 8–23)
BURR CELLS: ABNORMAL
C-REACTIVE PROTEIN: 6 MG/DL (ref 0–0.4)
CALCIUM IONIZED: 1.17 MMOL/L (ref 1.15–1.33)
CALCIUM IONIZED: 1.21 MMOL/L (ref 1.15–1.33)
CALCIUM IONIZED: 1.21 MMOL/L (ref 1.15–1.33)
CALCIUM SERPL-MCNC: 8 MG/DL (ref 8.6–10.2)
CALCIUM SERPL-MCNC: 8.1 MG/DL (ref 8.6–10.2)
CALCIUM SERPL-MCNC: 8.2 MG/DL (ref 8.6–10.2)
CALCIUM SERPL-MCNC: 8.2 MG/DL (ref 8.6–10.2)
CALCIUM SERPL-MCNC: 8.3 MG/DL (ref 8.6–10.2)
CALCIUM SERPL-MCNC: 8.4 MG/DL (ref 8.6–10.2)
CALCIUM SERPL-MCNC: 8.5 MG/DL (ref 8.6–10.2)
CALCIUM SERPL-MCNC: 8.6 MG/DL (ref 8.6–10.2)
CALCIUM SERPL-MCNC: 8.7 MG/DL (ref 8.6–10.2)
CALCIUM SERPL-MCNC: 8.8 MG/DL (ref 8.6–10.2)
CALCIUM SERPL-MCNC: 8.9 MG/DL (ref 8.6–10.2)
CALCIUM SERPL-MCNC: 8.9 MG/DL (ref 8.6–10.2)
CEA: 2.2 NG/ML (ref 0–5.2)
CELL COUNT FLUID TYPE: NORMAL
CHLAMYDOPHILIA PNEUMONIAE BY PCR: NOT DETECTED
CHLORIDE BLD-SCNC: 100 MMOL/L (ref 98–107)
CHLORIDE BLD-SCNC: 101 MMOL/L (ref 98–107)
CHLORIDE BLD-SCNC: 102 MMOL/L (ref 98–107)
CHLORIDE BLD-SCNC: 103 MMOL/L (ref 98–107)
CHLORIDE BLD-SCNC: 105 MMOL/L (ref 98–107)
CHLORIDE BLD-SCNC: 105 MMOL/L (ref 98–107)
CHLORIDE BLD-SCNC: 106 MMOL/L (ref 98–107)
CHLORIDE BLD-SCNC: 107 MMOL/L (ref 98–107)
CHLORIDE BLD-SCNC: 107 MMOL/L (ref 98–107)
CHLORIDE BLD-SCNC: 109 MMOL/L (ref 98–107)
CHLORIDE BLD-SCNC: 99 MMOL/L (ref 98–107)
CHLORIDE URINE RANDOM: 49 MMOL/L
CHOLESTEROL, TOTAL: 106 MG/DL (ref 0–199)
CHOLESTEROL, TOTAL: 134 MG/DL (ref 0–199)
CK MB: 6 NG/ML (ref 0–4.3)
CLARITY: ABNORMAL
CLARITY: CLEAR
CLARITY: CLEAR
CO2: 19 MMOL/L (ref 22–29)
CO2: 20 MMOL/L (ref 22–29)
CO2: 22 MMOL/L (ref 22–29)
CO2: 23 MMOL/L (ref 22–29)
CO2: 24 MMOL/L (ref 22–29)
CO2: 24 MMOL/L (ref 22–29)
CO2: 25 MMOL/L (ref 22–29)
CO2: 26 MMOL/L (ref 22–29)
CO2: 27 MMOL/L (ref 22–29)
CO2: 28 MMOL/L (ref 22–29)
CO2: 29 MMOL/L (ref 22–29)
CO2: 30 MMOL/L (ref 22–29)
CO2: 30 MMOL/L (ref 22–29)
CO2: 33 MMOL/L (ref 22–29)
COARSE CASTS, UA: ABNORMAL /LPF (ref 0–2)
COHB: 0.8 % (ref 0–1.5)
COHB: 0.8 % (ref 0–1.5)
COHB: 1.2 % (ref 0–1.5)
COLOR FLUID: NORMAL
COLOR: YELLOW
CORONAVIRUS 229E BY PCR: NOT DETECTED
CORONAVIRUS HKU1 BY PCR: NOT DETECTED
CORONAVIRUS NL63 BY PCR: NOT DETECTED
CORONAVIRUS OC43 BY PCR: NOT DETECTED
CREAT SERPL-MCNC: 1 MG/DL (ref 0.5–1)
CREAT SERPL-MCNC: 1.1 MG/DL (ref 0.5–1)
CREAT SERPL-MCNC: 1.2 MG/DL (ref 0.5–1)
CREAT SERPL-MCNC: 1.3 MG/DL (ref 0.5–1)
CREAT SERPL-MCNC: 1.4 MG/DL (ref 0.5–1)
CREAT SERPL-MCNC: 1.5 MG/DL (ref 0.5–1)
CREAT SERPL-MCNC: 1.5 MG/DL (ref 0.5–1)
CREAT SERPL-MCNC: 1.9 MG/DL (ref 0.5–1)
CREAT SERPL-MCNC: 2.1 MG/DL (ref 0.5–1)
CREAT SERPL-MCNC: 2.3 MG/DL (ref 0.5–1)
CREAT SERPL-MCNC: 2.6 MG/DL (ref 0.5–1)
CREAT SERPL-MCNC: 2.6 MG/DL (ref 0.5–1)
CREAT SERPL-MCNC: 3 MG/DL (ref 0.5–1)
CREAT SERPL-MCNC: 3.4 MG/DL (ref 0.5–1)
CREAT SERPL-MCNC: 3.5 MG/DL (ref 0.5–1)
CREAT SERPL-MCNC: 3.6 MG/DL (ref 0.5–1)
CREAT SERPL-MCNC: 3.7 MG/DL (ref 0.5–1)
CREAT SERPL-MCNC: 4.1 MG/DL (ref 0.5–1)
CREAT SERPL-MCNC: 4.6 MG/DL (ref 0.5–1)
CREAT SERPL-MCNC: 4.6 MG/DL (ref 0.5–1)
CREAT SERPL-MCNC: 5.7 MG/DL (ref 0.5–1)
CREATININE URINE: 118 MG/DL (ref 29–226)
CREATININE URINE: 119 MG/DL (ref 29–226)
CRITICAL: ABNORMAL
CULTURE, BLOOD 2: ABNORMAL
CULTURE, BLOOD 2: NORMAL
D DIMER: 701 NG/ML DDU
D DIMER: 786 NG/ML DDU
D DIMER: 808 NG/ML DDU
DATE ANALYZED: ABNORMAL
DATE OF COLLECTION: ABNORMAL
EKG ATRIAL RATE: 258 BPM
EKG ATRIAL RATE: 62 BPM
EKG ATRIAL RATE: 68 BPM
EKG ATRIAL RATE: 77 BPM
EKG ATRIAL RATE: 77 BPM
EKG ATRIAL RATE: 95 BPM
EKG P AXIS: 39 DEGREES
EKG P AXIS: 48 DEGREES
EKG P AXIS: 56 DEGREES
EKG P AXIS: 62 DEGREES
EKG P AXIS: 67 DEGREES
EKG P-R INTERVAL: 136 MS
EKG P-R INTERVAL: 138 MS
EKG P-R INTERVAL: 142 MS
EKG P-R INTERVAL: 148 MS
EKG P-R INTERVAL: 152 MS
EKG Q-T INTERVAL: 360 MS
EKG Q-T INTERVAL: 374 MS
EKG Q-T INTERVAL: 392 MS
EKG Q-T INTERVAL: 402 MS
EKG Q-T INTERVAL: 418 MS
EKG Q-T INTERVAL: 422 MS
EKG QRS DURATION: 100 MS
EKG QRS DURATION: 102 MS
EKG QRS DURATION: 104 MS
EKG QRS DURATION: 96 MS
EKG QTC CALCULATION (BAZETT): 428 MS
EKG QTC CALCULATION (BAZETT): 443 MS
EKG QTC CALCULATION (BAZETT): 444 MS
EKG QTC CALCULATION (BAZETT): 454 MS
EKG QTC CALCULATION (BAZETT): 464 MS
EKG QTC CALCULATION (BAZETT): 469 MS
EKG R AXIS: -149 DEGREES
EKG R AXIS: -19 DEGREES
EKG R AXIS: -23 DEGREES
EKG R AXIS: -24 DEGREES
EKG R AXIS: -33 DEGREES
EKG R AXIS: 98 DEGREES
EKG T AXIS: 126 DEGREES
EKG T AXIS: 37 DEGREES
EKG T AXIS: 4 DEGREES
EKG T AXIS: 44 DEGREES
EKG T AXIS: 72 DEGREES
EKG T AXIS: 85 DEGREES
EKG VENTRICULAR RATE: 100 BPM
EKG VENTRICULAR RATE: 62 BPM
EKG VENTRICULAR RATE: 68 BPM
EKG VENTRICULAR RATE: 77 BPM
EKG VENTRICULAR RATE: 77 BPM
EKG VENTRICULAR RATE: 95 BPM
EOSINOPHILS ABSOLUTE: 0 E9/L (ref 0.05–0.5)
EOSINOPHILS ABSOLUTE: 0.04 E9/L (ref 0.05–0.5)
EOSINOPHILS ABSOLUTE: 0.06 E9/L (ref 0.05–0.5)
EOSINOPHILS ABSOLUTE: 0.08 E9/L (ref 0.05–0.5)
EOSINOPHILS ABSOLUTE: 0.09 E9/L (ref 0.05–0.5)
EOSINOPHILS ABSOLUTE: 0.1 E9/L (ref 0.05–0.5)
EOSINOPHILS ABSOLUTE: 0.14 E9/L (ref 0.05–0.5)
EOSINOPHILS ABSOLUTE: 0.15 E9/L (ref 0.05–0.5)
EOSINOPHILS ABSOLUTE: 0.15 E9/L (ref 0.05–0.5)
EOSINOPHILS ABSOLUTE: 0.16 E9/L (ref 0.05–0.5)
EOSINOPHILS ABSOLUTE: 0.2 E9/L (ref 0.05–0.5)
EOSINOPHILS ABSOLUTE: 0.21 E9/L (ref 0.05–0.5)
EOSINOPHILS ABSOLUTE: 0.26 E9/L (ref 0.05–0.5)
EOSINOPHILS ABSOLUTE: 0.28 E9/L (ref 0.05–0.5)
EOSINOPHILS ABSOLUTE: 0.3 E9/L (ref 0.05–0.5)
EOSINOPHILS ABSOLUTE: 0.31 E9/L (ref 0.05–0.5)
EOSINOPHILS RELATIVE PERCENT: 0 % (ref 0–6)
EOSINOPHILS RELATIVE PERCENT: 0.2 % (ref 0–6)
EOSINOPHILS RELATIVE PERCENT: 0.9 % (ref 0–6)
EOSINOPHILS RELATIVE PERCENT: 1.3 % (ref 0–6)
EOSINOPHILS RELATIVE PERCENT: 1.6 % (ref 0–6)
EOSINOPHILS RELATIVE PERCENT: 1.6 % (ref 0–6)
EOSINOPHILS RELATIVE PERCENT: 1.7 % (ref 0–6)
EOSINOPHILS RELATIVE PERCENT: 1.8 % (ref 0–6)
EOSINOPHILS RELATIVE PERCENT: 2.2 % (ref 0–6)
EOSINOPHILS RELATIVE PERCENT: 2.4 % (ref 0–6)
EOSINOPHILS RELATIVE PERCENT: 2.5 % (ref 0–6)
EOSINOPHILS RELATIVE PERCENT: 2.6 % (ref 0–6)
EOSINOPHILS RELATIVE PERCENT: 2.8 % (ref 0–6)
EOSINOPHILS RELATIVE PERCENT: 3 % (ref 0–6)
EOSINOPHILS RELATIVE PERCENT: 4 % (ref 0–6)
EOSINOPHILS RELATIVE PERCENT: 4 % (ref 0–6)
EOSINOPHILS RELATIVE PERCENT: 5.2 % (ref 0–6)
FERRITIN: 295 NG/ML
FLUID TYPE: NORMAL
GFR AFRICAN AMERICAN: 11
GFR AFRICAN AMERICAN: 11
GFR AFRICAN AMERICAN: 13
GFR AFRICAN AMERICAN: 14
GFR AFRICAN AMERICAN: 15
GFR AFRICAN AMERICAN: 16
GFR AFRICAN AMERICAN: 18
GFR AFRICAN AMERICAN: 21
GFR AFRICAN AMERICAN: 21
GFR AFRICAN AMERICAN: 24
GFR AFRICAN AMERICAN: 27
GFR AFRICAN AMERICAN: 30
GFR AFRICAN AMERICAN: 40
GFR AFRICAN AMERICAN: 40
GFR AFRICAN AMERICAN: 43
GFR AFRICAN AMERICAN: 47
GFR AFRICAN AMERICAN: 52
GFR AFRICAN AMERICAN: 57
GFR AFRICAN AMERICAN: 9
GFR AFRICAN AMERICAN: >60
GFR NON-AFRICAN AMERICAN: 11 ML/MIN/1.73
GFR NON-AFRICAN AMERICAN: 11 ML/MIN/1.73
GFR NON-AFRICAN AMERICAN: 13 ML/MIN/1.73
GFR NON-AFRICAN AMERICAN: 14 ML/MIN/1.73
GFR NON-AFRICAN AMERICAN: 15 ML/MIN/1.73
GFR NON-AFRICAN AMERICAN: 16 ML/MIN/1.73
GFR NON-AFRICAN AMERICAN: 18 ML/MIN/1.73
GFR NON-AFRICAN AMERICAN: 21 ML/MIN/1.73
GFR NON-AFRICAN AMERICAN: 21 ML/MIN/1.73
GFR NON-AFRICAN AMERICAN: 24 ML/MIN/1.73
GFR NON-AFRICAN AMERICAN: 27 ML/MIN/1.73
GFR NON-AFRICAN AMERICAN: 30 ML/MIN/1.73
GFR NON-AFRICAN AMERICAN: 40 ML/MIN/1.73
GFR NON-AFRICAN AMERICAN: 40 ML/MIN/1.73
GFR NON-AFRICAN AMERICAN: 43 ML/MIN/1.73
GFR NON-AFRICAN AMERICAN: 47 ML/MIN/1.73
GFR NON-AFRICAN AMERICAN: 52 ML/MIN/1.73
GFR NON-AFRICAN AMERICAN: 57 ML/MIN/1.73
GFR NON-AFRICAN AMERICAN: 9 ML/MIN/1.73
GFR NON-AFRICAN AMERICAN: >60 ML/MIN/1.73
GLUCOSE BLD-MCNC: 101 MG/DL (ref 74–99)
GLUCOSE BLD-MCNC: 101 MG/DL (ref 74–99)
GLUCOSE BLD-MCNC: 103 MG/DL (ref 74–99)
GLUCOSE BLD-MCNC: 104 MG/DL (ref 74–99)
GLUCOSE BLD-MCNC: 106 MG/DL (ref 74–99)
GLUCOSE BLD-MCNC: 107 MG/DL (ref 74–99)
GLUCOSE BLD-MCNC: 109 MG/DL (ref 74–99)
GLUCOSE BLD-MCNC: 112 MG/DL (ref 74–99)
GLUCOSE BLD-MCNC: 114 MG/DL (ref 74–99)
GLUCOSE BLD-MCNC: 119 MG/DL (ref 74–99)
GLUCOSE BLD-MCNC: 119 MG/DL (ref 74–99)
GLUCOSE BLD-MCNC: 124 MG/DL (ref 74–99)
GLUCOSE BLD-MCNC: 141 MG/DL (ref 74–99)
GLUCOSE BLD-MCNC: 176 MG/DL (ref 74–99)
GLUCOSE BLD-MCNC: 177 MG/DL (ref 74–99)
GLUCOSE BLD-MCNC: 193 MG/DL (ref 74–99)
GLUCOSE BLD-MCNC: 199 MG/DL (ref 74–99)
GLUCOSE BLD-MCNC: 279 MG/DL (ref 74–99)
GLUCOSE BLD-MCNC: 373 MG/DL (ref 74–99)
GLUCOSE BLD-MCNC: 68 MG/DL (ref 74–99)
GLUCOSE BLD-MCNC: 72 MG/DL (ref 74–99)
GLUCOSE BLD-MCNC: 74 MG/DL (ref 74–99)
GLUCOSE BLD-MCNC: 79 MG/DL (ref 74–99)
GLUCOSE BLD-MCNC: 79 MG/DL (ref 74–99)
GLUCOSE BLD-MCNC: 81 MG/DL (ref 74–99)
GLUCOSE BLD-MCNC: 83 MG/DL (ref 74–99)
GLUCOSE BLD-MCNC: 84 MG/DL (ref 74–99)
GLUCOSE BLD-MCNC: 85 MG/DL (ref 74–99)
GLUCOSE BLD-MCNC: 87 MG/DL (ref 74–99)
GLUCOSE BLD-MCNC: 94 MG/DL (ref 74–99)
GLUCOSE BLD-MCNC: 95 MG/DL (ref 74–99)
GLUCOSE BLD-MCNC: 96 MG/DL (ref 74–99)
GLUCOSE URINE: NEGATIVE MG/DL
GLUCOSE, FLUID: 187 MG/DL
GRAM STAIN RESULT: NORMAL
HBA1C MFR BLD: 6.1 % (ref 4–5.6)
HBA1C MFR BLD: 6.7 % (ref 4–5.6)
HBV SURFACE AB TITR SER: NORMAL {TITER}
HCO3: 21.4 MMOL/L (ref 22–26)
HCO3: 25.3 MMOL/L (ref 22–26)
HCO3: 29.8 MMOL/L (ref 22–26)
HCT VFR BLD CALC: 29.3 % (ref 34–48)
HCT VFR BLD CALC: 30.3 % (ref 34–48)
HCT VFR BLD CALC: 30.5 % (ref 34–48)
HCT VFR BLD CALC: 30.7 % (ref 34–48)
HCT VFR BLD CALC: 30.9 % (ref 34–48)
HCT VFR BLD CALC: 31.3 % (ref 34–48)
HCT VFR BLD CALC: 31.5 % (ref 34–48)
HCT VFR BLD CALC: 31.7 % (ref 34–48)
HCT VFR BLD CALC: 32.1 % (ref 34–48)
HCT VFR BLD CALC: 32.2 % (ref 34–48)
HCT VFR BLD CALC: 32.3 % (ref 34–48)
HCT VFR BLD CALC: 32.9 % (ref 34–48)
HCT VFR BLD CALC: 32.9 % (ref 34–48)
HCT VFR BLD CALC: 33.2 % (ref 34–48)
HCT VFR BLD CALC: 33.3 % (ref 34–48)
HCT VFR BLD CALC: 33.5 % (ref 34–48)
HCT VFR BLD CALC: 33.5 % (ref 34–48)
HCT VFR BLD CALC: 33.9 % (ref 34–48)
HCT VFR BLD CALC: 34.8 % (ref 34–48)
HCT VFR BLD CALC: 35.4 % (ref 34–48)
HCT VFR BLD CALC: 37.4 % (ref 34–48)
HCT VFR BLD CALC: 37.7 % (ref 34–48)
HCT VFR BLD CALC: 37.8 % (ref 34–48)
HDLC SERPL-MCNC: 41 MG/DL
HDLC SERPL-MCNC: 65 MG/DL
HEMOGLOBIN: 10 G/DL (ref 11.5–15.5)
HEMOGLOBIN: 10.2 G/DL (ref 11.5–15.5)
HEMOGLOBIN: 10.2 G/DL (ref 11.5–15.5)
HEMOGLOBIN: 10.4 G/DL (ref 11.5–15.5)
HEMOGLOBIN: 10.5 G/DL (ref 11.5–15.5)
HEMOGLOBIN: 10.6 G/DL (ref 11.5–15.5)
HEMOGLOBIN: 11.1 G/DL (ref 11.5–15.5)
HEMOGLOBIN: 11.5 G/DL (ref 11.5–15.5)
HEMOGLOBIN: 11.5 G/DL (ref 11.5–15.5)
HEMOGLOBIN: 8.6 G/DL (ref 11.5–15.5)
HEMOGLOBIN: 8.8 G/DL (ref 11.5–15.5)
HEMOGLOBIN: 9 G/DL (ref 11.5–15.5)
HEMOGLOBIN: 9.1 G/DL (ref 11.5–15.5)
HEMOGLOBIN: 9.2 G/DL (ref 11.5–15.5)
HEMOGLOBIN: 9.3 G/DL (ref 11.5–15.5)
HEMOGLOBIN: 9.6 G/DL (ref 11.5–15.5)
HEMOGLOBIN: 9.7 G/DL (ref 11.5–15.5)
HEMOGLOBIN: 9.8 G/DL (ref 11.5–15.5)
HEMOGLOBIN: 9.8 G/DL (ref 11.5–15.5)
HEPATITIS B SURFACE ANTIGEN INTERPRETATION: NORMAL
HEPATITIS C ANTIBODY INTERPRETATION: NORMAL
HHB: 11.4 % (ref 0–5)
HHB: 6 % (ref 0–5)
HHB: 9.7 % (ref 0–5)
HUMAN METAPNEUMOVIRUS BY PCR: NOT DETECTED
HUMAN RHINOVIRUS/ENTEROVIRUS BY PCR: NOT DETECTED
HYPOCHROMIA: ABNORMAL
IMMATURE GRANULOCYTES #: 0.02 E9/L
IMMATURE GRANULOCYTES #: 0.02 E9/L
IMMATURE GRANULOCYTES #: 0.03 E9/L
IMMATURE GRANULOCYTES #: 0.06 E9/L
IMMATURE GRANULOCYTES #: 0.07 E9/L
IMMATURE GRANULOCYTES #: 0.07 E9/L
IMMATURE GRANULOCYTES #: 0.09 E9/L
IMMATURE GRANULOCYTES #: 0.09 E9/L
IMMATURE GRANULOCYTES #: 0.13 E9/L
IMMATURE GRANULOCYTES #: 0.13 E9/L
IMMATURE GRANULOCYTES #: 0.14 E9/L
IMMATURE GRANULOCYTES #: 0.17 E9/L
IMMATURE GRANULOCYTES #: 0.2 E9/L
IMMATURE GRANULOCYTES %: 0.4 % (ref 0–5)
IMMATURE GRANULOCYTES %: 0.5 % (ref 0–5)
IMMATURE GRANULOCYTES %: 0.8 % (ref 0–5)
IMMATURE GRANULOCYTES %: 0.9 % (ref 0–5)
IMMATURE GRANULOCYTES %: 1 % (ref 0–5)
IMMATURE GRANULOCYTES %: 1 % (ref 0–5)
IMMATURE GRANULOCYTES %: 1.1 % (ref 0–5)
IMMATURE GRANULOCYTES %: 1.2 % (ref 0–5)
IMMATURE GRANULOCYTES %: 1.3 % (ref 0–5)
IMMATURE GRANULOCYTES %: 1.7 % (ref 0–5)
IMMATURE GRANULOCYTES %: 1.7 % (ref 0–5)
IMMATURE GRANULOCYTES %: 1.9 % (ref 0–5)
IMMATURE GRANULOCYTES %: 2.3 % (ref 0–5)
INFLUENZA A BY PCR: NOT DETECTED
INFLUENZA B BY PCR: NOT DETECTED
INR BLD: 1.1
IRON SATURATION: 19 % (ref 15–50)
IRON: 42 MCG/DL (ref 37–145)
KETONES, URINE: ABNORMAL MG/DL
KETONES, URINE: NEGATIVE MG/DL
KETONES, URINE: NEGATIVE MG/DL
L. PNEUMOPHILA SEROGP 1 UR AG: NORMAL
L. PNEUMOPHILA SEROGP 1 UR AG: NORMAL
LAB: ABNORMAL
LACTATE DEHYDROGENASE: 406 U/L (ref 135–214)
LACTIC ACID, SEPSIS: 0.9 MMOL/L (ref 0.5–1.9)
LACTIC ACID, SEPSIS: 0.9 MMOL/L (ref 0.5–1.9)
LACTIC ACID: 1 MMOL/L (ref 0.5–2.2)
LACTIC ACID: 1.1 MMOL/L (ref 0.5–2.2)
LACTIC ACID: 1.3 MMOL/L (ref 0.5–2.2)
LACTIC ACID: 6.2 MMOL/L (ref 0.5–2.2)
LD, FLUID: 203 U/L
LDL CHOLESTEROL CALCULATED: 50 MG/DL (ref 0–99)
LDL CHOLESTEROL CALCULATED: 52 MG/DL (ref 0–99)
LEUKOCYTE ESTERASE, URINE: ABNORMAL
LEUKOCYTE ESTERASE, URINE: NEGATIVE
LEUKOCYTE ESTERASE, URINE: NEGATIVE
LYMPHOCYTES ABSOLUTE: 0.08 E9/L (ref 1.5–4)
LYMPHOCYTES ABSOLUTE: 0.21 E9/L (ref 1.5–4)
LYMPHOCYTES ABSOLUTE: 0.22 E9/L (ref 1.5–4)
LYMPHOCYTES ABSOLUTE: 0.23 E9/L (ref 1.5–4)
LYMPHOCYTES ABSOLUTE: 0.31 E9/L (ref 1.5–4)
LYMPHOCYTES ABSOLUTE: 0.35 E9/L (ref 1.5–4)
LYMPHOCYTES ABSOLUTE: 0.36 E9/L (ref 1.5–4)
LYMPHOCYTES ABSOLUTE: 0.45 E9/L (ref 1.5–4)
LYMPHOCYTES ABSOLUTE: 0.45 E9/L (ref 1.5–4)
LYMPHOCYTES ABSOLUTE: 0.48 E9/L (ref 1.5–4)
LYMPHOCYTES ABSOLUTE: 0.5 E9/L (ref 1.5–4)
LYMPHOCYTES ABSOLUTE: 0.52 E9/L (ref 1.5–4)
LYMPHOCYTES ABSOLUTE: 0.53 E9/L (ref 1.5–4)
LYMPHOCYTES ABSOLUTE: 0.56 E9/L (ref 1.5–4)
LYMPHOCYTES ABSOLUTE: 0.56 E9/L (ref 1.5–4)
LYMPHOCYTES ABSOLUTE: 0.61 E9/L (ref 1.5–4)
LYMPHOCYTES ABSOLUTE: 0.62 E9/L (ref 1.5–4)
LYMPHOCYTES ABSOLUTE: 0.66 E9/L (ref 1.5–4)
LYMPHOCYTES ABSOLUTE: 0.74 E9/L (ref 1.5–4)
LYMPHOCYTES ABSOLUTE: 0.75 E9/L (ref 1.5–4)
LYMPHOCYTES ABSOLUTE: 0.76 E9/L (ref 1.5–4)
LYMPHOCYTES ABSOLUTE: 1.34 E9/L (ref 1.5–4)
LYMPHOCYTES RELATIVE PERCENT: 0.9 % (ref 20–42)
LYMPHOCYTES RELATIVE PERCENT: 11.3 % (ref 20–42)
LYMPHOCYTES RELATIVE PERCENT: 14.3 % (ref 20–42)
LYMPHOCYTES RELATIVE PERCENT: 14.5 % (ref 20–42)
LYMPHOCYTES RELATIVE PERCENT: 16.1 % (ref 20–42)
LYMPHOCYTES RELATIVE PERCENT: 16.4 % (ref 20–42)
LYMPHOCYTES RELATIVE PERCENT: 2.6 % (ref 20–42)
LYMPHOCYTES RELATIVE PERCENT: 3 % (ref 20–42)
LYMPHOCYTES RELATIVE PERCENT: 3.5 % (ref 20–42)
LYMPHOCYTES RELATIVE PERCENT: 4.6 % (ref 20–42)
LYMPHOCYTES RELATIVE PERCENT: 4.9 % (ref 20–42)
LYMPHOCYTES RELATIVE PERCENT: 5 % (ref 20–42)
LYMPHOCYTES RELATIVE PERCENT: 5.2 % (ref 20–42)
LYMPHOCYTES RELATIVE PERCENT: 5.7 % (ref 20–42)
LYMPHOCYTES RELATIVE PERCENT: 6.2 % (ref 20–42)
LYMPHOCYTES RELATIVE PERCENT: 6.3 % (ref 20–42)
LYMPHOCYTES RELATIVE PERCENT: 6.5 % (ref 20–42)
LYMPHOCYTES RELATIVE PERCENT: 8.4 % (ref 20–42)
LYMPHOCYTES RELATIVE PERCENT: 8.4 % (ref 20–42)
LYMPHOCYTES RELATIVE PERCENT: 8.5 % (ref 20–42)
LYMPHOCYTES RELATIVE PERCENT: 8.8 % (ref 20–42)
LYMPHOCYTES RELATIVE PERCENT: 9.2 % (ref 20–42)
Lab: ABNORMAL
MAGNESIUM: 1.9 MG/DL (ref 1.6–2.6)
MAGNESIUM: 2 MG/DL (ref 1.6–2.6)
MAGNESIUM: 2.1 MG/DL (ref 1.6–2.6)
MAGNESIUM: 2.2 MG/DL (ref 1.6–2.6)
MAGNESIUM: 2.3 MG/DL (ref 1.6–2.6)
MAGNESIUM: 2.3 MG/DL (ref 1.6–2.6)
MAGNESIUM: 2.4 MG/DL (ref 1.6–2.6)
MAGNESIUM: 2.4 MG/DL (ref 1.6–2.6)
MAGNESIUM: 2.5 MG/DL (ref 1.6–2.6)
MAGNESIUM: 3 MG/DL (ref 1.6–2.6)
MCH RBC QN AUTO: 27 PG (ref 26–35)
MCH RBC QN AUTO: 27 PG (ref 26–35)
MCH RBC QN AUTO: 27.1 PG (ref 26–35)
MCH RBC QN AUTO: 27.2 PG (ref 26–35)
MCH RBC QN AUTO: 27.2 PG (ref 26–35)
MCH RBC QN AUTO: 27.3 PG (ref 26–35)
MCH RBC QN AUTO: 27.4 PG (ref 26–35)
MCH RBC QN AUTO: 27.4 PG (ref 26–35)
MCH RBC QN AUTO: 27.5 PG (ref 26–35)
MCH RBC QN AUTO: 27.6 PG (ref 26–35)
MCH RBC QN AUTO: 27.7 PG (ref 26–35)
MCH RBC QN AUTO: 27.8 PG (ref 26–35)
MCH RBC QN AUTO: 28 PG (ref 26–35)
MCH RBC QN AUTO: 28 PG (ref 26–35)
MCH RBC QN AUTO: 28.2 PG (ref 26–35)
MCHC RBC AUTO-ENTMCNC: 29 % (ref 32–34.5)
MCHC RBC AUTO-ENTMCNC: 29.1 % (ref 32–34.5)
MCHC RBC AUTO-ENTMCNC: 29.4 % (ref 32–34.5)
MCHC RBC AUTO-ENTMCNC: 29.4 % (ref 32–34.5)
MCHC RBC AUTO-ENTMCNC: 29.7 % (ref 32–34.5)
MCHC RBC AUTO-ENTMCNC: 29.7 % (ref 32–34.5)
MCHC RBC AUTO-ENTMCNC: 29.8 % (ref 32–34.5)
MCHC RBC AUTO-ENTMCNC: 29.9 % (ref 32–34.5)
MCHC RBC AUTO-ENTMCNC: 29.9 % (ref 32–34.5)
MCHC RBC AUTO-ENTMCNC: 30 % (ref 32–34.5)
MCHC RBC AUTO-ENTMCNC: 30.1 % (ref 32–34.5)
MCHC RBC AUTO-ENTMCNC: 30.3 % (ref 32–34.5)
MCHC RBC AUTO-ENTMCNC: 30.4 % (ref 32–34.5)
MCHC RBC AUTO-ENTMCNC: 30.5 % (ref 32–34.5)
MCHC RBC AUTO-ENTMCNC: 30.8 % (ref 32–34.5)
MCHC RBC AUTO-ENTMCNC: 31.3 % (ref 32–34.5)
MCV RBC AUTO: 89.7 FL (ref 80–99.9)
MCV RBC AUTO: 89.9 FL (ref 80–99.9)
MCV RBC AUTO: 90 FL (ref 80–99.9)
MCV RBC AUTO: 90.7 FL (ref 80–99.9)
MCV RBC AUTO: 90.7 FL (ref 80–99.9)
MCV RBC AUTO: 90.8 FL (ref 80–99.9)
MCV RBC AUTO: 91 FL (ref 80–99.9)
MCV RBC AUTO: 91.4 FL (ref 80–99.9)
MCV RBC AUTO: 91.6 FL (ref 80–99.9)
MCV RBC AUTO: 91.7 FL (ref 80–99.9)
MCV RBC AUTO: 92 FL (ref 80–99.9)
MCV RBC AUTO: 92.1 FL (ref 80–99.9)
MCV RBC AUTO: 92.1 FL (ref 80–99.9)
MCV RBC AUTO: 92.5 FL (ref 80–99.9)
MCV RBC AUTO: 92.9 FL (ref 80–99.9)
MCV RBC AUTO: 93 FL (ref 80–99.9)
MCV RBC AUTO: 93 FL (ref 80–99.9)
MCV RBC AUTO: 93.6 FL (ref 80–99.9)
METAMYELOCYTES RELATIVE PERCENT: 0.9 % (ref 0–1)
METAMYELOCYTES RELATIVE PERCENT: 1 % (ref 0–1)
METAMYELOCYTES RELATIVE PERCENT: 2 % (ref 0–1)
METER GLUCOSE: 101 MG/DL (ref 74–99)
METER GLUCOSE: 101 MG/DL (ref 74–99)
METER GLUCOSE: 102 MG/DL (ref 74–99)
METER GLUCOSE: 102 MG/DL (ref 74–99)
METER GLUCOSE: 104 MG/DL (ref 74–99)
METER GLUCOSE: 105 MG/DL (ref 74–99)
METER GLUCOSE: 105 MG/DL (ref 74–99)
METER GLUCOSE: 108 MG/DL (ref 74–99)
METER GLUCOSE: 112 MG/DL (ref 74–99)
METER GLUCOSE: 114 MG/DL (ref 74–99)
METER GLUCOSE: 114 MG/DL (ref 74–99)
METER GLUCOSE: 115 MG/DL (ref 74–99)
METER GLUCOSE: 115 MG/DL (ref 74–99)
METER GLUCOSE: 117 MG/DL (ref 74–99)
METER GLUCOSE: 118 MG/DL (ref 74–99)
METER GLUCOSE: 118 MG/DL (ref 74–99)
METER GLUCOSE: 119 MG/DL (ref 74–99)
METER GLUCOSE: 121 MG/DL (ref 74–99)
METER GLUCOSE: 125 MG/DL (ref 74–99)
METER GLUCOSE: 126 MG/DL (ref 74–99)
METER GLUCOSE: 128 MG/DL (ref 74–99)
METER GLUCOSE: 132 MG/DL (ref 74–99)
METER GLUCOSE: 133 MG/DL (ref 74–99)
METER GLUCOSE: 134 MG/DL (ref 74–99)
METER GLUCOSE: 136 MG/DL (ref 74–99)
METER GLUCOSE: 140 MG/DL (ref 74–99)
METER GLUCOSE: 140 MG/DL (ref 74–99)
METER GLUCOSE: 141 MG/DL (ref 74–99)
METER GLUCOSE: 142 MG/DL (ref 74–99)
METER GLUCOSE: 146 MG/DL (ref 74–99)
METER GLUCOSE: 146 MG/DL (ref 74–99)
METER GLUCOSE: 147 MG/DL (ref 74–99)
METER GLUCOSE: 147 MG/DL (ref 74–99)
METER GLUCOSE: 151 MG/DL (ref 74–99)
METER GLUCOSE: 151 MG/DL (ref 74–99)
METER GLUCOSE: 154 MG/DL (ref 74–99)
METER GLUCOSE: 155 MG/DL (ref 74–99)
METER GLUCOSE: 155 MG/DL (ref 74–99)
METER GLUCOSE: 158 MG/DL (ref 74–99)
METER GLUCOSE: 162 MG/DL (ref 74–99)
METER GLUCOSE: 171 MG/DL (ref 74–99)
METER GLUCOSE: 171 MG/DL (ref 74–99)
METER GLUCOSE: 173 MG/DL (ref 74–99)
METER GLUCOSE: 175 MG/DL (ref 74–99)
METER GLUCOSE: 176 MG/DL (ref 74–99)
METER GLUCOSE: 177 MG/DL (ref 74–99)
METER GLUCOSE: 178 MG/DL (ref 74–99)
METER GLUCOSE: 181 MG/DL (ref 74–99)
METER GLUCOSE: 182 MG/DL (ref 74–99)
METER GLUCOSE: 184 MG/DL (ref 74–99)
METER GLUCOSE: 185 MG/DL (ref 74–99)
METER GLUCOSE: 189 MG/DL (ref 74–99)
METER GLUCOSE: 190 MG/DL (ref 74–99)
METER GLUCOSE: 192 MG/DL (ref 74–99)
METER GLUCOSE: 192 MG/DL (ref 74–99)
METER GLUCOSE: 194 MG/DL (ref 74–99)
METER GLUCOSE: 196 MG/DL (ref 74–99)
METER GLUCOSE: 200 MG/DL (ref 74–99)
METER GLUCOSE: 207 MG/DL (ref 74–99)
METER GLUCOSE: 210 MG/DL (ref 74–99)
METER GLUCOSE: 211 MG/DL (ref 74–99)
METER GLUCOSE: 230 MG/DL (ref 74–99)
METER GLUCOSE: 230 MG/DL (ref 74–99)
METER GLUCOSE: 236 MG/DL (ref 74–99)
METER GLUCOSE: 274 MG/DL (ref 74–99)
METER GLUCOSE: 276 MG/DL (ref 74–99)
METER GLUCOSE: 293 MG/DL (ref 74–99)
METER GLUCOSE: 301 MG/DL (ref 74–99)
METER GLUCOSE: 51 MG/DL (ref 74–99)
METER GLUCOSE: 65 MG/DL (ref 74–99)
METER GLUCOSE: 70 MG/DL (ref 74–99)
METER GLUCOSE: 71 MG/DL (ref 74–99)
METER GLUCOSE: 74 MG/DL (ref 74–99)
METER GLUCOSE: 77 MG/DL (ref 74–99)
METER GLUCOSE: 77 MG/DL (ref 74–99)
METER GLUCOSE: 79 MG/DL (ref 74–99)
METER GLUCOSE: 80 MG/DL (ref 74–99)
METER GLUCOSE: 82 MG/DL (ref 74–99)
METER GLUCOSE: 82 MG/DL (ref 74–99)
METER GLUCOSE: 84 MG/DL (ref 74–99)
METER GLUCOSE: 84 MG/DL (ref 74–99)
METER GLUCOSE: 85 MG/DL (ref 74–99)
METER GLUCOSE: 86 MG/DL (ref 74–99)
METER GLUCOSE: 87 MG/DL (ref 74–99)
METER GLUCOSE: 88 MG/DL (ref 74–99)
METER GLUCOSE: 91 MG/DL (ref 74–99)
METER GLUCOSE: 92 MG/DL (ref 74–99)
METER GLUCOSE: 92 MG/DL (ref 74–99)
METER GLUCOSE: 93 MG/DL (ref 74–99)
METER GLUCOSE: 94 MG/DL (ref 74–99)
METER GLUCOSE: 95 MG/DL (ref 74–99)
METER GLUCOSE: 96 MG/DL (ref 74–99)
METER GLUCOSE: 99 MG/DL (ref 74–99)
METHB: 0.2 % (ref 0–1.5)
METHB: 0.4 % (ref 0–1.5)
METHB: 0.4 % (ref 0–1.5)
MODE: ABNORMAL
MONOCYTE, FLUID: 84 %
MONOCYTES ABSOLUTE: 0.26 E9/L (ref 0.1–0.95)
MONOCYTES ABSOLUTE: 0.37 E9/L (ref 0.1–0.95)
MONOCYTES ABSOLUTE: 0.37 E9/L (ref 0.1–0.95)
MONOCYTES ABSOLUTE: 0.5 E9/L (ref 0.1–0.95)
MONOCYTES ABSOLUTE: 0.55 E9/L (ref 0.1–0.95)
MONOCYTES ABSOLUTE: 0.57 E9/L (ref 0.1–0.95)
MONOCYTES ABSOLUTE: 0.62 E9/L (ref 0.1–0.95)
MONOCYTES ABSOLUTE: 0.66 E9/L (ref 0.1–0.95)
MONOCYTES ABSOLUTE: 0.7 E9/L (ref 0.1–0.95)
MONOCYTES ABSOLUTE: 0.71 E9/L (ref 0.1–0.95)
MONOCYTES ABSOLUTE: 0.71 E9/L (ref 0.1–0.95)
MONOCYTES ABSOLUTE: 0.73 E9/L (ref 0.1–0.95)
MONOCYTES ABSOLUTE: 0.75 E9/L (ref 0.1–0.95)
MONOCYTES ABSOLUTE: 0.82 E9/L (ref 0.1–0.95)
MONOCYTES ABSOLUTE: 0.86 E9/L (ref 0.1–0.95)
MONOCYTES ABSOLUTE: 0.88 E9/L (ref 0.1–0.95)
MONOCYTES ABSOLUTE: 0.9 E9/L (ref 0.1–0.95)
MONOCYTES ABSOLUTE: 0.92 E9/L (ref 0.1–0.95)
MONOCYTES ABSOLUTE: 1.02 E9/L (ref 0.1–0.95)
MONOCYTES ABSOLUTE: 1.08 E9/L (ref 0.1–0.95)
MONOCYTES ABSOLUTE: 1.21 E9/L (ref 0.1–0.95)
MONOCYTES ABSOLUTE: 1.56 E9/L (ref 0.1–0.95)
MONOCYTES RELATIVE PERCENT: 10 % (ref 2–12)
MONOCYTES RELATIVE PERCENT: 11 % (ref 2–12)
MONOCYTES RELATIVE PERCENT: 12.2 % (ref 2–12)
MONOCYTES RELATIVE PERCENT: 12.3 % (ref 2–12)
MONOCYTES RELATIVE PERCENT: 12.5 % (ref 2–12)
MONOCYTES RELATIVE PERCENT: 12.9 % (ref 2–12)
MONOCYTES RELATIVE PERCENT: 13 % (ref 2–12)
MONOCYTES RELATIVE PERCENT: 13.8 % (ref 2–12)
MONOCYTES RELATIVE PERCENT: 13.8 % (ref 2–12)
MONOCYTES RELATIVE PERCENT: 14.1 % (ref 2–12)
MONOCYTES RELATIVE PERCENT: 14.8 % (ref 2–12)
MONOCYTES RELATIVE PERCENT: 16.8 % (ref 2–12)
MONOCYTES RELATIVE PERCENT: 18.5 % (ref 2–12)
MONOCYTES RELATIVE PERCENT: 18.8 % (ref 2–12)
MONOCYTES RELATIVE PERCENT: 3.8 % (ref 2–12)
MONOCYTES RELATIVE PERCENT: 5.2 % (ref 2–12)
MONOCYTES RELATIVE PERCENT: 5.3 % (ref 2–12)
MONOCYTES RELATIVE PERCENT: 7 % (ref 2–12)
MONOCYTES RELATIVE PERCENT: 7.8 % (ref 2–12)
MONOCYTES RELATIVE PERCENT: 7.9 % (ref 2–12)
MONOCYTES RELATIVE PERCENT: 9.5 % (ref 2–12)
MONOCYTES RELATIVE PERCENT: 9.6 % (ref 2–12)
MRSA CULTURE ONLY: NORMAL
MYCOPLASMA PNEUMONIAE BY PCR: NOT DETECTED
MYELOCYTE PERCENT: 0.9 % (ref 0–0)
MYELOCYTE PERCENT: 0.9 % (ref 0–0)
MYELOCYTE PERCENT: 1.7 % (ref 0–0)
NEUTROPHIL, FLUID: 16 %
NEUTROPHILS ABSOLUTE: 13.89 E9/L (ref 1.8–7.3)
NEUTROPHILS ABSOLUTE: 2.32 E9/L (ref 1.8–7.3)
NEUTROPHILS ABSOLUTE: 2.35 E9/L (ref 1.8–7.3)
NEUTROPHILS ABSOLUTE: 3.4 E9/L (ref 1.8–7.3)
NEUTROPHILS ABSOLUTE: 3.87 E9/L (ref 1.8–7.3)
NEUTROPHILS ABSOLUTE: 4.16 E9/L (ref 1.8–7.3)
NEUTROPHILS ABSOLUTE: 4.29 E9/L (ref 1.8–7.3)
NEUTROPHILS ABSOLUTE: 4.31 E9/L (ref 1.8–7.3)
NEUTROPHILS ABSOLUTE: 4.32 E9/L (ref 1.8–7.3)
NEUTROPHILS ABSOLUTE: 4.51 E9/L (ref 1.8–7.3)
NEUTROPHILS ABSOLUTE: 4.63 E9/L (ref 1.8–7.3)
NEUTROPHILS ABSOLUTE: 4.87 E9/L (ref 1.8–7.3)
NEUTROPHILS ABSOLUTE: 5.41 E9/L (ref 1.8–7.3)
NEUTROPHILS ABSOLUTE: 5.96 E9/L (ref 1.8–7.3)
NEUTROPHILS ABSOLUTE: 5.96 E9/L (ref 1.8–7.3)
NEUTROPHILS ABSOLUTE: 6.13 E9/L (ref 1.8–7.3)
NEUTROPHILS ABSOLUTE: 6.23 E9/L (ref 1.8–7.3)
NEUTROPHILS ABSOLUTE: 6.49 E9/L (ref 1.8–7.3)
NEUTROPHILS ABSOLUTE: 6.81 E9/L (ref 1.8–7.3)
NEUTROPHILS ABSOLUTE: 7.46 E9/L (ref 1.8–7.3)
NEUTROPHILS ABSOLUTE: 7.73 E9/L (ref 1.8–7.3)
NEUTROPHILS ABSOLUTE: 8.75 E9/L (ref 1.8–7.3)
NEUTROPHILS RELATIVE PERCENT: 61.4 % (ref 43–80)
NEUTROPHILS RELATIVE PERCENT: 61.9 % (ref 43–80)
NEUTROPHILS RELATIVE PERCENT: 66.5 % (ref 43–80)
NEUTROPHILS RELATIVE PERCENT: 69.3 % (ref 43–80)
NEUTROPHILS RELATIVE PERCENT: 72.4 % (ref 43–80)
NEUTROPHILS RELATIVE PERCENT: 72.8 % (ref 43–80)
NEUTROPHILS RELATIVE PERCENT: 72.8 % (ref 43–80)
NEUTROPHILS RELATIVE PERCENT: 73 % (ref 43–80)
NEUTROPHILS RELATIVE PERCENT: 74.9 % (ref 43–80)
NEUTROPHILS RELATIVE PERCENT: 75.2 % (ref 43–80)
NEUTROPHILS RELATIVE PERCENT: 77.9 % (ref 43–80)
NEUTROPHILS RELATIVE PERCENT: 78.3 % (ref 43–80)
NEUTROPHILS RELATIVE PERCENT: 78.8 % (ref 43–80)
NEUTROPHILS RELATIVE PERCENT: 81 % (ref 43–80)
NEUTROPHILS RELATIVE PERCENT: 81.8 % (ref 43–80)
NEUTROPHILS RELATIVE PERCENT: 82.6 % (ref 43–80)
NEUTROPHILS RELATIVE PERCENT: 83 % (ref 43–80)
NEUTROPHILS RELATIVE PERCENT: 84.3 % (ref 43–80)
NEUTROPHILS RELATIVE PERCENT: 87.6 % (ref 43–80)
NEUTROPHILS RELATIVE PERCENT: 89.9 % (ref 43–80)
NEUTROPHILS RELATIVE PERCENT: 91.2 % (ref 43–80)
NEUTROPHILS RELATIVE PERCENT: 91.3 % (ref 43–80)
NITRITE, URINE: NEGATIVE
NUCLEATED CELLS FLUID: 665 /UL
NUCLEATED RED BLOOD CELLS: 0 /100 WBC
O2 CONTENT: 13.8 ML/DL
O2 CONTENT: 14 ML/DL
O2 CONTENT: 14.1 ML/DL
O2 SATURATION: 88.5 % (ref 92–98.5)
O2 SATURATION: 90.1 % (ref 92–98.5)
O2 SATURATION: 93.9 % (ref 92–98.5)
O2HB: 87.4 % (ref 94–97)
O2HB: 88.7 % (ref 94–97)
O2HB: 93 % (ref 94–97)
OPERATOR ID: 1210
OPERATOR ID: 2860
OPERATOR ID: ABNORMAL
ORGANISM: ABNORMAL
OVALOCYTES: ABNORMAL
PARAINFLUENZA VIRUS 1 BY PCR: NOT DETECTED
PARAINFLUENZA VIRUS 2 BY PCR: NOT DETECTED
PARAINFLUENZA VIRUS 3 BY PCR: NOT DETECTED
PARAINFLUENZA VIRUS 4 BY PCR: NOT DETECTED
PATIENT TEMP: 37 C
PCO2: 35.6 MMHG (ref 35–45)
PCO2: 38.5 MMHG (ref 35–45)
PCO2: 45.2 MMHG (ref 35–45)
PDW BLD-RTO: 14.5 FL (ref 11.5–15)
PDW BLD-RTO: 14.6 FL (ref 11.5–15)
PDW BLD-RTO: 14.8 FL (ref 11.5–15)
PDW BLD-RTO: 14.9 FL (ref 11.5–15)
PDW BLD-RTO: 14.9 FL (ref 11.5–15)
PDW BLD-RTO: 15 FL (ref 11.5–15)
PDW BLD-RTO: 15.5 FL (ref 11.5–15)
PDW BLD-RTO: 15.6 FL (ref 11.5–15)
PDW BLD-RTO: 15.7 FL (ref 11.5–15)
PDW BLD-RTO: 15.7 FL (ref 11.5–15)
PH BLOOD GAS: 7.4 (ref 7.35–7.45)
PH BLOOD GAS: 7.44 (ref 7.35–7.45)
PH BLOOD GAS: 7.44 (ref 7.35–7.45)
PH UA: 5 (ref 5–9)
PH UA: 5.5 (ref 5–9)
PH UA: 5.5 (ref 5–9)
PHOSPHORUS: 4.4 MG/DL (ref 2.5–4.5)
PHOSPHORUS: 5 MG/DL (ref 2.5–4.5)
PHOSPHORUS: 6.6 MG/DL (ref 2.5–4.5)
PHOSPHORUS: 7.9 MG/DL (ref 2.5–4.5)
PLATELET # BLD: 147 E9/L (ref 130–450)
PLATELET # BLD: 150 E9/L (ref 130–450)
PLATELET # BLD: 150 E9/L (ref 130–450)
PLATELET # BLD: 151 E9/L (ref 130–450)
PLATELET # BLD: 151 E9/L (ref 130–450)
PLATELET # BLD: 154 E9/L (ref 130–450)
PLATELET # BLD: 158 E9/L (ref 130–450)
PLATELET # BLD: 164 E9/L (ref 130–450)
PLATELET # BLD: 168 E9/L (ref 130–450)
PLATELET # BLD: 171 E9/L (ref 130–450)
PLATELET # BLD: 172 E9/L (ref 130–450)
PLATELET # BLD: 172 E9/L (ref 130–450)
PLATELET # BLD: 173 E9/L (ref 130–450)
PLATELET # BLD: 179 E9/L (ref 130–450)
PLATELET # BLD: 186 E9/L (ref 130–450)
PLATELET # BLD: 190 E9/L (ref 130–450)
PLATELET # BLD: 193 E9/L (ref 130–450)
PLATELET # BLD: 203 E9/L (ref 130–450)
PLATELET # BLD: 204 E9/L (ref 130–450)
PLATELET # BLD: 205 E9/L (ref 130–450)
PLATELET # BLD: 205 E9/L (ref 130–450)
PLATELET # BLD: 212 E9/L (ref 130–450)
PLATELET # BLD: 229 E9/L (ref 130–450)
PMV BLD AUTO: 10 FL (ref 7–12)
PMV BLD AUTO: 10.1 FL (ref 7–12)
PMV BLD AUTO: 10.2 FL (ref 7–12)
PMV BLD AUTO: 10.2 FL (ref 7–12)
PMV BLD AUTO: 10.4 FL (ref 7–12)
PMV BLD AUTO: 10.6 FL (ref 7–12)
PMV BLD AUTO: 10.7 FL (ref 7–12)
PMV BLD AUTO: 10.7 FL (ref 7–12)
PMV BLD AUTO: 9.2 FL (ref 7–12)
PMV BLD AUTO: 9.3 FL (ref 7–12)
PMV BLD AUTO: 9.5 FL (ref 7–12)
PMV BLD AUTO: 9.6 FL (ref 7–12)
PMV BLD AUTO: 9.8 FL (ref 7–12)
PMV BLD AUTO: 9.9 FL (ref 7–12)
PO2: 53.6 MMHG (ref 75–100)
PO2: 61.5 MMHG (ref 75–100)
PO2: 70.1 MMHG (ref 75–100)
POIKILOCYTES: ABNORMAL
POLYCHROMASIA: ABNORMAL
POTASSIUM SERPL-SCNC: 3.6 MMOL/L (ref 3.5–5)
POTASSIUM SERPL-SCNC: 3.7 MMOL/L (ref 3.5–5)
POTASSIUM SERPL-SCNC: 3.7 MMOL/L (ref 3.5–5)
POTASSIUM SERPL-SCNC: 4 MMOL/L (ref 3.5–5)
POTASSIUM SERPL-SCNC: 4 MMOL/L (ref 3.5–5)
POTASSIUM SERPL-SCNC: 4.1 MMOL/L (ref 3.5–5)
POTASSIUM SERPL-SCNC: 4.1 MMOL/L (ref 3.5–5)
POTASSIUM SERPL-SCNC: 4.2 MMOL/L (ref 3.5–5)
POTASSIUM SERPL-SCNC: 4.2 MMOL/L (ref 3.5–5)
POTASSIUM SERPL-SCNC: 4.3 MMOL/L (ref 3.5–5)
POTASSIUM SERPL-SCNC: 4.4 MMOL/L (ref 3.5–5)
POTASSIUM SERPL-SCNC: 4.5 MMOL/L (ref 3.5–5)
POTASSIUM SERPL-SCNC: 4.5 MMOL/L (ref 3.5–5)
POTASSIUM SERPL-SCNC: 4.7 MMOL/L (ref 3.5–5)
POTASSIUM SERPL-SCNC: 4.8 MMOL/L (ref 3.5–5)
POTASSIUM SERPL-SCNC: 4.9 MMOL/L (ref 3.5–5)
POTASSIUM SERPL-SCNC: 5 MMOL/L (ref 3.5–5)
POTASSIUM SERPL-SCNC: 5.1 MMOL/L (ref 3.5–5)
POTASSIUM SERPL-SCNC: 5.2 MMOL/L (ref 3.5–5)
POTASSIUM SERPL-SCNC: 5.2 MMOL/L (ref 3.5–5)
POTASSIUM SERPL-SCNC: 5.4 MMOL/L (ref 3.5–5)
POTASSIUM, UR: 44.7 MMOL/L
PRO-BNP: 1021 PG/ML (ref 0–450)
PRO-BNP: 1901 PG/ML (ref 0–450)
PRO-BNP: 2450 PG/ML (ref 0–450)
PRO-BNP: 3467 PG/ML (ref 0–450)
PRO-BNP: 3514 PG/ML (ref 0–450)
PRO-BNP: 6551 PG/ML (ref 0–450)
PRO-BNP: 735 PG/ML (ref 0–450)
PRO-BNP: 8749 PG/ML (ref 0–450)
PROCALCITONIN: 0.18 NG/ML (ref 0–0.08)
PROCALCITONIN: 0.3 NG/ML (ref 0–0.08)
PROCALCITONIN: 0.54 NG/ML (ref 0–0.08)
PROCALCITONIN: 1.29 NG/ML (ref 0–0.08)
PROCALCITONIN: 1.65 NG/ML (ref 0–0.08)
PROMYELOCYTES PERCENT: 0.9 % (ref 0–0)
PROTEIN FLUID: 2.7 G/DL
PROTEIN UA: 100 MG/DL
PROTEIN UA: 30 MG/DL
PROTEIN UA: ABNORMAL MG/DL
PROTHROMBIN TIME: 12 SEC (ref 9.3–12.4)
PROTHROMBIN TIME: 12 SEC (ref 9.3–12.4)
PROTHROMBIN TIME: 12.4 SEC (ref 9.3–12.4)
RBC # BLD: 3.15 E12/L (ref 3.5–5.5)
RBC # BLD: 3.26 E12/L (ref 3.5–5.5)
RBC # BLD: 3.3 E12/L (ref 3.5–5.5)
RBC # BLD: 3.35 E12/L (ref 3.5–5.5)
RBC # BLD: 3.36 E12/L (ref 3.5–5.5)
RBC # BLD: 3.4 E12/L (ref 3.5–5.5)
RBC # BLD: 3.47 E12/L (ref 3.5–5.5)
RBC # BLD: 3.49 E12/L (ref 3.5–5.5)
RBC # BLD: 3.5 E12/L (ref 3.5–5.5)
RBC # BLD: 3.55 E12/L (ref 3.5–5.5)
RBC # BLD: 3.56 E12/L (ref 3.5–5.5)
RBC # BLD: 3.59 E12/L (ref 3.5–5.5)
RBC # BLD: 3.61 E12/L (ref 3.5–5.5)
RBC # BLD: 3.62 E12/L (ref 3.5–5.5)
RBC # BLD: 3.63 E12/L (ref 3.5–5.5)
RBC # BLD: 3.66 E12/L (ref 3.5–5.5)
RBC # BLD: 3.69 E12/L (ref 3.5–5.5)
RBC # BLD: 3.74 E12/L (ref 3.5–5.5)
RBC # BLD: 3.78 E12/L (ref 3.5–5.5)
RBC # BLD: 3.86 E12/L (ref 3.5–5.5)
RBC # BLD: 4.06 E12/L (ref 3.5–5.5)
RBC # BLD: 4.15 E12/L (ref 3.5–5.5)
RBC # BLD: 4.2 E12/L (ref 3.5–5.5)
RBC FLUID: NORMAL /UL
RBC UA: ABNORMAL /HPF (ref 0–2)
RESPIRATORY SYNCYTIAL VIRUS BY PCR: NOT DETECTED
SARS-COV-2, NAAT: NOT DETECTED
SARS-COV-2, NAAT: NOT DETECTED
SARS-COV-2, PCR: DETECTED
SARS-COV-2, PCR: NOT DETECTED
SCHISTOCYTES: ABNORMAL
SEDIMENTATION RATE, ERYTHROCYTE: 50 MM/HR (ref 0–20)
SODIUM BLD-SCNC: 132 MMOL/L (ref 132–146)
SODIUM BLD-SCNC: 133 MMOL/L (ref 132–146)
SODIUM BLD-SCNC: 134 MMOL/L (ref 132–146)
SODIUM BLD-SCNC: 135 MMOL/L (ref 132–146)
SODIUM BLD-SCNC: 136 MMOL/L (ref 132–146)
SODIUM BLD-SCNC: 137 MMOL/L (ref 132–146)
SODIUM BLD-SCNC: 137 MMOL/L (ref 132–146)
SODIUM BLD-SCNC: 138 MMOL/L (ref 132–146)
SODIUM BLD-SCNC: 139 MMOL/L (ref 132–146)
SODIUM BLD-SCNC: 140 MMOL/L (ref 132–146)
SODIUM BLD-SCNC: 140 MMOL/L (ref 132–146)
SODIUM BLD-SCNC: 141 MMOL/L (ref 132–146)
SODIUM BLD-SCNC: 142 MMOL/L (ref 132–146)
SODIUM BLD-SCNC: 143 MMOL/L (ref 132–146)
SODIUM BLD-SCNC: 144 MMOL/L (ref 132–146)
SODIUM BLD-SCNC: 146 MMOL/L (ref 132–146)
SODIUM URINE: 26 MMOL/L
SODIUM URINE: 50 MMOL/L
SOURCE, BLOOD GAS: ABNORMAL
SPECIFIC GRAVITY UA: 1.02 (ref 1–1.03)
SPHEROCYTES: ABNORMAL
STREP PNEUMONIAE ANTIGEN, URINE: NORMAL
STREP PNEUMONIAE ANTIGEN, URINE: NORMAL
TARGET CELLS: ABNORMAL
TEAR DROP CELLS: ABNORMAL
THB: 10.7 G/DL (ref 11.5–16.5)
THB: 11 G/DL (ref 11.5–16.5)
THB: 11.4 G/DL (ref 11.5–16.5)
TIME ANALYZED: 1840
TIME ANALYZED: 250
TIME ANALYZED: 632
TOTAL CK: 521 U/L (ref 20–180)
TOTAL IRON BINDING CAPACITY: 224 MCG/DL (ref 250–450)
TOTAL PROTEIN: 5.9 G/DL (ref 6.4–8.3)
TOTAL PROTEIN: 6 G/DL (ref 6.4–8.3)
TOTAL PROTEIN: 6.1 G/DL (ref 6.4–8.3)
TOTAL PROTEIN: 6.2 G/DL (ref 6.4–8.3)
TOTAL PROTEIN: 6.2 G/DL (ref 6.4–8.3)
TOTAL PROTEIN: 6.3 G/DL (ref 6.4–8.3)
TOTAL PROTEIN: 6.3 G/DL (ref 6.4–8.3)
TOTAL PROTEIN: 6.4 G/DL (ref 6.4–8.3)
TOTAL PROTEIN: 6.6 G/DL (ref 6.4–8.3)
TOTAL PROTEIN: 6.7 G/DL (ref 6.4–8.3)
TOTAL PROTEIN: 7.3 G/DL (ref 6.4–8.3)
TOXIC GRANULATION: ABNORMAL
TRIGL SERPL-MCNC: 73 MG/DL (ref 0–149)
TRIGL SERPL-MCNC: 83 MG/DL (ref 0–149)
TROPONIN: 0.01 NG/ML (ref 0–0.03)
TROPONIN: 0.03 NG/ML (ref 0–0.03)
TROPONIN: 0.07 NG/ML (ref 0–0.03)
TROPONIN: 0.09 NG/ML (ref 0–0.03)
TROPONIN: 0.13 NG/ML (ref 0–0.03)
TROPONIN: 0.14 NG/ML (ref 0–0.03)
TSH SERPL DL<=0.05 MIU/L-ACNC: 2.61 UIU/ML (ref 0.27–4.2)
TSH SERPL DL<=0.05 MIU/L-ACNC: 5.39 UIU/ML (ref 0.27–4.2)
UREA NITROGEN, UR: 360 MG/DL (ref 800–1666)
UREA NITROGEN, UR: 486 MG/DL (ref 800–1666)
URIC ACID, SERUM: 8.9 MG/DL (ref 2.4–5.7)
UROBILINOGEN, URINE: 0.2 E.U./DL
VITAMIN D 25-HYDROXY: 9 NG/ML (ref 30–100)
VITAMIN D 25-HYDROXY: 9 NG/ML (ref 30–100)
VLDLC SERPL CALC-MCNC: 15 MG/DL
VLDLC SERPL CALC-MCNC: 17 MG/DL
WBC # BLD: 16.5 E9/L (ref 4.5–11.5)
WBC # BLD: 3.8 E9/L (ref 4.5–11.5)
WBC # BLD: 3.8 E9/L (ref 4.5–11.5)
WBC # BLD: 4.8 E9/L (ref 4.5–11.5)
WBC # BLD: 5.1 E9/L (ref 4.5–11.5)
WBC # BLD: 5.2 E9/L (ref 4.5–11.5)
WBC # BLD: 5.2 E9/L (ref 4.5–11.5)
WBC # BLD: 5.3 E9/L (ref 4.5–11.5)
WBC # BLD: 5.5 E9/L (ref 4.5–11.5)
WBC # BLD: 5.6 E9/L (ref 4.5–11.5)
WBC # BLD: 5.9 E9/L (ref 4.5–11.5)
WBC # BLD: 6.2 E9/L (ref 4.5–11.5)
WBC # BLD: 6.7 E9/L (ref 4.5–11.5)
WBC # BLD: 6.9 E9/L (ref 4.5–11.5)
WBC # BLD: 7.1 E9/L (ref 4.5–11.5)
WBC # BLD: 7.1 E9/L (ref 4.5–11.5)
WBC # BLD: 7.4 E9/L (ref 4.5–11.5)
WBC # BLD: 7.5 E9/L (ref 4.5–11.5)
WBC # BLD: 8.2 E9/L (ref 4.5–11.5)
WBC # BLD: 8.2 E9/L (ref 4.5–11.5)
WBC # BLD: 9.4 E9/L (ref 4.5–11.5)
WBC # BLD: 9.7 E9/L (ref 4.5–11.5)
WBC # BLD: 9.8 E9/L (ref 4.5–11.5)
WBC UA: ABNORMAL /HPF (ref 0–5)
YEAST: PRESENT /HPF

## 2021-01-01 PROCEDURE — 82962 GLUCOSE BLOOD TEST: CPT

## 2021-01-01 PROCEDURE — 71045 X-RAY EXAM CHEST 1 VIEW: CPT

## 2021-01-01 PROCEDURE — 36415 COLL VENOUS BLD VENIPUNCTURE: CPT

## 2021-01-01 PROCEDURE — 6360000002 HC RX W HCPCS: Performed by: NURSE PRACTITIONER

## 2021-01-01 PROCEDURE — 2580000003 HC RX 258: Performed by: INTERNAL MEDICINE

## 2021-01-01 PROCEDURE — 82306 VITAMIN D 25 HYDROXY: CPT

## 2021-01-01 PROCEDURE — 2700000000 HC OXYGEN THERAPY PER DAY

## 2021-01-01 PROCEDURE — APPSS60 APP SPLIT SHARED TIME 46-60 MINUTES: Performed by: NURSE PRACTITIONER

## 2021-01-01 PROCEDURE — 6370000000 HC RX 637 (ALT 250 FOR IP): Performed by: INTERNAL MEDICINE

## 2021-01-01 PROCEDURE — 93005 ELECTROCARDIOGRAM TRACING: CPT | Performed by: EMERGENCY MEDICINE

## 2021-01-01 PROCEDURE — 2060000000 HC ICU INTERMEDIATE R&B

## 2021-01-01 PROCEDURE — 82330 ASSAY OF CALCIUM: CPT

## 2021-01-01 PROCEDURE — 84145 PROCALCITONIN (PCT): CPT

## 2021-01-01 PROCEDURE — U0005 INFEC AGEN DETEC AMPLI PROBE: HCPCS

## 2021-01-01 PROCEDURE — 80053 COMPREHEN METABOLIC PANEL: CPT

## 2021-01-01 PROCEDURE — 99213 OFFICE O/P EST LOW 20 MIN: CPT | Performed by: INTERNAL MEDICINE

## 2021-01-01 PROCEDURE — 2500000003 HC RX 250 WO HCPCS: Performed by: FAMILY MEDICINE

## 2021-01-01 PROCEDURE — 83735 ASSAY OF MAGNESIUM: CPT

## 2021-01-01 PROCEDURE — 6370000000 HC RX 637 (ALT 250 FOR IP): Performed by: NURSE PRACTITIONER

## 2021-01-01 PROCEDURE — XW033E5 INTRODUCTION OF REMDESIVIR ANTI-INFECTIVE INTO PERIPHERAL VEIN, PERCUTANEOUS APPROACH, NEW TECHNOLOGY GROUP 5: ICD-10-PCS | Performed by: INTERNAL MEDICINE

## 2021-01-01 PROCEDURE — 87635 SARS-COV-2 COVID-19 AMP PRB: CPT

## 2021-01-01 PROCEDURE — 99232 SBSQ HOSP IP/OBS MODERATE 35: CPT | Performed by: INTERNAL MEDICINE

## 2021-01-01 PROCEDURE — 99233 SBSQ HOSP IP/OBS HIGH 50: CPT | Performed by: INTERNAL MEDICINE

## 2021-01-01 PROCEDURE — 84100 ASSAY OF PHOSPHORUS: CPT

## 2021-01-01 PROCEDURE — 85025 COMPLETE CBC W/AUTO DIFF WBC: CPT

## 2021-01-01 PROCEDURE — 94640 AIRWAY INHALATION TREATMENT: CPT

## 2021-01-01 PROCEDURE — 36600 WITHDRAWAL OF ARTERIAL BLOOD: CPT

## 2021-01-01 PROCEDURE — 83615 LACTATE (LD) (LDH) ENZYME: CPT

## 2021-01-01 PROCEDURE — 6360000002 HC RX W HCPCS: Performed by: INTERNAL MEDICINE

## 2021-01-01 PROCEDURE — 2500000003 HC RX 250 WO HCPCS: Performed by: INTERNAL MEDICINE

## 2021-01-01 PROCEDURE — 96374 THER/PROPH/DIAG INJ IV PUSH: CPT

## 2021-01-01 PROCEDURE — 74230 X-RAY XM SWLNG FUNCJ C+: CPT

## 2021-01-01 PROCEDURE — 99233 SBSQ HOSP IP/OBS HIGH 50: CPT | Performed by: STUDENT IN AN ORGANIZED HEALTH CARE EDUCATION/TRAINING PROGRAM

## 2021-01-01 PROCEDURE — 90935 HEMODIALYSIS ONE EVALUATION: CPT

## 2021-01-01 PROCEDURE — 6370000000 HC RX 637 (ALT 250 FOR IP): Performed by: FAMILY MEDICINE

## 2021-01-01 PROCEDURE — 71046 X-RAY EXAM CHEST 2 VIEWS: CPT

## 2021-01-01 PROCEDURE — 83880 ASSAY OF NATRIURETIC PEPTIDE: CPT

## 2021-01-01 PROCEDURE — 82150 ASSAY OF AMYLASE: CPT

## 2021-01-01 PROCEDURE — 6360000002 HC RX W HCPCS: Performed by: SURGERY

## 2021-01-01 PROCEDURE — 84484 ASSAY OF TROPONIN QUANT: CPT

## 2021-01-01 PROCEDURE — 80048 BASIC METABOLIC PNL TOTAL CA: CPT

## 2021-01-01 PROCEDURE — C1752 CATH,HEMODIALYSIS,SHORT-TERM: HCPCS

## 2021-01-01 PROCEDURE — 99222 1ST HOSP IP/OBS MODERATE 55: CPT | Performed by: SURGERY

## 2021-01-01 PROCEDURE — 97530 THERAPEUTIC ACTIVITIES: CPT

## 2021-01-01 PROCEDURE — 92526 ORAL FUNCTION THERAPY: CPT | Performed by: SPEECH-LANGUAGE PATHOLOGIST

## 2021-01-01 PROCEDURE — 6360000002 HC RX W HCPCS: Performed by: FAMILY MEDICINE

## 2021-01-01 PROCEDURE — 82436 ASSAY OF URINE CHLORIDE: CPT

## 2021-01-01 PROCEDURE — 86803 HEPATITIS C AB TEST: CPT

## 2021-01-01 PROCEDURE — 99283 EMERGENCY DEPT VISIT LOW MDM: CPT

## 2021-01-01 PROCEDURE — 84300 ASSAY OF URINE SODIUM: CPT

## 2021-01-01 PROCEDURE — 82570 ASSAY OF URINE CREATININE: CPT

## 2021-01-01 PROCEDURE — 82550 ASSAY OF CK (CPK): CPT

## 2021-01-01 PROCEDURE — 93000 ELECTROCARDIOGRAM COMPLETE: CPT | Performed by: INTERNAL MEDICINE

## 2021-01-01 PROCEDURE — 87205 SMEAR GRAM STAIN: CPT

## 2021-01-01 PROCEDURE — 94660 CPAP INITIATION&MGMT: CPT

## 2021-01-01 PROCEDURE — 0202U NFCT DS 22 TRGT SARS-COV-2: CPT

## 2021-01-01 PROCEDURE — 93010 ELECTROCARDIOGRAM REPORT: CPT | Performed by: INTERNAL MEDICINE

## 2021-01-01 PROCEDURE — 82805 BLOOD GASES W/O2 SATURATION: CPT

## 2021-01-01 PROCEDURE — 86706 HEP B SURFACE ANTIBODY: CPT

## 2021-01-01 PROCEDURE — 6370000000 HC RX 637 (ALT 250 FOR IP): Performed by: EMERGENCY MEDICINE

## 2021-01-01 PROCEDURE — 99223 1ST HOSP IP/OBS HIGH 75: CPT | Performed by: INTERNAL MEDICINE

## 2021-01-01 PROCEDURE — 85610 PROTHROMBIN TIME: CPT

## 2021-01-01 PROCEDURE — 88112 CYTOPATH CELL ENHANCE TECH: CPT

## 2021-01-01 PROCEDURE — APPSS30 APP SPLIT SHARED TIME 16-30 MINUTES: Performed by: NURSE PRACTITIONER

## 2021-01-01 PROCEDURE — 71250 CT THORAX DX C-: CPT

## 2021-01-01 PROCEDURE — 84443 ASSAY THYROID STIM HORMONE: CPT

## 2021-01-01 PROCEDURE — 83036 HEMOGLOBIN GLYCOSYLATED A1C: CPT

## 2021-01-01 PROCEDURE — 99222 1ST HOSP IP/OBS MODERATE 55: CPT | Performed by: NEUROLOGICAL SURGERY

## 2021-01-01 PROCEDURE — U0003 INFECTIOUS AGENT DETECTION BY NUCLEIC ACID (DNA OR RNA); SEVERE ACUTE RESPIRATORY SYNDROME CORONAVIRUS 2 (SARS-COV-2) (CORONAVIRUS DISEASE [COVID-19]), AMPLIFIED PROBE TECHNIQUE, MAKING USE OF HIGH THROUGHPUT TECHNOLOGIES AS DESCRIBED BY CMS-2020-01-R: HCPCS

## 2021-01-01 PROCEDURE — 99232 SBSQ HOSP IP/OBS MODERATE 35: CPT | Performed by: FAMILY MEDICINE

## 2021-01-01 PROCEDURE — 51702 INSERT TEMP BLADDER CATH: CPT

## 2021-01-01 PROCEDURE — 84157 ASSAY OF PROTEIN OTHER: CPT

## 2021-01-01 PROCEDURE — 2140000000 HC CCU INTERMEDIATE R&B

## 2021-01-01 PROCEDURE — 83540 ASSAY OF IRON: CPT

## 2021-01-01 PROCEDURE — 6360000004 HC RX CONTRAST MEDICATION: Performed by: RADIOLOGY

## 2021-01-01 PROCEDURE — 2580000003 HC RX 258: Performed by: FAMILY MEDICINE

## 2021-01-01 PROCEDURE — 89051 BODY FLUID CELL COUNT: CPT

## 2021-01-01 PROCEDURE — 2709999900 US THORACENTESIS

## 2021-01-01 PROCEDURE — 87040 BLOOD CULTURE FOR BACTERIA: CPT

## 2021-01-01 PROCEDURE — 6360000002 HC RX W HCPCS: Performed by: EMERGENCY MEDICINE

## 2021-01-01 PROCEDURE — 85378 FIBRIN DEGRADE SEMIQUANT: CPT

## 2021-01-01 PROCEDURE — 82378 CARCINOEMBRYONIC ANTIGEN: CPT

## 2021-01-01 PROCEDURE — 83605 ASSAY OF LACTIC ACID: CPT

## 2021-01-01 PROCEDURE — 81001 URINALYSIS AUTO W/SCOPE: CPT

## 2021-01-01 PROCEDURE — 94664 DEMO&/EVAL PT USE INHALER: CPT

## 2021-01-01 PROCEDURE — 85027 COMPLETE CBC AUTOMATED: CPT

## 2021-01-01 PROCEDURE — 2500000003 HC RX 250 WO HCPCS: Performed by: STUDENT IN AN ORGANIZED HEALTH CARE EDUCATION/TRAINING PROGRAM

## 2021-01-01 PROCEDURE — 72156 MRI NECK SPINE W/O & W/DYE: CPT

## 2021-01-01 PROCEDURE — 97535 SELF CARE MNGMENT TRAINING: CPT

## 2021-01-01 PROCEDURE — 6370000000 HC RX 637 (ALT 250 FOR IP): Performed by: NEUROLOGICAL SURGERY

## 2021-01-01 PROCEDURE — 93005 ELECTROCARDIOGRAM TRACING: CPT | Performed by: NURSE PRACTITIONER

## 2021-01-01 PROCEDURE — 85651 RBC SED RATE NONAUTOMATED: CPT

## 2021-01-01 PROCEDURE — 84540 ASSAY OF URINE/UREA-N: CPT

## 2021-01-01 PROCEDURE — 97165 OT EVAL LOW COMPLEX 30 MIN: CPT

## 2021-01-01 PROCEDURE — 99285 EMERGENCY DEPT VISIT HI MDM: CPT

## 2021-01-01 PROCEDURE — A9579 GAD-BASE MR CONTRAST NOS,1ML: HCPCS | Performed by: RADIOLOGY

## 2021-01-01 PROCEDURE — 2500000003 HC RX 250 WO HCPCS: Performed by: RADIOLOGY

## 2021-01-01 PROCEDURE — 93970 EXTREMITY STUDY: CPT | Performed by: RADIOLOGY

## 2021-01-01 PROCEDURE — 87070 CULTURE OTHR SPECIMN AEROBIC: CPT

## 2021-01-01 PROCEDURE — 99222 1ST HOSP IP/OBS MODERATE 55: CPT | Performed by: NURSE PRACTITIONER

## 2021-01-01 PROCEDURE — 99232 SBSQ HOSP IP/OBS MODERATE 35: CPT | Performed by: STUDENT IN AN ORGANIZED HEALTH CARE EDUCATION/TRAINING PROGRAM

## 2021-01-01 PROCEDURE — 83550 IRON BINDING TEST: CPT

## 2021-01-01 PROCEDURE — 87449 NOS EACH ORGANISM AG IA: CPT

## 2021-01-01 PROCEDURE — 82553 CREATINE MB FRACTION: CPT

## 2021-01-01 PROCEDURE — 82728 ASSAY OF FERRITIN: CPT

## 2021-01-01 PROCEDURE — 5A1D70Z PERFORMANCE OF URINARY FILTRATION, INTERMITTENT, LESS THAN 6 HOURS PER DAY: ICD-10-PCS | Performed by: SURGERY

## 2021-01-01 PROCEDURE — 99222 1ST HOSP IP/OBS MODERATE 55: CPT | Performed by: INTERNAL MEDICINE

## 2021-01-01 PROCEDURE — 71275 CT ANGIOGRAPHY CHEST: CPT

## 2021-01-01 PROCEDURE — 82947 ASSAY GLUCOSE BLOOD QUANT: CPT

## 2021-01-01 PROCEDURE — 84550 ASSAY OF BLOOD/URIC ACID: CPT

## 2021-01-01 PROCEDURE — 02HV33Z INSERTION OF INFUSION DEVICE INTO SUPERIOR VENA CAVA, PERCUTANEOUS APPROACH: ICD-10-PCS | Performed by: SURGERY

## 2021-01-01 PROCEDURE — 86038 ANTINUCLEAR ANTIBODIES: CPT

## 2021-01-01 PROCEDURE — 72125 CT NECK SPINE W/O DYE: CPT

## 2021-01-01 PROCEDURE — 97166 OT EVAL MOD COMPLEX 45 MIN: CPT

## 2021-01-01 PROCEDURE — 93970 EXTREMITY STUDY: CPT

## 2021-01-01 PROCEDURE — 93005 ELECTROCARDIOGRAM TRACING: CPT | Performed by: INTERNAL MEDICINE

## 2021-01-01 PROCEDURE — 80061 LIPID PANEL: CPT

## 2021-01-01 PROCEDURE — 97162 PT EVAL MOD COMPLEX 30 MIN: CPT

## 2021-01-01 PROCEDURE — 86140 C-REACTIVE PROTEIN: CPT

## 2021-01-01 PROCEDURE — 88305 TISSUE EXAM BY PATHOLOGIST: CPT

## 2021-01-01 PROCEDURE — 99239 HOSP IP/OBS DSCHRG MGMT >30: CPT | Performed by: INTERNAL MEDICINE

## 2021-01-01 PROCEDURE — 99214 OFFICE O/P EST MOD 30 MIN: CPT | Performed by: NURSE PRACTITIONER

## 2021-01-01 PROCEDURE — 92611 MOTION FLUOROSCOPY/SWALLOW: CPT | Performed by: SPEECH-LANGUAGE PATHOLOGIST

## 2021-01-01 PROCEDURE — 87340 HEPATITIS B SURFACE AG IA: CPT

## 2021-01-01 PROCEDURE — 82010 KETONE BODYS QUAN: CPT

## 2021-01-01 PROCEDURE — 85730 THROMBOPLASTIN TIME PARTIAL: CPT

## 2021-01-01 PROCEDURE — 87081 CULTURE SCREEN ONLY: CPT

## 2021-01-01 PROCEDURE — 93308 TTE F-UP OR LMTD: CPT

## 2021-01-01 PROCEDURE — 84133 ASSAY OF URINE POTASSIUM: CPT

## 2021-01-01 PROCEDURE — 93005 ELECTROCARDIOGRAM TRACING: CPT | Performed by: PHYSICIAN ASSISTANT

## 2021-01-01 PROCEDURE — 97161 PT EVAL LOW COMPLEX 20 MIN: CPT

## 2021-01-01 RX ORDER — LEVOTHYROXINE SODIUM 0.05 MG/1
50 TABLET ORAL DAILY
Status: DISCONTINUED | OUTPATIENT
Start: 2021-01-01 | End: 2021-01-01 | Stop reason: HOSPADM

## 2021-01-01 RX ORDER — SODIUM CHLORIDE 0.9 % (FLUSH) 0.9 %
10 SYRINGE (ML) INJECTION EVERY 12 HOURS SCHEDULED
Status: DISCONTINUED | OUTPATIENT
Start: 2021-01-01 | End: 2021-01-01 | Stop reason: HOSPADM

## 2021-01-01 RX ORDER — BUMETANIDE 1 MG/1
1 TABLET ORAL DAILY
Status: DISCONTINUED | OUTPATIENT
Start: 2021-01-01 | End: 2021-01-01

## 2021-01-01 RX ORDER — ALBUTEROL SULFATE 2.5 MG/3ML
2.5 SOLUTION RESPIRATORY (INHALATION) 4 TIMES DAILY
Status: DISCONTINUED | OUTPATIENT
Start: 2021-01-01 | End: 2021-01-01 | Stop reason: HOSPADM

## 2021-01-01 RX ORDER — HYDROCODONE BITARTRATE AND ACETAMINOPHEN 5; 325 MG/1; MG/1
1 TABLET ORAL EVERY 6 HOURS PRN
Status: DISCONTINUED | OUTPATIENT
Start: 2021-01-01 | End: 2021-01-01 | Stop reason: HOSPADM

## 2021-01-01 RX ORDER — METOCLOPRAMIDE HYDROCHLORIDE 5 MG/ML
5 INJECTION INTRAMUSCULAR; INTRAVENOUS ONCE
Status: COMPLETED | OUTPATIENT
Start: 2021-01-01 | End: 2021-01-01

## 2021-01-01 RX ORDER — FUROSEMIDE 10 MG/ML
40 INJECTION INTRAMUSCULAR; INTRAVENOUS ONCE
Status: COMPLETED | OUTPATIENT
Start: 2021-01-01 | End: 2021-01-01

## 2021-01-01 RX ORDER — SPIRONOLACTONE 25 MG/1
12.5 TABLET ORAL DAILY
Status: DISCONTINUED | OUTPATIENT
Start: 2021-01-01 | End: 2021-01-01

## 2021-01-01 RX ORDER — BUMETANIDE 0.25 MG/ML
2 INJECTION, SOLUTION INTRAMUSCULAR; INTRAVENOUS ONCE
Status: COMPLETED | OUTPATIENT
Start: 2021-01-01 | End: 2021-01-01

## 2021-01-01 RX ORDER — REPAGLINIDE 0.5 MG/1
1 TABLET ORAL
Refills: 1 | Status: DISCONTINUED | OUTPATIENT
Start: 2021-01-01 | End: 2021-01-01

## 2021-01-01 RX ORDER — HEPARIN SODIUM 10000 [USP'U]/ML
5000 INJECTION, SOLUTION INTRAVENOUS; SUBCUTANEOUS EVERY 8 HOURS SCHEDULED
Status: DISCONTINUED | OUTPATIENT
Start: 2021-01-01 | End: 2021-01-01 | Stop reason: HOSPADM

## 2021-01-01 RX ORDER — GUAIFENESIN 400 MG/1
400 TABLET ORAL 3 TIMES DAILY
Status: DISCONTINUED | OUTPATIENT
Start: 2021-01-01 | End: 2021-01-01 | Stop reason: HOSPADM

## 2021-01-01 RX ORDER — IPRATROPIUM BROMIDE AND ALBUTEROL SULFATE 2.5; .5 MG/3ML; MG/3ML
1 SOLUTION RESPIRATORY (INHALATION) EVERY 4 HOURS PRN
Status: DISCONTINUED | OUTPATIENT
Start: 2021-01-01 | End: 2021-01-01 | Stop reason: HOSPADM

## 2021-01-01 RX ORDER — HYDRALAZINE HYDROCHLORIDE 25 MG/1
25 TABLET, FILM COATED ORAL EVERY 12 HOURS SCHEDULED
Status: DISCONTINUED | OUTPATIENT
Start: 2021-01-01 | End: 2021-01-01

## 2021-01-01 RX ORDER — DEXAMETHASONE SODIUM PHOSPHATE 4 MG/ML
6 INJECTION, SOLUTION INTRA-ARTICULAR; INTRALESIONAL; INTRAMUSCULAR; INTRAVENOUS; SOFT TISSUE EVERY 12 HOURS
Status: DISCONTINUED | OUTPATIENT
Start: 2021-01-01 | End: 2021-01-01

## 2021-01-01 RX ORDER — ONDANSETRON 2 MG/ML
4 INJECTION INTRAMUSCULAR; INTRAVENOUS EVERY 6 HOURS PRN
Status: DISCONTINUED | OUTPATIENT
Start: 2021-01-01 | End: 2021-01-01 | Stop reason: HOSPADM

## 2021-01-01 RX ORDER — LOSARTAN POTASSIUM 100 MG/1
100 TABLET ORAL DAILY
Status: ON HOLD | COMMUNITY
End: 2021-01-01 | Stop reason: HOSPADM

## 2021-01-01 RX ORDER — SODIUM CHLORIDE 9 MG/ML
1000 INJECTION, SOLUTION INTRAVENOUS CONTINUOUS
Status: DISCONTINUED | OUTPATIENT
Start: 2021-01-01 | End: 2021-01-01

## 2021-01-01 RX ORDER — PROMETHAZINE HYDROCHLORIDE 25 MG/1
12.5 TABLET ORAL EVERY 6 HOURS PRN
Status: DISCONTINUED | OUTPATIENT
Start: 2021-01-01 | End: 2021-01-01 | Stop reason: HOSPADM

## 2021-01-01 RX ORDER — ISOSORBIDE DINITRATE 10 MG/1
10 TABLET ORAL 3 TIMES DAILY
Status: DISCONTINUED | OUTPATIENT
Start: 2021-01-01 | End: 2021-01-01 | Stop reason: HOSPADM

## 2021-01-01 RX ORDER — ASPIRIN 81 MG/1
81 TABLET ORAL DAILY
Qty: 30 TABLET | Refills: 0 | Status: SHIPPED | OUTPATIENT
Start: 2021-01-01

## 2021-01-01 RX ORDER — ISOSORBIDE DINITRATE 10 MG/1
10 TABLET ORAL 2 TIMES DAILY WITH MEALS
Status: DISCONTINUED | OUTPATIENT
Start: 2021-01-01 | End: 2021-01-01

## 2021-01-01 RX ORDER — GLIMEPIRIDE 4 MG/1
4 TABLET ORAL
Status: DISCONTINUED | OUTPATIENT
Start: 2021-01-01 | End: 2021-01-01

## 2021-01-01 RX ORDER — MORPHINE SULFATE 2 MG/ML
2 INJECTION, SOLUTION INTRAMUSCULAR; INTRAVENOUS EVERY 4 HOURS PRN
Status: DISCONTINUED | OUTPATIENT
Start: 2021-01-01 | End: 2021-01-01

## 2021-01-01 RX ORDER — DEXTROSE MONOHYDRATE 50 MG/ML
100 INJECTION, SOLUTION INTRAVENOUS PRN
Status: DISCONTINUED | OUTPATIENT
Start: 2021-01-01 | End: 2021-01-01 | Stop reason: HOSPADM

## 2021-01-01 RX ORDER — DEXTROSE MONOHYDRATE 25 G/50ML
12.5 INJECTION, SOLUTION INTRAVENOUS PRN
Status: DISCONTINUED | OUTPATIENT
Start: 2021-01-01 | End: 2021-01-01 | Stop reason: HOSPADM

## 2021-01-01 RX ORDER — SODIUM CHLORIDE 9 MG/ML
25 INJECTION, SOLUTION INTRAVENOUS PRN
Status: DISCONTINUED | OUTPATIENT
Start: 2021-01-01 | End: 2021-01-01 | Stop reason: HOSPADM

## 2021-01-01 RX ORDER — NICOTINE POLACRILEX 4 MG
15 LOZENGE BUCCAL PRN
Status: DISCONTINUED | OUTPATIENT
Start: 2021-01-01 | End: 2021-01-01 | Stop reason: HOSPADM

## 2021-01-01 RX ORDER — ASCORBIC ACID 500 MG
500 TABLET ORAL DAILY
Status: DISCONTINUED | OUTPATIENT
Start: 2021-01-01 | End: 2021-01-01 | Stop reason: HOSPADM

## 2021-01-01 RX ORDER — POTASSIUM BICARBONATE 25 MEQ/1
50 TABLET, EFFERVESCENT ORAL ONCE
Status: DISCONTINUED | OUTPATIENT
Start: 2021-01-01 | End: 2021-01-01

## 2021-01-01 RX ORDER — ACETAMINOPHEN 500 MG
500 TABLET ORAL ONCE
Status: COMPLETED | OUTPATIENT
Start: 2021-01-01 | End: 2021-01-01

## 2021-01-01 RX ORDER — CLONIDINE HYDROCHLORIDE 0.1 MG/1
0.2 TABLET ORAL 2 TIMES DAILY
Status: DISCONTINUED | OUTPATIENT
Start: 2021-01-01 | End: 2021-01-01 | Stop reason: HOSPADM

## 2021-01-01 RX ORDER — BUMETANIDE 1 MG/1
1 TABLET ORAL DAILY
Qty: 30 TABLET | Refills: 1 | Status: SHIPPED | OUTPATIENT
Start: 2021-01-01

## 2021-01-01 RX ORDER — SODIUM CHLORIDE 9 MG/ML
25 INJECTION, SOLUTION INTRAVENOUS EVERY 12 HOURS
Status: DISCONTINUED | OUTPATIENT
Start: 2021-01-01 | End: 2021-01-01

## 2021-01-01 RX ORDER — NITROGLYCERIN 0.4 MG/1
0.4 TABLET SUBLINGUAL EVERY 5 MIN PRN
Status: DISCONTINUED | OUTPATIENT
Start: 2021-01-01 | End: 2021-01-01 | Stop reason: HOSPADM

## 2021-01-01 RX ORDER — BUMETANIDE 1 MG/1
2 TABLET ORAL DAILY
Status: DISCONTINUED | OUTPATIENT
Start: 2021-01-01 | End: 2021-01-01

## 2021-01-01 RX ORDER — ASPIRIN 81 MG/1
324 TABLET, CHEWABLE ORAL ONCE
Qty: 30 TABLET | Refills: 3 | Status: SHIPPED | OUTPATIENT
Start: 2021-01-01 | End: 2021-01-01 | Stop reason: HOSPADM

## 2021-01-01 RX ORDER — SPIRONOLACTONE 25 MG/1
12.5 TABLET ORAL DAILY
Qty: 30 TABLET | Refills: 3 | Status: SHIPPED | OUTPATIENT
Start: 2021-01-01

## 2021-01-01 RX ORDER — ACETAMINOPHEN 500 MG
500 TABLET ORAL EVERY 6 HOURS PRN
Status: DISCONTINUED | OUTPATIENT
Start: 2021-01-01 | End: 2021-01-01 | Stop reason: SDUPTHER

## 2021-01-01 RX ORDER — METOPROLOL SUCCINATE 25 MG/1
37.5 TABLET, EXTENDED RELEASE ORAL 2 TIMES DAILY
Status: DISCONTINUED | OUTPATIENT
Start: 2021-01-01 | End: 2021-01-01 | Stop reason: HOSPADM

## 2021-01-01 RX ORDER — BUMETANIDE 0.25 MG/ML
1 INJECTION, SOLUTION INTRAMUSCULAR; INTRAVENOUS DAILY
Status: DISCONTINUED | OUTPATIENT
Start: 2021-01-01 | End: 2021-01-01

## 2021-01-01 RX ORDER — MAGNESIUM OXIDE 400 MG/1
400 TABLET ORAL 2 TIMES DAILY
Status: ON HOLD | COMMUNITY
End: 2021-01-01

## 2021-01-01 RX ORDER — SODIUM CHLORIDE 0.9 % (FLUSH) 0.9 %
10 SYRINGE (ML) INJECTION PRN
Status: CANCELLED | OUTPATIENT
Start: 2021-01-01

## 2021-01-01 RX ORDER — POLYETHYLENE GLYCOL 3350 17 G/17G
17 POWDER, FOR SOLUTION ORAL DAILY PRN
Status: DISCONTINUED | OUTPATIENT
Start: 2021-01-01 | End: 2021-01-01 | Stop reason: HOSPADM

## 2021-01-01 RX ORDER — HEPARIN SODIUM (PORCINE) LOCK FLUSH IV SOLN 100 UNIT/ML 100 UNIT/ML
10 SOLUTION INTRAVENOUS ONCE
Status: COMPLETED | OUTPATIENT
Start: 2021-01-01 | End: 2021-01-01

## 2021-01-01 RX ORDER — METOPROLOL TARTRATE 5 MG/5ML
5 INJECTION INTRAVENOUS 3 TIMES DAILY
Status: DISCONTINUED | OUTPATIENT
Start: 2021-01-01 | End: 2021-01-01 | Stop reason: HOSPADM

## 2021-01-01 RX ORDER — NATEGLINIDE 60 MG/1
60 TABLET ORAL
Qty: 90 TABLET | Refills: 1 | Status: SHIPPED | OUTPATIENT
Start: 2021-01-01 | End: 2022-03-26

## 2021-01-01 RX ORDER — GLIMEPIRIDE 4 MG/1
TABLET ORAL
Status: ON HOLD | COMMUNITY
End: 2021-01-01 | Stop reason: HOSPADM

## 2021-01-01 RX ORDER — ERGOCALCIFEROL 1.25 MG/1
50000 CAPSULE ORAL WEEKLY
Status: DISCONTINUED | OUTPATIENT
Start: 2021-01-01 | End: 2021-01-01 | Stop reason: HOSPADM

## 2021-01-01 RX ORDER — MORPHINE SULFATE 2 MG/ML
2 INJECTION, SOLUTION INTRAMUSCULAR; INTRAVENOUS ONCE
Status: COMPLETED | OUTPATIENT
Start: 2021-01-01 | End: 2021-01-01

## 2021-01-01 RX ORDER — FUROSEMIDE 10 MG/ML
80 INJECTION INTRAMUSCULAR; INTRAVENOUS ONCE
Status: COMPLETED | OUTPATIENT
Start: 2021-01-01 | End: 2021-01-01

## 2021-01-01 RX ORDER — GLIMEPIRIDE 4 MG/1
4 TABLET ORAL
Status: DISCONTINUED | OUTPATIENT
Start: 2021-01-01 | End: 2021-01-01 | Stop reason: HOSPADM

## 2021-01-01 RX ORDER — ASPIRIN 81 MG/1
81 TABLET ORAL DAILY
Status: DISCONTINUED | OUTPATIENT
Start: 2021-01-01 | End: 2021-01-01 | Stop reason: HOSPADM

## 2021-01-01 RX ORDER — SODIUM CHLORIDE 0.9 % (FLUSH) 0.9 %
10 SYRINGE (ML) INJECTION PRN
Status: DISCONTINUED | OUTPATIENT
Start: 2021-01-01 | End: 2021-01-01 | Stop reason: HOSPADM

## 2021-01-01 RX ORDER — METOPROLOL SUCCINATE 25 MG/1
25 TABLET, EXTENDED RELEASE ORAL 2 TIMES DAILY
Status: DISCONTINUED | OUTPATIENT
Start: 2021-01-01 | End: 2021-01-01

## 2021-01-01 RX ORDER — ALBUTEROL SULFATE 90 UG/1
2 AEROSOL, METERED RESPIRATORY (INHALATION)
Status: DISCONTINUED | OUTPATIENT
Start: 2021-01-01 | End: 2021-01-01

## 2021-01-01 RX ORDER — ACETAMINOPHEN 650 MG/1
650 SUPPOSITORY RECTAL EVERY 6 HOURS PRN
Status: DISCONTINUED | OUTPATIENT
Start: 2021-01-01 | End: 2021-01-01 | Stop reason: HOSPADM

## 2021-01-01 RX ORDER — ASPIRIN 81 MG/1
324 TABLET, CHEWABLE ORAL ONCE
Status: DISCONTINUED | OUTPATIENT
Start: 2021-01-01 | End: 2021-01-01 | Stop reason: HOSPADM

## 2021-01-01 RX ORDER — ACETAMINOPHEN 325 MG/1
650 TABLET ORAL EVERY 6 HOURS PRN
Status: DISCONTINUED | OUTPATIENT
Start: 2021-01-01 | End: 2021-01-01 | Stop reason: HOSPADM

## 2021-01-01 RX ORDER — HEPARIN SODIUM 10000 [USP'U]/ML
5000 INJECTION, SOLUTION INTRAVENOUS; SUBCUTANEOUS EVERY 8 HOURS
Status: DISCONTINUED | OUTPATIENT
Start: 2021-01-01 | End: 2021-01-01 | Stop reason: HOSPADM

## 2021-01-01 RX ORDER — CEFUROXIME AXETIL 250 MG/1
250 TABLET ORAL 2 TIMES DAILY
Qty: 8 TABLET | Refills: 0 | Status: ON HOLD | OUTPATIENT
Start: 2021-01-01 | End: 2021-01-01

## 2021-01-01 RX ORDER — METOPROLOL SUCCINATE 25 MG/1
25 TABLET, EXTENDED RELEASE ORAL DAILY
Status: DISCONTINUED | OUTPATIENT
Start: 2021-01-01 | End: 2021-01-01 | Stop reason: HOSPADM

## 2021-01-01 RX ORDER — ACETAMINOPHEN 500 MG
500 TABLET ORAL EVERY 6 HOURS PRN
Status: DISCONTINUED | OUTPATIENT
Start: 2021-01-01 | End: 2021-01-01 | Stop reason: HOSPADM

## 2021-01-01 RX ORDER — MORPHINE SULFATE 2 MG/ML
2 INJECTION, SOLUTION INTRAMUSCULAR; INTRAVENOUS
Status: DISCONTINUED | OUTPATIENT
Start: 2021-01-01 | End: 2021-01-01 | Stop reason: HOSPADM

## 2021-01-01 RX ORDER — SODIUM CHLORIDE 9 MG/ML
INJECTION, SOLUTION INTRAVENOUS CONTINUOUS
Status: ACTIVE | OUTPATIENT
Start: 2021-01-01 | End: 2021-01-01

## 2021-01-01 RX ORDER — BUMETANIDE 0.25 MG/ML
1 INJECTION, SOLUTION INTRAMUSCULAR; INTRAVENOUS 2 TIMES DAILY
Status: DISCONTINUED | OUTPATIENT
Start: 2021-01-01 | End: 2021-01-01 | Stop reason: HOSPADM

## 2021-01-01 RX ORDER — BUMETANIDE 0.25 MG/ML
1 INJECTION, SOLUTION INTRAMUSCULAR; INTRAVENOUS 2 TIMES DAILY
Status: DISCONTINUED | OUTPATIENT
Start: 2021-01-01 | End: 2021-01-01

## 2021-01-01 RX ORDER — CLONIDINE HYDROCHLORIDE 0.2 MG/1
0.2 TABLET ORAL 2 TIMES DAILY
Status: DISCONTINUED | OUTPATIENT
Start: 2021-01-01 | End: 2021-01-01 | Stop reason: HOSPADM

## 2021-01-01 RX ORDER — SODIUM CHLORIDE 0.9 % (FLUSH) 0.9 %
10 SYRINGE (ML) INJECTION EVERY 12 HOURS SCHEDULED
Status: CANCELLED | OUTPATIENT
Start: 2021-01-01

## 2021-01-01 RX ORDER — TRAMADOL HYDROCHLORIDE 50 MG/1
50 TABLET ORAL EVERY 6 HOURS PRN
Status: DISCONTINUED | OUTPATIENT
Start: 2021-01-01 | End: 2021-01-01 | Stop reason: HOSPADM

## 2021-01-01 RX ORDER — SPIRONOLACTONE 25 MG/1
12.5 TABLET ORAL DAILY
Status: DISCONTINUED | OUTPATIENT
Start: 2021-01-01 | End: 2021-01-01 | Stop reason: HOSPADM

## 2021-01-01 RX ORDER — DILTIAZEM HYDROCHLORIDE 5 MG/ML
10 INJECTION INTRAVENOUS ONCE
Status: DISCONTINUED | OUTPATIENT
Start: 2021-01-01 | End: 2021-01-01 | Stop reason: HOSPADM

## 2021-01-01 RX ORDER — LEVALBUTEROL INHALATION SOLUTION 0.63 MG/3ML
0.63 SOLUTION RESPIRATORY (INHALATION) EVERY 6 HOURS
Status: DISCONTINUED | OUTPATIENT
Start: 2021-01-01 | End: 2021-01-01 | Stop reason: HOSPADM

## 2021-01-01 RX ORDER — ZINC SULFATE 50(220)MG
50 CAPSULE ORAL DAILY
Status: DISCONTINUED | OUTPATIENT
Start: 2021-01-01 | End: 2021-01-01 | Stop reason: HOSPADM

## 2021-01-01 RX ORDER — POTASSIUM CHLORIDE 750 MG/1
10 TABLET, EXTENDED RELEASE ORAL DAILY
Status: DISCONTINUED | OUTPATIENT
Start: 2021-01-01 | End: 2021-01-01

## 2021-01-01 RX ORDER — SODIUM CHLORIDE 9 MG/ML
1000 INJECTION, SOLUTION INTRAVENOUS CONTINUOUS
Status: ACTIVE | OUTPATIENT
Start: 2021-01-01 | End: 2021-01-01

## 2021-01-01 RX ORDER — FUROSEMIDE 10 MG/ML
40 INJECTION INTRAMUSCULAR; INTRAVENOUS DAILY
Status: DISCONTINUED | OUTPATIENT
Start: 2021-01-01 | End: 2021-01-01

## 2021-01-01 RX ORDER — METOPROLOL SUCCINATE 25 MG/1
25 TABLET, EXTENDED RELEASE ORAL DAILY
Status: DISCONTINUED | OUTPATIENT
Start: 2021-01-01 | End: 2021-01-01

## 2021-01-01 RX ORDER — LOSARTAN POTASSIUM 50 MG/1
100 TABLET ORAL DAILY
Status: DISCONTINUED | OUTPATIENT
Start: 2021-01-01 | End: 2021-01-01 | Stop reason: HOSPADM

## 2021-01-01 RX ORDER — TRAMADOL HYDROCHLORIDE 50 MG/1
25 TABLET ORAL EVERY 4 HOURS PRN
Status: DISCONTINUED | OUTPATIENT
Start: 2021-01-01 | End: 2021-01-01 | Stop reason: HOSPADM

## 2021-01-01 RX ORDER — HYDRALAZINE HYDROCHLORIDE 25 MG/1
25 TABLET, FILM COATED ORAL EVERY 8 HOURS SCHEDULED
Status: DISCONTINUED | OUTPATIENT
Start: 2021-01-01 | End: 2021-01-01 | Stop reason: HOSPADM

## 2021-01-01 RX ORDER — CLONIDINE HYDROCHLORIDE 0.1 MG/1
0.2 TABLET ORAL 2 TIMES DAILY
Status: DISCONTINUED | OUTPATIENT
Start: 2021-01-01 | End: 2021-01-01

## 2021-01-01 RX ADMIN — REPAGLINIDE 1 MG: 0.5 TABLET ORAL at 12:09

## 2021-01-01 RX ADMIN — FUROSEMIDE 40 MG: 10 INJECTION, SOLUTION INTRAVENOUS at 10:13

## 2021-01-01 RX ADMIN — INSULIN LISPRO 1 UNITS: 100 INJECTION, SOLUTION INTRAVENOUS; SUBCUTANEOUS at 11:55

## 2021-01-01 RX ADMIN — ACETAMINOPHEN 650 MG: 325 TABLET ORAL at 00:23

## 2021-01-01 RX ADMIN — VANCOMYCIN HYDROCHLORIDE 1250 MG: 10 INJECTION, POWDER, LYOPHILIZED, FOR SOLUTION INTRAVENOUS at 04:08

## 2021-01-01 RX ADMIN — SODIUM CHLORIDE, PRESERVATIVE FREE 10 ML: 5 INJECTION INTRAVENOUS at 22:19

## 2021-01-01 RX ADMIN — ALBUTEROL SULFATE 2 PUFF: 90 AEROSOL, METERED RESPIRATORY (INHALATION) at 08:38

## 2021-01-01 RX ADMIN — INSULIN LISPRO 1 UNITS: 100 INJECTION, SOLUTION INTRAVENOUS; SUBCUTANEOUS at 12:10

## 2021-01-01 RX ADMIN — OXYCODONE HYDROCHLORIDE AND ACETAMINOPHEN 500 MG: 500 TABLET ORAL at 08:27

## 2021-01-01 RX ADMIN — SODIUM CHLORIDE, PRESERVATIVE FREE 10 ML: 5 INJECTION INTRAVENOUS at 07:47

## 2021-01-01 RX ADMIN — OXYCODONE HYDROCHLORIDE AND ACETAMINOPHEN 500 MG: 500 TABLET ORAL at 09:51

## 2021-01-01 RX ADMIN — CEFEPIME 2000 MG: 2 INJECTION, POWDER, FOR SOLUTION INTRAVENOUS at 16:21

## 2021-01-01 RX ADMIN — HYDRALAZINE HYDROCHLORIDE 25 MG: 25 TABLET, FILM COATED ORAL at 23:11

## 2021-01-01 RX ADMIN — ACETAMINOPHEN 650 MG: 325 TABLET ORAL at 15:47

## 2021-01-01 RX ADMIN — ENOXAPARIN SODIUM 30 MG: 30 INJECTION SUBCUTANEOUS at 08:28

## 2021-01-01 RX ADMIN — SODIUM CHLORIDE, PRESERVATIVE FREE 10 ML: 5 INJECTION INTRAVENOUS at 10:15

## 2021-01-01 RX ADMIN — METOPROLOL TARTRATE 5 MG: 5 INJECTION INTRAVENOUS at 13:30

## 2021-01-01 RX ADMIN — INSULIN LISPRO 2 UNITS: 100 INJECTION, SOLUTION INTRAVENOUS; SUBCUTANEOUS at 12:47

## 2021-01-01 RX ADMIN — Medication 10 ML: at 12:21

## 2021-01-01 RX ADMIN — LEVOTHYROXINE SODIUM 50 MCG: 0.05 TABLET ORAL at 05:31

## 2021-01-01 RX ADMIN — LEVOTHYROXINE SODIUM 50 MCG: 50 TABLET ORAL at 06:21

## 2021-01-01 RX ADMIN — REPAGLINIDE 1 MG: 0.5 TABLET ORAL at 11:42

## 2021-01-01 RX ADMIN — HEPARIN SODIUM 5000 UNITS: 10000 INJECTION INTRAVENOUS; SUBCUTANEOUS at 12:36

## 2021-01-01 RX ADMIN — REPAGLINIDE 1 MG: 0.5 TABLET ORAL at 11:09

## 2021-01-01 RX ADMIN — SODIUM CHLORIDE, PRESERVATIVE FREE 10 ML: 5 INJECTION INTRAVENOUS at 20:46

## 2021-01-01 RX ADMIN — HEPARIN SODIUM 5000 UNITS: 10000 INJECTION INTRAVENOUS; SUBCUTANEOUS at 16:07

## 2021-01-01 RX ADMIN — HEPARIN SODIUM 5000 UNITS: 10000 INJECTION INTRAVENOUS; SUBCUTANEOUS at 00:38

## 2021-01-01 RX ADMIN — ACETAMINOPHEN 650 MG: 650 SUPPOSITORY RECTAL at 11:17

## 2021-01-01 RX ADMIN — Medication 10 ML: at 14:22

## 2021-01-01 RX ADMIN — SODIUM CHLORIDE, PRESERVATIVE FREE 10 ML: 5 INJECTION INTRAVENOUS at 09:39

## 2021-01-01 RX ADMIN — METOPROLOL TARTRATE 5 MG: 5 INJECTION INTRAVENOUS at 02:22

## 2021-01-01 RX ADMIN — SODIUM CHLORIDE, PRESERVATIVE FREE 10 ML: 5 INJECTION INTRAVENOUS at 11:59

## 2021-01-01 RX ADMIN — ENOXAPARIN SODIUM 30 MG: 30 INJECTION SUBCUTANEOUS at 08:40

## 2021-01-01 RX ADMIN — SODIUM CHLORIDE, PRESERVATIVE FREE 10 ML: 5 INJECTION INTRAVENOUS at 07:42

## 2021-01-01 RX ADMIN — ENOXAPARIN SODIUM 30 MG: 30 INJECTION SUBCUTANEOUS at 09:24

## 2021-01-01 RX ADMIN — INSULIN LISPRO 4 UNITS: 100 INJECTION, SOLUTION INTRAVENOUS; SUBCUTANEOUS at 10:46

## 2021-01-01 RX ADMIN — REPAGLINIDE 1 MG: 0.5 TABLET ORAL at 10:54

## 2021-01-01 RX ADMIN — ISOSORBIDE DINITRATE 10 MG: 10 TABLET ORAL at 14:59

## 2021-01-01 RX ADMIN — SODIUM CHLORIDE 25 ML: 9 INJECTION, SOLUTION INTRAVENOUS at 20:22

## 2021-01-01 RX ADMIN — LOSARTAN POTASSIUM 100 MG: 50 TABLET, FILM COATED ORAL at 12:36

## 2021-01-01 RX ADMIN — HEPARIN SODIUM 5000 UNITS: 10000 INJECTION INTRAVENOUS; SUBCUTANEOUS at 15:44

## 2021-01-01 RX ADMIN — ISOSORBIDE DINITRATE 10 MG: 10 TABLET ORAL at 14:32

## 2021-01-01 RX ADMIN — EPOETIN ALFA-EPBX 10000 UNITS: 10000 INJECTION, SOLUTION INTRAVENOUS; SUBCUTANEOUS at 12:54

## 2021-01-01 RX ADMIN — MAGNESIUM GLUCONATE 500 MG ORAL TABLET 400 MG: 500 TABLET ORAL at 12:00

## 2021-01-01 RX ADMIN — GUAIFENESIN 400 MG: 400 TABLET ORAL at 21:48

## 2021-01-01 RX ADMIN — INSULIN LISPRO 1 UNITS: 100 INJECTION, SOLUTION INTRAVENOUS; SUBCUTANEOUS at 20:42

## 2021-01-01 RX ADMIN — Medication 10 ML: at 13:30

## 2021-01-01 RX ADMIN — GUAIFENESIN 400 MG: 400 TABLET ORAL at 21:06

## 2021-01-01 RX ADMIN — METOPROLOL SUCCINATE 37.5 MG: 25 TABLET, FILM COATED, EXTENDED RELEASE ORAL at 20:52

## 2021-01-01 RX ADMIN — BUMETANIDE 2 MG: 1 TABLET ORAL at 08:56

## 2021-01-01 RX ADMIN — ISOSORBIDE DINITRATE 10 MG: 10 TABLET ORAL at 21:15

## 2021-01-01 RX ADMIN — CEFEPIME 2000 MG: 2 INJECTION, POWDER, FOR SOLUTION INTRAVENOUS at 14:18

## 2021-01-01 RX ADMIN — ALBUTEROL SULFATE 2 PUFF: 90 AEROSOL, METERED RESPIRATORY (INHALATION) at 21:18

## 2021-01-01 RX ADMIN — HEPARIN SODIUM 5000 UNITS: 10000 INJECTION INTRAVENOUS; SUBCUTANEOUS at 23:26

## 2021-01-01 RX ADMIN — ACETAMINOPHEN 650 MG: 325 TABLET ORAL at 18:19

## 2021-01-01 RX ADMIN — INSULIN LISPRO 1 UNITS: 100 INJECTION, SOLUTION INTRAVENOUS; SUBCUTANEOUS at 21:13

## 2021-01-01 RX ADMIN — LEVALBUTEROL HYDROCHLORIDE 0.63 MG: 0.63 SOLUTION RESPIRATORY (INHALATION) at 08:23

## 2021-01-01 RX ADMIN — VANCOMYCIN HYDROCHLORIDE 750 MG: 1 INJECTION, POWDER, LYOPHILIZED, FOR SOLUTION INTRAVENOUS at 06:10

## 2021-01-01 RX ADMIN — ACETAMINOPHEN 650 MG: 325 TABLET ORAL at 21:12

## 2021-01-01 RX ADMIN — HEPARIN SODIUM 5000 UNITS: 10000 INJECTION INTRAVENOUS; SUBCUTANEOUS at 11:35

## 2021-01-01 RX ADMIN — HYDRALAZINE HYDROCHLORIDE 25 MG: 25 TABLET, FILM COATED ORAL at 06:51

## 2021-01-01 RX ADMIN — INSULIN LISPRO 1 UNITS: 100 INJECTION, SOLUTION INTRAVENOUS; SUBCUTANEOUS at 12:33

## 2021-01-01 RX ADMIN — MAGNESIUM GLUCONATE 500 MG ORAL TABLET 400 MG: 500 TABLET ORAL at 08:36

## 2021-01-01 RX ADMIN — METOPROLOL SUCCINATE 37.5 MG: 25 TABLET, FILM COATED, EXTENDED RELEASE ORAL at 10:29

## 2021-01-01 RX ADMIN — INSULIN LISPRO 1 UNITS: 100 INJECTION, SOLUTION INTRAVENOUS; SUBCUTANEOUS at 16:30

## 2021-01-01 RX ADMIN — HYDRALAZINE HYDROCHLORIDE 25 MG: 25 TABLET, FILM COATED ORAL at 21:24

## 2021-01-01 RX ADMIN — ALBUTEROL SULFATE 2 PUFF: 90 AEROSOL, METERED RESPIRATORY (INHALATION) at 21:06

## 2021-01-01 RX ADMIN — VANCOMYCIN HYDROCHLORIDE 750 MG: 1 INJECTION, POWDER, LYOPHILIZED, FOR SOLUTION INTRAVENOUS at 08:47

## 2021-01-01 RX ADMIN — SPIRONOLACTONE 12.5 MG: 25 TABLET ORAL at 08:19

## 2021-01-01 RX ADMIN — BUMETANIDE 1 MG: 0.25 INJECTION, SOLUTION INTRAMUSCULAR; INTRAVENOUS at 21:45

## 2021-01-01 RX ADMIN — ISOSORBIDE DINITRATE 10 MG: 10 TABLET ORAL at 20:47

## 2021-01-01 RX ADMIN — SODIUM CHLORIDE, PRESERVATIVE FREE 10 ML: 5 INJECTION INTRAVENOUS at 20:14

## 2021-01-01 RX ADMIN — ENOXAPARIN SODIUM 30 MG: 30 INJECTION SUBCUTANEOUS at 10:14

## 2021-01-01 RX ADMIN — ASPIRIN 81 MG: 81 TABLET, COATED ORAL at 09:39

## 2021-01-01 RX ADMIN — MORPHINE SULFATE 2 MG: 2 INJECTION, SOLUTION INTRAMUSCULAR; INTRAVENOUS at 14:22

## 2021-01-01 RX ADMIN — ACETAMINOPHEN 650 MG: 325 TABLET ORAL at 10:11

## 2021-01-01 RX ADMIN — ASPIRIN 81 MG: 81 TABLET, COATED ORAL at 08:27

## 2021-01-01 RX ADMIN — LEVOTHYROXINE SODIUM 50 MCG: 0.05 TABLET ORAL at 05:11

## 2021-01-01 RX ADMIN — SODIUM CHLORIDE, PRESERVATIVE FREE 10 ML: 5 INJECTION INTRAVENOUS at 09:23

## 2021-01-01 RX ADMIN — HYDRALAZINE HYDROCHLORIDE 25 MG: 25 TABLET, FILM COATED ORAL at 15:41

## 2021-01-01 RX ADMIN — LEVOTHYROXINE SODIUM 50 MCG: 50 TABLET ORAL at 06:05

## 2021-01-01 RX ADMIN — ISOSORBIDE DINITRATE 10 MG: 10 TABLET ORAL at 09:40

## 2021-01-01 RX ADMIN — ALBUTEROL SULFATE 2 PUFF: 90 AEROSOL, METERED RESPIRATORY (INHALATION) at 11:42

## 2021-01-01 RX ADMIN — ALBUTEROL SULFATE 2 PUFF: 90 AEROSOL, METERED RESPIRATORY (INHALATION) at 09:11

## 2021-01-01 RX ADMIN — SODIUM CHLORIDE 25 ML: 9 INJECTION, SOLUTION INTRAVENOUS at 19:58

## 2021-01-01 RX ADMIN — CLONIDINE HYDROCHLORIDE 0.2 MG: 0.1 TABLET ORAL at 10:15

## 2021-01-01 RX ADMIN — SODIUM CHLORIDE 25 ML: 9 INJECTION, SOLUTION INTRAVENOUS at 07:30

## 2021-01-01 RX ADMIN — ASPIRIN 81 MG: 81 TABLET, COATED ORAL at 09:50

## 2021-01-01 RX ADMIN — CEFEPIME 2000 MG: 2 INJECTION, POWDER, FOR SOLUTION INTRAVENOUS at 03:21

## 2021-01-01 RX ADMIN — REPAGLINIDE 1 MG: 0.5 TABLET ORAL at 06:10

## 2021-01-01 RX ADMIN — ACETAMINOPHEN 500 MG: 500 TABLET ORAL at 23:20

## 2021-01-01 RX ADMIN — METOPROLOL SUCCINATE 37.5 MG: 25 TABLET, FILM COATED, EXTENDED RELEASE ORAL at 22:15

## 2021-01-01 RX ADMIN — ENOXAPARIN SODIUM 30 MG: 30 INJECTION SUBCUTANEOUS at 08:36

## 2021-01-01 RX ADMIN — HYDRALAZINE HYDROCHLORIDE 25 MG: 25 TABLET, FILM COATED ORAL at 05:49

## 2021-01-01 RX ADMIN — BUMETANIDE 2 MG: 1 TABLET ORAL at 09:39

## 2021-01-01 RX ADMIN — ISOSORBIDE DINITRATE 10 MG: 10 TABLET ORAL at 08:27

## 2021-01-01 RX ADMIN — ALBUTEROL SULFATE 2.5 MG: 2.5 SOLUTION RESPIRATORY (INHALATION) at 15:41

## 2021-01-01 RX ADMIN — REPAGLINIDE 1 MG: 0.5 TABLET ORAL at 06:04

## 2021-01-01 RX ADMIN — ACETAMINOPHEN 650 MG: 325 TABLET ORAL at 06:03

## 2021-01-01 RX ADMIN — ISOSORBIDE DINITRATE 10 MG: 10 TABLET ORAL at 21:06

## 2021-01-01 RX ADMIN — MAGNESIUM GLUCONATE 500 MG ORAL TABLET 400 MG: 500 TABLET ORAL at 20:28

## 2021-01-01 RX ADMIN — METOPROLOL SUCCINATE 37.5 MG: 25 TABLET, FILM COATED, EXTENDED RELEASE ORAL at 21:48

## 2021-01-01 RX ADMIN — ALBUTEROL SULFATE 2 PUFF: 90 AEROSOL, METERED RESPIRATORY (INHALATION) at 14:08

## 2021-01-01 RX ADMIN — ENOXAPARIN SODIUM 40 MG: 40 INJECTION SUBCUTANEOUS at 08:43

## 2021-01-01 RX ADMIN — REPAGLINIDE 1 MG: 0.5 TABLET ORAL at 15:51

## 2021-01-01 RX ADMIN — GUAIFENESIN 400 MG: 400 TABLET ORAL at 21:18

## 2021-01-01 RX ADMIN — METOPROLOL SUCCINATE 37.5 MG: 25 TABLET, FILM COATED, EXTENDED RELEASE ORAL at 20:24

## 2021-01-01 RX ADMIN — CEFEPIME 2000 MG: 2 INJECTION, POWDER, FOR SOLUTION INTRAVENOUS at 15:14

## 2021-01-01 RX ADMIN — ENOXAPARIN SODIUM 30 MG: 30 INJECTION SUBCUTANEOUS at 09:45

## 2021-01-01 RX ADMIN — CEFEPIME 2000 MG: 2 INJECTION, POWDER, FOR SOLUTION INTRAVENOUS at 02:59

## 2021-01-01 RX ADMIN — SODIUM CHLORIDE, PRESERVATIVE FREE 10 ML: 5 INJECTION INTRAVENOUS at 11:31

## 2021-01-01 RX ADMIN — INSULIN LISPRO 2 UNITS: 100 INJECTION, SOLUTION INTRAVENOUS; SUBCUTANEOUS at 21:41

## 2021-01-01 RX ADMIN — SODIUM CHLORIDE, PRESERVATIVE FREE 10 ML: 5 INJECTION INTRAVENOUS at 20:47

## 2021-01-01 RX ADMIN — HEPARIN SODIUM 5000 UNITS: 10000 INJECTION INTRAVENOUS; SUBCUTANEOUS at 06:25

## 2021-01-01 RX ADMIN — ASPIRIN 81 MG: 81 TABLET, COATED ORAL at 12:55

## 2021-01-01 RX ADMIN — HYDRALAZINE HYDROCHLORIDE 25 MG: 25 TABLET, FILM COATED ORAL at 08:19

## 2021-01-01 RX ADMIN — HEPARIN SODIUM 5000 UNITS: 10000 INJECTION INTRAVENOUS; SUBCUTANEOUS at 21:45

## 2021-01-01 RX ADMIN — SODIUM CHLORIDE 25 ML: 9 INJECTION, SOLUTION INTRAVENOUS at 06:20

## 2021-01-01 RX ADMIN — ALBUTEROL SULFATE 2.5 MG: 2.5 SOLUTION RESPIRATORY (INHALATION) at 21:34

## 2021-01-01 RX ADMIN — METOPROLOL SUCCINATE 37.5 MG: 25 TABLET, FILM COATED, EXTENDED RELEASE ORAL at 09:40

## 2021-01-01 RX ADMIN — ENOXAPARIN SODIUM 40 MG: 40 INJECTION SUBCUTANEOUS at 09:14

## 2021-01-01 RX ADMIN — ZINC SULFATE 220 MG (50 MG) CAPSULE 50 MG: CAPSULE at 09:23

## 2021-01-01 RX ADMIN — HYDRALAZINE HYDROCHLORIDE 25 MG: 25 TABLET, FILM COATED ORAL at 22:41

## 2021-01-01 RX ADMIN — ALBUTEROL SULFATE 2 PUFF: 90 AEROSOL, METERED RESPIRATORY (INHALATION) at 09:37

## 2021-01-01 RX ADMIN — SODIUM CHLORIDE, PRESERVATIVE FREE 10 ML: 5 INJECTION INTRAVENOUS at 08:44

## 2021-01-01 RX ADMIN — SODIUM CHLORIDE 25 ML: 9 INJECTION, SOLUTION INTRAVENOUS at 07:32

## 2021-01-01 RX ADMIN — MAGNESIUM GLUCONATE 500 MG ORAL TABLET 400 MG: 500 TABLET ORAL at 10:14

## 2021-01-01 RX ADMIN — ISOSORBIDE DINITRATE 10 MG: 10 TABLET ORAL at 08:24

## 2021-01-01 RX ADMIN — ACETAMINOPHEN 650 MG: 325 TABLET ORAL at 08:36

## 2021-01-01 RX ADMIN — ISOSORBIDE DINITRATE 10 MG: 10 TABLET ORAL at 08:44

## 2021-01-01 RX ADMIN — SODIUM CHLORIDE, PRESERVATIVE FREE 10 ML: 5 INJECTION INTRAVENOUS at 20:28

## 2021-01-01 RX ADMIN — LEVOTHYROXINE SODIUM 50 MCG: 0.05 TABLET ORAL at 06:21

## 2021-01-01 RX ADMIN — INSULIN LISPRO 3 UNITS: 100 INJECTION, SOLUTION INTRAVENOUS; SUBCUTANEOUS at 17:01

## 2021-01-01 RX ADMIN — ALBUTEROL SULFATE 2.5 MG: 2.5 SOLUTION RESPIRATORY (INHALATION) at 16:20

## 2021-01-01 RX ADMIN — LEVOTHYROXINE SODIUM 50 MCG: 50 TABLET ORAL at 06:10

## 2021-01-01 RX ADMIN — ERGOCALCIFEROL 50000 UNITS: 1.25 CAPSULE ORAL at 09:23

## 2021-01-01 RX ADMIN — CEFEPIME 2000 MG: 2 INJECTION, POWDER, FOR SOLUTION INTRAVENOUS at 14:56

## 2021-01-01 RX ADMIN — HYDRALAZINE HYDROCHLORIDE 25 MG: 25 TABLET, FILM COATED ORAL at 14:26

## 2021-01-01 RX ADMIN — GUAIFENESIN 400 MG: 400 TABLET ORAL at 13:50

## 2021-01-01 RX ADMIN — GUAIFENESIN 400 MG: 400 TABLET ORAL at 15:41

## 2021-01-01 RX ADMIN — ISOSORBIDE DINITRATE 10 MG: 10 TABLET ORAL at 21:48

## 2021-01-01 RX ADMIN — LEVOTHYROXINE SODIUM 50 MCG: 50 TABLET ORAL at 06:28

## 2021-01-01 RX ADMIN — SODIUM CHLORIDE, PRESERVATIVE FREE 10 ML: 5 INJECTION INTRAVENOUS at 08:36

## 2021-01-01 RX ADMIN — SODIUM CHLORIDE, PRESERVATIVE FREE 10 ML: 5 INJECTION INTRAVENOUS at 21:15

## 2021-01-01 RX ADMIN — INSULIN LISPRO 1 UNITS: 100 INJECTION, SOLUTION INTRAVENOUS; SUBCUTANEOUS at 17:01

## 2021-01-01 RX ADMIN — POTASSIUM BICARBONATE 40 MEQ: 782 TABLET, EFFERVESCENT ORAL at 09:40

## 2021-01-01 RX ADMIN — SODIUM CHLORIDE, PRESERVATIVE FREE 10 ML: 5 INJECTION INTRAVENOUS at 21:46

## 2021-01-01 RX ADMIN — HYDRALAZINE HYDROCHLORIDE 25 MG: 25 TABLET, FILM COATED ORAL at 22:16

## 2021-01-01 RX ADMIN — SODIUM CHLORIDE, PRESERVATIVE FREE 10 ML: 5 INJECTION INTRAVENOUS at 12:06

## 2021-01-01 RX ADMIN — METOPROLOL SUCCINATE 25 MG: 25 TABLET, EXTENDED RELEASE ORAL at 10:15

## 2021-01-01 RX ADMIN — LEVALBUTEROL HYDROCHLORIDE 0.63 MG: 0.63 SOLUTION RESPIRATORY (INHALATION) at 20:30

## 2021-01-01 RX ADMIN — CEFEPIME 2000 MG: 2 INJECTION, POWDER, FOR SOLUTION INTRAVENOUS at 08:33

## 2021-01-01 RX ADMIN — INSULIN LISPRO 1 UNITS: 100 INJECTION, SOLUTION INTRAVENOUS; SUBCUTANEOUS at 21:29

## 2021-01-01 RX ADMIN — ALBUTEROL SULFATE 2 PUFF: 90 AEROSOL, METERED RESPIRATORY (INHALATION) at 05:15

## 2021-01-01 RX ADMIN — ALBUTEROL SULFATE 2.5 MG: 2.5 SOLUTION RESPIRATORY (INHALATION) at 09:48

## 2021-01-01 RX ADMIN — ENOXAPARIN SODIUM 40 MG: 40 INJECTION SUBCUTANEOUS at 09:21

## 2021-01-01 RX ADMIN — ASPIRIN 81 MG: 81 TABLET, COATED ORAL at 08:24

## 2021-01-01 RX ADMIN — OXYCODONE HYDROCHLORIDE AND ACETAMINOPHEN 500 MG: 500 TABLET ORAL at 12:36

## 2021-01-01 RX ADMIN — GUAIFENESIN 400 MG: 400 TABLET ORAL at 14:54

## 2021-01-01 RX ADMIN — ENOXAPARIN SODIUM 30 MG: 30 INJECTION SUBCUTANEOUS at 10:30

## 2021-01-01 RX ADMIN — OXYCODONE HYDROCHLORIDE AND ACETAMINOPHEN 500 MG: 500 TABLET ORAL at 08:24

## 2021-01-01 RX ADMIN — ZINC SULFATE 220 MG (50 MG) CAPSULE 50 MG: CAPSULE at 09:40

## 2021-01-01 RX ADMIN — CLONIDINE HYDROCHLORIDE 0.2 MG: 0.2 TABLET ORAL at 08:36

## 2021-01-01 RX ADMIN — REPAGLINIDE 1 MG: 0.5 TABLET ORAL at 11:24

## 2021-01-01 RX ADMIN — MORPHINE SULFATE 2 MG: 2 INJECTION, SOLUTION INTRAMUSCULAR; INTRAVENOUS at 20:14

## 2021-01-01 RX ADMIN — INSULIN LISPRO 2 UNITS: 100 INJECTION, SOLUTION INTRAVENOUS; SUBCUTANEOUS at 17:20

## 2021-01-01 RX ADMIN — DEXAMETHASONE SODIUM PHOSPHATE 6 MG: 4 INJECTION, SOLUTION INTRAMUSCULAR; INTRAVENOUS at 10:32

## 2021-01-01 RX ADMIN — METOPROLOL SUCCINATE 37.5 MG: 25 TABLET, FILM COATED, EXTENDED RELEASE ORAL at 21:24

## 2021-01-01 RX ADMIN — SODIUM CHLORIDE, PRESERVATIVE FREE 10 ML: 5 INJECTION INTRAVENOUS at 08:59

## 2021-01-01 RX ADMIN — ALBUTEROL SULFATE 2 PUFF: 90 AEROSOL, METERED RESPIRATORY (INHALATION) at 20:34

## 2021-01-01 RX ADMIN — HYDRALAZINE HYDROCHLORIDE 25 MG: 25 TABLET, FILM COATED ORAL at 21:15

## 2021-01-01 RX ADMIN — GUAIFENESIN 400 MG: 400 TABLET ORAL at 14:26

## 2021-01-01 RX ADMIN — ACETAMINOPHEN 650 MG: 325 TABLET ORAL at 09:22

## 2021-01-01 RX ADMIN — ALBUTEROL SULFATE 2 PUFF: 90 AEROSOL, METERED RESPIRATORY (INHALATION) at 13:50

## 2021-01-01 RX ADMIN — REPAGLINIDE 1 MG: 0.5 TABLET ORAL at 12:34

## 2021-01-01 RX ADMIN — REPAGLINIDE 1 MG: 0.5 TABLET ORAL at 06:26

## 2021-01-01 RX ADMIN — SODIUM CHLORIDE 25 ML: 9 INJECTION, SOLUTION INTRAVENOUS at 18:56

## 2021-01-01 RX ADMIN — ACETAMINOPHEN 650 MG: 325 TABLET ORAL at 08:45

## 2021-01-01 RX ADMIN — Medication 10 ML: at 20:15

## 2021-01-01 RX ADMIN — SODIUM CHLORIDE, PRESERVATIVE FREE 10 ML: 5 INJECTION INTRAVENOUS at 21:59

## 2021-01-01 RX ADMIN — REPAGLINIDE 1 MG: 0.5 TABLET ORAL at 17:01

## 2021-01-01 RX ADMIN — ENOXAPARIN SODIUM 30 MG: 30 INJECTION SUBCUTANEOUS at 08:57

## 2021-01-01 RX ADMIN — INSULIN LISPRO 1 UNITS: 100 INJECTION, SOLUTION INTRAVENOUS; SUBCUTANEOUS at 11:00

## 2021-01-01 RX ADMIN — SPIRONOLACTONE 12.5 MG: 25 TABLET ORAL at 12:00

## 2021-01-01 RX ADMIN — ALBUTEROL SULFATE 2 PUFF: 90 AEROSOL, METERED RESPIRATORY (INHALATION) at 22:13

## 2021-01-01 RX ADMIN — SODIUM CHLORIDE, PRESERVATIVE FREE 10 ML: 5 INJECTION INTRAVENOUS at 11:24

## 2021-01-01 RX ADMIN — LOSARTAN POTASSIUM 100 MG: 50 TABLET, FILM COATED ORAL at 15:49

## 2021-01-01 RX ADMIN — ZINC SULFATE 220 MG (50 MG) CAPSULE 50 MG: CAPSULE at 12:38

## 2021-01-01 RX ADMIN — MORPHINE SULFATE 2 MG: 2 INJECTION, SOLUTION INTRAMUSCULAR; INTRAVENOUS at 16:56

## 2021-01-01 RX ADMIN — INSULIN LISPRO 2 UNITS: 100 INJECTION, SOLUTION INTRAVENOUS; SUBCUTANEOUS at 11:35

## 2021-01-01 RX ADMIN — HYDRALAZINE HYDROCHLORIDE 25 MG: 25 TABLET, FILM COATED ORAL at 22:00

## 2021-01-01 RX ADMIN — HEPARIN SODIUM 5000 UNITS: 10000 INJECTION INTRAVENOUS; SUBCUTANEOUS at 13:38

## 2021-01-01 RX ADMIN — METOPROLOL SUCCINATE 37.5 MG: 25 TABLET, FILM COATED, EXTENDED RELEASE ORAL at 08:44

## 2021-01-01 RX ADMIN — ONDANSETRON 4 MG: 2 INJECTION INTRAMUSCULAR; INTRAVENOUS at 11:06

## 2021-01-01 RX ADMIN — CEFEPIME 2000 MG: 2 INJECTION, POWDER, FOR SOLUTION INTRAVENOUS at 15:00

## 2021-01-01 RX ADMIN — ISOSORBIDE DINITRATE 10 MG: 10 TABLET ORAL at 15:41

## 2021-01-01 RX ADMIN — ALBUTEROL SULFATE 2.5 MG: 2.5 SOLUTION RESPIRATORY (INHALATION) at 11:41

## 2021-01-01 RX ADMIN — LEVOTHYROXINE SODIUM 50 MCG: 50 TABLET ORAL at 06:47

## 2021-01-01 RX ADMIN — ISOSORBIDE DINITRATE 10 MG: 10 TABLET ORAL at 15:11

## 2021-01-01 RX ADMIN — METOPROLOL SUCCINATE 25 MG: 25 TABLET, EXTENDED RELEASE ORAL at 08:36

## 2021-01-01 RX ADMIN — ISOSORBIDE DINITRATE 10 MG: 10 TABLET ORAL at 13:16

## 2021-01-01 RX ADMIN — HYDRALAZINE HYDROCHLORIDE 25 MG: 25 TABLET, FILM COATED ORAL at 06:53

## 2021-01-01 RX ADMIN — ALBUTEROL SULFATE 2.5 MG: 2.5 SOLUTION RESPIRATORY (INHALATION) at 04:49

## 2021-01-01 RX ADMIN — INSULIN LISPRO 2 UNITS: 100 INJECTION, SOLUTION INTRAVENOUS; SUBCUTANEOUS at 11:19

## 2021-01-01 RX ADMIN — ISOSORBIDE DINITRATE 10 MG: 10 TABLET ORAL at 14:41

## 2021-01-01 RX ADMIN — METOPROLOL SUCCINATE 37.5 MG: 25 TABLET, FILM COATED, EXTENDED RELEASE ORAL at 22:08

## 2021-01-01 RX ADMIN — INSULIN LISPRO 1 UNITS: 100 INJECTION, SOLUTION INTRAVENOUS; SUBCUTANEOUS at 12:05

## 2021-01-01 RX ADMIN — ACETAMINOPHEN 650 MG: 325 TABLET ORAL at 09:51

## 2021-01-01 RX ADMIN — MORPHINE SULFATE 2 MG: 2 INJECTION, SOLUTION INTRAMUSCULAR; INTRAVENOUS at 02:22

## 2021-01-01 RX ADMIN — INSULIN LISPRO 4 UNITS: 100 INJECTION, SOLUTION INTRAVENOUS; SUBCUTANEOUS at 17:15

## 2021-01-01 RX ADMIN — HYDRALAZINE HYDROCHLORIDE 25 MG: 25 TABLET, FILM COATED ORAL at 06:29

## 2021-01-01 RX ADMIN — CLONIDINE HYDROCHLORIDE 0.2 MG: 0.1 TABLET ORAL at 09:19

## 2021-01-01 RX ADMIN — ZINC SULFATE 220 MG (50 MG) CAPSULE 50 MG: CAPSULE at 08:28

## 2021-01-01 RX ADMIN — BUMETANIDE 2 MG: 1 TABLET ORAL at 09:43

## 2021-01-01 RX ADMIN — HEPARIN SODIUM 5000 UNITS: 10000 INJECTION INTRAVENOUS; SUBCUTANEOUS at 16:31

## 2021-01-01 RX ADMIN — ALBUTEROL SULFATE 2 PUFF: 90 AEROSOL, METERED RESPIRATORY (INHALATION) at 16:23

## 2021-01-01 RX ADMIN — INSULIN LISPRO 1 UNITS: 100 INJECTION, SOLUTION INTRAVENOUS; SUBCUTANEOUS at 17:24

## 2021-01-01 RX ADMIN — HYDRALAZINE HYDROCHLORIDE 25 MG: 25 TABLET, FILM COATED ORAL at 21:18

## 2021-01-01 RX ADMIN — ACETAMINOPHEN 650 MG: 325 TABLET ORAL at 14:20

## 2021-01-01 RX ADMIN — ACETAMINOPHEN 650 MG: 650 SUPPOSITORY RECTAL at 18:11

## 2021-01-01 RX ADMIN — HYDRALAZINE HYDROCHLORIDE 25 MG: 25 TABLET, FILM COATED ORAL at 14:23

## 2021-01-01 RX ADMIN — GUAIFENESIN 400 MG: 400 TABLET ORAL at 09:22

## 2021-01-01 RX ADMIN — HYDRALAZINE HYDROCHLORIDE 25 MG: 25 TABLET, FILM COATED ORAL at 05:36

## 2021-01-01 RX ADMIN — ACETAMINOPHEN 500 MG: 500 TABLET ORAL at 12:14

## 2021-01-01 RX ADMIN — LEVOTHYROXINE SODIUM 50 MCG: 0.05 TABLET ORAL at 05:57

## 2021-01-01 RX ADMIN — HEPARIN SODIUM 5000 UNITS: 10000 INJECTION INTRAVENOUS; SUBCUTANEOUS at 06:20

## 2021-01-01 RX ADMIN — METOPROLOL SUCCINATE 25 MG: 25 TABLET, EXTENDED RELEASE ORAL at 09:19

## 2021-01-01 RX ADMIN — INSULIN LISPRO 1 UNITS: 100 INJECTION, SOLUTION INTRAVENOUS; SUBCUTANEOUS at 07:46

## 2021-01-01 RX ADMIN — MAGNESIUM GLUCONATE 500 MG ORAL TABLET 400 MG: 500 TABLET ORAL at 21:08

## 2021-01-01 RX ADMIN — METOPROLOL SUCCINATE 37.5 MG: 25 TABLET, FILM COATED, EXTENDED RELEASE ORAL at 20:22

## 2021-01-01 RX ADMIN — SODIUM CHLORIDE, PRESERVATIVE FREE 10 ML: 5 INJECTION INTRAVENOUS at 21:11

## 2021-01-01 RX ADMIN — BUMETANIDE 1 MG: 1 TABLET ORAL at 08:44

## 2021-01-01 RX ADMIN — CEFEPIME 2000 MG: 2 INJECTION, POWDER, FOR SOLUTION INTRAVENOUS at 03:29

## 2021-01-01 RX ADMIN — INSULIN LISPRO 1 UNITS: 100 INJECTION, SOLUTION INTRAVENOUS; SUBCUTANEOUS at 16:10

## 2021-01-01 RX ADMIN — LEVOTHYROXINE SODIUM 50 MCG: 50 TABLET ORAL at 06:51

## 2021-01-01 RX ADMIN — SODIUM CHLORIDE 1000 ML: 9 INJECTION, SOLUTION INTRAVENOUS at 16:45

## 2021-01-01 RX ADMIN — METOPROLOL SUCCINATE 37.5 MG: 25 TABLET, FILM COATED, EXTENDED RELEASE ORAL at 09:39

## 2021-01-01 RX ADMIN — GUAIFENESIN 400 MG: 400 TABLET ORAL at 14:32

## 2021-01-01 RX ADMIN — REPAGLINIDE 1 MG: 0.5 TABLET ORAL at 15:49

## 2021-01-01 RX ADMIN — LEVALBUTEROL HYDROCHLORIDE 0.63 MG: 0.63 SOLUTION RESPIRATORY (INHALATION) at 21:22

## 2021-01-01 RX ADMIN — ISOSORBIDE DINITRATE 10 MG: 10 TABLET ORAL at 21:59

## 2021-01-01 RX ADMIN — CEFEPIME 2000 MG: 2 INJECTION, POWDER, FOR SOLUTION INTRAVENOUS at 15:57

## 2021-01-01 RX ADMIN — INSULIN LISPRO 1 UNITS: 100 INJECTION, SOLUTION INTRAVENOUS; SUBCUTANEOUS at 16:58

## 2021-01-01 RX ADMIN — ALBUTEROL SULFATE 2 PUFF: 90 AEROSOL, METERED RESPIRATORY (INHALATION) at 09:17

## 2021-01-01 RX ADMIN — INSULIN LISPRO 1 UNITS: 100 INJECTION, SOLUTION INTRAVENOUS; SUBCUTANEOUS at 21:21

## 2021-01-01 RX ADMIN — ACETAMINOPHEN 650 MG: 325 TABLET ORAL at 21:18

## 2021-01-01 RX ADMIN — GUAIFENESIN 400 MG: 400 TABLET ORAL at 10:29

## 2021-01-01 RX ADMIN — MORPHINE SULFATE 2 MG: 2 INJECTION, SOLUTION INTRAMUSCULAR; INTRAVENOUS at 10:16

## 2021-01-01 RX ADMIN — ACETAMINOPHEN 500 MG: 500 TABLET ORAL at 00:54

## 2021-01-01 RX ADMIN — SODIUM CHLORIDE, PRESERVATIVE FREE 10 ML: 5 INJECTION INTRAVENOUS at 08:42

## 2021-01-01 RX ADMIN — HYDRALAZINE HYDROCHLORIDE 25 MG: 25 TABLET, FILM COATED ORAL at 20:23

## 2021-01-01 RX ADMIN — REPAGLINIDE 1 MG: 0.5 TABLET ORAL at 06:51

## 2021-01-01 RX ADMIN — ENOXAPARIN SODIUM 30 MG: 30 INJECTION SUBCUTANEOUS at 08:24

## 2021-01-01 RX ADMIN — HEPARIN SODIUM 5000 UNITS: 10000 INJECTION INTRAVENOUS; SUBCUTANEOUS at 07:43

## 2021-01-01 RX ADMIN — SODIUM CHLORIDE, PRESERVATIVE FREE 10 ML: 5 INJECTION INTRAVENOUS at 12:17

## 2021-01-01 RX ADMIN — Medication 10 ML: at 17:53

## 2021-01-01 RX ADMIN — LEVALBUTEROL HYDROCHLORIDE 0.63 MG: 0.63 SOLUTION RESPIRATORY (INHALATION) at 01:40

## 2021-01-01 RX ADMIN — SPIRONOLACTONE 12.5 MG: 25 TABLET ORAL at 08:42

## 2021-01-01 RX ADMIN — SODIUM CHLORIDE, PRESERVATIVE FREE 10 ML: 5 INJECTION INTRAVENOUS at 23:14

## 2021-01-01 RX ADMIN — HYDRALAZINE HYDROCHLORIDE 25 MG: 25 TABLET, FILM COATED ORAL at 14:08

## 2021-01-01 RX ADMIN — INSULIN LISPRO 2 UNITS: 100 INJECTION, SOLUTION INTRAVENOUS; SUBCUTANEOUS at 20:31

## 2021-01-01 RX ADMIN — HYDRALAZINE HYDROCHLORIDE 25 MG: 25 TABLET, FILM COATED ORAL at 08:28

## 2021-01-01 RX ADMIN — LEVALBUTEROL HYDROCHLORIDE 0.63 MG: 0.63 SOLUTION RESPIRATORY (INHALATION) at 12:19

## 2021-01-01 RX ADMIN — LEVALBUTEROL HYDROCHLORIDE 0.63 MG: 0.63 SOLUTION RESPIRATORY (INHALATION) at 07:56

## 2021-01-01 RX ADMIN — TRAMADOL HYDROCHLORIDE 50 MG: 50 TABLET, FILM COATED ORAL at 15:30

## 2021-01-01 RX ADMIN — HYDRALAZINE HYDROCHLORIDE 25 MG: 25 TABLET, FILM COATED ORAL at 06:05

## 2021-01-01 RX ADMIN — ISOSORBIDE DINITRATE 10 MG: 10 TABLET ORAL at 10:42

## 2021-01-01 RX ADMIN — ISOSORBIDE DINITRATE 10 MG: 10 TABLET ORAL at 09:22

## 2021-01-01 RX ADMIN — LEVOTHYROXINE SODIUM 50 MCG: 50 TABLET ORAL at 06:27

## 2021-01-01 RX ADMIN — SODIUM CHLORIDE, PRESERVATIVE FREE 10 ML: 5 INJECTION INTRAVENOUS at 09:46

## 2021-01-01 RX ADMIN — MORPHINE SULFATE 2 MG: 2 INJECTION, SOLUTION INTRAMUSCULAR; INTRAVENOUS at 17:53

## 2021-01-01 RX ADMIN — REPAGLINIDE 1 MG: 0.5 TABLET ORAL at 07:33

## 2021-01-01 RX ADMIN — REPAGLINIDE 1 MG: 0.5 TABLET ORAL at 15:42

## 2021-01-01 RX ADMIN — ALBUTEROL SULFATE 2.5 MG: 2.5 SOLUTION RESPIRATORY (INHALATION) at 08:46

## 2021-01-01 RX ADMIN — GUAIFENESIN 400 MG: 400 TABLET ORAL at 20:24

## 2021-01-01 RX ADMIN — CLONIDINE HYDROCHLORIDE 0.2 MG: 0.1 TABLET ORAL at 08:42

## 2021-01-01 RX ADMIN — CLONIDINE HYDROCHLORIDE 0.2 MG: 0.1 TABLET ORAL at 22:00

## 2021-01-01 RX ADMIN — ENOXAPARIN SODIUM 40 MG: 40 INJECTION SUBCUTANEOUS at 17:15

## 2021-01-01 RX ADMIN — REPAGLINIDE 1 MG: 0.5 TABLET ORAL at 12:47

## 2021-01-01 RX ADMIN — HYDRALAZINE HYDROCHLORIDE 25 MG: 25 TABLET, FILM COATED ORAL at 14:54

## 2021-01-01 RX ADMIN — MAGNESIUM GLUCONATE 500 MG ORAL TABLET 400 MG: 500 TABLET ORAL at 23:14

## 2021-01-01 RX ADMIN — ISOSORBIDE DINITRATE 10 MG: 10 TABLET ORAL at 08:19

## 2021-01-01 RX ADMIN — TRAMADOL HYDROCHLORIDE 25 MG: 50 TABLET, FILM COATED ORAL at 16:03

## 2021-01-01 RX ADMIN — BUMETANIDE 1 MG: 1 TABLET ORAL at 08:45

## 2021-01-01 RX ADMIN — ALBUTEROL SULFATE 2.5 MG: 2.5 SOLUTION RESPIRATORY (INHALATION) at 12:31

## 2021-01-01 RX ADMIN — ASPIRIN 81 MG: 81 TABLET, COATED ORAL at 09:44

## 2021-01-01 RX ADMIN — ENOXAPARIN SODIUM 30 MG: 30 INJECTION SUBCUTANEOUS at 09:40

## 2021-01-01 RX ADMIN — LOSARTAN POTASSIUM 100 MG: 50 TABLET, FILM COATED ORAL at 09:19

## 2021-01-01 RX ADMIN — CLONIDINE HYDROCHLORIDE 0.2 MG: 0.1 TABLET ORAL at 20:28

## 2021-01-01 RX ADMIN — SODIUM CHLORIDE, PRESERVATIVE FREE 10 ML: 5 INJECTION INTRAVENOUS at 08:37

## 2021-01-01 RX ADMIN — MORPHINE SULFATE 2 MG: 2 INJECTION, SOLUTION INTRAMUSCULAR; INTRAVENOUS at 01:00

## 2021-01-01 RX ADMIN — REPAGLINIDE 1 MG: 0.5 TABLET ORAL at 16:23

## 2021-01-01 RX ADMIN — LEVOTHYROXINE SODIUM 50 MCG: 50 TABLET ORAL at 06:11

## 2021-01-01 RX ADMIN — ASPIRIN 81 MG: 81 TABLET, COATED ORAL at 08:36

## 2021-01-01 RX ADMIN — METOPROLOL SUCCINATE 37.5 MG: 25 TABLET, FILM COATED, EXTENDED RELEASE ORAL at 21:16

## 2021-01-01 RX ADMIN — ISOSORBIDE DINITRATE 10 MG: 10 TABLET ORAL at 16:00

## 2021-01-01 RX ADMIN — HYDRALAZINE HYDROCHLORIDE 25 MG: 25 TABLET, FILM COATED ORAL at 20:22

## 2021-01-01 RX ADMIN — ACETAMINOPHEN 650 MG: 325 TABLET ORAL at 13:08

## 2021-01-01 RX ADMIN — HYDRALAZINE HYDROCHLORIDE 25 MG: 25 TABLET, FILM COATED ORAL at 14:41

## 2021-01-01 RX ADMIN — ASPIRIN 81 MG: 81 TABLET, COATED ORAL at 08:18

## 2021-01-01 RX ADMIN — LEVALBUTEROL HYDROCHLORIDE 0.63 MG: 0.63 SOLUTION RESPIRATORY (INHALATION) at 02:27

## 2021-01-01 RX ADMIN — GUAIFENESIN 400 MG: 400 TABLET ORAL at 12:37

## 2021-01-01 RX ADMIN — ALBUTEROL SULFATE 2 PUFF: 90 AEROSOL, METERED RESPIRATORY (INHALATION) at 20:35

## 2021-01-01 RX ADMIN — METOPROLOL SUCCINATE 37.5 MG: 25 TABLET, FILM COATED, EXTENDED RELEASE ORAL at 09:43

## 2021-01-01 RX ADMIN — ISOSORBIDE DINITRATE 10 MG: 10 TABLET ORAL at 20:24

## 2021-01-01 RX ADMIN — ACETAMINOPHEN 650 MG: 325 TABLET ORAL at 15:39

## 2021-01-01 RX ADMIN — GUAIFENESIN 400 MG: 400 TABLET ORAL at 21:14

## 2021-01-01 RX ADMIN — METOPROLOL TARTRATE 5 MG: 5 INJECTION INTRAVENOUS at 20:47

## 2021-01-01 RX ADMIN — SODIUM CHLORIDE, PRESERVATIVE FREE 10 ML: 5 INJECTION INTRAVENOUS at 08:49

## 2021-01-01 RX ADMIN — GUAIFENESIN 400 MG: 400 TABLET ORAL at 09:40

## 2021-01-01 RX ADMIN — BARIUM SULFATE 10 ML: 400 PASTE ORAL at 11:57

## 2021-01-01 RX ADMIN — MORPHINE SULFATE 2 MG: 2 INJECTION, SOLUTION INTRAMUSCULAR; INTRAVENOUS at 05:17

## 2021-01-01 RX ADMIN — ALBUTEROL SULFATE 2.5 MG: 2.5 SOLUTION RESPIRATORY (INHALATION) at 06:45

## 2021-01-01 RX ADMIN — METOPROLOL TARTRATE 5 MG: 5 INJECTION INTRAVENOUS at 14:22

## 2021-01-01 RX ADMIN — ALBUTEROL SULFATE 2.5 MG: 2.5 SOLUTION RESPIRATORY (INHALATION) at 01:14

## 2021-01-01 RX ADMIN — FUROSEMIDE 80 MG: 10 INJECTION, SOLUTION INTRAVENOUS at 21:20

## 2021-01-01 RX ADMIN — METOPROLOL SUCCINATE 25 MG: 25 TABLET, EXTENDED RELEASE ORAL at 12:38

## 2021-01-01 RX ADMIN — FUROSEMIDE 40 MG: 10 INJECTION, SOLUTION INTRAMUSCULAR; INTRAVENOUS at 21:37

## 2021-01-01 RX ADMIN — ALBUTEROL SULFATE 2 PUFF: 90 AEROSOL, METERED RESPIRATORY (INHALATION) at 12:42

## 2021-01-01 RX ADMIN — METOPROLOL SUCCINATE 37.5 MG: 25 TABLET, FILM COATED, EXTENDED RELEASE ORAL at 21:06

## 2021-01-01 RX ADMIN — SODIUM CHLORIDE, PRESERVATIVE FREE 10 ML: 5 INJECTION INTRAVENOUS at 05:57

## 2021-01-01 RX ADMIN — SODIUM CHLORIDE, PRESERVATIVE FREE 10 ML: 5 INJECTION INTRAVENOUS at 09:40

## 2021-01-01 RX ADMIN — MORPHINE SULFATE 2 MG: 2 INJECTION, SOLUTION INTRAMUSCULAR; INTRAVENOUS at 12:47

## 2021-01-01 RX ADMIN — MORPHINE SULFATE 2 MG: 2 INJECTION, SOLUTION INTRAMUSCULAR; INTRAVENOUS at 04:34

## 2021-01-01 RX ADMIN — METOPROLOL SUCCINATE 37.5 MG: 25 TABLET, FILM COATED, EXTENDED RELEASE ORAL at 08:45

## 2021-01-01 RX ADMIN — HEPARIN SODIUM 5000 UNITS: 10000 INJECTION INTRAVENOUS; SUBCUTANEOUS at 21:41

## 2021-01-01 RX ADMIN — ISOSORBIDE DINITRATE 10 MG: 10 TABLET ORAL at 16:38

## 2021-01-01 RX ADMIN — LEVOTHYROXINE SODIUM 50 MCG: 50 TABLET ORAL at 05:49

## 2021-01-01 RX ADMIN — LEVALBUTEROL HYDROCHLORIDE 0.63 MG: 0.63 SOLUTION RESPIRATORY (INHALATION) at 03:18

## 2021-01-01 RX ADMIN — CEFEPIME 2000 MG: 2 INJECTION, POWDER, FOR SOLUTION INTRAVENOUS at 15:42

## 2021-01-01 RX ADMIN — ISOSORBIDE DINITRATE 10 MG: 10 TABLET ORAL at 21:24

## 2021-01-01 RX ADMIN — OXYCODONE HYDROCHLORIDE AND ACETAMINOPHEN 500 MG: 500 TABLET ORAL at 12:55

## 2021-01-01 RX ADMIN — ERGOCALCIFEROL 50000 UNITS: 1.25 CAPSULE ORAL at 20:47

## 2021-01-01 RX ADMIN — METOPROLOL SUCCINATE 25 MG: 25 TABLET, EXTENDED RELEASE ORAL at 12:00

## 2021-01-01 RX ADMIN — SODIUM CHLORIDE, PRESERVATIVE FREE 10 ML: 5 INJECTION INTRAVENOUS at 10:14

## 2021-01-01 RX ADMIN — ALBUTEROL SULFATE 2.5 MG: 2.5 SOLUTION RESPIRATORY (INHALATION) at 13:36

## 2021-01-01 RX ADMIN — ALBUTEROL SULFATE 2 PUFF: 90 AEROSOL, METERED RESPIRATORY (INHALATION) at 17:20

## 2021-01-01 RX ADMIN — GUAIFENESIN 400 MG: 400 TABLET ORAL at 20:47

## 2021-01-01 RX ADMIN — BUMETANIDE 1 MG: 0.25 INJECTION, SOLUTION INTRAMUSCULAR; INTRAVENOUS at 08:19

## 2021-01-01 RX ADMIN — ALBUTEROL SULFATE 2.5 MG: 2.5 SOLUTION RESPIRATORY (INHALATION) at 20:47

## 2021-01-01 RX ADMIN — SODIUM CHLORIDE 25 ML: 9 INJECTION, SOLUTION INTRAVENOUS at 20:28

## 2021-01-01 RX ADMIN — INSULIN LISPRO 1 UNITS: 100 INJECTION, SOLUTION INTRAVENOUS; SUBCUTANEOUS at 17:03

## 2021-01-01 RX ADMIN — BARIUM SULFATE 120 ML: 400 SUSPENSION ORAL at 11:57

## 2021-01-01 RX ADMIN — SODIUM CHLORIDE, PRESERVATIVE FREE 10 ML: 5 INJECTION INTRAVENOUS at 03:57

## 2021-01-01 RX ADMIN — CEFTRIAXONE SODIUM 1000 MG: 1 INJECTION, POWDER, FOR SOLUTION INTRAMUSCULAR; INTRAVENOUS at 08:41

## 2021-01-01 RX ADMIN — ALBUTEROL SULFATE 2 PUFF: 90 AEROSOL, METERED RESPIRATORY (INHALATION) at 14:54

## 2021-01-01 RX ADMIN — MORPHINE SULFATE 2 MG: 2 INJECTION, SOLUTION INTRAMUSCULAR; INTRAVENOUS at 21:11

## 2021-01-01 RX ADMIN — LEVALBUTEROL HYDROCHLORIDE 0.63 MG: 0.63 SOLUTION RESPIRATORY (INHALATION) at 20:18

## 2021-01-01 RX ADMIN — SODIUM CHLORIDE 25 ML: 9 INJECTION, SOLUTION INTRAVENOUS at 07:23

## 2021-01-01 RX ADMIN — HYDROCODONE BITARTRATE AND ACETAMINOPHEN 1 TABLET: 5; 325 TABLET ORAL at 21:08

## 2021-01-01 RX ADMIN — METOPROLOL SUCCINATE 37.5 MG: 25 TABLET, FILM COATED, EXTENDED RELEASE ORAL at 08:24

## 2021-01-01 RX ADMIN — INSULIN LISPRO 2 UNITS: 100 INJECTION, SOLUTION INTRAVENOUS; SUBCUTANEOUS at 22:00

## 2021-01-01 RX ADMIN — HYDRALAZINE HYDROCHLORIDE 25 MG: 25 TABLET, FILM COATED ORAL at 20:35

## 2021-01-01 RX ADMIN — METOPROLOL SUCCINATE 25 MG: 25 TABLET, EXTENDED RELEASE ORAL at 10:14

## 2021-01-01 RX ADMIN — GUAIFENESIN 400 MG: 400 TABLET ORAL at 08:36

## 2021-01-01 RX ADMIN — FUROSEMIDE 40 MG: 10 INJECTION, SOLUTION INTRAMUSCULAR; INTRAVENOUS at 00:54

## 2021-01-01 RX ADMIN — SODIUM CHLORIDE, PRESERVATIVE FREE 10 ML: 5 INJECTION INTRAVENOUS at 11:25

## 2021-01-01 RX ADMIN — SODIUM CHLORIDE 25 ML: 9 INJECTION, SOLUTION INTRAVENOUS at 20:48

## 2021-01-01 RX ADMIN — METOPROLOL SUCCINATE 37.5 MG: 25 TABLET, FILM COATED, EXTENDED RELEASE ORAL at 21:03

## 2021-01-01 RX ADMIN — ALBUTEROL SULFATE 2 PUFF: 90 AEROSOL, METERED RESPIRATORY (INHALATION) at 11:25

## 2021-01-01 RX ADMIN — MAGNESIUM GLUCONATE 500 MG ORAL TABLET 400 MG: 500 TABLET ORAL at 01:06

## 2021-01-01 RX ADMIN — REPAGLINIDE 1 MG: 0.5 TABLET ORAL at 06:11

## 2021-01-01 RX ADMIN — ALBUTEROL SULFATE 2.5 MG: 2.5 SOLUTION RESPIRATORY (INHALATION) at 20:17

## 2021-01-01 RX ADMIN — ISOSORBIDE DINITRATE 10 MG: 10 TABLET ORAL at 09:50

## 2021-01-01 RX ADMIN — HYDRALAZINE HYDROCHLORIDE 25 MG: 25 TABLET, FILM COATED ORAL at 06:28

## 2021-01-01 RX ADMIN — ALBUTEROL SULFATE 2 PUFF: 90 AEROSOL, METERED RESPIRATORY (INHALATION) at 13:00

## 2021-01-01 RX ADMIN — SPIRONOLACTONE 12.5 MG: 25 TABLET ORAL at 08:45

## 2021-01-01 RX ADMIN — INSULIN LISPRO 2 UNITS: 100 INJECTION, SOLUTION INTRAVENOUS; SUBCUTANEOUS at 11:55

## 2021-01-01 RX ADMIN — HEPARIN SODIUM 5000 UNITS: 10000 INJECTION INTRAVENOUS; SUBCUTANEOUS at 14:07

## 2021-01-01 RX ADMIN — MAGNESIUM GLUCONATE 500 MG ORAL TABLET 400 MG: 500 TABLET ORAL at 08:42

## 2021-01-01 RX ADMIN — ISOSORBIDE DINITRATE 10 MG: 10 TABLET ORAL at 14:08

## 2021-01-01 RX ADMIN — ISOSORBIDE DINITRATE 10 MG: 10 TABLET ORAL at 14:23

## 2021-01-01 RX ADMIN — GUAIFENESIN 400 MG: 400 TABLET ORAL at 12:55

## 2021-01-01 RX ADMIN — ASPIRIN 81 MG: 81 TABLET, COATED ORAL at 09:23

## 2021-01-01 RX ADMIN — METOPROLOL SUCCINATE 25 MG: 25 TABLET, FILM COATED, EXTENDED RELEASE ORAL at 09:14

## 2021-01-01 RX ADMIN — REPAGLINIDE 1 MG: 0.5 TABLET ORAL at 16:31

## 2021-01-01 RX ADMIN — LEVOTHYROXINE SODIUM 50 MCG: 50 TABLET ORAL at 05:36

## 2021-01-01 RX ADMIN — FUROSEMIDE 40 MG: 10 INJECTION, SOLUTION INTRAVENOUS at 09:20

## 2021-01-01 RX ADMIN — ZINC SULFATE 220 MG (50 MG) CAPSULE 50 MG: CAPSULE at 10:30

## 2021-01-01 RX ADMIN — ISOSORBIDE DINITRATE 10 MG: 10 TABLET ORAL at 08:57

## 2021-01-01 RX ADMIN — INSULIN LISPRO 1 UNITS: 100 INJECTION, SOLUTION INTRAVENOUS; SUBCUTANEOUS at 16:14

## 2021-01-01 RX ADMIN — POLYETHYLENE GLYCOL 3350 17 G: 17 POWDER, FOR SOLUTION ORAL at 13:08

## 2021-01-01 RX ADMIN — ASPIRIN 81 MG: 81 TABLET, COATED ORAL at 09:14

## 2021-01-01 RX ADMIN — GUAIFENESIN 400 MG: 400 TABLET ORAL at 21:24

## 2021-01-01 RX ADMIN — SODIUM CHLORIDE, PRESERVATIVE FREE 10 ML: 5 INJECTION INTRAVENOUS at 21:29

## 2021-01-01 RX ADMIN — ALBUTEROL SULFATE 2 PUFF: 90 AEROSOL, METERED RESPIRATORY (INHALATION) at 17:02

## 2021-01-01 RX ADMIN — DEXAMETHASONE SODIUM PHOSPHATE 6 MG: 4 INJECTION, SOLUTION INTRAMUSCULAR; INTRAVENOUS at 11:42

## 2021-01-01 RX ADMIN — ACETAMINOPHEN 650 MG: 325 TABLET ORAL at 21:01

## 2021-01-01 RX ADMIN — POTASSIUM CHLORIDE 10 MEQ: 750 TABLET, EXTENDED RELEASE ORAL at 12:39

## 2021-01-01 RX ADMIN — ISOSORBIDE DINITRATE 10 MG: 10 TABLET ORAL at 22:16

## 2021-01-01 RX ADMIN — POTASSIUM BICARBONATE 40 MEQ: 782 TABLET, EFFERVESCENT ORAL at 08:57

## 2021-01-01 RX ADMIN — BUMETANIDE 1 MG: 1 TABLET ORAL at 06:10

## 2021-01-01 RX ADMIN — ACETAMINOPHEN 650 MG: 325 TABLET ORAL at 18:17

## 2021-01-01 RX ADMIN — BUMETANIDE 1 MG: 1 TABLET ORAL at 06:11

## 2021-01-01 RX ADMIN — SODIUM CHLORIDE, PRESERVATIVE FREE 10 ML: 5 INJECTION INTRAVENOUS at 21:19

## 2021-01-01 RX ADMIN — INSULIN LISPRO 8 UNITS: 100 INJECTION, SOLUTION INTRAVENOUS; SUBCUTANEOUS at 12:28

## 2021-01-01 RX ADMIN — SODIUM CHLORIDE, PRESERVATIVE FREE 10 ML: 5 INJECTION INTRAVENOUS at 12:40

## 2021-01-01 RX ADMIN — METOPROLOL SUCCINATE 37.5 MG: 25 TABLET, FILM COATED, EXTENDED RELEASE ORAL at 12:54

## 2021-01-01 RX ADMIN — INSULIN LISPRO 1 UNITS: 100 INJECTION, SOLUTION INTRAVENOUS; SUBCUTANEOUS at 12:51

## 2021-01-01 RX ADMIN — SODIUM CHLORIDE 1000 ML: 9 INJECTION, SOLUTION INTRAVENOUS at 17:01

## 2021-01-01 RX ADMIN — ACETAMINOPHEN 650 MG: 325 TABLET ORAL at 21:24

## 2021-01-01 RX ADMIN — BUMETANIDE 1 MG: 0.25 INJECTION, SOLUTION INTRAMUSCULAR; INTRAVENOUS at 21:08

## 2021-01-01 RX ADMIN — CEFEPIME 2000 MG: 2 INJECTION, POWDER, FOR SOLUTION INTRAVENOUS at 03:14

## 2021-01-01 RX ADMIN — HEPARIN SODIUM 5000 UNITS: 10000 INJECTION INTRAVENOUS; SUBCUTANEOUS at 02:22

## 2021-01-01 RX ADMIN — METOPROLOL SUCCINATE 37.5 MG: 25 TABLET, FILM COATED, EXTENDED RELEASE ORAL at 08:28

## 2021-01-01 RX ADMIN — ALBUTEROL SULFATE 2.5 MG: 2.5 SOLUTION RESPIRATORY (INHALATION) at 17:27

## 2021-01-01 RX ADMIN — Medication 10 ML: at 19:03

## 2021-01-01 RX ADMIN — ALBUTEROL SULFATE 2.5 MG: 2.5 SOLUTION RESPIRATORY (INHALATION) at 13:18

## 2021-01-01 RX ADMIN — HEPARIN SODIUM 5000 UNITS: 10000 INJECTION INTRAVENOUS; SUBCUTANEOUS at 05:59

## 2021-01-01 RX ADMIN — SODIUM CHLORIDE, PRESERVATIVE FREE 10 ML: 5 INJECTION INTRAVENOUS at 20:53

## 2021-01-01 RX ADMIN — DEXTROSE MONOHYDRATE 5 MG/HR: 50 INJECTION, SOLUTION INTRAVENOUS at 03:22

## 2021-01-01 RX ADMIN — BARIUM SULFATE 70 G: 0.81 POWDER, FOR SUSPENSION ORAL at 11:57

## 2021-01-01 RX ADMIN — ACETAMINOPHEN 650 MG: 325 TABLET ORAL at 23:11

## 2021-01-01 RX ADMIN — SODIUM CHLORIDE, PRESERVATIVE FREE 10 ML: 5 INJECTION INTRAVENOUS at 08:46

## 2021-01-01 RX ADMIN — METOPROLOL SUCCINATE 25 MG: 25 TABLET, EXTENDED RELEASE ORAL at 08:41

## 2021-01-01 RX ADMIN — HYDRALAZINE HYDROCHLORIDE 25 MG: 25 TABLET, FILM COATED ORAL at 22:07

## 2021-01-01 RX ADMIN — SODIUM CHLORIDE, PRESERVATIVE FREE 10 ML: 5 INJECTION INTRAVENOUS at 09:52

## 2021-01-01 RX ADMIN — CEFEPIME 2000 MG: 2 INJECTION, POWDER, FOR SOLUTION INTRAVENOUS at 03:30

## 2021-01-01 RX ADMIN — MORPHINE SULFATE 2 MG: 2 INJECTION, SOLUTION INTRAMUSCULAR; INTRAVENOUS at 08:26

## 2021-01-01 RX ADMIN — SODIUM CHLORIDE, PRESERVATIVE FREE 10 ML: 5 INJECTION INTRAVENOUS at 08:28

## 2021-01-01 RX ADMIN — ISOSORBIDE DINITRATE 10 MG: 10 TABLET ORAL at 14:54

## 2021-01-01 RX ADMIN — FUROSEMIDE 40 MG: 10 INJECTION, SOLUTION INTRAVENOUS at 12:35

## 2021-01-01 RX ADMIN — METOPROLOL SUCCINATE 37.5 MG: 25 TABLET, FILM COATED, EXTENDED RELEASE ORAL at 08:36

## 2021-01-01 RX ADMIN — LEVOTHYROXINE SODIUM 50 MCG: 0.05 TABLET ORAL at 07:06

## 2021-01-01 RX ADMIN — BUMETANIDE 1 MG: 0.25 INJECTION, SOLUTION INTRAMUSCULAR; INTRAVENOUS at 09:15

## 2021-01-01 RX ADMIN — MAGNESIUM GLUCONATE 500 MG ORAL TABLET 400 MG: 500 TABLET ORAL at 09:19

## 2021-01-01 RX ADMIN — HEPARIN SODIUM 5000 UNITS: 10000 INJECTION INTRAVENOUS; SUBCUTANEOUS at 12:55

## 2021-01-01 RX ADMIN — METOPROLOL SUCCINATE 37.5 MG: 25 TABLET, FILM COATED, EXTENDED RELEASE ORAL at 21:18

## 2021-01-01 RX ADMIN — ASPIRIN 81 MG: 81 TABLET, COATED ORAL at 09:40

## 2021-01-01 RX ADMIN — LEVALBUTEROL HYDROCHLORIDE 0.63 MG: 0.63 SOLUTION RESPIRATORY (INHALATION) at 20:36

## 2021-01-01 RX ADMIN — ENOXAPARIN SODIUM 30 MG: 30 INJECTION SUBCUTANEOUS at 09:51

## 2021-01-01 RX ADMIN — TRAMADOL HYDROCHLORIDE 50 MG: 50 TABLET, FILM COATED ORAL at 10:25

## 2021-01-01 RX ADMIN — POTASSIUM BICARBONATE 40 MEQ: 782 TABLET, EFFERVESCENT ORAL at 09:42

## 2021-01-01 RX ADMIN — MAGNESIUM GLUCONATE 500 MG ORAL TABLET 400 MG: 500 TABLET ORAL at 12:33

## 2021-01-01 RX ADMIN — HEPARIN SODIUM 5000 UNITS: 10000 INJECTION INTRAVENOUS; SUBCUTANEOUS at 16:39

## 2021-01-01 RX ADMIN — DEXAMETHASONE SODIUM PHOSPHATE 6 MG: 4 INJECTION, SOLUTION INTRAMUSCULAR; INTRAVENOUS at 22:13

## 2021-01-01 RX ADMIN — ENOXAPARIN SODIUM 30 MG: 30 INJECTION SUBCUTANEOUS at 08:46

## 2021-01-01 RX ADMIN — GUAIFENESIN 400 MG: 400 TABLET ORAL at 08:27

## 2021-01-01 RX ADMIN — SODIUM CHLORIDE 25 ML: 9 INJECTION, SOLUTION INTRAVENOUS at 12:54

## 2021-01-01 RX ADMIN — CEFEPIME 2000 MG: 2 INJECTION, POWDER, FOR SOLUTION INTRAVENOUS at 02:27

## 2021-01-01 RX ADMIN — METOPROLOL SUCCINATE 37.5 MG: 25 TABLET, FILM COATED, EXTENDED RELEASE ORAL at 09:51

## 2021-01-01 RX ADMIN — ISOSORBIDE DINITRATE 10 MG: 10 TABLET ORAL at 12:37

## 2021-01-01 RX ADMIN — SPIRONOLACTONE 12.5 MG: 25 TABLET ORAL at 08:56

## 2021-01-01 RX ADMIN — ASPIRIN 81 MG: 81 TABLET, COATED ORAL at 10:29

## 2021-01-01 RX ADMIN — GUAIFENESIN 400 MG: 400 TABLET ORAL at 21:03

## 2021-01-01 RX ADMIN — DEXTROSE MONOHYDRATE 12.5 G: 25 INJECTION, SOLUTION INTRAVENOUS at 20:21

## 2021-01-01 RX ADMIN — REPAGLINIDE 1 MG: 0.5 TABLET ORAL at 10:40

## 2021-01-01 RX ADMIN — METOPROLOL SUCCINATE 37.5 MG: 25 TABLET, FILM COATED, EXTENDED RELEASE ORAL at 20:34

## 2021-01-01 RX ADMIN — ISOSORBIDE DINITRATE 10 MG: 10 TABLET ORAL at 08:36

## 2021-01-01 RX ADMIN — ONDANSETRON 4 MG: 2 INJECTION INTRAMUSCULAR; INTRAVENOUS at 07:47

## 2021-01-01 RX ADMIN — SODIUM CHLORIDE, PRESERVATIVE FREE 10 ML: 5 INJECTION INTRAVENOUS at 08:24

## 2021-01-01 RX ADMIN — HYDRALAZINE HYDROCHLORIDE 25 MG: 25 TABLET, FILM COATED ORAL at 05:15

## 2021-01-01 RX ADMIN — SPIRONOLACTONE 12.5 MG: 25 TABLET ORAL at 09:40

## 2021-01-01 RX ADMIN — GUAIFENESIN 400 MG: 400 TABLET ORAL at 22:08

## 2021-01-01 RX ADMIN — BUMETANIDE 1 MG: 0.25 INJECTION, SOLUTION INTRAMUSCULAR; INTRAVENOUS at 08:41

## 2021-01-01 RX ADMIN — REPAGLINIDE 1 MG: 0.5 TABLET ORAL at 16:46

## 2021-01-01 RX ADMIN — INSULIN LISPRO 1 UNITS: 100 INJECTION, SOLUTION INTRAVENOUS; SUBCUTANEOUS at 11:03

## 2021-01-01 RX ADMIN — ACETAMINOPHEN 500 MG: 500 TABLET ORAL at 08:41

## 2021-01-01 RX ADMIN — INSULIN LISPRO 1 UNITS: 100 INJECTION, SOLUTION INTRAVENOUS; SUBCUTANEOUS at 11:21

## 2021-01-01 RX ADMIN — POTASSIUM CHLORIDE 10 MEQ: 750 TABLET, EXTENDED RELEASE ORAL at 09:20

## 2021-01-01 RX ADMIN — OXYCODONE HYDROCHLORIDE AND ACETAMINOPHEN 500 MG: 500 TABLET ORAL at 09:23

## 2021-01-01 RX ADMIN — CLONIDINE HYDROCHLORIDE 0.2 MG: 0.2 TABLET ORAL at 21:08

## 2021-01-01 RX ADMIN — MAGNESIUM GLUCONATE 500 MG ORAL TABLET 400 MG: 500 TABLET ORAL at 22:18

## 2021-01-01 RX ADMIN — ALBUTEROL SULFATE 2 PUFF: 90 AEROSOL, METERED RESPIRATORY (INHALATION) at 21:29

## 2021-01-01 RX ADMIN — OXYCODONE HYDROCHLORIDE AND ACETAMINOPHEN 500 MG: 500 TABLET ORAL at 10:32

## 2021-01-01 RX ADMIN — METOPROLOL SUCCINATE 37.5 MG: 25 TABLET, FILM COATED, EXTENDED RELEASE ORAL at 20:23

## 2021-01-01 RX ADMIN — ALBUTEROL SULFATE 2.5 MG: 2.5 SOLUTION RESPIRATORY (INHALATION) at 04:03

## 2021-01-01 RX ADMIN — ISOSORBIDE DINITRATE 10 MG: 10 TABLET ORAL at 09:44

## 2021-01-01 RX ADMIN — SODIUM CHLORIDE, PRESERVATIVE FREE 10 ML: 5 INJECTION INTRAVENOUS at 08:19

## 2021-01-01 RX ADMIN — HYDRALAZINE HYDROCHLORIDE 25 MG: 25 TABLET, FILM COATED ORAL at 09:40

## 2021-01-01 RX ADMIN — GUAIFENESIN 400 MG: 400 TABLET ORAL at 09:51

## 2021-01-01 RX ADMIN — ISOSORBIDE DINITRATE 10 MG: 10 TABLET ORAL at 12:55

## 2021-01-01 RX ADMIN — ISOSORBIDE DINITRATE 10 MG: 10 TABLET ORAL at 21:03

## 2021-01-01 RX ADMIN — HEPARIN SODIUM 5000 UNITS: 10000 INJECTION INTRAVENOUS; SUBCUTANEOUS at 00:34

## 2021-01-01 RX ADMIN — HYDRALAZINE HYDROCHLORIDE 25 MG: 25 TABLET, FILM COATED ORAL at 15:38

## 2021-01-01 RX ADMIN — ALBUTEROL SULFATE 2 PUFF: 90 AEROSOL, METERED RESPIRATORY (INHALATION) at 16:21

## 2021-01-01 RX ADMIN — ALBUTEROL SULFATE 2.5 MG: 2.5 SOLUTION RESPIRATORY (INHALATION) at 19:27

## 2021-01-01 RX ADMIN — MORPHINE SULFATE 2 MG: 2 INJECTION, SOLUTION INTRAMUSCULAR; INTRAVENOUS at 12:21

## 2021-01-01 RX ADMIN — SPIRONOLACTONE 12.5 MG: 25 TABLET ORAL at 09:14

## 2021-01-01 RX ADMIN — GUAIFENESIN 400 MG: 400 TABLET ORAL at 14:23

## 2021-01-01 RX ADMIN — POTASSIUM CHLORIDE 10 MEQ: 750 TABLET, EXTENDED RELEASE ORAL at 10:15

## 2021-01-01 RX ADMIN — LEVOTHYROXINE SODIUM 50 MCG: 0.05 TABLET ORAL at 06:25

## 2021-01-01 RX ADMIN — ALBUTEROL SULFATE 2 PUFF: 90 AEROSOL, METERED RESPIRATORY (INHALATION) at 10:02

## 2021-01-01 RX ADMIN — HEPARIN SODIUM 5000 UNITS: 10000 INJECTION INTRAVENOUS; SUBCUTANEOUS at 11:52

## 2021-01-01 RX ADMIN — SODIUM CHLORIDE, PRESERVATIVE FREE 10 ML: 5 INJECTION INTRAVENOUS at 09:21

## 2021-01-01 RX ADMIN — CLONIDINE HYDROCHLORIDE 0.2 MG: 0.1 TABLET ORAL at 23:14

## 2021-01-01 RX ADMIN — HEPARIN SODIUM 5000 UNITS: 10000 INJECTION INTRAVENOUS; SUBCUTANEOUS at 08:26

## 2021-01-01 RX ADMIN — HYDRALAZINE HYDROCHLORIDE 25 MG: 25 TABLET, FILM COATED ORAL at 09:44

## 2021-01-01 RX ADMIN — CEFEPIME 2000 MG: 2 INJECTION, POWDER, FOR SOLUTION INTRAVENOUS at 08:47

## 2021-01-01 RX ADMIN — OXYCODONE HYDROCHLORIDE AND ACETAMINOPHEN 500 MG: 500 TABLET ORAL at 10:29

## 2021-01-01 RX ADMIN — SODIUM CHLORIDE, PRESERVATIVE FREE 10 ML: 5 INJECTION INTRAVENOUS at 09:16

## 2021-01-01 RX ADMIN — LEVOTHYROXINE SODIUM 50 MCG: 50 TABLET ORAL at 06:53

## 2021-01-01 RX ADMIN — METOPROLOL SUCCINATE 37.5 MG: 25 TABLET, FILM COATED, EXTENDED RELEASE ORAL at 20:47

## 2021-01-01 RX ADMIN — ACETAMINOPHEN 650 MG: 325 TABLET ORAL at 03:43

## 2021-01-01 RX ADMIN — SODIUM CHLORIDE, PRESERVATIVE FREE 10 ML: 5 INJECTION INTRAVENOUS at 21:06

## 2021-01-01 RX ADMIN — SODIUM CHLORIDE 25 ML: 9 INJECTION, SOLUTION INTRAVENOUS at 01:00

## 2021-01-01 RX ADMIN — INSULIN LISPRO 1 UNITS: 100 INJECTION, SOLUTION INTRAVENOUS; SUBCUTANEOUS at 11:20

## 2021-01-01 RX ADMIN — INSULIN LISPRO 1 UNITS: 100 INJECTION, SOLUTION INTRAVENOUS; SUBCUTANEOUS at 21:15

## 2021-01-01 RX ADMIN — ALBUTEROL SULFATE 2 PUFF: 90 AEROSOL, METERED RESPIRATORY (INHALATION) at 17:05

## 2021-01-01 RX ADMIN — ENOXAPARIN SODIUM 40 MG: 40 INJECTION SUBCUTANEOUS at 12:35

## 2021-01-01 RX ADMIN — IOPAMIDOL 75 ML: 755 INJECTION, SOLUTION INTRAVENOUS at 23:58

## 2021-01-01 RX ADMIN — METOPROLOL SUCCINATE 25 MG: 25 TABLET, EXTENDED RELEASE ORAL at 08:42

## 2021-01-01 RX ADMIN — ZINC SULFATE 220 MG (50 MG) CAPSULE 50 MG: CAPSULE at 12:55

## 2021-01-01 RX ADMIN — HYDRALAZINE HYDROCHLORIDE 25 MG: 25 TABLET, FILM COATED ORAL at 22:25

## 2021-01-01 RX ADMIN — DEXTROSE MONOHYDRATE 12.5 G: 25 INJECTION, SOLUTION INTRAVENOUS at 02:35

## 2021-01-01 RX ADMIN — ZINC SULFATE 220 MG (50 MG) CAPSULE 50 MG: CAPSULE at 08:24

## 2021-01-01 RX ADMIN — ASPIRIN 81 MG: 81 TABLET, COATED ORAL at 08:44

## 2021-01-01 RX ADMIN — HYDRALAZINE HYDROCHLORIDE 25 MG: 25 TABLET, FILM COATED ORAL at 13:51

## 2021-01-01 RX ADMIN — HYDRALAZINE HYDROCHLORIDE 25 MG: 25 TABLET, FILM COATED ORAL at 06:30

## 2021-01-01 RX ADMIN — INSULIN LISPRO 2 UNITS: 100 INJECTION, SOLUTION INTRAVENOUS; SUBCUTANEOUS at 21:00

## 2021-01-01 RX ADMIN — BUMETANIDE 1 MG: 0.25 INJECTION, SOLUTION INTRAMUSCULAR; INTRAVENOUS at 20:23

## 2021-01-01 RX ADMIN — METOPROLOL SUCCINATE 37.5 MG: 25 TABLET, FILM COATED, EXTENDED RELEASE ORAL at 08:18

## 2021-01-01 RX ADMIN — LEVALBUTEROL HYDROCHLORIDE 0.63 MG: 0.63 SOLUTION RESPIRATORY (INHALATION) at 02:21

## 2021-01-01 RX ADMIN — MORPHINE SULFATE 2 MG: 2 INJECTION, SOLUTION INTRAMUSCULAR; INTRAVENOUS at 22:53

## 2021-01-01 RX ADMIN — ZINC SULFATE 220 MG (50 MG) CAPSULE 50 MG: CAPSULE at 10:33

## 2021-01-01 RX ADMIN — ALBUTEROL SULFATE 2 PUFF: 90 AEROSOL, METERED RESPIRATORY (INHALATION) at 21:37

## 2021-01-01 RX ADMIN — ISOSORBIDE DINITRATE 10 MG: 10 TABLET ORAL at 21:18

## 2021-01-01 RX ADMIN — SODIUM CHLORIDE: 9 INJECTION, SOLUTION INTRAVENOUS at 16:26

## 2021-01-01 RX ADMIN — BUMETANIDE 1 MG: 0.25 INJECTION, SOLUTION INTRAMUSCULAR; INTRAVENOUS at 08:37

## 2021-01-01 RX ADMIN — BUMETANIDE 2 MG: 0.25 INJECTION, SOLUTION INTRAMUSCULAR; INTRAVENOUS at 14:19

## 2021-01-01 RX ADMIN — ALBUTEROL SULFATE 2.5 MG: 2.5 SOLUTION RESPIRATORY (INHALATION) at 20:18

## 2021-01-01 RX ADMIN — SODIUM CHLORIDE, PRESERVATIVE FREE 10 ML: 5 INJECTION INTRAVENOUS at 22:00

## 2021-01-01 RX ADMIN — METOPROLOL SUCCINATE 25 MG: 25 TABLET, FILM COATED, EXTENDED RELEASE ORAL at 20:45

## 2021-01-01 RX ADMIN — LEVOTHYROXINE SODIUM 50 MCG: 50 TABLET ORAL at 08:28

## 2021-01-01 RX ADMIN — SODIUM CHLORIDE, PRESERVATIVE FREE 10 ML: 5 INJECTION INTRAVENOUS at 12:57

## 2021-01-01 RX ADMIN — ALBUTEROL SULFATE 2 PUFF: 90 AEROSOL, METERED RESPIRATORY (INHALATION) at 05:36

## 2021-01-01 RX ADMIN — CLONIDINE HYDROCHLORIDE 0.2 MG: 0.2 TABLET ORAL at 08:41

## 2021-01-01 RX ADMIN — LEVOTHYROXINE SODIUM 50 MCG: 50 TABLET ORAL at 05:15

## 2021-01-01 RX ADMIN — GUAIFENESIN 400 MG: 400 TABLET ORAL at 14:59

## 2021-01-01 RX ADMIN — ACETAMINOPHEN 650 MG: 650 SUPPOSITORY RECTAL at 20:30

## 2021-01-01 RX ADMIN — INSULIN LISPRO 2 UNITS: 100 INJECTION, SOLUTION INTRAVENOUS; SUBCUTANEOUS at 06:21

## 2021-01-01 RX ADMIN — METOPROLOL SUCCINATE 37.5 MG: 25 TABLET, FILM COATED, EXTENDED RELEASE ORAL at 08:57

## 2021-01-01 RX ADMIN — MAGNESIUM GLUCONATE 500 MG ORAL TABLET 400 MG: 500 TABLET ORAL at 22:00

## 2021-01-01 RX ADMIN — CLONIDINE HYDROCHLORIDE 0.2 MG: 0.2 TABLET ORAL at 11:59

## 2021-01-01 RX ADMIN — LEVOTHYROXINE SODIUM 50 MCG: 50 TABLET ORAL at 06:31

## 2021-01-01 RX ADMIN — SPIRONOLACTONE 12.5 MG: 25 TABLET ORAL at 09:45

## 2021-01-01 RX ADMIN — CEFEPIME 2000 MG: 2 INJECTION, POWDER, FOR SOLUTION INTRAVENOUS at 03:10

## 2021-01-01 RX ADMIN — ALBUTEROL SULFATE 2.5 MG: 2.5 SOLUTION RESPIRATORY (INHALATION) at 07:58

## 2021-01-01 RX ADMIN — SODIUM CHLORIDE, PRESERVATIVE FREE 10 ML: 5 INJECTION INTRAVENOUS at 10:31

## 2021-01-01 RX ADMIN — GUAIFENESIN 400 MG: 400 TABLET ORAL at 22:16

## 2021-01-01 RX ADMIN — CLONIDINE HYDROCHLORIDE 0.2 MG: 0.1 TABLET ORAL at 10:14

## 2021-01-01 RX ADMIN — INSULIN LISPRO 1 UNITS: 100 INJECTION, SOLUTION INTRAVENOUS; SUBCUTANEOUS at 22:39

## 2021-01-01 RX ADMIN — CLONIDINE HYDROCHLORIDE 0.2 MG: 0.2 TABLET ORAL at 21:46

## 2021-01-01 RX ADMIN — SODIUM CHLORIDE, PRESERVATIVE FREE 10 ML: 5 INJECTION INTRAVENOUS at 20:23

## 2021-01-01 RX ADMIN — SODIUM CHLORIDE 25 ML: 9 INJECTION, SOLUTION INTRAVENOUS at 19:12

## 2021-01-01 RX ADMIN — SODIUM CHLORIDE, PRESERVATIVE FREE 10 ML: 5 INJECTION INTRAVENOUS at 21:03

## 2021-01-01 RX ADMIN — CEFTRIAXONE SODIUM 1000 MG: 1 INJECTION, POWDER, FOR SOLUTION INTRAMUSCULAR; INTRAVENOUS at 09:45

## 2021-01-01 RX ADMIN — INSULIN LISPRO 1 UNITS: 100 INJECTION, SOLUTION INTRAVENOUS; SUBCUTANEOUS at 11:24

## 2021-01-01 RX ADMIN — CLONIDINE HYDROCHLORIDE 0.2 MG: 0.1 TABLET ORAL at 22:18

## 2021-01-01 RX ADMIN — ASPIRIN 81 MG: 81 TABLET, COATED ORAL at 12:39

## 2021-01-01 RX ADMIN — SODIUM CHLORIDE, PRESERVATIVE FREE 10 ML: 5 INJECTION INTRAVENOUS at 22:17

## 2021-01-01 RX ADMIN — DEXTROSE MONOHYDRATE 5 MG/HR: 50 INJECTION, SOLUTION INTRAVENOUS at 13:47

## 2021-01-01 RX ADMIN — ISOSORBIDE DINITRATE 10 MG: 10 TABLET ORAL at 22:07

## 2021-01-01 RX ADMIN — REPAGLINIDE 1 MG: 0.5 TABLET ORAL at 06:23

## 2021-01-01 RX ADMIN — SODIUM CHLORIDE, PRESERVATIVE FREE 10 ML: 5 INJECTION INTRAVENOUS at 21:48

## 2021-01-01 RX ADMIN — METOPROLOL TARTRATE 5 MG: 5 INJECTION INTRAVENOUS at 07:42

## 2021-01-01 RX ADMIN — METOPROLOL SUCCINATE 37.5 MG: 25 TABLET, FILM COATED, EXTENDED RELEASE ORAL at 21:59

## 2021-01-01 RX ADMIN — ASPIRIN 81 MG: 81 TABLET, COATED ORAL at 08:56

## 2021-01-01 RX ADMIN — SODIUM CHLORIDE, PRESERVATIVE FREE 1000 UNITS: 5 INJECTION INTRAVENOUS at 16:13

## 2021-01-01 RX ADMIN — CEFTRIAXONE SODIUM 1000 MG: 1 INJECTION, POWDER, FOR SOLUTION INTRAMUSCULAR; INTRAVENOUS at 11:59

## 2021-01-01 RX ADMIN — METOCLOPRAMIDE HYDROCHLORIDE 5 MG: 5 INJECTION INTRAMUSCULAR; INTRAVENOUS at 12:06

## 2021-01-01 RX ADMIN — METOPROLOL SUCCINATE 37.5 MG: 25 TABLET, FILM COATED, EXTENDED RELEASE ORAL at 12:37

## 2021-01-01 RX ADMIN — INSULIN LISPRO 1 UNITS: 100 INJECTION, SOLUTION INTRAVENOUS; SUBCUTANEOUS at 16:23

## 2021-01-01 RX ADMIN — ISOSORBIDE DINITRATE 10 MG: 10 TABLET ORAL at 14:26

## 2021-01-01 RX ADMIN — CLONIDINE HYDROCHLORIDE 0.2 MG: 0.1 TABLET ORAL at 03:23

## 2021-01-01 RX ADMIN — EPOETIN ALFA-EPBX 10000 UNITS: 10000 INJECTION, SOLUTION INTRAVENOUS; SUBCUTANEOUS at 17:00

## 2021-01-01 RX ADMIN — INSULIN LISPRO 2 UNITS: 100 INJECTION, SOLUTION INTRAVENOUS; SUBCUTANEOUS at 16:07

## 2021-01-01 RX ADMIN — REPAGLINIDE 1 MG: 0.5 TABLET ORAL at 11:30

## 2021-01-01 RX ADMIN — BUMETANIDE 1 MG: 0.25 INJECTION, SOLUTION INTRAMUSCULAR; INTRAVENOUS at 05:57

## 2021-01-01 RX ADMIN — HYDRALAZINE HYDROCHLORIDE 25 MG: 25 TABLET, FILM COATED ORAL at 14:32

## 2021-01-01 RX ADMIN — HYDRALAZINE HYDROCHLORIDE 25 MG: 25 TABLET, FILM COATED ORAL at 22:11

## 2021-01-01 RX ADMIN — GADOTERIDOL 15 ML: 279.3 INJECTION, SOLUTION INTRAVENOUS at 12:22

## 2021-01-01 RX ADMIN — SODIUM CHLORIDE 25 ML: 9 INJECTION, SOLUTION INTRAVENOUS at 13:14

## 2021-01-01 RX ADMIN — ZINC SULFATE 220 MG (50 MG) CAPSULE 50 MG: CAPSULE at 09:51

## 2021-01-01 RX ADMIN — OXYCODONE HYDROCHLORIDE AND ACETAMINOPHEN 500 MG: 500 TABLET ORAL at 09:40

## 2021-01-01 RX ADMIN — REPAGLINIDE 1 MG: 0.5 TABLET ORAL at 08:27

## 2021-01-01 RX ADMIN — HEPARIN SODIUM 5000 UNITS: 10000 INJECTION INTRAVENOUS; SUBCUTANEOUS at 00:15

## 2021-01-01 RX ADMIN — LEVALBUTEROL HYDROCHLORIDE 0.63 MG: 0.63 SOLUTION RESPIRATORY (INHALATION) at 13:51

## 2021-01-01 RX ADMIN — INSULIN LISPRO 3 UNITS: 100 INJECTION, SOLUTION INTRAVENOUS; SUBCUTANEOUS at 12:04

## 2021-01-01 RX ADMIN — GLIMEPIRIDE 4 MG: 4 TABLET ORAL at 09:19

## 2021-01-01 RX ADMIN — METOPROLOL SUCCINATE 37.5 MG: 25 TABLET, FILM COATED, EXTENDED RELEASE ORAL at 09:23

## 2021-01-01 RX ADMIN — HYDRALAZINE HYDROCHLORIDE 25 MG: 25 TABLET, FILM COATED ORAL at 21:48

## 2021-01-01 RX ADMIN — HYDRALAZINE HYDROCHLORIDE 25 MG: 25 TABLET, FILM COATED ORAL at 13:16

## 2021-01-01 RX ADMIN — SODIUM CHLORIDE, PRESERVATIVE FREE 10 ML: 5 INJECTION INTRAVENOUS at 21:08

## 2021-01-01 RX ADMIN — SPIRONOLACTONE 12.5 MG: 25 TABLET ORAL at 08:37

## 2021-01-01 RX ADMIN — CLONIDINE HYDROCHLORIDE 0.2 MG: 0.1 TABLET ORAL at 03:57

## 2021-01-01 RX ADMIN — SPIRONOLACTONE 12.5 MG: 25 TABLET ORAL at 09:39

## 2021-01-01 SDOH — HEALTH STABILITY: MENTAL HEALTH: HOW OFTEN DO YOU HAVE A DRINK CONTAINING ALCOHOL?: NOT ASKED

## 2021-01-01 SDOH — HEALTH STABILITY: MENTAL HEALTH: HOW MANY STANDARD DRINKS CONTAINING ALCOHOL DO YOU HAVE ON A TYPICAL DAY?: NOT ASKED

## 2021-01-01 ASSESSMENT — PAIN SCALES - GENERAL
PAINLEVEL_OUTOF10: 4
PAINLEVEL_OUTOF10: 0
PAINLEVEL_OUTOF10: 8
PAINLEVEL_OUTOF10: 0
PAINLEVEL_OUTOF10: 9
PAINLEVEL_OUTOF10: 0
PAINLEVEL_OUTOF10: 0
PAINLEVEL_OUTOF10: 3
PAINLEVEL_OUTOF10: 5
PAINLEVEL_OUTOF10: 10
PAINLEVEL_OUTOF10: 9
PAINLEVEL_OUTOF10: 0
PAINLEVEL_OUTOF10: 0
PAINLEVEL_OUTOF10: 6
PAINLEVEL_OUTOF10: 0
PAINLEVEL_OUTOF10: 8
PAINLEVEL_OUTOF10: 8
PAINLEVEL_OUTOF10: 0
PAINLEVEL_OUTOF10: 2
PAINLEVEL_OUTOF10: 0
PAINLEVEL_OUTOF10: 4
PAINLEVEL_OUTOF10: 0
PAINLEVEL_OUTOF10: 8
PAINLEVEL_OUTOF10: 0
PAINLEVEL_OUTOF10: 6
PAINLEVEL_OUTOF10: 0
PAINLEVEL_OUTOF10: 9
PAINLEVEL_OUTOF10: 8
PAINLEVEL_OUTOF10: 0
PAINLEVEL_OUTOF10: 0
PAINLEVEL_OUTOF10: 4
PAINLEVEL_OUTOF10: 0
PAINLEVEL_OUTOF10: 6
PAINLEVEL_OUTOF10: 4
PAINLEVEL_OUTOF10: 3
PAINLEVEL_OUTOF10: 0
PAINLEVEL_OUTOF10: 10
PAINLEVEL_OUTOF10: 0
PAINLEVEL_OUTOF10: 4
PAINLEVEL_OUTOF10: 0
PAINLEVEL_OUTOF10: 3
PAINLEVEL_OUTOF10: 2
PAINLEVEL_OUTOF10: 5
PAINLEVEL_OUTOF10: 4
PAINLEVEL_OUTOF10: 8

## 2021-01-01 ASSESSMENT — PAIN DESCRIPTION - PAIN TYPE
TYPE: ACUTE PAIN

## 2021-01-01 ASSESSMENT — PAIN DESCRIPTION - DESCRIPTORS
DESCRIPTORS: ACHING;DISCOMFORT;SORE
DESCRIPTORS: ACHING;DISCOMFORT;SORE
DESCRIPTORS: OTHER (COMMENT)
DESCRIPTORS: ACHING;DISCOMFORT
DESCRIPTORS: ACHING;DISCOMFORT
DESCRIPTORS: PATIENT UNABLE TO DESCRIBE
DESCRIPTORS: HEADACHE
DESCRIPTORS: ACHING
DESCRIPTORS: DISCOMFORT
DESCRIPTORS: ACHING;DISCOMFORT;SORE

## 2021-01-01 ASSESSMENT — PAIN DESCRIPTION - FREQUENCY
FREQUENCY: CONTINUOUS
FREQUENCY: INTERMITTENT
FREQUENCY: CONTINUOUS
FREQUENCY: INTERMITTENT
FREQUENCY: CONTINUOUS
FREQUENCY: CONTINUOUS
FREQUENCY: INTERMITTENT

## 2021-01-01 ASSESSMENT — PAIN DESCRIPTION - ONSET
ONSET: ON-GOING
ONSET: PROGRESSIVE
ONSET: ON-GOING
ONSET: GRADUAL
ONSET: OTHER (COMMENT)

## 2021-01-01 ASSESSMENT — PAIN DESCRIPTION - LOCATION
LOCATION: NECK
LOCATION: GENERALIZED
LOCATION: NECK
LOCATION: GENERALIZED
LOCATION: ABDOMEN;ARM
LOCATION: HEAD
LOCATION: HEAD
LOCATION: NECK
LOCATION: GENERALIZED
LOCATION: HEAD
LOCATION: GENERALIZED
LOCATION: HEAD
LOCATION: GENERALIZED

## 2021-01-01 ASSESSMENT — PAIN DESCRIPTION - PROGRESSION
CLINICAL_PROGRESSION: GRADUALLY IMPROVING
CLINICAL_PROGRESSION: NOT CHANGED
CLINICAL_PROGRESSION: GRADUALLY IMPROVING
CLINICAL_PROGRESSION: NOT CHANGED
CLINICAL_PROGRESSION: NOT CHANGED

## 2021-01-01 ASSESSMENT — PAIN DESCRIPTION - ORIENTATION
ORIENTATION: RIGHT;LEFT;LOWER
ORIENTATION: RIGHT
ORIENTATION: POSTERIOR
ORIENTATION: RIGHT;LEFT
ORIENTATION: ANTERIOR
ORIENTATION: RIGHT;LEFT

## 2021-01-01 ASSESSMENT — PAIN - FUNCTIONAL ASSESSMENT
PAIN_FUNCTIONAL_ASSESSMENT: ACTIVITIES ARE NOT PREVENTED
PAIN_FUNCTIONAL_ASSESSMENT: PREVENTS OR INTERFERES WITH MANY ACTIVE NOT PASSIVE ACTIVITIES
PAIN_FUNCTIONAL_ASSESSMENT: ACTIVITIES ARE NOT PREVENTED

## 2021-02-15 NOTE — PROGRESS NOTES
OFFICE VISIT        PRIMARY CARE PHYSICIAN:    Michelle Mcginnis MD       ALLERGIES / SENSITIVITIES:    Allergies   Allergen Reactions    Aspirin Other (See Comments)     \"tears up my stomach\"        REVIEWED MEDICATIONS:      Current Outpatient Medications:     glimepiride (AMARYL) 4 MG tablet, Take by mouth 1 tab qam & 0.5 tab qpm, Disp: , Rfl:     magnesium oxide (MAG-OX) 400 MG tablet, Take 400 mg by mouth 2 times daily, Disp: , Rfl:     acetaminophen-codeine (TYLENOL #3) 300-30 MG per tablet, TAKE 1 TABLET BY MOUTH EVERY 4 TO 6 HOURS AS NEEDED FOR PAIN, Disp: , Rfl:     diclofenac sodium (VOLTAREN) 1 % GEL, APPLY THIN LAYER TOPICALLY 4 TIMES DAILY AS NEEDED AS DIRECTED, Disp: , Rfl:     metoprolol succinate (TOPROL XL) 25 MG extended release tablet, Take 1 tablet by mouth daily, Disp: 30 tablet, Rfl: 3    furosemide (LASIX) 40 MG tablet, Take 1 tablet by mouth daily, Disp: 60 tablet, Rfl: 3    meloxicam (MOBIC) 15 MG tablet, Take 1 tablet by mouth daily (Patient taking differently: Take 7.5 mg by mouth daily ), Disp: 30 tablet, Rfl: 0    levothyroxine (SYNTHROID) 50 MCG tablet, Take 50 mcg by mouth Daily, Disp: , Rfl:     cloNIDine (CATAPRES) 0.2 MG tablet, Take 0.2 mg by mouth 2 times daily , Disp: , Rfl:     acetaminophen (TYLENOL) 500 MG tablet, Take 500 mg by mouth every 6 hours as needed for Pain, Disp: , Rfl:     metFORMIN (GLUCOPHAGE) 500 MG tablet, Take 500 mg by mouth 2 times daily (with meals), Disp: , Rfl:     losartan (COZAAR) 100 MG tablet, Take 1 tablet by mouth daily, Disp: 30 tablet, Rfl: 0    potassium chloride (KLOR-CON M) 10 MEQ extended release tablet, Take 1 tablet by mouth daily, Disp: 30 tablet, Rfl: 0    amLODIPine (NORVASC) 10 MG tablet, Take 10 mg by mouth daily , Disp: , Rfl:     nystatin (MYCOSTATIN) [id number] UNIT/GM cream, , Disp: , Rfl:     Compression Stockings MISC, by Does not apply route Measure and fit to patient (Patient not taking: Reported on 2/12/2021), Disp: 1 each, Rfl: 0    hydrALAZINE (APRESOLINE) 50 MG tablet, Take 50 mg by mouth every 8 hours Instructed to take am of procedure, Disp: , Rfl:       S: REASON FOR VISIT:    Chronic diastolic congestive heart failure, valvular heart disease, sick sinus syndrome and hypertension. Esperanza Knapp is an 55-year-old lady with cardiovascular history as described below. She is a poor historian. She continues to report occasional, poorly-characterized chest discomfort, which is nonexertional and which lasts a few seconds at a time with no other associated symptoms. She is limited with her activities and uses a quad walker with wheels to ambulate. She does have chronic exertional dyspnea which has not worsened. She also has chronic lower extremity edema, which has not worsened. She denies orthopnea, PND's, palpitations, presyncope or syncope. REVIEW OF SYSTEMS:    CONSTITUTIONAL:  Denies fevers, chills, night sweats or fatigue. HEENT:  Denies any unusual headaches.  Denies recent changes in hearing or vision.  Denies dysphagia, hoarseness, hemoptysis, hematemesis or epistaxis. ENDOCRINE:  Denies polyphagia, polydipsia or polyuria.  Denies heat or cold intolerance. MUSCULOSKELETAL:  She complains of right knee pain.  She also complains of neck pain and back pain.    SKIN:  Denies rashes, ulcers or itching. HEME/LYMPH:  Denies any palpable lymph nodes, bleeding or easy bruisability. HEART:  As above. LUNGS:  Denies cough or sputum production. GI: Denies abdominal pain, nausea, vomiting,diarrhea, constipation, rectal bleeding or tarry stools. :  Denies hematuria or dysuria. PSYCHIATRIC:  Denies mood changes, anxiety or depression.   NEUROLOGIC:  Denies memory loss, motor weakness, numbness, tingling or tremors.                    CARDIOVASCULAR HISTORY:    1.  Cardiac catheterization June 2005 (Dr. Esme Suero) showed normal left ventricular ejection fraction, normal coronary arteries, cardiac catheterization done secondary to a false positive stress myocardial perfusion study. 2.  Pharmacologic stress test done in [place] 09/02/2017.  No evidence of stress-induced ischemia with normal ejection fraction. 3.  Hypertension. 4.  Diabetes mellitus. 5.  Aspirin allergy:  Causes GI upset per patient. 6.  Sick sinus syndrome with history of bradycardia and an episode of Wenckebach in 03/2017 at which time the patient was on clonidine and verapamil.    7.  Echocardiogram done on 03/31/2017 was read as showing normal left ventricular size with mild concentric left ventricular hypertrophy with an ejection fraction of 60-65% with mild mitral regurgitation and mild left atrial dilatation.    8.  Echocardiogram done on 6/20/2020 showed normal left ventricular size with mild concentric left ventricular hypertrophy with no regional wall motion abnormality with an estimated ejection fraction of 75 +/- 5% with stage 2 left ventricular diastolic dysfunction. Normal right ventricular size and function. Mildly dilated left atrium. Mild aortic stenosis. Mild mitral regurgitation. Mild tricuspid regurgitation. Mild pulmonary hypertension.       PAST MEDICAL HISTORY:  1.  As under cardiovascular history. 2.  Osteoarthritis:    a.  Status post bilateral knee injections. b.  Status post total right knee arthroplasty for severe right knee osteoarthritis with valgus deformity on 3/21/2019.    3.  Status post excision of a soft tissue mass from left second toe 10/27/2006. 4.  Status post bilateral bunionectomy 05/29/2012. 5.  History of cervical radiculopathy. 6.  History of bilateral shoulder bursitis. 7.  Hypothyroidism:  On replacement therapy. 8.  Suspect dementia. 9.  Severe acute blood loss anemia noted in 4/2019, S/P total right knee arthroplasty on 3/21/2019.    10.  Chronic kidney disease.    11. Medical noncompliance.     FAMILY HISTORY:  Noncontributory.     SOCIAL HISTORY:  Denies smoking.  Denies alcohol or illicit drug use.      O:  COMPLETE PHYSICAL EXAM:      BP (!) 140/60   Pulse 78   Resp 16   Ht 5' 3\" (1.6 m)   Wt 165 lb (74.8 kg)   SpO2 98%   BMI 29.23 kg/m²      General:          Well-developed, well-nourished, elderly lady in no acute distress. HEENT:           Normocephalic and atraumatic head. No JVD. No carotid bruits. Carotid upstrokes normal bilaterally.  No thyromegaly.    Sclerae not icteric.  No xanthelasmas.  Mucous membranes moist.  Chest:              Symmetrical and nontender.  No deformities. Lungs:             Clear to auscultation bilaterally. Heart:              Normal S1 and S2.  No S3 or S4.  Grade 2/6 systolic murmur heard at the right upper sternal border.    Abdomen:        Soft, nontender without organomegaly or masses.  No bruits.  Normal bowel sounds. Extremities:     1-2+ pitting peritibial and ankle edema bilaterally.  Tender shins.  Normal pulses.  Right knee replacement scar healing   well.   Skin:                Normal turgor. No rashes or ulcers noted. Right knee replacement scar healing well. Neurologic:      Oriented x3.  No motor or sensory deficits detected.        REVIEW OF DIAGNOSTIC TESTS:    1. EKG done today showed sinus rhythm with occasional PAC's with left ventricular hypertrophy with nonspecific T wave abnormality. ASSESSMENT / DIAGNOSIS:   1.  Heart failure with preserved EF: Compensated. 2.  Valvular heart disease with mild aortic stenosis, mild mitral regurgitation, mild tricuspid regurgitation and mild pulmonary hypertension on echo in 6/2020. 3.  Chronic kidney disease. 4.  Sick sinus syndrome and history of sinus bradycardia and Wenckebach (while on verapamil and high dose clonidine). Stable  5.  Chronic dizziness/unsteadiness on feet. 6.  Suspect dementia. 7.  Medical noncompliance. 8.  Mild anemia. 9.  Hypertension/LVH. 10.  Diabetes mellitus. 11.  Hypothyroidism:  On replacement therapy.   12.  Osteoarthritis/ knees:  S/P total right knee arthroplasty in 3/2019.    13.  Cervical radiculopathy. 14.  Bursitis of both shoulders. 15.  Sensitivity to aspirin: Causes GI upset. TREATMENT PLAN:  1. Reassure. 2.  Continue current cardiac medications. 3.  Patient again instructed on when to take an extra dose of Lasix for signs/symptoms of fluid overload. 4.  Follow up with Cardiology in 6 months or on a prn basis. Mango Payne.  [facility] [id number]  [phone]

## 2021-03-03 PROBLEM — I50.9 ACUTE ON CHRONIC CONGESTIVE HEART FAILURE (HCC): Status: ACTIVE | Noted: 2021-01-01

## 2021-03-03 NOTE — ED TRIAGE NOTES
[facility] CONTACT ASSESSMENT NOTE      Department of Emergency Medicine   ED  First Provider Note   3/3/21  5:58 PM EST    Chief Complaint: Neck Pain (C/O neck and lower back pain.) and Shortness of Breath (75% on room air, C/O minimal SOB the last few days. )      History of Present Illness:    Kayode Novak is a 81 y.o. female who presents to the ED by private car for myalgia, hypoxic in triage  Focused Screening Exam:  Constitutional:  Alert, appears stated age and is in no distress.       *ALLERGIES*     Aspirin     ED Triage Vitals   BP Temp Temp src Pulse Resp SpO2 Height Weight   03/03/21 1751 03/03/21 1742 -- 03/03/21 1742 03/03/21 1751 03/03/21 1742 03/03/21 1751 03/03/21 1751   115/76 97.5 °F (36.4 °C)  99 16 (!) 75 % 5' 4\" (1.626 m) 155 lb (70.3 kg)        Initial Plan of Care:  Initiate Treatment-Testing, Proceed toTreatment Area When Bed Available for ED Attending/MLP to Continue Care    -----------------END OF FIRST PROVIDER CONTACT ASSESSMENT NOTE--------------  Electronically signed by KASSIE Burton CNP   DD: 3/3/21\

## 2021-03-04 PROBLEM — D64.9 ANEMIA: Status: ACTIVE | Noted: 2021-01-01

## 2021-03-04 PROBLEM — I50.33 ACUTE ON CHRONIC DIASTOLIC CONGESTIVE HEART FAILURE (HCC): Status: ACTIVE | Noted: 2021-01-01

## 2021-03-04 PROBLEM — E87.20 METABOLIC ACIDOSIS: Status: ACTIVE | Noted: 2021-01-01

## 2021-03-04 PROBLEM — E11.65 UNCONTROLLED TYPE 2 DIABETES MELLITUS WITH HYPERGLYCEMIA (HCC): Status: ACTIVE | Noted: 2017-03-29

## 2021-03-04 PROBLEM — R60.9 EDEMA: Status: ACTIVE | Noted: 2021-01-01

## 2021-03-04 PROBLEM — J96.01 ACUTE RESPIRATORY FAILURE WITH HYPOXIA (HCC): Status: ACTIVE | Noted: 2017-03-29

## 2021-03-04 NOTE — H&P
Hospital Medicine History & Physical      PCP: Halima Arnold MD    Date of Admission: 3/3/2021    Date of Service: Pt seen/examined on 3/4/2021 and Admitted to Inpatient with expected LOS greater than two midnights due to medical therapy. Chief Complaint: Shortness of breath and neck pain      History Of Present Illness:      81 y.o. female who presented to [facility] with past medical history of hypertensive heart disease, hypothyroidism, mitral regurgitation, sick sinus syndrome, osteoarthritis, DJD of the spine, chronic pain, CHF with diastolic dysfunction, anemia, diabetes, pretension and chronic kidney disease. Patient has been having worsening of her chronic neck pain for the last 2 days. States that her pain is sharp, 8 out of 10, constant, worse with movement, radiates down to his shoulders and arms, no associated weakness. She also complains about low back pain which is also chronic. She has been having shortness of breath for the past 2 days, moderate in intensity, on and off, associated with leg swelling, cough productive of yellow sputum and mild left chest pain which has been ongoing for the past month. She denies any nausea or vomiting. No abdominal pain. Vital signs notable for respiratory rate of 26, saturation of 75% on room air required BiPAP in the ER. Labs showed CO2 of 19, glucose 373, proBNP 1021, hemoglobin 11.1 and negative troponin. Chest x-ray shows bilateral infiltrates with effusions, dilated stomach. EKG shows sinus rhythm with PVC rate of 95 with Q waves in aVL and lead I. She had an echo in June 2020 with EF of 75± percent with grade 2 diastolic dysfunction, mild MR/TR/AAS/pulmonary hypertension. She is being admitted for further management.     Past Medical History:          Diagnosis Date    (HFpEF) heart failure with preserved ejection fraction ([facility] [address] 05/26/2017    3/31/17- echo- LVEF 60-65%, mild LVH, mild MR    Arthritis     Bursitis     shoulders    Cervical radiculopathy     [facility]est[facility]     CKD (chronic kidney disease) stage 3, GFR 30-59 ml/min     Diabetes mellitus ([facility])     GERD (gastroesophageal reflux disease)     [place] (hard of hearing)     Hypertension     Hypothyroidism     SSS (sick sinus syndrome) ([facility])     stable, per [facility] note.  Thyroid disease     Unsteadiness     Valvular heart disease     sees Dr. Rg Maier        Past Surgical History:          Procedure Laterality Date    FOOT SURGERY      both    JOINT REPLACEMENT  1999    TOTAL KNEE ARTHROPLASTY Right 3/21/2019    RIGHT KNEE TOTAL ARTHROPLASTY (ILENE)++ADDUCTOR CANAL BLOCK++ performed by Jagjit Shrestha MD at [facility] OR       Medications Prior to Admission:      Prior to Admission medications    Medication Sig Start Date End Date Taking?  Authorizing Provider   glimepiride (AMARYL) 4 MG tablet Take by mouth 1 tab qam & 0.5 tab qpm   Yes Historical Provider, MD   magnesium oxide (MAG-OX) 400 MG tablet Take 400 mg by mouth 2 times daily   Yes Historical Provider, MD   acetaminophen-codeine (TYLENOL #3) 300-30 MG per tablet TAKE 1 TABLET BY MOUTH EVERY 4 TO 6 HOURS AS NEEDED FOR PAIN 8/5/20  Yes Historical Provider, MD   metoprolol succinate (TOPROL XL) 25 MG extended release tablet Take 1 tablet by mouth daily 6/22/20  Yes Bobbi Jaramillo MD   furosemide (LASIX) 40 MG tablet Take 1 tablet by mouth daily 6/22/20  Yes Bobbi Jaramillo MD   meloxicam (MOBIC) 15 MG tablet Take 1 tablet by mouth daily  Patient taking differently: Take 7.5 mg by mouth daily  5/21/20  Yes Trace Pelaez MD   levothyroxine (SYNTHROID) 50 MCG tablet Take 50 mcg by mouth Daily   Yes Historical Provider, MD   cloNIDine (CATAPRES) 0.2 MG tablet Take 0.2 mg by mouth 2 times daily    Yes Historical Provider, MD   acetaminophen (TYLENOL) 500 MG tablet Take 500 mg by mouth every 6 hours as needed for Pain   Yes Historical Provider, MD   metFORMIN (GLUCOPHAGE) 500 MG tablet Take 500 mg by mouth 2 times daily (with meals)   Yes Historical Provider, MD   losartan (COZAAR) 100 MG tablet Take 1 tablet by mouth daily 5/5/17  Yes Yolanda Montoya, DO   potassium chloride (KLOR-CON M) 10 MEQ extended release tablet Take 1 tablet by mouth daily 5/5/17  Yes Yolanda Cainorn, DO   amLODIPine (NORVASC) 10 MG tablet Take 10 mg by mouth daily    Yes Historical Provider, MD       Allergies:  Aspirin    Social History:      The patient currently lives at home. TOBACCO:   reports that she has never smoked. She has never used smokeless tobacco.  ETOH:   reports previous alcohol use. Family History:     Reviewed in detail Positive as follows:        Problem Relation Age of Onset    No Known Problems Mother     No Known Problems Father        REVIEW OF SYSTEMS:   Pertinent positives as noted in the HPI. All other systems reviewed and negative. PHYSICAL EXAM:    /76   Pulse 99   Temp 97.5 °F (36.4 °C)   Resp 16   Ht 5' 4\" (1.626 m)   Wt 155 lb (70.3 kg)   SpO2 96%   BMI 26.61 kg/m²     General appearance: Elderly female, mild painful distress, appears stated age and cooperative. Afebrile. HEENT:  Normal cephalic, atraumatic without obvious deformity. Pupils equal, round, and reactive to light. Extra ocular muscles intact. Conjunctivae/corneas clear. Neck: Supple, with limited range of motion. No jugular venous distention. Trachea midline. Respiratory:  Normal respiratory effort. Decreased air movement with diffuse  Rales/Rhonchi. Cardiovascular: irregular rate and rhythm with normal S1/S2 without murmurs, rubs or gallops. Abdomen: Soft, non-tender, non-distended with normal bowel sounds. Musculoskeletal:  No clubbing/cyanosis, 2+ edema bilaterally. Limited range of motion without deformity. Skin: Skin color, texture, turgor normal.  No rashes or lesions.   Neurologic:   Cranial nerves: II-XII intact, grossly non-focal.  Psychiatric:  Alert and oriented, thought content appropriate, normal insight  Capillary Refill: Brisk,< 3 seconds   Peripheral Pulses: +2 palpable, equal bilaterally       Labs:     Recent Labs     03/03/21  1809   WBC 4.8   HGB 11.1*   HCT 37.4        Recent Labs     03/03/21  1809      K 4.9      CO2 19*   BUN 22   CREATININE 1.0   CALCIUM 8.6     Recent Labs     03/03/21  1809   AST 22   ALT 13   BILITOT 0.6   ALKPHOS 80     No results for input(s): INR in the last 72 hours. Recent Labs     03/03/21  1809   TROPONINI 0.01       Urinalysis:      Lab Results   Component Value Date    NITRU Negative 06/20/2020    WBCUA 1-3 06/20/2020    BACTERIA NONE SEEN 06/20/2020    RBCUA NONE 06/20/2020    BLOODU Negative 06/20/2020    SPECGRAV 1.015 06/20/2020    GLUCOSEU Negative 06/20/2020       Radiology:   Reviewed and documented    XR CHEST (2 VW)   Final Result   Diffuse patchy perihilar and bilateral infiltrates and effusions likely   CHF/edema and or pneumonia. ASSESSMENT:    Active Hospital Problems    Diagnosis Date Noted    Metabolic acidosis [R23.0] 03/04/2021    Anemia [D64.9] 03/04/2021    Edema [R60.9] 03/04/2021    Acute on chronic diastolic congestive heart failure (HCC) [I50.33] 03/03/2021    Chronic pain syndrome [G89.4] 07/01/2020    Cervicalgia [M54.2] 07/01/2020    Chest pain [R07.9] 09/13/2017    Hypothyroidism [E03.9] 09/02/2017    HTN (hypertension), benign [I10] 09/01/2017    Uncontrolled type 2 diabetes mellitus with hyperglycemia ([facility] [address] [E11.65] 03/29/2017    Acute respiratory failure with hypoxia ([facility] [address] [J96.01] 03/29/2017     PLAN:  1. Acute on chronic diastolic CHF, diurese with Lasix, monitor I's and O's and daily weights. Cycle cardiac enzymes. 2.  Acute respiratory failure with hypoxia required BiPAP. Currently on oxygen. ABGs. 3.  Chest pain rule out acute coronary syndrome/PE. Cycle enzymes, check D-dimer, if elevated, obtain CTA. 4.  Lower extremity edema, Doppler ultrasound to rule out DVT  5.   Acute on chronic neck and back pain, obtain CT scan of the cervical spine. Pain control. 6.  Uncontrolled type 2 diabetes with hyperglycemia, patient has metabolic acidosis, check beta hydroxybutyric acid to evaluate for DKA. Sliding scale coverage, monitor blood sugar. 7.  Bilateral infiltrates on chest x-ray, likely secondary to pulmonary edema, check procalcitonin, consider antibiotics for pneumonia if elevated. 8.  Hypertension, blood pressures controlled, continue home medications  9. Chronic pain syndrome due to multilevel DJD with arthritis of the joints  10. Hypothyroidism, continue Synthroid, check thyroid function  11. Anemia, monitor hemoglobin      DVT Prophylaxis: Lovenox  Diet: DIET LOW SODIUM 2 GM;  Code Status: Full Code    PT/OT Eval Status: As needed    Dispo -inpatient/telemetry       Mane Butler MD    Thank you Maribeth Mas MD for the opportunity to be involved in this patient's care.

## 2021-03-04 NOTE — PROGRESS NOTES
Nutrition Education    Counseled patient on heart healthy/cardiac diet. Provided educational handouts and sample meal plans. Pt states good appetite . · Written educational materials provided. · Contact name and number provided. · Refer to Patient Education activity for more details.     Electronically signed by Jessica Durbin RD, LD on 3/4/21 at 11:53 AM EST    Contact: [phone]

## 2021-03-04 NOTE — PROGRESS NOTES
Hospitalist Progress Note      SYNOPSIS: Patient admitted on 3/3/2021 for shortness of breath and neck pain. She has chronic neck pain, and said it had been worsening for 2 days prior to admission. She is being managed for acute hypoxic respiratory failure due to acute on chronic HFpEF. SUBJECTIVE:    Patient seen and examined. She had a neck collar on. She complained about neck pain. Her shortness of breath is much better. Review of systems is otherwise negative. She has remained hemodynamically stable. Records reviewed. Temp (24hrs), Av.2 °F (36.8 °C), Min:97.5 °F (36.4 °C), Max:99 °F (37.2 °C)    DIET: DIET LOW SODIUM 2 GM;  CODE: Full Code    Intake/Output Summary (Last 24 hours) at 3/4/2021 1318  Last data filed at 3/4/2021 0912  Gross per 24 hour   Intake 480 ml   Output 850 ml   Net -370 ml       OBJECTIVE:    BP (!) 168/81   Pulse 78   Temp 98.2 °F (36.8 °C) (Oral)   Resp 18   Ht 5' 4\" (1.626 m)   Wt 167 lb (75.8 kg)   SpO2 93%   BMI 28.67 kg/m²     General appearance: No apparent distress, appears stated age and cooperative. HEENT:  Conjunctivae/corneas clear. Neck: Supple. No jugular venous distention. Has cervical neck collar on  Respiratory: Clear to auscultation bilaterally, normal respiratory effort  Cardiovascular: Regular rate rhythm, normal S1-S2  Abdomen: Soft, nontender, nondistended  Musculoskeletal: No clubbing, cyanosis, bilateral 1+ lower extremity edema. Brisk capillary refill. Skin:  No rashes  on visible skin  Neurologic: awake, alert and following commands     ASSESSMENT:  #Acute on chronic HFpEF  -being diuresed with IVF lasix  -monitor intake and output  -daily weghts  -cardiology consulted    #Acute  Hypoxic respiratory failure due to CHF exacerbation  -now off BIPAP.  On oxygen by nasal canula  -titrate oxygen to maintain sats >90%  -breathing treatment with bronchodilators    #Acute on chronic neck pain  -CT of the cervical spine showed diffuse facet arthritis with moderately advanced degenerative disc disease changes greatest from C4-5 through C7-T1; changes are described as chronic appearing.  -continue pain meds  -consult neurosurgery as patient continues to complain of severe pain  -will get MRI of the neck    #Hypertension: on clonidine and metoprolol as well as losartan    #Type 2 diabtes mellitus  - on glimepiride. ISS. Accuchecks ACHS    #Hypothyroidism: on synthroid    #Anemia; stable      DVT prophylaxis: lovenox         DISPOSITION: to be determined.      Medications:  REVIEWED DAILY    Infusion Medications    dextrose       Scheduled Medications    furosemide  40 mg Intravenous Daily    cloNIDine  0.2 mg Oral BID    glimepiride  4 mg Oral Daily with breakfast    levothyroxine  50 mcg Oral Daily    losartan  100 mg Oral Daily    magnesium oxide  400 mg Oral BID    metoprolol succinate  25 mg Oral Daily    potassium chloride  10 mEq Oral Daily    insulin lispro  0-10 Units Subcutaneous 4x Daily AC & HS    sodium chloride flush  10 mL Intravenous 2 times per day    enoxaparin  40 mg Subcutaneous Daily     PRN Meds: glucose, dextrose, glucagon (rDNA), dextrose, sodium chloride flush, promethazine **OR** ondansetron, polyethylene glycol, acetaminophen **OR** acetaminophen, nitroGLYCERIN    Labs:     Recent Labs     03/03/21  1809 03/04/21 0222   WBC 4.8 5.1   HGB 11.1* 11.5   HCT 37.4 37.8    172       Recent Labs     03/03/21  1809 03/04/21 0222    141   K 4.9 4.4    106   CO2 19* 26   BUN 22 22   CREATININE 1.0 1.1*   CALCIUM 8.6 8.9       Recent Labs     03/03/21 1809   PROT 6.7   ALKPHOS 80   ALT 13   AST 22   BILITOT 0.6       Recent Labs     03/04/21  0438   INR 1.1       Recent Labs     03/03/21  1809 03/04/21 0222   TROPONINI 0.01 0.03       Chronic labs:    Lab Results   Component Value Date    CHOL 134 03/04/2021    TRIG 83 03/04/2021    HDL 65 03/04/2021    LDLCALC 52 03/04/2021    TSH 5.390 (H) 03/04/2021 INR 1.1 03/04/2021    LABA1C 6.7 (H) 03/04/2021       Radiology: REVIEWED DAILY    +++++++++++++++++++++++++++++++++++++++++++++++++  [place], [place]  +++++++++++++++++++++++++++++++++++++++++++++++++  NOTE: This report was transcribed using voice recognition software. Every effort was made to ensure accuracy; however, inadvertent computerized transcription errors may be present.

## 2021-03-04 NOTE — ED PROVIDER NOTES
HPI:  3/3/21,   Time: 11:24 PM ELIE Tavera is a 81 y.o. female presenting to the ED for neck pain and shortness of breath. Patient states she has been having neck pain ongoing since 1993, no worsening or acute neck pain, no new trauma or injuries. Patient states however the past few days she has been feeling increasingly short of breath. Denies fever chills cough or cold-like symptoms. No chest pain. No abdominal pain. No other complaints. Noted be SaO2 75% on room air in triage. ROS:   GEN: no fevers, no chills, no fatique  HENT: No trauma, no sore throat, no swelling throat or oral mucosa  EYES: No changes in vision, no pain  CARDIO: No chest pain, no palpitations  PULM: See HPI  ABD: No pain, no nausea, no vomiting  MSK: See HPI  SKIN: No rashes, no abrasions, no lacerations  NEURO: No changes in mentation, no headache, no weakness     --------------------------------------------- PAST HISTORY ---------------------------------------------  Past Medical History:  has a past medical history of (HFpEF) heart failure with preserved ejection fraction (HCC), Arthritis, Bursitis, Cervical radiculopathy, CHF (congestive heart failure) ([facility] CKD (chronic kidney disease) stage 3, GFR 30-59 ml/min, Diabetes mellitus ([address] GERD (gastroesophageal reflux disease), [place] (hard of hearing), Hypertension, Hypothyroidism, SSS (sick sinus syndrome) ([facility] Thyroid disease, Unsteadiness, and Valvular heart disease. Past Surgical History:  has a past surgical history that includes Foot surgery; Total knee arthroplasty (Right, 3/21/2019); and joint replacement (1999). Social History:  reports that she has never smoked. She has never used smokeless tobacco. She reports previous alcohol use. She reports that she does not use drugs. Family History: family history includes No Known Problems in her father and mother. The patients home medications have been reviewed.     Allergies: Aspirin    -------------------------------------------------- RESULTS -------------------------------------------------  All laboratory and radiology results have been personally reviewed by myself   LABS:  Results for orders placed or performed during the hospital encounter of 03/03/21   COVID-19, Rapid   Result Value Ref Range    SARS-CoV-2, NAAT Not Detected Not Detected   CBC   Result Value Ref Range    WBC 4.8 4.5 - 11.5 E9/L    RBC 4.06 3.50 - 5.50 E12/L    Hemoglobin 11.1 (L) 11.5 - 15.5 g/dL    Hematocrit 37.4 34.0 - 48.0 %    MCV 92.1 80.0 - 99.9 fL    MCH 27.3 26.0 - 35.0 pg    MCHC 29.7 (L) 32.0 - 34.5 %    RDW 15.0 11.5 - 15.0 fL    Platelets 927 196 - 141 E9/L    MPV 10.7 7.0 - 12.0 fL   Comprehensive Metabolic Panel   Result Value Ref Range    Sodium 137 132 - 146 mmol/L    Potassium 4.9 3.5 - 5.0 mmol/L    Chloride 106 98 - 107 mmol/L    CO2 19 (L) 22 - 29 mmol/L    Anion Gap 12 7 - 16 mmol/L    Glucose 373 (H) 74 - 99 mg/dL    BUN 22 8 - 23 mg/dL    CREATININE 1.0 0.5 - 1.0 mg/dL    GFR Non-African American >60 >=60 mL/min/1.73    GFR African American >60     Calcium 8.6 8.6 - 10.2 mg/dL    Total Protein 6.7 6.4 - 8.3 g/dL    Albumin 3.4 (L) 3.5 - 5.2 g/dL    Total Bilirubin 0.6 0.0 - 1.2 mg/dL    Alkaline Phosphatase 80 35 - 104 U/L    ALT 13 0 - 32 U/L    AST 22 0 - 31 U/L   Troponin   Result Value Ref Range    Troponin 0.01 0.00 - 0.03 ng/mL   Brain Natriuretic Peptide   Result Value Ref Range    Pro-BNP 1,021 (H) 0 - 450 pg/mL   EKG 12 Lead   Result Value Ref Range    Ventricular Rate 95 BPM    Atrial Rate 95 BPM    P-R Interval 152 ms    QRS Duration 104 ms    Q-T Interval 374 ms    QTc Calculation (Bazett) 469 ms    P Axis 67 degrees    R Axis -149 degrees    T Axis 44 degrees       RADIOLOGY:  Interpreted by Radiologist.  XR CHEST (2 VW)   Final Result   Diffuse patchy perihilar and bilateral infiltrates and effusions likely   CHF/edema and or pneumonia.             ------------------------- occasional premature supraventricular complex  ST Segments: normal  T Waves: normal    Clinical Impression: no acute changes          This patient's ED course included: re-evaluation prior to disposition and a personal history and physicial eaxmination    This patient has remained hemodynamically stable during their ED course. Counseling: The emergency provider has spoken with the patient and discussed todays results, in addition to providing specific details for the plan of care and counseling regarding the diagnosis and prognosis. Questions are answered at this time and they are agreeable with the plan.      --------------------------------- IMPRESSION AND DISPOSITION ---------------------------------    IMPRESSION  1. Acute on chronic congestive heart failure, unspecified heart failure type ([address]    2. Hypoxia    3.  Chronic neck pain        DISPOSITION  Disposition: Admit to telemetry  Patient condition is fair        Yesi Malloy DO  03/03/21 [id number]

## 2021-03-04 NOTE — CARE COORDINATION
Met with pt at the bedside. Role of  and transition of care discussed. Pt lives alone in an basement apartment. She states he son assists her with all needs. Pt is independent for all ADLs, She has a cane, walker, BSC, shower/tub chair. No Hx of HHC/MATA. She does still drive occasionally. Plan is to return home at discharge. Denies any needs. Declines HHC. PCP Dr Moe Painter. Pharmacy [facility] in [facility].

## 2021-03-05 NOTE — PATIENT CARE CONFERENCE
[facility] Quality Flow/Interdisciplinary Rounds Progress Note        Quality Flow Rounds held on March 5, 2021    Disciplines Attending:  Bedside Nurse, ,  and Nursing Unit Leadership    Alvin Whitlock was admitted on 3/3/2021  6:49 PM    Anticipated Discharge Date:  Expected Discharge Date: 03/06/21    Disposition:    Jose F Score:  Jose F Scale Score: 21    Readmission Risk              Risk of Unplanned Readmission:        16           Discussed patient goal for the day, patient clinical progression, and barriers to discharge.   The following Goal(s) of the Day/Commitment(s) have been identified:  Diagnostics - Report Results         Corey Alcala  March 5, 2021

## 2021-03-05 NOTE — PROGRESS NOTES
Hospitalist Progress Note      SYNOPSIS: Patient admitted on 3/3/2021 for shortness of breath and neck pain. She has chronic neck pain, and said it had been worsening for 2 days prior to admission. She is being managed for acute hypoxic respiratory failure due to acute on chronic HFpEF. SUBJECTIVE:    Patient seen and examined. She still complains of neck pain. Shortness of breath is much better. Review of systems is otherwise negative. She has remained hemodynamically stable. Records reviewed. Temp (24hrs), Av.9 °F (36.6 °C), Min:96.6 °F (35.9 °C), Max:98.7 °F (37.1 °C)    DIET: DIET LOW SODIUM 2 GM;  CODE: Full Code    Intake/Output Summary (Last 24 hours) at 3/5/2021 1555  Last data filed at 3/5/2021 0816  Gross per 24 hour   Intake 230 ml   Output 300 ml   Net -70 ml       OBJECTIVE:    BP (!) 107/51   Pulse 91   Temp 97.9 °F (36.6 °C) (Oral)   Resp 16   Ht 5' 4\" (1.626 m)   Wt 165 lb 9.6 oz (75.1 kg)   SpO2 95%   BMI 28.43 kg/m²     General appearance: No apparent distress, appears stated age and cooperative. HEENT:  Conjunctivae/corneas clear. Neck: Supple. No jugular venous distention. Has neck brace on  Respiratory: Clear to auscultation bilaterally, normal respiratory effort  Cardiovascular: Regular rate rhythm, normal S1-S2  Abdomen: Soft, nontender, nondistended  Musculoskeletal: No clubbing, cyanosis,  Bilateral 1+ lower extremity edema. Brisk capillary refill. Skin:  No rashes  on visible skin  Neurologic: awake, alert and following commands     ASSESSMENT:  #Acute on chronic HFpEF  -being diuresed with IVF lasix  -monitor intake and output  -daily weghts  -d     #Acute  Hypoxic respiratory failure due to CHF exacerbation  -now off BIPAP.  On oxygen by nasal canula  -titrate oxygen to maintain sats >90%  -breathing treatment with bronchodilators     #Acute on chronic neck pain  -CT of the cervical spine showed diffuse facet arthritis with moderately advanced degenerative disc disease changes greatest from C4-5 through C7-T1; changes are described as chronic appearing.  -continue pain meds  -MRI of the cervical spine showed no fracture, evidence of discitis or osteomyelitis, and severe central canal stenosis at C3-4, and moderate stenosis from C5-7, with severe neural foraminal stenosis, worse at C3-4, and C7-T1  -she tells me she has been told she cannot have surgery as it is too dangerous. She had pain shots in her neck in the past, but says it didn't help much.   -wants a neck collar as that helps ameliorate the pain. #Hypertension: on clonidine and metoprolol as well as losartan     #Type 2 diabtes mellitus  - on glimepiride. ISS.  Accuchecks ACHS     #Hypothyroidism: on synthroid     #Anemia; stable        DVT prophylaxis: lovenox        DISPOSITION: to be determined    Medications:  REVIEWED DAILY    Infusion Medications    dextrose       Scheduled Medications    furosemide  40 mg Intravenous Daily    cloNIDine  0.2 mg Oral BID    glimepiride  4 mg Oral Daily with breakfast    levothyroxine  50 mcg Oral Daily    losartan  100 mg Oral Daily    magnesium oxide  400 mg Oral BID    metoprolol succinate  25 mg Oral Daily    potassium chloride  10 mEq Oral Daily    insulin lispro  0-10 Units Subcutaneous 4x Daily AC & HS    sodium chloride flush  10 mL Intravenous 2 times per day    enoxaparin  40 mg Subcutaneous Daily     PRN Meds: glucose, dextrose, glucagon (rDNA), dextrose, sodium chloride flush, promethazine **OR** ondansetron, polyethylene glycol, acetaminophen **OR** acetaminophen, nitroGLYCERIN    Labs:     Recent Labs     03/03/21  1809 03/04/21 0222 03/05/21  0412   WBC 4.8 5.1 3.8*   HGB 11.1* 11.5 10.2*   HCT 37.4 37.8 33.5*    172 150       Recent Labs     03/03/21  1809 03/04/21  0222 03/05/21  0412    141 141   K 4.9 4.4 4.4    106 106   CO2 19* 26 28   BUN 22 22 26*   CREATININE 1.0 1.1* 1.3*   CALCIUM 8.6 8.9 8.4*       Recent Labs 03/03/21  1809   PROT 6.7   ALKPHOS 80   ALT 13   AST 22   BILITOT 0.6       Recent Labs     03/04/21  0438   INR 1.1       Recent Labs     03/03/21  1809 03/04/21  0222   TROPONINI 0.01 0.03       Chronic labs:    Lab Results   Component Value Date    CHOL 134 03/04/2021    TRIG 83 03/04/2021    HDL 65 03/04/2021    LDLCALC 52 03/04/2021    TSH 5.390 (H) 03/04/2021    INR 1.1 03/04/2021    LABA1C 6.7 (H) 03/04/2021       Radiology: REVIEWED DAILY    +++++++++++++++++++++++++++++++++++++++++++++++++  [place], [place]  +++++++++++++++++++++++++++++++++++++++++++++++++  NOTE: This report was transcribed using voice recognition software. Every effort was made to ensure accuracy; however, inadvertent computerized transcription errors may be present.

## 2021-03-06 PROBLEM — M54.2 NECK PAIN: Status: ACTIVE | Noted: 2021-01-01

## 2021-03-06 NOTE — PROGRESS NOTES
Hospitalist Progress Note      SYNOPSIS: Patient admitted on 3/3/2021 for shortness of breath and neck pain. She has chronic neck pain, and said it had been worsening for 2 days prior to admission. She is being managed for acute hypoxic respiratory failure due to acute on chronic HFpEF. SUBJECTIVE:    Patient seen and examined. Reports chronic neck pain for which she has seen neurosurgery in the past. Also c/o wheezing without SOB. Review of systems is otherwise negative. She has remained hemodynamically stable. Records reviewed. Temp (24hrs), Av.5 °F (36.4 °C), Min:96.6 °F (35.9 °C), Max:97.9 °F (36.6 °C)    DIET: DIET LOW SODIUM 2 GM;  CODE: Full Code    Intake/Output Summary (Last 24 hours) at 3/6/2021 0825  Last data filed at 3/6/2021 0600  Gross per 24 hour   Intake 560 ml   Output 1450 ml   Net -890 ml       OBJECTIVE:    BP (!) 142/73   Pulse 99   Temp 97.6 °F (36.4 °C) (Oral)   Resp 16   Ht 5' 4\" (1.626 m)   Wt 165 lb 8 oz (75.1 kg)   SpO2 99%   BMI 28.41 kg/m²     General appearance: No apparent distress, appears stated age and cooperative. HEENT:  Conjunctivae/corneas clear. EOMI. Mucous membranes moist.  Neck:  Has soft neck brace on  Respiratory: Clear to auscultation bilaterally, normal respiratory effort, no wheezing/rales/rhonchi  Cardiovascular: Regular rate rhythm, normal S1-S2  Abdomen: Soft, nontender, nondistended  Musculoskeletal: No clubbing, cyanosis,  Bilateral 2+ lower extremity edema. Skin:  No rashes on visible skin  Neurologic: awake, alert and following commands     ASSESSMENT:  #Acute on chronic HFpEF  -being diuresed with IV lasix 40mg daily  -monitor intake and output  -daily weights  - negative 1680 fluid balance for admission  - feels SOB has improved since admission  - wean O2  As tolerated     #Acute  Hypoxic respiratory failure due to CHF exacerbation  -now off BIPAP.  On oxygen by nasal canula  -titrate oxygen to maintain sats >90%  -breathing treatment with bronchodilators as needed     #Acute on chronic neck pain  #Right arm radiculopathy  -CT of the cervical spine showed diffuse facet arthritis with moderately advanced degenerative disc disease changes greatest from C4-5 through C7-T1; changes are described as chronic appearing.  -continue pain meds  -MRI of the cervical spine showed no fracture, evidence of discitis or osteomyelitis, and severe central canal stenosis at C3-4, and moderate stenosis from C5-7, with severe neural foraminal stenosis, worse at C3-4, and C7-T1  -she tells me she has been told she cannot have surgery as it is too dangerous. She had pain shots in her neck in the past, but says it didn't help much.   -wants a neck collar as that helps ameliorate the pain. - neurosurgery evaluated, tweaking prn pain medications for neck pain, do not recommend surgery due to high risk     #Hypertension: on clonidine and metoprolol as well as losartan     #Type 2 diabtes mellitus  - on glimepiride. ISS.  Accuchecks ACHS     #Hypothyroidism: on synthroid     #Anemia; stable        DVT prophylaxis: lovenox        DISPOSITION: to be determined, likely home, able to get to bathroom independently per nurse    Medications:  REVIEWED DAILY    Infusion Medications    dextrose       Scheduled Medications    furosemide  40 mg Intravenous Daily    cloNIDine  0.2 mg Oral BID    glimepiride  4 mg Oral Daily with breakfast    levothyroxine  50 mcg Oral Daily    losartan  100 mg Oral Daily    magnesium oxide  400 mg Oral BID    metoprolol succinate  25 mg Oral Daily    potassium chloride  10 mEq Oral Daily    insulin lispro  0-10 Units Subcutaneous 4x Daily AC & HS    sodium chloride flush  10 mL Intravenous 2 times per day    enoxaparin  40 mg Subcutaneous Daily     PRN Meds: glucose, dextrose, glucagon (rDNA), dextrose, sodium chloride flush, promethazine **OR** ondansetron, polyethylene glycol, acetaminophen **OR** acetaminophen,

## 2021-03-06 NOTE — CONSULTS
[address]                  [address] [facility],  [facility]                                  CONSULTATION    PATIENT NAME: Darryle Gold                      :        1935  MED REC NO:   [id number]                            ROOM:       [id number]  ACCOUNT NO:   [de-identified]                           ADMIT DATE: 2021  PROVIDER:     Fatimah Mandujano MD    CONSULT DATE:  2021    REASON FOR CONSULT:  Neck pain and cervical stenosis. HISTORY OF PRESENT ILLNESS:  The patient is an 81-year-old lady who is  known to my service. She has chronic neck pain. She also has cervical  stenosis. She presented to the hospital with chest pain and neck pain,  and she had a repeat MRI that showed significant cervical stenosis from  C3 to C7. Neurosurgery Service was consulted. In the past, she had  been seen in the office and was advised that she was at high risk for  any surgical intervention, and she is also in agreement that she does  not feel that is an appropriate candidate for surgical intervention. PAST MEDICAL HISTORY:  Positive for hypertension, hypothyroidism, CHF,  diabetes, arthritis, valvular heart disease, gastroesophageal reflux,  chronic kidney disease, sick sinus syndrome. PAST SURGICAL HISTORY:  Positive for foot surgery, total knee  arthroplasty. SOCIAL HISTORY:  Negative for tobacco or alcohol use. ALLERGIES:  Include ASPIRIN. HOME MEDICATIONS:  Include Catapres, Lasix, Amaryl, Cozaar, Synthroid. REVIEW OF SYSTEMS:  HEENT:  Positive for neck pain. CARDIOVASCULAR:   Positive for chest pain. RESPIRATORY:  Negative for shortness of  breath, asthma, bronchitis, or pneumonia. GASTROINTESTINAL:  Negative  for heartburn, nausea, vomiting, diarrhea, or constipation. GENITOURINARY:  Negative for hematuria or dysuria. HEMATOLOGIC:   Positive for easy bruising. INFECTIOUS:  Negative for any recent  infection.   MUSCULOSKELETAL: Positive for joint stiffness and swelling. PSYCHIATRIC:  Negative for anxiety or depression. NEUROLOGIC:  Negative  for seizure or stroke. ENDOCRINE:  Positive for thyroid disorder and  diabetes. PHYSICAL EXAMINATION:  VITAL SIGNS:  She is currently afebrile with a T-current of 36.6 degrees  Celsius, pulse 68, blood pressure is 142/63, respiratory rate is 16. GENERAL:  She is resting in bed and appears to be in mild distress  secondary to pain, appears her stated age. HEENT:  Her head is normocephalic and atraumatic. Pupils are 3 to 2 mm  and reactive. She has no drainage out of her eyes, ears, nose, or  throat. SKIN:  Her skin is warm and dry. MUSCULOSKELETAL:  She has got decreased range of motion in her right  lower extremity and her right upper extremity. ABDOMEN:  Soft, nontender, and nondistended. RESPIRATORY:  She is not using any accessory muscles of respiration. NEUROLOGIC:  On rest of her neurologic exam; she is awake, alert, and  oriented x3. Cranial nerves II through XII are intact bilaterally. Motor exam reveals 4+/5 strength in her bilateral upper extremity and in  her left lower extremity. She has 4-/5 strength in her right lower  extremity. Sensation is grossly intact to light touch. She has  negative [place]. LAB VALUES:  She has sodium of 140, potassium of 4.3, BUN 32, creatinine  1.3. REVIEW OF IMAGING:  MRI of the cervical spine shows that she has got  moderate-to-severe stenosis from C3 down to C7.    ASSESSMENT AND PLAN:  The patient is an 27-year-old lady with  predominantly neck pain and occasional right-sided radiculopathy. MRI  does show that she has got significant stenosis from C3 to C7, and in  the past I did discuss surgical intervention with her, and advised her  that the risk was high given her cardiac history and other comorbid  conditions, and she did state that she is in agreement that surgical  intervention is not a way to go for her at this time.   We will go ahead  and get her a new cervical collar, and we will go ahead and also start  her on some tramadol and see if this helps her pain.         Amelia Frye MD    D: 03/06/2021 12:55:12       T: 03/06/2021 15:03:40     [email or web address]  Job#: [id number]     Doc#: [id number]    CC:

## 2021-03-06 NOTE — PLAN OF CARE
Problem: OXYGENATION/RESPIRATORY FUNCTION  Goal: Patient will maintain patent airway  Outcome: Ongoing  Goal: Patient will achieve/maintain normal respiratory rate/effort  Description: Respiratory rate and effort will be within normal limits for the patient  Outcome: Ongoing     Problem: HEMODYNAMIC STATUS  Goal: Patient has stable vital signs and fluid balance  Outcome: Ongoing     Problem: FLUID AND ELECTROLYTE IMBALANCE  Goal: Fluid and electrolyte balance are achieved/maintained  Outcome: Ongoing     Problem: Falls - Risk of:  Goal: Will remain free from falls  Description: Will remain free from falls  Outcome: Ongoing  Goal: Absence of physical injury  Description: Absence of physical injury  Outcome: Ongoing

## 2021-03-07 NOTE — PROGRESS NOTES
Hospitalist Progress Note      SYNOPSIS: Patient admitted on 3/3/2021 for shortness of breath and neck pain. She has chronic neck pain, and said it had been worsening for 2 days prior to admission. She is being managed for acute hypoxic respiratory failure due to acute on chronic HFpEF. SUBJECTIVE:    Patient seen and examined. Continues to have neck pain. Denies SOB at this time, per bedside nurse SaO2 drops into 80s off oxygen. Review of systems is otherwise negative. She has remained hemodynamically stable. Records reviewed. Temp (24hrs), Av.9 °F (36.6 °C), Min:97 °F (36.1 °C), Max:99.1 °F (37.3 °C)    DIET: DIET LOW SODIUM 2 GM;  CODE: Full Code    Intake/Output Summary (Last 24 hours) at 3/7/2021 0819  Last data filed at 3/7/2021 0659  Gross per 24 hour   Intake 480 ml   Output 1000 ml   Net -520 ml       OBJECTIVE:    BP (!) 113/57   Pulse 52   Temp 97.4 °F (36.3 °C) (Temporal)   Resp 16   Ht 5' 4\" (1.626 m)   Wt 168 lb 8 oz (76.4 kg)   SpO2 96%   BMI 28.92 kg/m²     General appearance: No apparent distress, appears stated age and cooperative. HEENT:  Conjunctivae/corneas clear. EOMI. Mucous membranes moist.  Neck:  Has soft neck brace on  Respiratory: Clear to auscultation bilaterally, normal respiratory effort, no wheezing/rales/rhonchi  Cardiovascular: Regular rate rhythm, normal S1-S2, distant heart sounds  Abdomen: Soft, nontender, nondistended  Musculoskeletal: No clubbing, cyanosis,  Bilateral 2+ lower extremity pitting edema.     Skin:  No rashes on visible skin  Neurologic: awake, alert and following commands     ASSESSMENT:  #Acute on chronic HFpEF  -being diuresed with IV lasix 40mg daily- stop this due to AMANDEEP  -monitor intake and output  -daily weights  - negative 2200 fluid balance for admission  - wean O2  As tolerated- SaO2 87% overnight  - cxr to evaluate for possible pneumonia    #AMANDEEP  -likely secondary to diuresis  - baseline creatinine 1.0, now 1.5 with Daily    insulin lispro  0-10 Units Subcutaneous 4x Daily AC & HS    sodium chloride flush  10 mL Intravenous 2 times per day    enoxaparin  40 mg Subcutaneous Daily     PRN Meds: traMADol, ipratropium-albuterol, glucose, dextrose, glucagon (rDNA), dextrose, sodium chloride flush, promethazine **OR** ondansetron, polyethylene glycol, acetaminophen **OR** acetaminophen, nitroGLYCERIN    Labs:     Recent Labs     03/05/21 0412   WBC 3.8*   HGB 10.2*   HCT 33.5*          Recent Labs     03/05/21  0412 03/06/21  0417 03/07/21  0442    140 134   K 4.4 4.3 5.1*    103 101   CO2 28 29 29   BUN 26* 32* 37*   CREATININE 1.3* 1.3* 1.5*   CALCIUM 8.4* 8.5* 8.4*       No results for input(s): PROT, ALB, ALKPHOS, ALT, AST, BILITOT, AMYLASE, LIPASE in the last 72 hours. No results for input(s): INR in the last 72 hours. No results for input(s): Teryl Drown in the last 72 hours.     Chronic labs:    Lab Results   Component Value Date    CHOL 134 03/04/2021    TRIG 83 03/04/2021    HDL 65 03/04/2021    LDLCALC 52 03/04/2021    TSH 5.390 (H) 03/04/2021    INR 1.1 03/04/2021    LABA1C 6.7 (H) 03/04/2021       Radiology: REVIEWED DAILY    +++++++++++++++++++++++++++++++++++++++++++++++++  [address] [place], [place]  +++++++++++++++++++++++++++++++++++++++++++++++++

## 2021-03-07 NOTE — PATIENT CARE CONFERENCE
[facility] Quality Flow/Interdisciplinary Rounds Progress Note        Quality Flow Rounds held on March 7, 2021    Disciplines Attending:  Bedside Nurse, ,  and Nursing Unit Leadership    Chris Concepcion was admitted on 3/3/2021  6:49 PM    Anticipated Discharge Date:  Expected Discharge Date: 03/06/21    Disposition:    Jose F Score:  Jose F Scale Score: 21    Readmission Risk              Risk of Unplanned Readmission:        17           Discussed patient goal for the day, patient clinical progression, and barriers to discharge.   The following Goal(s) of the Day/Commitment(s) have been identified:  Diagnostics - Report Results      Mike Paredes  March 7, 2021

## 2021-03-08 NOTE — CARE COORDINATION
Notified by pt's RN that pt will require 3L O2 for discharge. Met with pt at the bedside. Reviewed DME companies. Referral made to Rose Aparicio with [facility]. Message sent to Dr Olive Munoz for DME order.

## 2021-03-08 NOTE — DISCHARGE SUMMARY
Hospital Medicine Discharge Summary    Patient ID: Lali Sims      Patient's PCP: Lebron Gil MD    Admit Date: 3/3/2021     Discharge Date:   3/8/2021    Admitting Physician: Kt Gibbons MD     Discharge Physician: Rosalio Omer MD     Discharge Diagnoses: Active Hospital Problems    Diagnosis Date Noted    Neck pain [M54.2] 37/33/1982    Metabolic acidosis [Q62.3] 03/04/2021    Anemia [D64.9] 03/04/2021    Edema [R60.9] 03/04/2021    Acute on chronic diastolic congestive heart failure (HCC) [I50.33] 03/03/2021    Chronic pain syndrome [G89.4] 07/01/2020    Cervicalgia [M54.2] 07/01/2020    Chest pain [R07.9] 09/13/2017    Hypothyroidism [E03.9] 09/02/2017    HTN (hypertension), benign [I10] 09/01/2017    Uncontrolled type 2 diabetes mellitus with hyperglycemia ([facility] [E11.65] 03/29/2017    Acute respiratory failure with hypoxia ([facility] [J96.01] 03/29/2017       The patient was seen and examined on day of discharge and this discharge summary is in conjunction with any daily progress note from day of discharge. Hospital Course:   Patient admitted on 3/3/2021 for shortness of breath and neck pain. She has chronic neck pain, and said it had been worsening for 2 days prior to admission.  She is being managed for acute hypoxic respiratory failure due to acute on chronic HFpEF    #Acute on chronic HFpEF  -improved with IV Lasix  - achieved negative fluid balance     #Acute Hypoxic respiratory failure due to CHF exacerbation  - was requiring BIPAP from which was eventually weaned off  - supplemental O2 was weaned down to 3 L/min  - home oxyen was arranged prior to discharge      #Acute on chronic neck pain  #Right arm radiculopathy  -CT of the cervical spine showed diffuse facet arthritis with moderately advanced degenerative disc disease changes greatest from C4-5 through C7-T1; changes are described as chronic appearing.  -continue pain meds  -MRI of the cervical spine showed no Medications:     Current Discharge Medication List      CONTINUE these medications which have NOT CHANGED    Details   glimepiride (AMARYL) 4 MG tablet Take by mouth 1 tab qam & 0.5 tab qpm      magnesium oxide (MAG-OX) 400 MG tablet Take 400 mg by mouth 2 times daily      acetaminophen-codeine (TYLENOL #3) 300-30 MG per tablet TAKE 1 TABLET BY MOUTH EVERY 4 TO 6 HOURS AS NEEDED FOR PAIN      metoprolol succinate (TOPROL XL) 25 MG extended release tablet Take 1 tablet by mouth daily  Qty: 30 tablet, Refills: 3      furosemide (LASIX) 40 MG tablet Take 1 tablet by mouth daily  Qty: 60 tablet, Refills: 3      levothyroxine (SYNTHROID) 50 MCG tablet Take 50 mcg by mouth Daily      cloNIDine (CATAPRES) 0.2 MG tablet Take 0.2 mg by mouth 2 times daily       acetaminophen (TYLENOL) 500 MG tablet Take 500 mg by mouth every 6 hours as needed for Pain      metFORMIN (GLUCOPHAGE) 500 MG tablet Take 500 mg by mouth 2 times daily (with meals)      potassium chloride (KLOR-CON M) 10 MEQ extended release tablet Take 1 tablet by mouth daily  Qty: 30 tablet, Refills: 0    Comments: Discontinue script for 15 meq. amLODIPine (NORVASC) 10 MG tablet Take 10 mg by mouth daily          STOP taking these medications       diclofenac sodium (VOLTAREN) 1 % GEL Comments:   Reason for Stopping:         meloxicam (MOBIC) 15 MG tablet Comments:   Reason for Stopping:         nystatin (MYCOSTATIN) [id number] UNIT/GM cream Comments:   Reason for Stopping:         Compression Stockings MISC Comments:   Reason for Stopping:         hydrALAZINE (APRESOLINE) 50 MG tablet Comments:   Reason for Stopping:         losartan (COZAAR) 100 MG tablet Comments:   Reason for Stopping:               Time Spent on discharge is more than 30 minutes in the examination, evaluation, counseling and review of medications and discharge plan.       Signed:    Amna Sanchez MD   3/8/2021      Thank you Don Garvin MD for the opportunity to be involved in this patient's care. If you have any questions or concerns please feel free to contact me at [phone].

## 2021-03-08 NOTE — PROGRESS NOTES
Throughout entirety of day shift pt has refused to wear multiple soft collars stating they are uncomfortable. Multiple adjustments were made along with correcting position so as collar aligns neck properly. Pt states she is too uncomfortable wearing collar and leaves off.

## 2021-03-08 NOTE — PROGRESS NOTES
Pulse ox test  Pt on RA at rest was 88%. Pt with ambulation on RA dropped to 82%. Pt recovered with 3 L NC to 93%.

## 2021-03-15 NOTE — TELEPHONE ENCOUNTER
Patient called to schedule hospital follow-up appt. Per Marium Horvath, no follow-up needed. Left voicemail for patient.

## 2021-03-22 NOTE — PROGRESS NOTES
OFFICE VISIT        PRIMARY CARE PHYSICIAN:    Jen Pierce MD       ALLERGIES / SENSITIVITIES:    Allergies   Allergen Reactions    Aspirin Other (See Comments)     \"tears up my stomach\"        REVIEWED MEDICATIONS:      Current Outpatient Medications:     losartan (COZAAR) 100 MG tablet, Take 100 mg by mouth daily, Disp: , Rfl:     glimepiride (AMARYL) 4 MG tablet, Take by mouth 1 tab qam & 0.5 tab qpm, Disp: , Rfl:     magnesium oxide (MAG-OX) 400 MG tablet, Take 400 mg by mouth 2 times daily, Disp: , Rfl:     acetaminophen-codeine (TYLENOL #3) 300-30 MG per tablet, TAKE 1 TABLET BY MOUTH EVERY 4 TO 6 HOURS AS NEEDED FOR PAIN, Disp: , Rfl:     metoprolol succinate (TOPROL XL) 25 MG extended release tablet, Take 1 tablet by mouth daily, Disp: 30 tablet, Rfl: 3    furosemide (LASIX) 40 MG tablet, Take 1 tablet by mouth daily, Disp: 60 tablet, Rfl: 3    levothyroxine (SYNTHROID) 50 MCG tablet, Take 50 mcg by mouth Daily, Disp: , Rfl:     cloNIDine (CATAPRES) 0.2 MG tablet, Take 0.2 mg by mouth 2 times daily , Disp: , Rfl:     acetaminophen (TYLENOL) 500 MG tablet, Take 500 mg by mouth every 6 hours as needed for Pain, Disp: , Rfl:     metFORMIN (GLUCOPHAGE) 500 MG tablet, Take 500 mg by mouth 2 times daily (with meals), Disp: , Rfl:     potassium chloride (KLOR-CON M) 10 MEQ extended release tablet, Take 1 tablet by mouth daily, Disp: 30 tablet, Rfl: 0    amLODIPine (NORVASC) 10 MG tablet, Take 10 mg by mouth daily , Disp: , Rfl:       S: REASON FOR VISIT:    Chronic diastolic congestive heart failure with preserved ejection fraction, valvular heart disease, sick sinus syndrome and hypertension. She is followed here by Dr. Gabbie James. Laurie Hidalgo is an 81-year-old lady with cardiovascular history as described below. She is a poor historian. She has chronic chest pain which she does not describe well.     Since her last visit here in February 2021, she was admitted to [facility] on March 3, 2021 bleeding or easy bruisability. HEART:  As above. LUNGS:  positive cough but no sputum production. GI: Denies abdominal pain, nausea, vomiting,diarrhea, constipation, rectal bleeding or tarry stools. :  Denies hematuria or dysuria. PSYCHIATRIC:  Denies mood changes, anxiety or depression. NEUROLOGIC:  Denies memory loss, motor weakness, numbness, tingling or tremors.                    CARDIOVASCULAR HISTORY:    1.  Cardiac catheterization June 2005 (Dr. Esme Suero) showed normal left ventricular ejection fraction, normal coronary arteries, cardiac catheterization done secondary to a false positive stress myocardial perfusion study. 2.  Pharmacologic stress test done in [place] 09/02/2017.  No evidence of stress-induced ischemia with normal ejection fraction. 3.  Hypertension. 4.  Diabetes mellitus. 5.  Aspirin allergy:  Causes GI upset per patient. 6.  Sick sinus syndrome with history of bradycardia and an episode of Wenckebach in 03/2017 at which time the patient was on clonidine and verapamil.    7.  Echocardiogram done on 03/31/2017 was read as showing normal left ventricular size with mild concentric left ventricular hypertrophy with an ejection fraction of 60-65% with mild mitral regurgitation and mild left atrial dilatation.    8.  Echocardiogram done on 6/20/2020 showed normal left ventricular size with mild concentric left ventricular hypertrophy with no regional wall motion abnormality with an estimated ejection fraction of 75 +/- 5% with stage 2 left ventricular diastolic dysfunction. Normal right ventricular size and function. Mildly dilated left atrium. Mild aortic stenosis. Mild mitral regurgitation. Mild tricuspid regurgitation. Mild pulmonary hypertension. [facility] admission March 3, 2021 for hypoxic respiratory failure due to \"congestive heart failure \"and discharged on home oxygen     PAST MEDICAL HISTORY:  1.  As under cardiovascular history.   2.  Osteoarthritis:    a.  Status post bilateral knee injections. b.  Status post total right knee arthroplasty for severe right knee osteoarthritis with valgus deformity on 3/21/2019.    3.  Status post excision of a soft tissue mass from left second toe 10/27/2006. 4.  Status post bilateral bunionectomy 05/29/2012. 5.  History of cervical radiculopathy. High risk for cervical stenosis surgery and given a cervical collar in March 2021, evaluated by neurosurgery  6.  History of bilateral shoulder bursitis. 7.  Hypothyroidism:  On replacement therapy. 8.  Suspect dementia. 9.  Severe acute blood loss anemia noted in 4/2019, S/P total right knee arthroplasty on 3/21/2019.    10.  Chronic kidney disease. 11. Medical noncompliance.       FAMILY HISTORY:  Noncontributory.     SOCIAL HISTORY:  Denies smoking.  Denies alcohol or illicit drug use. O:  COMPLETE PHYSICAL EXAM:      BP (!) 116/52 (Site: Right Upper Arm, Position: Sitting, Cuff Size: Medium Adult)   Pulse 80   Resp 20   Ht 5' 3\" (1.6 m)   Wt 170 lb 6.4 oz (77.3 kg)   SpO2 99% Comment: on 6L O2  BMI 30.19 kg/m²      General:          Well-developed, well-nourished, elderly lady in no acute distress while wearing oxygen but becomes dyspneic when ambulating a few feet to the wheelchair and is very weak  HEENT:           Normocephalic and atraumatic head. No JVD. No carotid bruits. Carotid upstrokes normal bilaterally.  No thyromegaly.    Sclerae not icteric.  No xanthelasmas.  Mucous membranes moist.  Chest:              Symmetrical and nontender.  No deformities. Lungs:             Clear to auscultation bilaterall  Upper lobes but diminished in the bases  Heart:              Normal S1 and S2.  No S3 or S4.  Grade 2/6 systolic murmur heard at the right upper sternal border.    Abdomen:        Soft, nontender without organomegaly or masses.  No bruits.  Normal bowel sounds.   Extremities:     2+ pitting peritibial and ankle edema bilaterally.  Tender shins.  Normal pulses.  Right knee replacement scar healing   well.   Skin:                Normal turgor. No rashes or ulcers noted. Right knee replacement scar healing well. Neurologic:      Oriented x3.  No motor or sensory deficits detected.        REVIEW OF DIAGNOSTIC TESTS:    1. EKG done today showed sinus rhythm with rate variability with nonspecific T wave abnormality. ASSESSMENT / DIAGNOSIS:   1.  Heart failure with preserved EF: With recent admission March 3, 2021 for hypoxic respiratory failure due to congestive heart failure and now with evidence of volume overload by physical exam and worsening dyspnea on 6 L nasal cannula  2.  Valvular heart disease with mild aortic stenosis, mild mitral regurgitation, mild tricuspid regurgitation and mild pulmonary hypertension on echo in 6/2020. 3.  Chronic kidney disease. With recent AMANDEEP during her last hospitalization from March 3 to March 9, 2021, creatinine was 1.3 at discharge and her baseline is 1.1  4.  Sick sinus syndrome and history of sinus bradycardia and Wenckebach (while on verapamil and high dose clonidine). Stable  5.  Chronic dizziness/unsteadiness on feet. 6.  Suspect dementia. 7.  Medical noncompliance. 8.  Mild anemia. 9.  Hypertension/LVH. 10.  Diabetes mellitus. 11.  Hypothyroidism:  On replacement therapy. 12.  Osteoarthritis/ knees:  S/P total right knee arthroplasty in 3/2019.    13.  Cervical radiculopathy/cervical stenosis treated medically as she is high risk for surgery  14.  Bursitis of both shoulders. 15.  Sensitivity to aspirin: Causes GI upset. TREATMENT PLAN: Discussed with Dr. Elbert Longo  1. Patient advised to go to the emergency room      Emergency room was notified of patient status    [facility]  [facility] [address] [place] [id number]  [phone]

## 2021-03-23 PROBLEM — I50.9 CONGESTIVE HEART FAILURE OF UNKNOWN ETIOLOGY (HCC): Status: ACTIVE | Noted: 2021-01-01

## 2021-03-23 NOTE — ED NOTES
FIRST PROVIDER CONTACT ASSESSMENT NOTE      Department of Emergency Medicine   ED  First Provider Note   3/23/21  3:35 PM EDT    Chief Complaint: Chest Pain (pt sent over for abnormal EKG . pt reports intermittent chest pain)      History of Present Illness:    Angela Roy is a 81 y.o. female who presents to the ED by private car for chest pain from Dr. Myra Strickland office. Focused Screening Exam:  Constitutional:  Alert, appears stated age and is in no distress. Regular heart rate and rhythm.     *ALLERGIES*     Aspirin     ED Triage Vitals   BP Temp Temp src Pulse Resp SpO2 Height Weight   03/23/21 1532 03/23/21 1438 -- 03/23/21 1438 03/23/21 1532 03/23/21 1438 -- --   122/67 97.5 °F (36.4 °C)  75 16 95 %          Initial Plan of Care:  Initiate Treatment-Testing, Proceed toTreatment Area When Bed Available for ED Attending/MLP to Continue Care    -----------------END OF FIRST PROVIDER CONTACT ASSESSMENT NOTE--------------  Electronically signed by ZAC Reina   DD: 3/23/21       Braxton Reina  03/23/21 1535

## 2021-03-23 NOTE — ED NOTES
Bed: HAA  Expected date:   Expected time:   Means of arrival:   Comments:  triage     Cony Dewitt RN  03/23/21 2227

## 2021-03-23 NOTE — TELEPHONE ENCOUNTER
Patient needs order for new cervical collar faxed to [address] [place] [address] in [employer].  Patient states she is having severe pain and did not get a new collar after being discharged from the hospital.

## 2021-03-24 PROBLEM — E66.9 OBESITY (BMI 30.0-34.9): Status: ACTIVE | Noted: 2021-01-01

## 2021-03-24 PROBLEM — N17.9 AKI (ACUTE KIDNEY INJURY) (HCC): Status: ACTIVE | Noted: 2021-01-01

## 2021-03-24 PROBLEM — R77.8 ELEVATED TROPONIN: Status: ACTIVE | Noted: 2021-01-01

## 2021-03-24 PROBLEM — R79.89 ELEVATED TROPONIN: Status: ACTIVE | Noted: 2021-01-01

## 2021-03-24 NOTE — PATIENT CARE CONFERENCE
P Quality Flow/Interdisciplinary Rounds Progress Note        Quality Flow Rounds held on March 24, 2021    Disciplines Attending:  Bedside Nurse, ,  and Nursing Unit Leadership    Angela Roy was admitted on 3/23/2021  7:59 PM    Anticipated Discharge Date:  Expected Discharge Date: 03/29/21    Disposition:    Jose F Score:  Jose F Scale Score: 17    Readmission Risk              Risk of Unplanned Readmission:        22           Discussed patient goal for the day, patient clinical progression, and barriers to discharge.   The following Goal(s) of the Day/Commitment(s) have been identified:  to see cardiology      Sherry Madsen  March 24, 2021

## 2021-03-24 NOTE — CONSULTS
verbalizes understanding. Echocardiogram: June 2020     Left Ventricle   Left ventricle is normal in size . Mild concentric left ventricular hypertrophy. No regional wall motion abnormalities seen. Ejection fraction is visually estimated at 75+/-5%. There is doppler evidence of stage II diastolic dysfunction. BNP:   Lab Results   Component Value Date    PROBNP 1,901 (H) 03/23/2021       History of:    has a past medical history of (HFpEF) heart failure with preserved ejection fraction (HCC), Arthritis, Bursitis, Cervical radiculopathy, CHF (congestive heart failure) ([address] CKD (chronic kidney disease) stage 3, GFR 30-59 ml/min, Diabetes mellitus ([address] GERD (gastroesophageal reflux disease), [place] (hard of hearing), Hypertension, Hypothyroidism, SSS (sick sinus syndrome) ([address] Thyroid disease, Unsteadiness, and Valvular heart disease. has a past surgical history that includes Foot surgery; Total knee arthroplasty (Right, 3/21/2019); and joint replacement (1999).     Medications:    levothyroxine  50 mcg Oral Daily    magnesium oxide  400 mg Oral BID    metoprolol succinate  25 mg Oral Daily    insulin lispro  0-10 Units Subcutaneous 4x Daily AC & HS    bumetanide  1 mg Intravenous BID    sodium chloride flush  10 mL Intravenous 2 times per day    heparin (porcine)  5,000 Units Subcutaneous 3 times per day    albuterol  2.5 mg Nebulization 4x daily    cloNIDine  0.2 mg Oral BID    cefTRIAXone (ROCEPHIN) IV  1,000 mg Intravenous Q24H    spironolactone  12.5 mg Oral Daily    aspirin  324 mg Oral Once      dextrose         Code Status:Full Code    The patient is ordered:  Diet: DIET CARDIAC; Carb Control: 4 carb choices (60 gms)/meal; Low Sodium (2 GM)   Sodium controlled diet Yes  Fluid restriction daily ordered (fluid restriction recommended if patient is hyponatremic and/or diuretic is initiated or increased) No  FR:   Daily Weights:   Patient Vitals for the past 96 hrs (Last 3 readings): Allergy/angioedema  [] No left ventricular systolic dysfunction (LVSD)  [] Hyperkalemia  [] Moderate to severe aortic stenosis  [] Other (Specify): Not indicated  Angiotensin II receptor blockers (ARB) ordered:  [] Yes  [x] No (specify contraindication):  [] Renal Insufficiency  [] Hypotension  [] Allergy/angioedema  [] No LVSD  [] Hyperkalemia  [] Moderate to severe aortic stenosis  [] Other (Specify): Not Indicated    ARNI - Angiotensin Receptor Neprilysin Inhibitor ordered:  [] Yes  [x] No      ACC/AHA Guidelines Beta Blocker (Carvedilol, Metoprolol Succinate, or Bisoprolol) ordered:    [x] Yes  [] No (specify contraindication):  [] Bradycardia  [] Hypotension  [] LVD  [] 2nd or 3rd degree heart block  [] Bronchospastic airway disease  [] Decompensated CHF  [] Other (Specify):

## 2021-03-24 NOTE — PROGRESS NOTES
Hospitalist Progress Note      PCP: Vishnu Mack MD    Date of Admission: 3/23/2021    Chief Complaint: [facility] Course: * found to be in CHF, started on diuretics, TT also elevated, seen by cardiology  Added aldactone to the bumex     Subjective: **headache      Medications:  Reviewed    Infusion Medications    dextrose       Scheduled Medications    levothyroxine  50 mcg Oral Daily    magnesium oxide  400 mg Oral BID    metoprolol succinate  25 mg Oral Daily    insulin lispro  0-10 Units Subcutaneous 4x Daily AC & HS    bumetanide  1 mg Intravenous BID    sodium chloride flush  10 mL Intravenous 2 times per day    heparin (porcine)  5,000 Units Subcutaneous 3 times per day    albuterol  2.5 mg Nebulization 4x daily    cloNIDine  0.2 mg Oral BID    cefTRIAXone (ROCEPHIN) IV  1,000 mg Intravenous Q24H    spironolactone  12.5 mg Oral Daily    aspirin  324 mg Oral Once     PRN Meds: acetaminophen, HYDROcodone 5 mg - acetaminophen, glucose, dextrose, glucagon (rDNA), dextrose, sodium chloride flush, promethazine **OR** ondansetron, polyethylene glycol, nitroGLYCERIN, traMADol      Intake/Output Summary (Last 24 hours) at 3/24/2021 1512  Last data filed at 3/24/2021 1230  Gross per 24 hour   Intake 840 ml   Output 1725 ml   Net -885 ml       Exam:    /68   Pulse 74   Temp 99.1 °F (37.3 °C) (Oral)   Resp 20   Ht 5' 4\" (1.626 m)   Wt 143 lb 14.4 oz (65.3 kg)   SpO2 97%   BMI 24.70 kg/m²           Gen: *well developed  HEENT: NC/AT, moist mucous membranes, no oropharyngeal erythema or exudate  Neck: supple, trachea midline, no anterior cervical or SC LAD  Heart:  Normal s1/s2, RRR, no murmurs, gallops, or rubs. Lungs:  ** bilateral rales  Abd: bowel sounds present, soft, nontender, nondistended, no masses  Extrem:  No clubbing, cyanosis,  *pos* edema  Skin: no rashes or lesions  Psych: A & O x3  Neuro: grossly intact, moves all four extremities.     Capillary Refill: Brisk,< 3 seconds   Peripheral Pulses: +2 palpable, equal bilaterally               Labs:   Recent Labs     03/23/21 2028 03/24/21  0507   WBC 5.5 5.2   HGB 10.5* 10.2*   HCT 35.4 33.5*    179     Recent Labs     03/23/21 2028 03/24/21  0507    146   K 4.2 3.7    109*   CO2 27 29   BUN 24* 23   CREATININE 1.3* 1.3*   CALCIUM 8.8 8.3*     No results for input(s): AST, ALT, BILIDIR, BILITOT, ALKPHOS in the last 72 hours. Recent Labs     03/24/21  0507   INR 1.1     Recent Labs     03/23/21 2028 03/24/21  0044 03/24/21  0507   TROPONINI 0.07* 0.07* 0.07*     No results for input(s): AST, ALT, ALB, BILIDIR, BILITOT, ALKPHOS in the last 72 hours. No results for input(s): LACTA in the last 72 hours. No results found for: Dayanara Chiles  No results found for: AMMONIA    Assessment:    Active Hospital Problems    Diagnosis Date Noted    [facility]ANDEEP [facility]acute kidney [facility]) [facility] [N17.9] 03/24/2021    Elevated troponin [R77.8] 03/24/2021    Obesity (BMI 30.0-34. 9) [E66.9] 03/24/2021    Acute on chronic diastolic congestive heart failure (HCC) [I50.33] 03/03/2021    Intractable headache [R51.9] 09/23/2020    Chronic pain syndrome [G89.4] 07/01/2020    CKD (chronic kidney disease) stage 3, GFR 30-59 ml/min ([facility]) [N18.30] 10/20/2017    Non-rheumatic mitral regurgitation [I34.0] 10/20/2017    Chest pain [R07.9] 09/13/2017    Hypertensive heart disease [I11.9] 09/02/2017    Hypothyroidism [E03.9] 09/02/2017    Acute respiratory failure with hypoxia (HCC) [J96.01] 03/29/2017    DM (diabetes mellitus), type 2 ([facility] [E11.9] 04/19/2012   Dementia    Plan:  *cont bumex   betablocker  Aldactone   cont synthroid        DVT Prophylaxis: *heparin  Diet: DIET CARDIAC; Carb Control: 4 carb choices (60 gms)/meal; Low Sodium (2 GM)  Code Status: Full Code    PT/OT Eval Status: *ordered*    Dispo - *home vs [facility]      Electronically signed by Mattie Patiño DO on 3/24/2021 at 3:12 PM [facility]

## 2021-03-24 NOTE — ED NOTES
Pox 86 with oxygen at [facility].   Oxygen increased to [facility] with pox [address], [facility]  03/23/21 8093

## 2021-03-24 NOTE — CONSULTS
Inpatient Cardiology Consultation      Reason for Consult:  CHF    Consulting Physician: Dr Sanchez Gentle    Requesting Physician:  Dr Gerard Devine    Date of Consultation: 3/24/2021    HISTORY OF PRESENT ILLNESS:  80 yo AAF known to Dr Evette Miranda. PMH: HTN, T2DM, ASA causing GI upset, SSS (bradycardia/episode of Wenckebach in 2017 (was on clonidine/verapmil), osteoarthritis, cervical radiculopathy,  CKD, hypothyroidism on replacement therapy, CHF admission 3/3/2021 (was discharged on O2) and medical no compliance. Was seen in office by Yadiel oCok APRN 3/23/2021 for worsening dyspnea at request of PCP (Mobic was stopped) along with weight gain approximately 15 pounds since being discharged from hospital, she is also taking Cozaar which was not on her discharge summary from previous admission. Sent to ED for further treatment  [facility] 3/23/2021 BP upon arrival 122/67 HR 70-80's 95% on RA. CXR read as CHF versus pneumonia. Na 146, K+ 4.2-->3.7, Bun/Cr 24/1.3-->23/1.3, p-BNP 2028(6184 on 3/3/2021 -->735 on 3/6/2021), troponin 0.07 x 3, WBC 5.2, Hgb 10.5, D-dimer 786, INR 1.1, COVID-19 NEGATIVE, BC x 2 drawn, PO2 54 on ABG. CT Chest WO pending    Received 40 mg IV Lasix in ED and was placed on Bumex 1 mg IV BID    Complains of SOB/METZGER along with pedal ad BLE swelling over last several days. Ongoing problems with dizziness she states is from her neck problems)radiculopathy). Denies any CP    Please note: past medical records were reviewed per electronic medical record (EMR) - see detailed reports under Past Medical/ Surgical History. Past Medical History:    Angel Ratliff HISTORY:    1.  Cardiac catheterization June 2005 (Dr. Dashawn Viveros) showed normal left ventricular ejection fraction, normal coronary arteries, cardiac catheterization done secondary to a false positive stress myocardial perfusion study.   2.  Pharmacologic stress test done in [place] 09/02/2017.  No evidence of stress-induced ischemia with normal ejection fraction. 3.  Hypertension. 4.  Diabetes mellitus. HgbA1c 6.7 on 3/4/2021  5.  Aspirin allergy:  Causes GI upset per patient. 6.  Sick sinus syndrome with history of bradycardia and an episode of Wenckebach in 03/2017 at which time the patient was on clonidine and verapamil.    7.  TTE 03/31/2017 was read as showing normal left ventricular size with mild CLVH, EF60-65% with mild mitral regurgitation and mild left atrial dilatation.    8.  TTE 6/20/2020 showed normal left ventricular size with mild CLVH with no regional wall motion abnormality with an estimated ejection fraction of 75 +/- 5% with stage 2 DD.  Normal right ventricular size and function.  Mildly dilated LA.  Mild MR/AS/TR.  Mild PHTN.    9. Hospital admission March 3, 2021 for hypoxic respiratory failure due to \"congestive heart failure \"and discharged on home oxygen     PAST MEDICAL HISTORY:  1.  As under cardiovascular history. 2.  Osteoarthritis:    a.  Status post bilateral knee injections. b.  Status post R TKA for severe right knee osteoarthritis with valgus deformity on 3/21/2019.    3.  Status post excision of a soft tissue mass from left second toe 10/27/2006. 4.  Status post bilateral bunionectomy 05/29/2012. 5.  History of cervical radiculopathy. High risk for cervical stenosis surgery and given a cervical collar in March 2021, evaluated by neurosurgery  6.  History of bilateral shoulder bursitis. 7.  Hypothyroidism:  On replacement therapy. TSH 5.390 on 3/4/2021  8.  Suspect dementia. 9.  Severe acute blood loss anemia noted in 4/2019, S/P total right knee arthroplasty on 3/21/2019.    10.  Chronic kidney disease. 11. Medical noncompliance. 12. Ambulates with cane/walker      Medications Prior to admit:  Prior to Admission medications    Medication Sig Start Date End Date Taking?  Authorizing Provider   losartan (COZAAR) 100 MG tablet Take 100 mg by mouth daily   Yes Historical Provider, MD   glimepiride (AMARYL) 4 MG tablet Take by mouth 1 tab qam & 0.5 tab qpm   Yes Historical Provider, MD   magnesium oxide (MAG-OX) 400 MG tablet Take 400 mg by mouth 2 times daily   Yes Historical Provider, MD   acetaminophen-codeine (TYLENOL #3) 300-30 MG per tablet TAKE 1 TABLET BY MOUTH EVERY 4 TO 6 HOURS AS NEEDED FOR PAIN 8/5/20  Yes Historical Provider, MD   metoprolol succinate (TOPROL XL) 25 MG extended release tablet Take 1 tablet by mouth daily 6/22/20  Yes Don Banegas MD   furosemide (LASIX) 40 MG tablet Take 1 tablet by mouth daily 6/22/20  Yes Don Banegas MD   levothyroxine (SYNTHROID) 50 MCG tablet Take 50 mcg by mouth Daily   Yes Historical Provider, MD   cloNIDine (CATAPRES) 0.2 MG tablet Take 0.2 mg by mouth 2 times daily    Yes Historical Provider, MD   acetaminophen (TYLENOL) 500 MG tablet Take 500 mg by mouth every 6 hours as needed for Pain   Yes Historical Provider, MD   metFORMIN (GLUCOPHAGE) 500 MG tablet Take 500 mg by mouth 2 times daily (with meals)   Yes Historical Provider, MD   potassium chloride (KLOR-CON M) 10 MEQ extended release tablet Take 1 tablet by mouth daily 5/5/17  Yes Luz Marina Cancino Days, DO   amLODIPine (NORVASC) 10 MG tablet Take 10 mg by mouth daily    Yes Historical Provider, MD       Current Medications:    Current Facility-Administered Medications: acetaminophen (TYLENOL) tablet 500 mg, 500 mg, Oral, Q6H PRN  HYDROcodone-acetaminophen (NORCO) 5-325 MG per tablet 1 tablet, 1 tablet, Oral, Q6H PRN  glimepiride (AMARYL) tablet 4 mg, 4 mg, Oral, Daily with breakfast  levothyroxine (SYNTHROID) tablet 50 mcg, 50 mcg, Oral, Daily  magnesium oxide (MAG-OX) tablet 400 mg, 400 mg, Oral, BID  metoprolol succinate (TOPROL XL) extended release tablet 25 mg, 25 mg, Oral, Daily  insulin lispro (HUMALOG) injection vial 0-10 Units, 0-10 Units, Subcutaneous, 4x Daily AC & HS  glucose (GLUTOSE) 40 % oral gel 15 g, 15 g, Oral, PRN  dextrose 50 % IV solution, 12.5 g, Intravenous, PRN  glucagon (rDNA) injection 1 mg, 1 mg, Intramuscular, PRN  dextrose 5 % solution, 100 mL/hr, Intravenous, PRN  bumetanide (BUMEX) injection 1 mg, 1 mg, Intravenous, BID  sodium chloride flush 0.9 % injection 10 mL, 10 mL, Intravenous, 2 times per day  sodium chloride flush 0.9 % injection 10 mL, 10 mL, Intravenous, PRN  promethazine (PHENERGAN) tablet 12.5 mg, 12.5 mg, Oral, Q6H PRN **OR** ondansetron (ZOFRAN) injection 4 mg, 4 mg, Intravenous, Q6H PRN  polyethylene glycol (GLYCOLAX) packet 17 g, 17 g, Oral, Daily PRN  heparin (porcine) injection 5,000 Units, 5,000 Units, Subcutaneous, 3 times per day  nitroGLYCERIN (NITROSTAT) SL tablet 0.4 mg, 0.4 mg, Sublingual, Q5 Min PRN  albuterol (PROVENTIL) nebulizer solution 2.5 mg, 2.5 mg, Nebulization, 4x daily  cloNIDine (CATAPRES) tablet 0.2 mg, 0.2 mg, Oral, BID  aspirin chewable tablet 324 mg, 324 mg, Oral, Once    Allergies:  Aspirin: GI upset    Social History:    Denies smoking.  Denies alcohol or illicit drug use. Lives alone in one story home with basement. Ambulates with cane inside home and walker outside home. Code Status: Full Code      Family History: Noncontributory secondary to age      REVIEW OF SYSTEMS:     · Constitutional: Denies fatigue, fevers, chills or night sweats  · Eyes: Denies visual changes or drainage  · ENT: Denies headaches or hearing loss. No mouth sores or sore throat. No epistaxis   · Cardiovascular: Denies chest pain, pressure or palpitations. No lower extremity swelling. · Respiratory: + METZGER/SOB. Denies cough, orthopnea or PND. No hemoptysis   · Gastrointestinal: Denies hematemesis or anorexia. No hematochezia or melena    · Genitourinary: Denies urgency, dysuria or hematuria. · Musculoskeletal: + ambulates with cane/walker. + joint complaints  · Integumentary: Denies rash, hives or pruritis   · Neurological: + dizziness secondary to neck problems. Denies headaches or seizures. No numbness or tingling  · Psychiatric: Denies anxiety or depression.   · Endocrine: Denies temperature intolerance. No recent weight change. .  · Hematologic/Lymphatic: Denies abnormal bruising or bleeding. No swollen lymph nodes    PHYSICAL EXAM:   BP (!) 148/68   Pulse 65   Temp 98.2 °F (36.8 °C) (Temporal)   Resp 16   Ht 5' 4\" (1.626 m)   Wt 143 lb 14.4 oz (65.3 kg)   SpO2 (!) 87%   BMI 24.70 kg/m²   CONST:  Well developed, elderly AAF who appears of stated age. Awake, alert and cooperative. No apparent distress. HEENT:   Head- Normocephalic, atraumatic   Eyes- Conjunctivae pink, anicteric  Throat- Oral mucosa pink and moist  Neck-  No stridor, trachea midline, +JVD. No carotid bruit. CHEST: Chest symmetrical and non-tender to palpation. No accessory muscle use or intercostal retractions  RESPIRATORY: Lung sounds - crackles in the bases, on NC O2.   CARDIOVASCULAR:     Heart Ausculation- Regular rate and irregular rhythm, II/VI JUSTIN   PV: thick shins. 1+ BLE edema. No varicosities. Pedal pulses palpable, no clubbing or cyanosis   ABDOMEN: Soft, non-tender to light palpation. Bowel sounds present. No palpable masses; no abdominal bruit  MS: BLE tender to touch. Good muscle strength and tone. No atrophy or abnormal movements. : Deferred  SKIN: Warm and dry no statis dermatitis or ulcers   NEURO / PSYCH: Oriented to person, place and time. Speech clear and appropriate. Follows all commands.  Pleasant affect     DATA:    ECG SR with SA  Tele strips: SR    Diagnostic:  2 View CXR 3/23/2021: CHF versus bilateral pneumonia    Intake/Output Summary (Last 24 hours) at 3/24/2021 7055  Last data filed at 3/24/2021 6634  Gross per 24 hour   Intake 120 ml   Output 1275 ml   Net -1155 ml       Labs:   CBC:   Recent Labs     03/23/21 2028 03/24/21  0507   WBC 5.5 5.2   HGB 10.5* 10.2*   HCT 35.4 33.5*    179     BMP:   Recent Labs     03/23/21 2028 03/24/21  0507    146   K 4.2 3.7   CO2 27 29   BUN 24* 23   CREATININE 1.3* 1.3*   LABGLOM 47 47   CALCIUM 8.8 8.3*     Mag:   Recent Labs 03/24/21  0507   MG 2.0     HgA1c:   Lab Results   Component Value Date    LABA1C 6.7 (H) 03/04/2021     Lab Results   Component Value Date     12/19/2017     proBNP:   Recent Labs     03/23/21 2028   PROBNP 1,901*     PT/INR:   Recent Labs     03/24/21  0507   PROTIME 12.4   INR 1.1     CARDIAC ENZYMES:  Recent Labs     03/23/21 2028 03/24/21  0044 03/24/21  0507   TROPONINI 0.07* 0.07* 0.07*     FASTING LIPID PANEL:  Lab Results   Component Value Date    CHOL 134 03/04/2021    HDL 65 03/04/2021    LDLCALC 52 03/04/2021    TRIG 83 03/04/2021   A&P per Dr Kadie Viveros  Electronically signed by Alysa Dial. FATUMA Ritter on 3/24/2021 at 7:02 AM     I independently performed an evaluation on the patient. I have reviewed the above documentation completed by the advance practitioner. Please see my additional contributions to the HPI, physical exam, assessment and medical decision making. Physical Exam   /63   Pulse 84   Temp 97.8 °F (36.6 °C) (Temporal)   Resp 20   Ht 5' 4\" (1.626 m)   Wt 143 lb 14.4 oz (65.3 kg)   SpO2 93%   BMI 24.70 kg/m²   Constitutional: Oriented to person, place, and time. Well-developed and well-nourished. No distress. Head: Normocephalic and atraumatic. Eyes: EOM are normal. Pupils are equal, round, and reactive to light. Neck: Normal range of motion. Neck supple. No hepatojugular reflux and no JVD present. Carotid bruit is not present. No tracheal deviation present. No thyromegaly present. Cardiovascular: Normal rate, regular rhythm, normal heart sounds and intact distal pulses. Exam reveals no gallop and no friction rub. No murmur heard. Pulmonary/Chest: Effort normal and breath sounds normal. No respiratory distress. No wheezes. No rales. No tenderness. Abdominal: Soft. Bowel sounds are normal. No distension and no mass. No tenderness. No rebound and no guarding. Musculoskeletal: Normal range of motion. No edema and no tenderness.    Lymphadenopathy:   No cervical adenopathy. Neurological: Alert and oriented to person, place, and time. Skin: Skin is warm and dry. No rash noted. Not diaphoretic. No erythema. Psychiatric: Normal mood and affect. Behavior is normal.     See accompanying documentation for full consult. ASSESSMENT AND PLAN:  1. Acute on chronic diastolic CHF:    Diurese. IV bumex/BB. Add aldactone. 2. VHD: mild AS/MR/TR and mild PH. LVEF 75%    3. HTN: Observe. 4. DM: Per primary service. 5. Hypothyroidism: On HRT. 6. CKD: Follow labs. 7. Dementia    Kyleigh Purdy D.O.   Cardiologist  Cardiology, [facility]

## 2021-03-24 NOTE — ED PROVIDER NOTES
EXAM--------------------------------------    Constitutional/General: Alert and oriented x3, appears ill  Head: Normocephalic and atraumatic  Eyes: PERRL, EOMI, conjunctive normal, sclera non icteric  Mouth: Oropharynx clear, handling secretions, no trismus, no asymmetry of the posterior oropharynx or uvular edema  Neck: Supple, full ROM, non tender to palpation in the midline, no stridor, no crepitus, no meningeal signs  Respiratory:, no wheezes, or rhonchi. Patient has rales bilaterally, not in respiratory distress  Cardiovascular:  Regular rate. Regular rhythm. No murmurs, gallops, or rubs. 2+ distal pulses  GI:  Abdomen Soft, Non tender, Non distended. +BS. No organomegaly, no palpable masses,  No rebound, guarding, or rigidity. Musculoskeletal: Moves all extremities x 4. Warm and well perfused, no clubbing, cyanosis,. 3+ pitting edema bilaterally capillary refill <3 seconds  Integument: skin warm and dry. No rashes. Neurologic: GCS 15, no focal deficits,   Psychiatric: Normal Affect    -------------------------------------------------- RESULTS -------------------------------------------------  I have personally reviewed all laboratory and imaging results for this patient. Results are listed below.      LABS:  Results for orders placed or performed during the hospital encounter of 03/23/21   COVID-19, Rapid    Specimen: Nasopharyngeal Swab   Result Value Ref Range    SARS-CoV-2, NAAT Not Detected Not Detected   CBC Auto Differential   Result Value Ref Range    WBC 5.5 4.5 - 11.5 E9/L    RBC 3.86 3.50 - 5.50 E12/L    Hemoglobin 10.5 (L) 11.5 - 15.5 g/dL    Hematocrit 35.4 34.0 - 48.0 %    MCV 91.7 80.0 - 99.9 fL    MCH 27.2 26.0 - 35.0 pg    MCHC 29.7 (L) 32.0 - 34.5 %    RDW 15.0 11.5 - 15.0 fL    Platelets 817 002 - 236 E9/L    MPV 9.9 7.0 - 12.0 fL    Neutrophils % 78.3 43.0 - 80.0 %    Lymphocytes % 11.3 (L) 20.0 - 42.0 %    Monocytes % 9.6 2.0 - 12.0 %    Eosinophils % 1.6 0.0 - 6.0 %    Basophils % sinus arrhythmia 68 bpm.  Right axis deviation. Patient did have Q waves in 1 and aVL with slight elevations. No reciprocal changes noted except for V6 T wave inversion. When compared to previous EKG the patient did have these changes in 1 and aVL. Repeat was performed which showed resolution of these changes. ------------------------- NURSING NOTES AND VITALS REVIEWED ---------------------------   The nursing notes within the ED encounter and vital signs as below have been reviewed by myself. BP (!) 151/66   Pulse 77   Temp 97.5 °F (36.4 °C)   Resp 25   SpO2 92%   Oxygen Saturation Interpretation: Abnormal and Improved after treatment    The patients available past medical records and past encounters were reviewed. ------------------------------ ED COURSE/MEDICAL DECISION MAKING----------------------  Medications   aspirin chewable tablet 324 mg (324 mg Oral Not Given 3/23/21 2139)   furosemide (LASIX) injection 40 mg (40 mg Intravenous Given 3/23/21 2137)         ED COURSE:       Medical Decision Making: This is an 52-year-old female presented to the ED for shortness of breath. Patient underwent laboratory work-up which showed a normal CBC except for hemoglobin 10.5. Normal chemistry except for BUN/creatinine 24/1.3. Troponin slightly elevated 0.07. Lactic acid normal.  Covid test negative. Patient's chest x-ray concerning for bilateral CHF and pulmonary edema. Patient's original EKG did show changes in 1 and aVL but when compared to previous patient did had these changes therefore this was not concerning for a STEMI. Repeat EKG was performed several hours later which showed resolution of these findings and nonspecific ST-T wave changes noted. The patient has been chest pain free her entire time here in the emergency department. Patient was given a dose of Lasix for diuresis due to her pulmonary edema. Patient was on nasal cannula for comfort.   Patient refused aspirin due to it upsetting her stomach. Patient to be admitted for further care. Case discussed with Dr. Heath Yates who has accepted the patient for admission. I, Dr. Radha Krishnamurthy, am the primary provider for this encounter    This patient's ED course included: a personal history and physicial examination, re-evaluation prior to disposition, multiple bedside re-evaluations, IV medications, cardiac monitoring and continuous pulse oximetry    This patient has remained hemodynamically stable during their ED course. Re-Evaluations:             Re-evaluation. Patients symptoms are improving      Counseling: The emergency provider has spoken with the patient and discussed todays results, in addition to providing specific details for the plan of care and counseling regarding the diagnosis and prognosis. Questions are answered at this time and they are agreeable with the plan.       --------------------------------- IMPRESSION AND DISPOSITION ---------------------------------    IMPRESSION  1. Acute on chronic combined systolic and diastolic CHF (congestive heart failure) (HCC)    2. Elevated troponin        DISPOSITION  Disposition: Admit to telemetry  Patient condition is stable    NOTE: This report was transcribed using voice recognition software.  Every effort was made to ensure accuracy; however, inadvertent computerized transcription errors may be present        Tian Ruiz DO  03/23/21 [id number]

## 2021-03-24 NOTE — PLAN OF CARE
Patient's chart updated to reflect:      . - HF care plan, HF education points and HF discharge instructions.  -Orders: 2 gram sodium diet, daily weights, I/O. -Patient will be seen inpatient for formal CHF teaching session.  -Secure email sent to [facility] cardiology for coordination of  discharge scheduling.     Flavia Yeh RN, BSN  Heart Failure Navigator

## 2021-03-24 NOTE — H&P
Hospital Medicine History & Physical      PCP: Lupe Godoy MD    Date of Admission: 3/23/2021    Date of Service: Pt seen/examined on 3/23/2021 and Admitted to Inpatient with expected LOS greater than two midnights due to medical therapy. Chief Complaint: Shortness of breath and chest pain      History Of Present Illness:      81 y.o. female who presented to [facility] with past medical history of hypertensive heart disease, hypothyroidism, mitral regurgitation, sick sinus syndrome, osteoarthritis, DJD of the spine, chronic pain, CHF with diastolic dysfunction, anemia, diabetes, hypertension and chronic kidney disease. Patient was just admitted earlier in the month with CHF and respiratory failure. She developed AMANDEEP following diuretic use. lorsartan and hydralazine stopped. She was eventually discharged home on oxygen and oral lasix. Patient has been having shortness of breath for the past week, she was sent to the ER from cardiology clinic. She complains of central chest pain which she describes as sharp, on and off, moderate in intensity, associated with palpitations and cough productive of yellow sputum, radiates into the left side and shoulders. She has had headaches and dizziness, weight gain between 5 to 15 pounds as well as leg swelling. No fever. Vital signs notable for saturation of 86% on 3 L, requiring 5 L now, labs showed troponin 0 0.07x2, hemoglobin 10.5, A1c 6.7, TSH 5.4, LDL 52, proBNP 1901, this is elevated from 1021 recently, creatinine 1.3, baseline 1.1, procalcitonin 0.18, tested negative for Covid. Chest x-ray shows bilateral infiltrates questioning CHF versus pneumonia. EKG abnormal with sinus arrhythmia, rate of 68, ST elevation in lead I and aVR/early repolarization and T wave inversion in V3. Repeat EKG showed improvement in these ST-T wave changes. Echo in June 2020 showed EF of 75%+/-5% with grade 2 diastolic dysfunction, mild TR/AI/MR and mild pulmonary hypertension. She had negative cardiac catheterization in 2005 and negative stress test at an outside hospital in 2007. She is being admitted for further management. Past Medical History:          Diagnosis Date    (HFpEF) heart failure with preserved ejection fraction (HCC) 05/26/2017    3/31/17- echo- LVEF 60-65%, mild LVH, mild MR    Arthritis     Bursitis     shoulders    Cervical radiculopathy     [facility]     CKD (chronic kidney disease) stage 3, GFR 30-59 ml/min     Diabetes mellitus ([facility]     GERD (gastroesophageal reflux disease)     [place] (hard of hearing)     Hypertension     Hypothyroidism     SSS (sick sinus syndrome) ([facility])     stable, per [facility] note.  Thyroid disease     Unsteadiness     Valvular heart disease     sees Dr. Cabrera Garica        Past Surgical History:          Procedure Laterality Date    FOOT SURGERY      both    JOINT REPLACEMENT  1999    TOTAL KNEE ARTHROPLASTY Right 3/21/2019    RIGHT KNEE TOTAL ARTHROPLASTY (ILENE)++ADDUCTOR CANAL BLOCK++ performed by Radhika Rene MD at [facility] OR       Medications Prior to Admission:      Prior to Admission medications    Medication Sig Start Date End Date Taking?  Authorizing Provider   losartan (COZAAR) 100 MG tablet Take 100 mg by mouth daily    Historical Provider, MD   glimepiride (AMARYL) 4 MG tablet Take by mouth 1 tab qam & 0.5 tab qpm    Historical Provider, MD   magnesium oxide (MAG-OX) 400 MG tablet Take 400 mg by mouth 2 times daily    Historical Provider, MD   acetaminophen-codeine (TYLENOL #3) 300-30 MG per tablet TAKE 1 TABLET BY MOUTH EVERY 4 TO 6 HOURS AS NEEDED FOR PAIN 8/5/20   Historical Provider, MD   metoprolol succinate (TOPROL XL) 25 MG extended release tablet Take 1 tablet by mouth daily 6/22/20   Ese Husain MD   furosemide (LASIX) 40 MG tablet Take 1 tablet by mouth daily 6/22/20   Ese Husain MD   levothyroxine (SYNTHROID) 50 MCG tablet Take 50 mcg by mouth Daily    Historical Provider, Cranial nerves: II-XII intact, grossly non-focal.  Psychiatric:  Alert and oriented, thought content appropriate, normal insight  Capillary Refill: Brisk,< 3 seconds   Peripheral Pulses: +2 palpable, equal bilaterally       Labs:     Recent Labs     03/23/21 2028   WBC 5.5   HGB 10.5*   HCT 35.4        Recent Labs     03/23/21 2028      K 4.2      CO2 27   BUN 24*   CREATININE 1.3*   CALCIUM 8.8     No results for input(s): AST, ALT, BILIDIR, BILITOT, ALKPHOS in the last 72 hours. No results for input(s): INR in the last 72 hours. Recent Labs     03/23/21 2028 03/24/21  0044   TROPONINI 0.07* 0.07*       Urinalysis:      Lab Results   Component Value Date    NITRU Negative 06/20/2020    WBCUA 1-3 06/20/2020    BACTERIA NONE SEEN 06/20/2020    RBCUA NONE 06/20/2020    BLOODU Negative 06/20/2020    SPECGRAV 1.015 06/20/2020    GLUCOSEU Negative 06/20/2020       Radiology:   Reviewed and documented    XR CHEST (2 VW)   Final Result   CHF versus bilateral pneumonia. ASSESSMENT:    Active Hospital Problems    Diagnosis Date Noted    AMANDEEP (acute kidney injury) ([facility] [N17.9] 03/24/2021    Elevated troponin [R77.8] 03/24/2021    Obesity (BMI 30.0-34. 9) [E66.9] 03/24/2021    Acute on chronic diastolic congestive heart failure (HCC) [I50.33] 03/03/2021    Intractable headache [R51.9] 09/23/2020    Chronic pain syndrome [G89.4] 07/01/2020    CKD (chronic kidney disease) stage 3, GFR 30-59 ml/min ([facility] [N18.30] 10/20/2017    Non-rheumatic mitral regurgitation [I34.0] 10/20/2017    Chest pain [R07.9] 09/13/2017    Hypertensive heart disease [I11.9] 09/02/2017    Hypothyroidism [E03.9] 09/02/2017    Acute respiratory failure with hypoxia (HCC) [J96.01] 03/29/2017    DM (diabetes mellitus), type 2 ([facility] [E11.9] 04/19/2012         PLAN:  1.   Acute on chronic diastolic CHF, patient with significant volume overload, diurese with Bumex, monitor I's and O's and daily weights, cycle cardiac enzymes. ARB currently held due to AMANDEEP  2. Acute respiratory failure with hypoxia and hypercapnia, PNA/CHF. currently on 5 L of oxygen, check ABG, BiPAP as needed. 3.  Chest pain rule out acute coronary syndrome, cardiac enzyme has remained stable, EKG is abnormal.  Cardiology consult for consideration for ischemic work-up. 4.  Headache, Tylenol as needed. Consider imaging if persistent. 5.  Possible PNA. Mildly elevated procalcitonin. Normal white count and no fever. Check CT chest if consistent with pneumonia, treat with IV rocephin and doxy  6. Hypertension, blood pressure is currently controlled, continue current medications. Losartan stopped due to AMANDEEP. Hydralazine also stopped last admission  7. Elevated troponin level in the setting of renal insufficiency and CHF, NSTEMI is possible. Cardiology evaluation. 8.  AMANDEEP, creatinine has been stable since beginning of the month around 1.3. Monitor while being diuresed. 9.  Hypothyroidism, continue Synthroid, check thyroid function. 10.  Bilateral lower extremity edema, this is likely secondary to CHF, hold amlodipine, Doppler ultrasound was negative earlier in the month for DVT. Monitor with diuresis. 11.  Type 2 diabetes controlled, A1c 6.7. Sliding scale coverage. Monitor blood sugar. 12.  Anemia, monitor hemoglobin. 13.  Mild valvular heart disease with mild pulmonary hypertension. 14.  Hypertensive heart disease  15. Obesity, dietary and lifestyle modification. DVT Prophylaxis: Heparin  Diet: No diet orders on file n.p.o. after midnight  Code Status: Prior full code    PT/OT Eval Status: As needed    Dispo -inpatient/telemetry       María Butler MD    Thank you Diego Salazar MD for the opportunity to be involved in this patient's care.

## 2021-03-24 NOTE — CARE COORDINATION
Transition of care: Cardio consulted. IV Rocephin 1gm q 24 hrs. IV bumex 1mg twice a day. o2 6l/hf nc. Met with pt in room. Pt lives alone in a 1 story home. Has 3 steps to enter home with  handrails on both sides. Independent with ADLs. Her son, Kristel Santana, drives for her. Kristel Santana lives close by and helps her out as needed at home. DME- [employer]-  pt uses outside the home and a cane - pt uses inside of her home, BSC, shower chair, oxygen at 3-4.5l/nc continuous from [address] [place] [address] Road. Spoke with Norma from  [address] [place] [address] and pt was ordered 3l/nc continuous and has portables. No hx of hhc or nikole. Discharge plan is to return home. Voiced no needs at present. PCP is Dr. Lorri Cruz and [facility] is [facility] [address] [place] [address].  Sw/cm will follow

## 2021-03-24 NOTE — PROGRESS NOTES
Nutrition Education    Counseled patient on heart healthy/diabetic diet. Provided educational handouts and sample meal plans. Pt states that her son does most of the cooking. Pt does not eat fast foods. · Written educational materials provided. · Contact name and number provided. · Refer to Patient Education activity for more details.     Electronically signed by Kaylee Michel RD, LD on 3/24/21 at 12:48 PM EDT    Contact: [phone]

## 2021-03-25 NOTE — PROGRESS NOTES
Physical Therapy    Physical Therapy Initial Assessment     Name: Lizbeth Tran  : 1935  MRN: [id number]    Referring Provider:  Sara Strong DO    Date of Service: 3/25/2021    Evaluating PT:  Xavier Johnson, PT, DPT [id number]     Room #:  0397/7170-X  Diagnosis:  CHF  PMHx/PSHx:  Arthritis, CHF, CKD, DM, HTN, hypothyroidism   Precautions:  Falls, O2, monitor SpO2  Equipment Needs:  TBD    SUBJECTIVE:    Pt lives with alone in a 1 story home with 3 stairs to enter and B rail. Bedroom and bathroom are on the 1st level. Pt ambulated with SPC vs WW PTA. She states she does not wear home O2 but per chart review she wears 3-4.5 L. OBJECTIVE:   Initial Evaluation  Date: 3/25/21 Treatment Short Term/ Long Term   Goals   AM-PAC 6 Clicks 82/92     Was pt agreeable to Eval/treatment? Yes     Does pt have pain? No c/o pain      Bed Mobility  Rolling: SBA  Supine to sit: SBA  Sit to supine: SBA  Scooting: Min A  Rolling: Independent   Supine to sit: Independent   Sit to supine: Independent   Scooting: Independent    Transfers Sit to stand: SBA  Stand to sit: SBA  Stand pivot: SBA with [employer]  Sit to stand: Independent   Stand to sit:  Independent   Stand pivot: Modified Independent    Ambulation    40 feet x2 with [employer] Min A  >150 feet with [employer] Modified Independent    Stair negotiation: ascended and descended  2 steps with 2 rail Min A  >4 steps with 2 rail Modified Independent     ROM BUE:  WFL  BLE:  WFL     Strength BUE:  NT  BLE:  Grossly 4/5     Balance Sitting EOB:  SBA  Dynamic Standing:  Min A  Sitting EOB:  Independent   Dynamic Standing:  Modified Independent      Pt is A & O x 4  Sensation:  Pt denies numbness and tingling to extremities  Edema:  Unremarkable     Vitals:  HR 77 SpO2 93% on 4 L  HR 80 SpO2 90% on 6 L    Pt desaturated during ambulation to 88%, cues for PLB brought her to 90% on 4L  Pt desaturated s/p stairs/ambulation to 79%, increased from 4 to 6 to 8 L to recover to 90%    Therapeutic Exercises:     BLE ROM    Patient education  Pt educated on safety during functional mobility, PLB    Patient response to education:   Pt verbalized understanding Pt demonstrated skill Pt requires further education in this area   Yes  Yes  Yes      ASSESSMENT:    Comments:  Pt received supine and agreeable to PT evaluation. Vitals monitored during session. Pt able to bring herself to EOB with bed rail and HOB slightly elevated. Pt ambulates with slow but somewhat steady gait speed. Required standing rest break d/t poor endurance prior to stairs. Performed up/down two steps using B handrails with cues for safety and sequencing. Pt ambulated back bedside. Pt insisted in sitting at EOB instead of in bedside chair. SpO2 assesed at EOB and pt in upper 70s. RN notified. O2 increased gradually to improve oxygen saturation. Pt cued on PLB. Assisted back to bed. RN present. Pt placed in chair position. Left on 6 L after recovering O2 saturation. Pt left with call button in reach, lines attached, and needs met. Treatment:  Patient practiced and was instructed in the following treatment:     Bed mobility training - pt given verbal and tactile cues to facilitate proper sequencing and safety during rolling and supine>sit as well as provided with physical assistance to complete task     STS and pivot transfer training - pt educated on proper hand and foot placement, safety and sequencing, and use of WW to safely complete sit<>stand and pivot transfers with hands on assistance to complete task safely    Gait training- pt was given verbal and tactile cues to facilitate safety/balance, WW usage, PLB, activity pacing during ambulation as well as provided with physical assistance to complete task.  Skilled positioning - Pt placed in the chair position with pillows utilized to facilitate upright posture, joint and skin integrity, and interaction with environment. Pt's/ family goals   1.  Home Patient and or family understand(s) diagnosis, prognosis, and plan of care. Yes     PLAN:    Current Treatment Recommendations     [x] Strengthening     [x] ROM   [x] Balance Training   [x] Endurance Training   [x] Transfer Training   [x] Gait Training   [x] Stair Training   [x] Positioning   [x] Safety and Education Training   [x] Patient/Caregiver Education   [x] HEP  [] Other     Frequency of treatments: 2-5x/week x 1-2 weeks. Time in  0948  Time out  1018    Total Treatment Time  25 minutes     Evaluation Time includes thorough review of current medical information, gathering information on past medical history/social history and prior level of function, completion of standardized testing/informal observation of tasks, assessment of data and education on plan of care and goals.     CPT codes:  [] Low Complexity PT evaluation 61898  [x] Moderate Complexity PT evaluation 46662  [] High Complexity PT evaluation 67762  [] PT Re-evaluation 68785  [] Gait training 03780 -- minutes  [] Manual therapy 24862 -- minutes  [x] Therapeutic activities 75753 25 minutes  [] Therapeutic exercises 96479 -- minutes  [] Neuromuscular reeducation 90464 -- minutes     Fabian Mancera, PT, DPT  [id number]

## 2021-03-25 NOTE — PROGRESS NOTES
[facility] Cardiology Inpatient Progress Note    Patient is a 81 y.o. female of Letty Rivers MD seen in hospital follow up. Chief complaint: CHF    HPI: Some SOB. No CP. Patient Active Problem List   Diagnosis    HTN (hypertension), benign    Hypertensive heart disease    Hypothyroidism    [facility] mellitus[facility] 2 [facility]    Acute respiratory failure with hypoxia ([facility]    Uncontrolled type 2 diabetes mellitus with hyperglycemia [facility]    Chronic diastolic CHF (congestive heart failure) [facility]    Chest pain    LVH (left ventricular hypertrophy)    Non-rheumatic mitral regurgitation    CKD (chronic kidney disease) stage 3, GFR 30-59 ml/min ([facility]    SSS (sick sinus syndrome) ([facility]    Dizziness    Unsteadiness    Leukopenia    Primary osteoarthritis involving multiple joints    Cervical radiculopathy    Bilateral shoulder bursitis    Aspirin intolerance    Primary osteoarthritis of one knee, right    Primary osteoarthritis of right knee    Acute blood loss anemia    HF [facility]heart failure) [facility]    Chronic pain syndrome    Cervicalgia    Myalgia    Intractable headache    Acute on chronic diastolic congestive heart failure ([facility]    Metabolic acidosis    Anemia    Edema    Neck pain    Congestive heart failure of unknown etiology ([facility]    AMANDEEP (acute kidney injury) ([facility]    Elevated troponin    Obesity (BMI 30.0-34. 9)       Allergies   Allergen Reactions    Aspirin Other (See Comments)     \"tears up my stomach\"       Current Facility-Administered Medications   Medication Dose Route Frequency Provider Last Rate Last Admin    acetaminophen (TYLENOL) tablet 500 mg  500 mg Oral Q6H PRN Latha Gonzales MD   500 mg at 03/24/21 2320    HYDROcodone-acetaminophen (NORCO) 5-325 MG per tablet 1 tablet  1 tablet Oral Q6H PRN Latha Gonzales MD        levothyroxine (SYNTHROID) tablet 50 mcg  50 mcg Oral Daily Latha Gonazles MD   50 mcg at 03/25/21 0625    magnesium oxide (MAG-OX) tablet 400 mg  400 mg Oral BID Lorie Vazquez MD   400 mg at 03/25/21 0106    metoprolol succinate (TOPROL XL) extended release tablet 25 mg  25 mg Oral Daily Lorie Vazquez MD   25 mg at 03/24/21 1200    insulin lispro (HUMALOG) injection vial 0-10 Units  0-10 Units Subcutaneous 4x Daily AC & HS Lorie Vazquez MD   2 Units at 03/24/21 1607    glucose (GLUTOSE) 40 % oral gel 15 g  15 g Oral PRN Lorie Vazquez MD        dextrose 50 % IV solution  12.5 g Intravenous PRN Lorie Vazquez MD        glucagon (rDNA) injection 1 mg  1 mg Intramuscular PRN Lorie Vazquez MD        dextrose 5 % solution  100 mL/hr Intravenous PRN Lorie Vazquez MD        bumetanide (BUMEX) injection 1 mg  1 mg Intravenous BID Lorie Vazquez MD   1 mg at 03/24/21 2145    sodium chloride flush 0.9 % injection 10 mL  10 mL Intravenous 2 times per day Lorie Vazquez MD   10 mL at 03/24/21 2146    sodium chloride flush 0.9 % injection 10 mL  10 mL Intravenous PRN Lorie Vazquez MD   10 mL at 03/24/21 0557    promethazine (PHENERGAN) tablet 12.5 mg  12.5 mg Oral Q6H PRN Lorie Vazquez MD        Or    ondansetron (ZOFRAN) injection 4 mg  4 mg Intravenous Q6H PRN Lorie Vazquez MD        polyethylene glycol (GLYCOLAX) packet 17 g  17 g Oral Daily PRN Lorie Vazquez MD        heparin (porcine) injection 5,000 Units  5,000 Units Subcutaneous 3 times per day Lorie Vazquez MD   5,000 Units at 03/25/21 0625    nitroGLYCERIN (NITROSTAT) SL tablet 0.4 mg  0.4 mg Sublingual Q5 Min PRN Lorie Vazquez MD        albuterol (PROVENTIL) nebulizer solution 2.5 mg  2.5 mg Nebulization 4x daily Lorie Vazquez MD   2.5 mg at 03/24/21 2018    cloNIDine (CATAPRES) tablet 0.2 mg  0.2 mg Oral BID Lorie Vazquez MD   0.2 mg at 03/24/21 2146    cefTRIAXone (ROCEPHIN) 1,000 mg in sterile water 10 mL IV syringe  1,000 mg Intravenous Q24H Jef Griffin DO   1,000 mg at 03/24/21 1159    spironolactone (ALDACTONE) tablet 12.5 mg  12.5 mg Oral Daily Loramrina Conn, DO   12.5 mg at 03/24/21 1200    traMADol (ULTRAM) tablet 25 mg  25 mg Oral Q4H PRN Toney Leaf, DO   25 mg at 03/24/21 1603    aspirin chewable tablet 324 mg  324 mg Oral Once [employer], DO           Review of systems:   Heart: as above   Lungs: as above   Eyes: denies changes in vision or discharge. Ears: denies changes in hearing or pain. Nose: denies epistaxis or masses   Throat: denies sore throat or trouble swallowing. Neuro: denies numbness, tingling, tremors. Skin: denies rashes or itching. : denies hematuria, dysuria   GI: denies vomiting, diarrhea   Psych: denies mood changed, anxiety, depression. Physical Exam   /60   Pulse 64   Temp 96.8 °F (36 °C) (Temporal)   Resp 18   Ht 5' 4\" (1.626 m)   Wt 160 lb 6.4 oz (72.8 kg)   SpO2 95%   BMI 27.53 kg/m²   Constitutional: Oriented to person, place, and time. No distress. Well developed. Head: Normocephalic and atraumatic. Neck: Neck supple. No hepatojugular reflux. No JVD present. Carotid bruit is not present. No tracheal deviation present. No thyromegaly present. Cardiovascular: Normal rate, regular rhythm, normal heart sounds. intact distal pulses. No gallop and no friction rub. No murmur heard. Pulmonary: Breath sounds normal. No respiratory distress. No wheezes. No rales. Chest: Effort normal. No tenderness. Abdominal: Soft. Bowel sounds are normal. No distension or mass. No tenderness, rebound or guarding. Musculoskeletal: . No tenderness. No clubbing or cyanosis. Extremitites: Intact distal pulses. No edema  Neurological: Alert and oriented to person, place, and time. Skin: Skin is warm and dry. No rash noted. Not diaphoretic. No erythema. Psychiatric: Normal mood and affect. Behavior is normal.   Lymphadenopathy: No cervical adenopathy. No groin adenopathy.       CBC:   Lab Results   Component Value Date    WBC 5.2 03/24/2021 Primary osteoarthritis of right knee    Acute blood loss anemia    HF (heart failure) (HCC)    Chronic pain syndrome    Cervicalgia    Myalgia    Intractable headache    Acute on chronic diastolic congestive heart failure (HCC)    Metabolic acidosis    Anemia    Edema    Neck pain    Congestive heart failure of unknown etiology (HCC)    AMAN[facility]EP (acute kidney injury) [facility]    Elevated troponin    Obesity (BMI 30.0-34.9)     1. Acute on chronic diastolic CHF:    Diurese. IV bumex/BB/aldactone. 2. VHD: mild AS/MR/TR and mild PH. LVEF 75%    3. HTN: Observe. 4. DM: Per primary service. 5. Hypothyroidism: On HRT. 6. CKD: Follow labs. 7. Dementia    Daphney Hilton D.O.   Cardiologist  Cardiology, [facility]

## 2021-03-25 NOTE — PROGRESS NOTES
Hospitalist Progress Note      PCP: Osman Scott MD    Date of Admission: 3/23/2021    Chief Complaint: [facility] Course: * found to be in CHF, started on diuretics, TT also elevated, seen by cardiology  Added aldactone to the bumex **diuresing well        Subjective: *has a headache      Medications:  Reviewed    Infusion Medications    dextrose       Scheduled Medications    levothyroxine  50 mcg Oral Daily    magnesium oxide  400 mg Oral BID    metoprolol succinate  25 mg Oral Daily    insulin lispro  0-10 Units Subcutaneous 4x Daily AC & HS    bumetanide  1 mg Intravenous BID    sodium chloride flush  10 mL Intravenous 2 times per day    heparin (porcine)  5,000 Units Subcutaneous 3 times per day    albuterol  2.5 mg Nebulization 4x daily    cloNIDine  0.2 mg Oral BID    cefTRIAXone (ROCEPHIN) IV  1,000 mg Intravenous Q24H    spironolactone  12.5 mg Oral Daily    aspirin  324 mg Oral Once     PRN Meds: acetaminophen, HYDROcodone 5 mg - acetaminophen, glucose, dextrose, glucagon (rDNA), dextrose, sodium chloride flush, promethazine **OR** ondansetron, polyethylene glycol, nitroGLYCERIN, traMADol      Intake/Output Summary (Last 24 hours) at 3/25/2021 1414  Last data filed at 3/24/2021 2228  Gross per 24 hour   Intake 240 ml   Output 1125 ml   Net -885 ml       Exam:    BP (!) 111/55   Pulse 68   Temp 95.6 °F (35.3 °C) (Temporal)   Resp 18   Ht 5' 4\" (1.626 m)   Wt 160 lb 6.4 oz (72.8 kg)   SpO2 94%   BMI 27.53 kg/m²         Gen: *well developed  HEENT: NC/AT, moist mucous membranes,   Neck: supple, trachea midline, no anterior cervical or SC LAD  Heart:  Normal s1/s2, RRR, no murmurs, gallops, or rubs. Lungs:  ** bilateral rales  Abd: bowel sounds present, soft, nontender, nondistended, no masses  Extrem:  No clubbing, cyanosis,  *pos* edema  Skin: no rashes or lesions  Psych: A & O x3  Neuro: grossly intact, moves all four extremities.     Capillary Refill: Brisk,< 3 seconds Peripheral Pulses: +2 palpable, equal bilaterally                Labs:   Recent Labs     03/23/21 2028 03/24/21  0507   WBC 5.5 5.2   HGB 10.5* 10.2*   HCT 35.4 33.5*    179     Recent Labs     03/23/21 2028 03/24/21  0507 03/25/21  0450    146 144   K 4.2 3.7 4.0    109* 107   CO2 27 29 30*   BUN 24* 23 25*   CREATININE 1.3* 1.3* 1.4*   CALCIUM 8.8 8.3* 8.0*     No results for input(s): AST, ALT, BILIDIR, BILITOT, ALKPHOS in the last 72 hours. Recent Labs     03/24/21  0507   INR 1.1     Recent Labs     03/23/21 2028 03/24/21  0044 03/24/21  0507   TROPONINI 0.07* 0.07* 0.07*     No results for input(s): AST, ALT, ALB, BILIDIR, BILITOT, ALKPHOS in the last 72 hours. No results for input(s): LACTA in the last 72 hours. No results found for: Neli Lown  No results found for: AMMONIA    Assessment:    Active Hospital Problems    Diagnosis Date Noted    AMANDEEP (acute kidney injury) ([facility] [N17.9] 03/24/2021    Elevated troponin [R77.8] 03/24/2021    Obesity (BMI 30.0-34. 9) [E66.9] 03/24/2021    Acute on chronic diastolic congestive heart failure (HCC) [I50.33] 03/03/2021    Intractable headache [R51.9] 09/23/2020    Chronic pain syndrome [G89.4] 07/01/2020    CKD (chronic kidney disease) stage 3, GFR 30-59 ml/min (HCC) [N18.30] 10/20/2017    Non-rheumatic mitral regurgitation [I34.0] 10/20/2017    Chest pain [R07.9] 09/13/2017    Hypertensive heart disease [I11.9] 09/02/2017    Hypothyroidism [E03.9] 09/02/2017    Acute respiratory failure with hypoxia (HCC) [J96.01] 03/29/2017    DM (diabetes mellitus), type 2 ([facility] [E11.9] 04/19/2012   Dementia     Plan:  *cont bumex   betablocker  Aldactone   cont synthroid           DVT Prophylaxis: *heparin  Diet: DIET CARDIAC; Carb Control: 4 carb choices (60 gms)/meal; Low Sodium (2 GM)  Code Status: Full Code     PT/OT Eval Status: *ordered*     Dispo - *home vs [facility]      Electronically signed by Shreyas Griffith DO on 3/25/2021 at 2:14 PM [facility]

## 2021-03-25 NOTE — PLAN OF CARE
Problem: Falls - Risk of:  Goal: Will remain free from falls  Description: Will remain free from falls  3/25/2021 0244 by Alyssa Wilkerson RN  Outcome: Met This Shift     Problem: Falls - Risk of:  Goal: Absence of physical injury  Description: Absence of physical injury  Outcome: Met This Shift     Problem: Skin Integrity:  Goal: Will show no infection signs and symptoms  Description: Will show no infection signs and symptoms  3/25/2021 0244 by Alyssa Wilkerson RN  Outcome: Met This Shift     Problem: Skin Integrity:  Goal: Absence of new skin breakdown  Description: Absence of new skin breakdown  Outcome: Met This Shift     Problem: OXYGENATION/RESPIRATORY FUNCTION  Goal: Patient will maintain patent airway  Outcome: Met This Shift     Problem: OXYGENATION/RESPIRATORY FUNCTION  Goal: Patient will achieve/maintain normal respiratory rate/effort  Description: Respiratory rate and effort will be within normal limits for the patient  Outcome: Met This Shift     Problem: HEMODYNAMIC STATUS  Goal: Patient has stable vital signs and fluid balance  Outcome: Met This Shift     Problem: FLUID AND ELECTROLYTE IMBALANCE  Goal: Fluid and electrolyte balance are achieved/maintained  Outcome: Met This Shift     Problem: ACTIVITY INTOLERANCE/IMPAIRED MOBILITY  Goal: Mobility/activity is maintained at optimum level for patient  Outcome: Met This Shift     Problem: Pain:  Goal: Pain level will decrease  Description: Pain level will decrease  3/25/2021 0244 by Alyssa Wilkerson RN  Outcome: Met This Shift     Problem: Pain:  Goal: Control of acute pain  Description: Control of acute pain  Outcome: Met This Shift     Problem: Pain:  Goal: Control of chronic pain  Description: Control of chronic pain  Outcome: Met This Shift

## 2021-03-25 NOTE — PROGRESS NOTES
Wound care: Order for tubi  stockings BLE by Dr. Madie Toribio. Size E provided to nurse after legs measured by nurse assigned.  Poncho Cherry

## 2021-03-25 NOTE — PLAN OF CARE
Problem: Falls - Risk of:  Goal: Will remain free from falls  Description: Will remain free from falls  3/25/2021 1024 by Jordy Brand RN  Outcome: Met This Shift  3/25/2021 1024 by Jordy Brand RN  Outcome: Met This Shift  3/25/2021 0244 by Williams Conner RN  Outcome: Met This Shift     Problem: Skin Integrity:  Goal: Will show no infection signs and symptoms  Description: Will show no infection signs and symptoms  3/25/2021 1024 by Jordy Brand RN  Outcome: Met This Shift  3/25/2021 1024 by Jordy Brand RN  Outcome: Met This Shift  3/25/2021 0244 by Williams Conner RN  Outcome: Met This Shift     Problem: Pain:  Goal: Pain level will decrease  Description: Pain level will decrease  3/25/2021 1024 by Jordy Brand RN  Outcome: Met This Shift  3/25/2021 1024 by Jordy Brand RN  Outcome: Met This Shift  3/25/2021 0244 by Williams Conner RN  Outcome: Met This Shift     Problem: Gas Exchange - Impaired:  Goal: Levels of oxygenation will improve  Description: Levels of oxygenation will improve  Outcome: Met This Shift

## 2021-03-26 NOTE — PLAN OF CARE
Cardiology hospital follow up appointment obtained  For 4/7/21 @ 11:00. Appointment details placed in patient's follow up discharge instructions.

## 2021-03-26 NOTE — PROGRESS NOTES
Message sent to Cardiology, NP regarding plan and clarification if patient could be discharged home today. Kallie Diaz wanted me to check with cardiology regarding discharge. I had just seen Dr. Martell Cohn last note from today that the plan was IV diuresis. Just wanted to make sure and clarify. 3/26/21 11:49 AM   Yes can go per [employer]    Message sent to Dr. Shelia Gilman regarding above and told her that the patient is requesting for her to call her son Deny Avilez.

## 2021-03-26 NOTE — HOME CARE
[facility] received referral. Will follow after discharge. Spoke with patient son and verified demographics.  Jeffrey Jennings LPN, [facility]

## 2021-03-26 NOTE — PROGRESS NOTES
CLINICAL PHARMACY NOTE: [facility]S TO [address] [facility] Patient?: No  Total # of Prescriptions Filled: 3   The following medications were delivered to the patient:  · BUMEX 1 MG   · ALDACTONE 25 MG   · CEFUROXINE AXETIL 250 MG   Total # of Interventions Completed: 4  Time Spent (min): 30    Additional Documentation:  ASPIRIN 81- OTC NOT COVERED  STARLIX- TRANSFERRED  Doctor Avni Falk Dr

## 2021-03-26 NOTE — CARE COORDINATION
SOCIAL WORK/CASEMANAGEMENT TRANSITION OF CARE [id number] [place], [address], [place], [phone]): I met with pt and went over PT eval. Offered hhc for nsg and PT and OT and pt said she wants to get things in order first. I explained to her that hhc needs to be setup before pt goes home. I asked her if I could call suhail to talk with him about it and she agreed. I called suhail as well and he is in agreement. He had no preference for hhc and I made a referral to [facility]. Left note for pcp and rn for orders. Sw/cm to follow. Fabio Lozada  3/26/2021  Discharge home today. [facility] notified of orders and they will see pt on Monday. Cardio signed off. Fabio Lozada  3/26/2021   rn is concerned that suhail the son may not show up to get pt. I provided her with a taxi voucher but told her to make sure that Fabiola Taryn is at pt's home when she arrives. The pharmacy here filled scripts and wrote off the cost because the son is not sure he has the money to pay for it. If he brings in the cash they will not write it off. Fabio Lozada  . 3/26/2021

## 2021-03-26 NOTE — PROGRESS NOTES
Hospitalist Progress Note      PCP: Bartolo Cantu MD    Date of Admission: 3/23/2021    Chief Complaint: [facility] Course: * found to be in CHF, started on diuretics, TT also elevated, seen by cardiology  Added aldactone to the bumex **diuresing well*,  negative 2,740 . Spoke with son, planning for discharge. Subjective: * no complaints       Medications:  Reviewed    Infusion Medications    dextrose       Scheduled Medications    levothyroxine  50 mcg Oral Daily    magnesium oxide  400 mg Oral BID    metoprolol succinate  25 mg Oral Daily    insulin lispro  0-10 Units Subcutaneous 4x Daily AC & HS    bumetanide  1 mg Intravenous BID    sodium chloride flush  10 mL Intravenous 2 times per day    heparin (porcine)  5,000 Units Subcutaneous 3 times per day    albuterol  2.5 mg Nebulization 4x daily    cloNIDine  0.2 mg Oral BID    cefTRIAXone (ROCEPHIN) IV  1,000 mg Intravenous Q24H    spironolactone  12.5 mg Oral Daily    aspirin  324 mg Oral Once     PRN Meds: acetaminophen, HYDROcodone 5 mg - acetaminophen, glucose, dextrose, glucagon (rDNA), dextrose, sodium chloride flush, promethazine **OR** ondansetron, polyethylene glycol, nitroGLYCERIN, traMADol      Intake/Output Summary (Last 24 hours) at 3/26/2021 1531  Last data filed at 3/26/2021 1356  Gross per 24 hour   Intake 780 ml   Output 750 ml   Net 30 ml       Exam:    /65   Pulse 72   Temp 98.5 °F (36.9 °C) (Temporal)   Resp 16   Ht 5' 4\" (1.626 m)   Wt 159 lb 3.2 oz (72.2 kg)   SpO2 95%   BMI 27.33 kg/m²         Gen: *well developed  HEENT: NC/AT, moist mucous membranes,   Neck: supple, trachea midline, no anterior cervical or SC LAD  Heart:  Normal s1/s2, RRR, no murmurs, gallops, or rubs.    Lungs:  ** bilateral rales  Abd: bowel sounds present, soft, nontender, nondistended,   Extrem:  No clubbing, cyanosis,  *pos* edema  Skin: no rashes or lesions  Psych: A & O x3  Neuro: grossly intact, moves all four extremities.    Capillary Refill: Brisk,< 3 seconds   Peripheral Pulses: +2 palpable, equal bilaterally             Labs:   Recent Labs     03/23/21 2028 03/24/21  0507   WBC 5.5 5.2   HGB 10.5* 10.2*   HCT 35.4 33.5*    179     Recent Labs     03/24/21  0507 03/25/21  0450 03/26/21  0556    144 142   K 3.7 4.0 4.4   * 107 105   CO2 29 30* 33*   BUN 23 25* 26*   CREATININE 1.3* 1.4* 1.4*   CALCIUM 8.3* 8.0* 8.1*     No results for input(s): AST, ALT, BILIDIR, BILITOT, ALKPHOS in the last 72 hours. Recent Labs     03/24/21  0507   INR 1.1     Recent Labs     03/23/21 2028 03/24/21  0044 03/24/21  0507   TROPONINI 0.07* 0.07* 0.07*     No results for input(s): AST, ALT, ALB, BILIDIR, BILITOT, ALKPHOS in the last 72 hours. No results for input(s): LACTA in the last 72 hours. No results found for: York Leavens  No results found for: AMMONIA    Assessment:    Active Hospital Problems    Diagnosis Date Noted    AMANDEEP (acute kidney injury) ([facility] [N17.9] 03/24/2021    Elevated troponin [R77.8] 03/24/2021    Obesity (BMI 30.0-34. 9) [E66.9] 03/24/2021    Acute on chronic diastolic congestive heart failure (HCC) [I50.33] 03/03/2021    Intractable headache [R51.9] 09/23/2020    Chronic pain syndrome [G89.4] 07/01/2020    CKD (chronic kidney disease) stage 3, GFR 30-59 ml/min (HCC) [N18.30] 10/20/2017    Non-rheumatic mitral regurgitation [I34.0] 10/20/2017    Chest pain [R07.9] 09/13/2017    Hypertensive heart disease [I11.9] 09/02/2017    Hypothyroidism [E03.9] 09/02/2017    Acute respiratory failure with hypoxia (HCC) [J96.01] 03/29/2017    DM [facility] mellitus[facility] [E11.9] 04/19/2012   Dementia       Plan:  *dc home with Luke Mcqueen    Electronically signed by Carmen Gage DO on 3/26/2021 at 3:31 PM [facility]

## 2021-03-26 NOTE — PROGRESS NOTES
Dressing Moderate Assist   Modified Stearns    Bathing Moderate Assist  Modified Stearns    Toileting Minimal Assist   Modified Stearns    Bed Mobility  Supine to sit: Stand by Assist   Sit to supine: NT   Supine to sit: Modified Stearns   Sit to supine: Modified Stearns    Functional Transfers Minimal Assist   Modified Stearns    Functional Mobility Minimal Assist     Ambulated in room without AD  Modified Stearns    Balance Sitting:     Static:  SBA    Dynamic:SBA  Standing: min A     Activity Tolerance F  F+   Visual/  Perceptual wfl                  Hand dominance: right     Strength ROM Additional Info:    RUE   3+/5 wfl good  and wfl FMC/dexterity noted during ADL tasks     LUE 3+/5 wfl good  and wfl FMC/dexterity noted during ADL tasks     Hearing: wfl  Sensation:wfl  Tone: wfl  Edema:none noted                             Comments: Upon arrival, patient supine in bed and agreeable to OT Session. Pt demonstrating fair understanding of education/techniques, requiring additional training / education. At end of session, patient seated in chair with call light and phone within reach, all lines and tubes intact. Pt would benefit from continued skilled OT to increase safety,  independence and quality of life. Treatment:  OT services provided: Facilitation of bed mobility, unsupported sitting balance (impacting ADLs; addressing posture, weight shifting, dynamic reaching), functional transfers (various surfaces: bed, toilet, chair), standing tolerance tasks (addressing posture, balance and activity tolerance; impacting ADLs and functional activity) and functional ambulation tasks with w/w (including to/from bathroom ; cuing on posture and safety) - skilled cuing on sequencing, hand placement, posture, body mechanics, energy conservation techniques and safety.   Therapist facilitated self-care retraining: UB/LB self-care tasks (robe, socks), simulated toileting task and standing grooming tasks while educating pt on modified techniques, posture, safety and energy conservation techniques. Skilled monitoring of HR, O2 sats and pts response to treatment (O2 sats 96% at rest; desat to 87-89% with activity; less than 1 minute to recover - cuing on pursed lip breathing). Assessment of current deficits    Functional mobility [x]  ADLs [x] Strength [x]  Cognition []  Functional transfers  [x] IADLs [x] Safety Awareness [x]  Endurance [x]  Fine Motor Coordination [] Balance [x] Vision/perception [] Sensation []   Gross Motor Coordination [] ROM [] Delirium []                  Motor Control []      Plan of Care:  1-4 days/wk for 1-2 weeks PRN   Instruction/training on adapted ADL techniques and AE recommendations to increase functional independence within precautions  Training on energy conservation strategies/techniques to improve independence/tolerance for self-care routine  Functional transfer/mobility training/DME recommendations for increased independence, safety, and fall prevention  Patient/Family education to increase follow through with safety techniques and functional independence  Recommendation of environmental modifications for increased safety with functional transfers/mobility and ADLs  Therapeutic exercise to improve motor endurance, ROM, and functional strength for ADLs/functional transfers  Therapeutic activities to facilitate/challenge dynamic balance, stand tolerance, fine motor dexterity/in-hand manipulation for increased independence with ADLs      Rehab Potential: Good for established goals     Patient / Family Goal: return home     Patient and/or family were instructed on functional diagnosis, prognosis/goals and OT plan of care. Demonstrated fair understanding.       AM-PAC Daily Activity Inpatient   How much help for putting on and taking off regular lower body clothing?: A Lot  How much help for Bathing?: A Lot  How much help for Toileting?: A Little  How much help for putting on and taking off regular upper body clothing?: A Little  How much help for taking care of personal grooming?: A Little  How much help for eating meals?: None  AM-PAC Inpatient Daily Activity Raw Score: 17  AM-PAC Inpatient ADL T-Scale Score : 37.26  ADL Inpatient CMS 0-100% Score: 50.11  ADL Inpatient CMS G-Code Modifier : CK         Eval Complexity: mod  Profile and History- med (extensive chart review)  Assessment of Occupational Performance and Identification of Deficits- med  Clinical Decision Making- med     Time In: 950  Time Out: 1020  Total Treatment Time: 23 minutes    Min Units   OT Eval Low 50273       OT Eval Medium 97166  x 1   OT Eval High 28446       OT Re-Eval [id number]       Therapeutic Ex 76548       Therapeutic Activities 49105  30  1   ADL/Self Care 01751  11  1   Orthotic Management 97273       Neuro Re-Ed 23109       Non-Billable Time          Evaluation Time includes thorough review of current medical information, gathering information on past medical history/social history and prior level of function, completion of standardized testing/informal observation of tasks, assessment of data and education on plan of care and goals.            [address] [facility] OTR/L #[id number]

## 2021-03-26 NOTE — PROGRESS NOTES
ALL paperwork including hospital follow-ups, new medications, and specific discharge instructions provided to patient and son.     Eric Yates RN

## 2021-03-26 NOTE — PLAN OF CARE
Problem: Falls - Risk of:  Goal: Will remain free from falls  Description: Will remain free from falls  3/25/2021 2326 by Renetta Arango RN  Outcome: Met This Shift     Problem: Falls - Risk of:  Goal: Absence of physical injury  Description: Absence of physical injury  Outcome: Met This Shift     Problem: Skin Integrity:  Goal: Will show no infection signs and symptoms  Description: Will show no infection signs and symptoms  3/25/2021 2326 by Renetta Arango RN  Outcome: Met This Shift     Problem: Skin Integrity:  Goal: Absence of new skin breakdown  Description: Absence of new skin breakdown  Outcome: Met This Shift     Problem: OXYGENATION/RESPIRATORY FUNCTION  Goal: Patient will maintain patent airway  Outcome: Met This Shift     Problem: OXYGENATION/RESPIRATORY FUNCTION  Goal: Patient will achieve/maintain normal respiratory rate/effort  Description: Respiratory rate and effort will be within normal limits for the patient  Outcome: Met This Shift     Problem: HEMODYNAMIC STATUS  Goal: Patient has stable vital signs and fluid balance  Outcome: Met This Shift     Problem: FLUID AND ELECTROLYTE IMBALANCE  Goal: Fluid and electrolyte balance are achieved/maintained  Outcome: Met This Shift     Problem: ACTIVITY INTOLERANCE/IMPAIRED MOBILITY  Goal: Mobility/activity is maintained at optimum level for patient  Outcome: Met This Shift     Problem: Pain:  Goal: Pain level will decrease  Description: Pain level will decrease  3/25/2021 2326 by Renetta Arango RN  Outcome: Met This Shift     Problem: Pain:  Goal: Control of acute pain  Description: Control of acute pain  Outcome: Met This Shift     Problem: Pain:  Goal: Control of chronic pain  Description: Control of chronic pain  Outcome: Met This Shift     Problem: Gas Exchange - Impaired:  Goal: Levels of oxygenation will improve  Description: Levels of oxygenation will improve  3/25/2021 2326 by Renetta Arango RN  Outcome: Met This Shift

## 2021-03-26 NOTE — DISCHARGE INSTR - COC
osteoarthritis involving multiple joints M89.49    Cervical radiculopathy M54.12    Bilateral shoulder bursitis M75.51, M75.52    Aspirin intolerance Z78.9    Primary osteoarthritis of one knee, right M17.11    Primary osteoarthritis of right knee M17.11    Acute blood loss anemia D62    HF (heart failure) ([facility]) I50.9    Chronic pain syndrome G89.4    Cervicalgia M54.2    Myalgia M79.10    Intractable headache R51.9    Acute on chronic diastolic congestive heart failure ([facility]) I89.45    Metabolic acidosis P57.1    Anemia D64.9    Edema R60.9    Neck pain M54.2    Congestive heart failure of unknown etiology ([facility]) I50.9    AMANDEEP (acute kidney injury) ([facility] [address] N17.9    Elevated troponin R77.8    Obesity (BMI 30.0-34. 9) E66.9       Isolation/Infection:   Isolation          No Isolation        Patient Infection Status     Infection Onset Added Last Indicated Last Indicated By Review Planned Expiration Resolved Resolved By    None active    Resolved    COVID-19 Rule Out 21 COVID-19, Rapid (Ordered)   21 Rule-Out Test Resulted    COVID-19 Rule Out 21 COVID-19, Rapid (Ordered)   21 Rule-Out Test Resulted          Nurse Assessment:  Last Vital Signs: /65   Pulse 72   Temp 98.5 °F (36.9 °C) (Temporal)   Resp 16   Ht 5' 4\" (1.626 m)   Wt 159 lb 3.2 oz (72.2 kg)   SpO2 95%   BMI 27.33 kg/m²     Last documented pain score (0-10 scale): Pain Level: 3  Last Weight:   Wt Readings from Last 1 Encounters:   21 159 lb 3.2 oz (72.2 kg)     Mental Status:  {IP PT MENTAL STATUS:}    IV Access:  {[facility] IV ACCESS:818016060}    Nursing Mobility/ADLs:  Walking   {CHP DME FBWT:697387054}  Transfer  {CHP DME ETOO:038661541}  Bathing  {CHP DME MTCU:445746751}  Dressing  {CHP DME VDM}  Toileting  {CHP DME RXJH:889824113}  Feeding  {EZEQUIEL BEGUM MBVX:661460213}  Med Admin  {EZEQUIEL BEGUM XBXM:474551173}  Med Delivery   {[facility] MED Delivery:133877707}    Wound Care Documentation and Therapy:        Elimination:  Continence:   · Bowel: {YES / MR:63759}  · Bladder: {YES / OF:20342}  Urinary Catheter: {Urinary Catheter:142265247}   Colostomy/Ileostomy/Ileal Conduit: {YES / EJ:49884}       Date of Last BM: ***    Intake/Output Summary (Last 24 hours) at 3/26/2021 1511  Last data filed at 3/26/2021 1356  Gross per 24 hour   Intake 780 ml   Output 750 ml   Net 30 ml     I/O last 3 completed shifts:   In: 80 [P.O.:780]  Out: 750 [Urine:750]    Safety Concerns:     [employer] Safety Concerns:497183927}    Impairments/Disabilities:      [employer] Impairments/Disabilities:265902448}    Nutrition Therapy:  Current Nutrition Therapy:   [employer] Diet List:704744712}    Routes of Feeding: {CHP DME Other Feedings:367528848}  Liquids: {Slp liquid thickness:37323}  Daily Fluid Restriction: {CHP DME Yes amt example:758608802}  Last Modified Barium Swallow with Video (Video Swallowing Test): {Done Not Done TWYL:678288797}    Treatments at the Time of Hospital Discharge:   Respiratory Treatments: ***  Oxygen Therapy:  {Therapy; copd oxygen:77441}  Ventilator:    { CC Vent CEUM:388924969}    Rehab Therapies: {THERAPEUTIC INTERVENTION:3158576506}  Weight Bearing Status/Restrictions: [facility] Weight Bearin}  Other Medical Equipment (for information only, NOT a DME order):  {EQUIPMENT:846852694}  Other Treatments: ***    Patient's personal belongings (please select all that are sent with patient):  {CHP DME Belongings:863377820}    RN SIGNATURE:  {Esignature:032932337}    CASE MANAGEMENT/SOCIAL WORK SECTION    Inpatient Status Date: ***    Readmission Risk Assessment Score:  Readmission Risk              Risk of Unplanned Readmission:        25           Discharging to Facility/ Agency   · Name:   · Address:  · Phone:  · Fax:    Dialysis Facility (if applicable)   · Name:  · Address:  · Dialysis Schedule:  · Phone:  · Fax:    / signature: {Esignature:064909407}    PHYSICIAN SECTION    Prognosis: {Prognosis:0495109654}    Condition at Discharge: [address] Patient Condition:590396738}    Rehab Potential (if transferring to Rehab): {Prognosis:9588336240}    Recommended Labs or Other Treatments After Discharge: ***    Physician Certification: I certify the above information and transfer of Panfilo Ferrera  is necessary for the continuing treatment of the diagnosis listed and that she requires {Admit to Appropriate Level of Care:13092} for {GREATER/LESS:983769593} 30 days.      Update Admission H&P: {CHP DME Changes in ZOXFY:267724155}    PHYSICIAN SIGNATURE:  {Esignature:693856411}

## 2021-03-26 NOTE — PROGRESS NOTES
[facility] Cardiology Inpatient Progress Note    Patient is a 81 y.o. female of Bartolo Cantu MD seen in hospital follow up. Chief complaint: CHF    HPI: Some SOB. No CP. Patient Active Problem List   Diagnosis    HTN (hypertension), benign    Hypertensive heart disease    Hypothyroidism    DM (diabetes mellitus), type 2 ([facility] [address]    Acute respiratory failure with hypoxia ([facility] [address]    Uncontrolled type 2 diabetes mellitus with hyperglycemia ([facility])    Chronic diastolic CHF (congestive heart failure) ([facility])    Chest pain    LVH (left ventricular hypertrophy)    Non-rheumatic mitral regurgitation    CKD (chronic kidney disease) stage 3, GFR 30-59 ml/min ([facility])    SSS (sick sinus syndrome) ([facility])    Dizziness    Unsteadiness    Leukopenia    Primary osteoarthritis involving multiple joints    Cervical radiculopathy    Bilateral shoulder bursitis    Aspirin intolerance    Primary osteoarthritis of one knee, right    Primary osteoarthritis of right knee    Acute blood loss anemia    HF (heart failure) ([facility])    Chronic pain syndrome    Cervicalgia    Myalgia    Intractable headache    Acute on chronic diastolic congestive heart failure ([facility])    Metabolic acidosis    Anemia    Edema    Neck pain    Congestive heart failure of unknown etiology ([facility])    AMANDEEP (acute kidney injury) ([facility] [address]    Elevated troponin    Obesity (BMI 30.0-34. 9)       Allergies   Allergen Reactions    Aspirin Other (See Comments)     \"tears up my stomach\"       Current Facility-Administered Medications   Medication Dose Route Frequency Provider Last Rate Last Admin    acetaminophen (TYLENOL) tablet 500 mg  500 mg Oral Q6H PRN Junior Ferro MD   500 mg at 03/24/21 2320    HYDROcodone-acetaminophen (NORCO) 5-325 MG per tablet 1 tablet  1 tablet Oral Q6H PRN Junior Ferro MD   1 tablet at 03/25/21 2107    levothyroxine (SYNTHROID) tablet 50 mcg  50 mcg Oral Daily Junior Ferro MD   50 mcg at 03/26/21 7269    magnesium oxide (MAG-OX) tablet 400 mg  400 mg Oral BID Delfino Ruiz MD   400 mg at 03/25/21 2108    metoprolol succinate (TOPROL XL) extended release tablet 25 mg  25 mg Oral Daily Delfino Ruiz MD   25 mg at 03/25/21 0836    insulin lispro (HUMALOG) injection vial 0-10 Units  0-10 Units Subcutaneous 4x Daily AC & HS Delfino Ruiz MD   2 Units at 03/26/21 0621    glucose (GLUTOSE) 40 % oral gel 15 g  15 g Oral PRN Delfino Ruiz MD        dextrose 50 % IV solution  12.5 g Intravenous PRN Delfino Ruiz MD        glucagon (rDNA) injection 1 mg  1 mg Intramuscular PRN Delfino Ruiz MD        dextrose 5 % solution  100 mL/hr Intravenous PRN Delfino Ruiz MD        bumetanide (BUMEX) injection 1 mg  1 mg Intravenous BID Delfino Ruiz MD   1 mg at 03/25/21 2108    sodium chloride flush 0.9 % injection 10 mL  10 mL Intravenous 2 times per day Delfino Ruiz MD   10 mL at 03/25/21 2108    sodium chloride flush 0.9 % injection 10 mL  10 mL Intravenous PRN Delfino Ruiz MD   10 mL at 03/24/21 0557    promethazine (PHENERGAN) tablet 12.5 mg  12.5 mg Oral Q6H PRN Delfino Ruiz MD        Or    ondansetron (ZOFRAN) injection 4 mg  4 mg Intravenous Q6H PRN Delfino Ruiz MD        polyethylene glycol (GLYCOLAX) packet 17 g  17 g Oral Daily PRN Delfino Ruiz MD        heparin (porcine) injection 5,000 Units  5,000 Units Subcutaneous 3 times per day Delfino Ruiz MD   5,000 Units at 03/26/21 0620    nitroGLYCERIN (NITROSTAT) SL tablet 0.4 mg  0.4 mg Sublingual Q5 Min PRN Delfino Ruiz MD        albuterol (PROVENTIL) nebulizer solution 2.5 mg  2.5 mg Nebulization 4x daily Delfino Ruiz MD   2.5 mg at 03/26/21 0758    cloNIDine (CATAPRES) tablet 0.2 mg  0.2 mg Oral BID Delfino Ruiz MD   0.2 mg at 03/25/21 2108    cefTRIAXone (ROCEPHIN) 1,000 mg in sterile water 10 mL IV syringe  1,000 mg Intravenous Q24H Kaellette Numbers, DO   1,000 mg at 03/25/21 0945    spironolactone (ALDACTONE) tablet 12.5 mg  12.5 mg Oral Daily Jt Tyler, DO   12.5 mg at 03/25/21 2320    traMADol (ULTRAM) tablet 25 mg  25 mg Oral Q4H PRN Shelton Fothergill, DO   25 mg at 03/24/21 1603    aspirin chewable tablet 324 mg  324 mg Oral Once [employer], DO           Review of systems:   Heart: as above   Lungs: as above   Eyes: denies changes in vision or discharge. Ears: denies changes in hearing or pain. Nose: denies epistaxis or masses   Throat: denies sore throat or trouble swallowing. Neuro: denies numbness, tingling, tremors. Skin: denies rashes or itching. : denies hematuria, dysuria   GI: denies vomiting, diarrhea   Psych: denies mood changed, anxiety, depression. Physical Exam   BP (!) 117/55   Pulse 61   Temp 97.9 °F (36.6 °C) (Temporal)   Resp 16   Ht 5' 4\" (1.626 m)   Wt 159 lb 3.2 oz (72.2 kg)   SpO2 93%   BMI 27.33 kg/m²   Constitutional: Oriented to person, place, and time. No distress. Well developed. Head: Normocephalic and atraumatic. Neck: Neck supple. No hepatojugular reflux. No JVD present. Carotid bruit is not present. No tracheal deviation present. No thyromegaly present. Cardiovascular: Normal rate, regular rhythm, normal heart sounds. intact distal pulses. No gallop and no friction rub. No murmur heard. Pulmonary: Breath sounds normal. No respiratory distress. No wheezes. No rales. Chest: Effort normal. No tenderness. Abdominal: Soft. Bowel sounds are normal. No distension or mass. No tenderness, rebound or guarding. Musculoskeletal: . No tenderness. No clubbing or cyanosis. Extremitites: Intact distal pulses. No edema  Neurological: Alert and oriented to person, place, and time. Skin: Skin is warm and dry. No rash noted. Not diaphoretic. No erythema. Psychiatric: Normal mood and affect. Behavior is normal.   Lymphadenopathy: No cervical adenopathy. No groin adenopathy.       CBC:   Lab Results   Component Value Date    WBC 5.2 03/24/2021    RBC 3.62 03/24/2021    RBC 3.68 12/19/2017    HGB 10.2 03/24/2021    HCT 33.5 03/24/2021    MCV 92.5 03/24/2021    MCH 28.2 03/24/2021    MCHC 30.4 03/24/2021    RDW 14.8 03/24/2021     03/24/2021    MPV 10.4 03/24/2021     BMP:   Lab Results   Component Value Date     03/26/2021    K 4.4 03/26/2021    K 4.2 06/20/2020     03/26/2021    CO2 33 03/26/2021    BUN 26 03/26/2021    LABALBU 3.4 03/03/2021    LABALBU 4.0 03/21/2012    CREATININE 1.4 03/26/2021    CALCIUM 8.1 03/26/2021    GFRAA 43 03/26/2021    LABGLOM 43 03/26/2021     Magnesium:    Lab Results   Component Value Date    MG 2.0 03/24/2021     Cardiac Enzymes:   Lab Results   Component Value Date    CKTOTAL 184 (H) 10/20/2018    CKTOTAL 194 (H) 09/12/2017    CKTOTAL 205 (H) 09/12/2017    CKMB 2.9 10/20/2018    CKMB 2.4 09/12/2017    CKMB 2.6 09/12/2017    TROPONINI 0.07 (H) 03/24/2021    TROPONINI 0.07 (H) 03/24/2021    TROPONINI 0.07 (H) 03/23/2021      PT/INR:    Lab Results   Component Value Date    PROTIME 12.4 03/24/2021    INR 1.1 03/24/2021     TSH:    Lab Results   Component Value Date    TSH 5.390 03/04/2021     Rhythm Strip: normal sinus rhythm.     ASSESSMENT & PLAN:    Patient Active Problem List   Diagnosis    HTN (hypertension), benign    Hypertensive heart disease    Hypothyroidism    DM (diabetes mellitus), type 2 ([facility] [address]    Acute respiratory failure with hypoxia ([facility] [address]    Uncontrolled type 2 diabetes mellitus with hyperglycemia (HCC)    Chronic diastolic CHF (congestive heart failure) (HCC)    Chest pain    LVH (left ventricular hypertrophy)    Non-rheumatic mitral regurgitation    CKD (chronic kidney disease) stage 3, GFR 30-59 ml/min ([facility]    SSS (sick sinus syndrome) ([facility])    Dizziness    Unsteadiness    Leukopenia    Primary osteoarthritis involving multiple joints    Cervical radiculopathy    Bilateral shoulder bursitis    Aspirin intolerance    Primary osteoarthritis of one knee, right    Primary osteoarthritis of right knee    Acute blood loss anemia    HF (heart failure) (HCC)    Chronic pain syndrome    Cervicalgia    Myalgia    Intractable headache    Acute on chronic diastolic congestive heart failure (HCC)    Metabolic acidosis    Anemia    Edema    Neck pain    Congestive heart failure of unknown etiology (HCC)    AMANDEEP (acute kidney injury) ([facility] [address]    Elevated troponin    Obesity (BMI 30.0-34.9)     1. Acute on chronic diastolic CHF:    Diurese. IV bumex/BB/aldactone. 2. VHD: mild AS/MR/TR and mild PH. LVEF 75%    3. HTN: Observe. 4. DM: Per primary service. 5. Hypothyroidism: On HRT. 6. CKD: Follow labs. 7. Dementia    Ankur Harper D.O.   Cardiologist  Cardiology, [facility]

## 2021-03-27 NOTE — DISCHARGE SUMMARY
Hospital Medicine Discharge Summary    Patient: Ryan Bentley     Gender: female  : 1935   Age: 81 y.o. MRN: [id number]    Code Status: full code    Primary Care Provider: Alfredo Gaitan MD    Admit Date: 3/23/2021   Discharge Date: 3/26/2021      Admitting Physician: Alexis Santoyo MD  Discharge Physician: Shelton Fothergill, DO     Discharge Diagnoses: Active Hospital Problems    Diagnosis Date Noted    AMANDEEP (acute kidney injury) ([facility] [address] [N17.9] 2021    Elevated troponin [R77.8] 2021    Obesity (BMI 30.0-34. 9) [E66.9] 2021    Acute on chronic diastolic congestive heart failure (HCC) [I50.33] 2021    Intractable headache [R51.9] 2020    Chronic pain syndrome [G89.4] 2020    CKD (chronic kidney disease) stage 3, GFR 30-59 ml/min ([facility]) [N18.30] 10/20/2017    Non-rheumatic mitral regurgitation [I34.0] 10/20/2017    Chest pain [R07.9] 2017    Hypertensive heart disease [I11.9] 2017    Hypothyroidism [E03.9] 2017    Acute respiratory failure with hypoxia ([facility] [address] [J96.01] 2017    DM (diabetes mellitus), type 2 ([facility] [address] [E11.9] 2012   Dementia      Hospital Course:   **Sob was the presentation, found to be in CHF, started on diuretics, TT also elevated, seen by cardiology  Added aldactone to the bumex **diuresing well*,  negative 2,740 . Spoke with son, planning for discharge. *    Disposition:  Home with [facility]     Exam:     /65   Pulse 72   Temp 98.5 °F (36.9 °C) (Temporal)   Resp 16   Ht 5' 4\" (1.626 m)   Wt 159 lb 3.2 oz (72.2 kg)   SpO2 95%   BMI 27.33 kg/m²   Gen: *well developed  HEENT: NC/AT, moist mucous membranes,   Neck: supple, trachea midline, no anterior cervical or SC LAD  Heart:  Normal s1/s2, RRR, no murmurs, gallops, or rubs.    Lungs:  ** bilateral rales  Abd: bowel sounds present, soft, nontender, nondistended,   Extrem:  No clubbing, cyanosis,  *pos* edema  Skin: no rashes or lesions  Psych: A & O x3  Neuro: grossly intact, moves all four extremities.    Capillary Refill: Brisk,< 3 seconds   Peripheral Pulses: +2 palpable, equal bilaterally           Consults:     IP CONSULT TO HEART FAILURE NURSE/COORDINATOR  IP CONSULT TO DIETITIAN  IP CONSULT TO CARDIOLOGY  IP CONSULT TO CARDIOLOGY  IP CONSULT TO HOME CARE NEEDS    Labs: For convenience and continuity at follow-up the following most recent labs are provided:    Lab Results   Component Value Date    WBC 5.2 03/24/2021    HGB 10.2 03/24/2021    HCT 33.5 03/24/2021    MCV 92.5 03/24/2021     03/24/2021     03/26/2021    K 4.4 03/26/2021    K 4.2 06/20/2020     03/26/2021    CO2 33 03/26/2021    BUN 26 03/26/2021    CREATININE 1.4 03/26/2021    CALCIUM 8.1 03/26/2021    PHOS 3.5 05/26/2017     02/21/2013    ALKPHOS 80 03/03/2021    ALT 13 03/03/2021    AST 22 03/03/2021    BILITOT 0.6 03/03/2021    BILIDIR <0.2 09/12/2017    LABALBU 3.4 03/03/2021    LABALBU 4.0 03/21/2012    LDLCALC 52 03/04/2021    LDLCHOLESTEROL 73 12/19/2017    TRIG 83 03/04/2021     Lab Results   Component Value Date    INR 1.1 03/24/2021    INR 1.1 03/04/2021    INR 1.0 06/21/2020       Radiology:  CT CHEST WO CONTRAST   Final Result   Limited study. Findings raise the possibility of congestive heart failure   with significant cardiomegaly, moderate-sized bilateral pleural effusions and   significant bibasilar infiltrates. The infiltrates could reflect pneumonia   or pulmonary edema. There is significant coronary arterial vascular plaque. Small mediastinal lymph nodes are identified. Some are calcified some are   not. Evaluation of the kaden is limited on this noncontrast study. XR CHEST (2 VW)   Final Result   CHF versus bilateral pneumonia.              Discharge Medications:   Discharge Medication List as of 3/26/2021  2:19 PM      START taking these medications    Details   spironolactone (ALDACTONE) 25 MG tablet Take 0.5 tablets by mouth (NORVASC) 10 MG tablet Comments:   Reason for Stopping: Follow-up appointments:  1 week    Provider Follow-up:    pmd    Condition at Discharge:  Stable    The patient was seen and examined on day of discharge and this discharge summary is in conjunction with any daily progress note from day of discharge. Time Spent on discharge is 1 hour  in the examination, evaluation, counseling and review of medications and discharge plan. Signed:    JENNY Duque   3/27/2021      Thank you Vikram Fleming MD for the opportunity to be involved in this patient's care.  If you have any questions or concerns please feel free to contact me at [phone]

## 2021-03-29 NOTE — ED PROVIDER NOTES
HPI:  3/29/21, Time: 6:40 PM EDT      HIP per patient and son     Caden Jc is a 81 y.o. female presenting to the ED for shortness of breath and palpitations, beginning today. She is on home oxygen (has been for 3 days for CHF since discharge from the hospital at [facility]). States the home O2 isn't helping. The complaint has been persistent, moderate in severity, and worsened by nothing. Patient denies fever/chills, sore throat, cough, congestion, chest pain, edema, headache, visual disturbances, focal paresthesias, focal weakness, abdominal pain, nausea, vomiting, diarrhea, constipation, dysuria, hematuria, trauma, neck or back pain, rash or other complaints. ROS:   A complete review of systems was performed and all pertinent positives and negatives are stated within HPI, all other systems reviewed and are negative.      --------------------------------------------- PAST HISTORY ---------------------------------------------  Past Medical History:  has a past medical history of (HFpEF) heart failure with preserved ejection fraction ([address] Arthritis, Bursitis, Cervical radiculopathy, CHF (congestive heart failure) ([address] CKD (chronic kidney disease) stage 3, GFR 30-59 ml/min, Diabetes mellitus ([address] GERD (gastroesophageal reflux disease), [place] (hard of hearing), Hypertension, Hypothyroidism, SSS (sick sinus syndrome) ([address] Thyroid disease, Unsteadiness, and Valvular heart disease. Past Surgical History:  has a past surgical history that includes Foot surgery; Total knee arthroplasty (Right, 3/21/2019); and joint replacement (1999). Social History:  reports that she has never smoked. She has never used smokeless tobacco. She reports previous alcohol use. She reports that she does not use drugs. Family History: family history includes No Known Problems in her father and mother. The patients home medications have been reviewed.     Allergies: Aspirin        ----------------------------------------PHYSICAL EXAM--------------------------------------  Constitutional:  Well developed, well nourished, no acute distress, non-toxic appearance   Eyes:  PERRL, conjunctiva normal, EOMI  HENT:  Atraumatic, external ears normal, nose normal, oropharynx moist, no pharyngeal exudates. Neck- normal range of motion, no nuchal rigidity   Respiratory: Moderate  respiratory distress, diffusely diminished breath sounds, no rales, no wheezing   Cardiovascular:  Tachycardic rate, iiregularly irregular rhythm, no murmurs, no gallops, no rubs. Radial and DP pulses 2+ bilaterally. GI:  Soft, nondistended, normal bowel sounds, nontender, no organomegaly, no mass, no rebound, no guarding   :  No costovertebral angle tenderness   Musculoskeletal:  No edema, no tenderness, no deformities. Integument:  Well hydrated, no visible rash. Adequate perfusion. Lymphatic:  No cervical lymphadenopathy noted   Neurologic:  Alert & oriented x 3, CN 2-12 normal, no focal deficits noted. Normal gait. Psychiatric:  Speech and behavior appropriate       -------------------------------------------------- RESULTS -------------------------------------------------  I have personally reviewed all laboratory and imaging results for this patient. Results are listed below.      LABS:  Results for orders placed or performed during the hospital encounter of 03/29/21   CBC auto differential   Result Value Ref Range    WBC 8.2 4.5 - 11.5 E9/L    RBC 3.74 3.50 - 5.50 E12/L    Hemoglobin 10.4 (L) 11.5 - 15.5 g/dL    Hematocrit 34.8 34.0 - 48.0 %    MCV 93.0 80.0 - 99.9 fL    MCH 27.8 26.0 - 35.0 pg    MCHC 29.9 (L) 32.0 - 34.5 %    RDW 14.8 11.5 - 15.0 fL    Platelets 952 241 - 259 E9/L    MPV 10.4 7.0 - 12.0 fL    Neutrophils % 91.2 (H) 43.0 - 80.0 %    Lymphocytes % 0.9 (L) 20.0 - 42.0 %    Monocytes % 7.9 2.0 - 12.0 %    Eosinophils % 0.0 0.0 - 6.0 %    Basophils % 0.0 0.0 - 2.0 %    Neutrophils Absolute 7.46 (H) 1.80 - 7.30 E9/L    Lymphocytes Absolute 0.08 (L) 1.50 - 4.00 E9/L    Monocytes Absolute 0.66 0.10 - 0.95 E9/L    Eosinophils Absolute 0.00 (L) 0.05 - 0.50 E9/L    Basophils Absolute 0.00 0.00 - 0.20 E9/L    nRBC 0.0 /100 WBC    Anisocytosis 1+     Polychromasia 1+     Poikilocytes 1+     [place] Cells 1+     Ovalocytes 1+    Comprehensive Metabolic Panel   Result Value Ref Range    Sodium 140 132 - 146 mmol/L    Potassium 5.2 (H) 3.5 - 5.0 mmol/L    Chloride 103 98 - 107 mmol/L    CO2 22 22 - 29 mmol/L    Anion Gap 15 7 - 16 mmol/L    Glucose 279 (H) 74 - 99 mg/dL    BUN 26 (H) 8 - 23 mg/dL    CREATININE 1.3 (H) 0.5 - 1.0 mg/dL    GFR Non-African American 47 >=60 mL/min/1.73    GFR African American 47     Calcium 8.9 8.6 - 10.2 mg/dL    Total Protein 7.3 6.4 - 8.3 g/dL    Albumin 3.3 (L) 3.5 - 5.2 g/dL    Total Bilirubin 0.8 0.0 - 1.2 mg/dL    Alkaline Phosphatase 74 35 - 104 U/L    ALT 22 0 - 32 U/L    AST 43 (H) 0 - 31 U/L   Brain Natriuretic Peptide   Result Value Ref Range    Pro-BNP 3,514 (H) 0 - 450 pg/mL   Troponin   Result Value Ref Range    Troponin 0.09 (H) 0.00 - 0.03 ng/mL   Lactic Acid, Plasma   Result Value Ref Range    Lactic Acid 6.2 (HH) 0.5 - 2.2 mmol/L   APTT   Result Value Ref Range    aPTT 23.6 (L) 24.5 - 35.1 sec   Protime-INR   Result Value Ref Range    Protime 12.0 9.3 - 12.4 sec    INR 1.1    TSH without Reflex   Result Value Ref Range    TSH 2.610 0.270 - 4.200 uIU/mL   Blood Gas, Arterial   Result Value Ref Range    Date Analyzed [id number]     Time Analyzed 1840     Source: Blood Arterial     pH, Blood Gas 7.397 7.350 - 7.450    PCO2 35.6 35.0 - 45.0 mmHg    PO2 61.5 (L) 75.0 - 100.0 mmHg    HCO3 21.4 (L) 22.0 - 26.0 mmol/L    B.E. -2.9 -3.0 - 3.0 mmol/L    O2 Sat 90.1 (L) 92.0 - 98.5 %    O2Hb 88.7 (L) 94.0 - 97.0 %    COHb 1.2 0.0 - 1.5 %    MetHb 0.4 0.0 - 1.5 %    O2 Content 13.8 mL/dL    HHb 9.7 (H) 0.0 - 5.0 %    tHb (est) 11.0 (L) 11.5 - 16.5 g/dL    Mode NRB 15L course included: multiple bedside re-evaluations, IV medications, cardiac monitoring, continuous pulse oximetry and a personal history and physicial eaxmination    This patient has remained hemodynamically stable, improved and remained unchanged during their ED course. Re-Evaluations:  Time: 9:24 PM EDT   Re-evaluation. Patients symptoms are improving  Repeat physical examination is improved    Time: 12:21 AM EDT  Re-evaluation. Patients symptoms are improving  Repeat physical examination is improved      Consultations:   12:19 AM EDT  Spoke with Dr Christiana Devi, discussed case, accepts admission    Critical Care: Please note that the withdrawal or failure to initiate urgent interventions for this patient would likely result in a life threatening deterioration or permanent disability. Accordingly this patient received 30 minutes of critical care time, excluding separately billable procedures. Tiago Mendoza DO, aneesh the Primary Provider of Record    Counseling: The emergency provider has spoken with the patient and discussed todays results, in addition to providing specific details for the plan of care and counseling regarding the diagnosis and prognosis. Questions are answered at this time and they are agreeable with the plan.    --------------------------- IMPRESSION AND DISPOSITION ---------------------------------    IMPRESSION  1. Acute respiratory failure with hypoxia ([facility]    2.  Congestive heart failure, unspecified HF chronicity, unspecified heart failure type ([facility]        DISPOSITION  Disposition: Admit to telemetry  Patient condition is stable             Joyce Potter DO  04/04/21 0018

## 2021-03-29 NOTE — PROGRESS NOTES
Date: 3/29/2021    Time: 6:47 PM    Patient Placed On BIPAP/CPAP/ Non-Invasive Ventilation? Yes    If no must comment. Facial area red/color change? No           If YES are Blister/Lesion present? No   If yes must notify nursing staff  BIPAP/CPAP skin barrier?   Yes    Skin barrier type:mepilexlite       Comments:        [place]mitchdomenica Foy     03/29/21 5134   NIV Type   $NIV $Daily Charge   Skin Protection for O2 Device Yes   Orientation Middle   Location Nose   Equipment Type v60   Mode Bilevel   Mask Type Full face mask   Mask Size Medium   Settings/Measurements   IPAP 12 cmH20   CPAP/EPAP 6 cmH2O   Rate Ordered 12   Resp 28   Insp Rise Time (%) 3 %   FiO2  100 %   I Time/ I Time % 0.9 s   Vt Exhaled 430 mL   Minute Volume 15 Liters   Mask Leak (lpm) 24 lpm   Comfort Level Good   Using Accessory Muscles No   SpO2 98

## 2021-03-30 NOTE — CARE COORDINATION
3/30/2021 Social Work Discharge Planning: This worker met with Pt to discuss  role and transition of care/discharge planning. Pt resides alone and has a son Willy Nascimento who is very supportive and lives locally. Pt said her son is trying to find her a different place to live, but until then she plans on returning home with [address] ALICE order will be needed. Pt has a ww,cane, BSC, shower chair and uses 3l o2 via [employer] (confirmed). Pt is currently on continuous bipap here. Also on IV ATB. Rona Gonzales  Electronically signed by JANELLE Capelaln on 3/30/2021 at 12:23 PM

## 2021-03-30 NOTE — PLAN OF CARE
General Appearance: alert and oriented to person, place and time and in no acute distress  Skin: warm and dry  Head: normocephalic and atraumatic  Eyes: pupils equal, round, and reactive to light, extraocular eye movements intact, conjunctivae normal  Neck: neck supple and non tender without mass   Pulmonary/Chest: Diminished/coarse to auscultation bilaterally - no wheezes,or rhonchi, normal air movement, no respiratory distress  Cardiovascular: normal rate, normal S1 and S2 and no rubs, gallops, murmur noted. Abdomen: soft, non-tender, non-distended, normal bowel sounds, no masses or organomegaly  Extremities: no cyanosis, no clubbing and trace edema  Neurologic: no cranial nerve deficit and speech normal      Assessment:  Hypoxia  HCAP  Elevated troponin  Acute on chronic diastolic HF  CKD stage III  Hypothyroidism  HTN    Plan:  1. Hypoxia-2/2 HF/pneumonia? CXR cardiomegaly with progressive perihilar and bibasilar infiltrates and pleural effusions likely CHF/edema and/or pneumonia. 3/30/21 CTA of chest with no evidence of pulmonary emboli. Cardiomegaly with prominent coronary arthrosclerotic calcifications and small pericardial effusion. Bilateral pleural effusions, mildly increased in the interval since the prior exam.  Multifocal patchy and groundglass airspace disease in the lungs bilaterally most consistent with multifocal pneumonia. Pulmonary edema cannot be excluded. Received furosemide 40 mg IV x1 in ED course. Currently on 3L NC tolerating well. Continue to wean as tolerated maintain SPO2 >92%. BP at at bedtime. Continue diuretics. Continue Abx. Consider Pulmonology consult with worsening hypoxia    2. ?HCAP-CTA as above. Procalcitonin 1.29. Received cefepime/vancomycin in ED course. Continue on cefepime. Sputum culture. MRSA nares. Urine antigens pending. 3.  Elevated troponin-troponin 0.09> 0.14>0. 13. Consistent with type II non-ST elevation MI per cardiology.    CK- MB 6.0 Cardiology input appreciated. 4.  Acute on chronic diastolic HF- ? Compliance. 6/20/2020 ECHO mild concentric LVH. EF 75%. Doppler evidence of stage II diastolic dysfunction. Mild mitral regurg. Mild aortic stenosis. Mild tricuspid regurg. Mild pulmonary hypertension. Repeat ECHO. Received 40 mg IV Lasix in ED course. Continues on Bumex daily. Strict I's/O. Weights daily. Cardiology input appreciated. 5.  CKD stage III -BUN/Crt 25/1.3. Appears to be at baseline per chart review. Need to follow closely. Avoid nephrotoxic agents. 6.  Hypothyroidism-continue levothyroxine. 7.  DM-A1c 6.1 continue repaglinide. SSI/hypoglycemic protocol/carb controlled diet. Continue to follow closely adjust as needed    8. HTN- Currently well controlled. Continue spironolactone/Toprol-XL. Continue to follow closely adjust as needed    9. Elevated lactic acid-2/2 HF/CKD/infection? Resolved upon presentation lactic acid 6.2. Repeat lactic 1.0.

## 2021-03-30 NOTE — CONSULTS
Taran Baxter M.D.,[facility]  Lindsey Matson D.O., F.A.C.O.I., Betito Salas M.D. Minnie Maria M.D., Flora Rowe M.D. Agustin Beavers D.O. Patient:  Herminia Riedel 81 y.o. female MRN: [id number]     Date of Service: 3/30/2021      PULMONARY CONSULTATION    Reason for Consultation: Abnormal CT hypoxia   referring Physician: Walter Luna    Communication with the referring physician will be sent via the electronic medical record. Chief Complaint:     CODE STATUS:    SUBJECTIVE:  HPI:  Herminia Riedel is a very pleasant 81 y. o. with a past medical history significant for heart failure with preserved ejection fraction, chronic kidney disease, was recently discharged from [facility] campus. She was admitted there from March 23-26 for appears to be CHF exacerbation. Patient did have a CT of her chest on March 23 time which did showed bibasilar infiltrates and pleural effusions. She presented to the ER at [facility] with progressive shortness of breath since her discharge and weakness and palpitations. She was found to be hypoxic with saturations apparently 43% at presentation. She was initially started on a nonrebreather mask and then on a BiPAP. She had a CT of her chest was negative for PE but showed the same persistent bibasilar infiltrates. Her proBNP was found to be elevated at 3514. She was started on antibiotics cefepime and vancomycin and started on diuretics and we have been asked to see her for 3 evaluation of her hypoxia and abnormal CT chest.    Upon my exam I was able to take patient off her BiPAP and maintain her on 6 L high flow nasal cannula she is maintaining her saturations between 94 to 96%.     Past Medical History:   Diagnosis Date    (HFpEF) heart failure with preserved ejection fraction ([facility] 05/26/2017    3/31/17- echo- LVEF 60-65%, mild LVH, mild MR    Arthritis     Bursitis     shoulders    Cervical radiculopathy     [facility]congestive [facility] failure) [facility]     CKD (chronic kidney disease) stage 3, GFR 30-59 ml/min     Diabetes mellitus ([facility])     GERD (gastroesophageal reflux disease)     [place] (hard of hearing)     Hypertension     Hypothyroidism     SSS (sick sinus syndrome) ([facility])     stable, per epic note.     Thyroid disease     Unsteadiness     Valvular heart disease     sees Dr. Cyril Devi        Past Surgical History:   Procedure Laterality Date    FOOT SURGERY      both    JOINT REPLACEMENT  1999    TOTAL KNEE ARTHROPLASTY Right 3/21/2019    RIGHT KNEE TOTAL ARTHROPLASTY (ILENE)++ADDUCTOR CANAL BLOCK++ performed by John Loera MD at [facility] OR       Family History   Problem Relation Age of Onset    No Known Problems Mother     No Known Problems Father        Social History:   Social History     Socioeconomic History    Marital status:      Spouse name: Not on file    Number of children: Not on file    Years of education: Not on file    Highest education level: Not on file   Occupational History    Not on file   Social Needs    Financial resource strain: Not on file    Food insecurity     Worry: Not on file     Inability: Not on file    Transportation needs     Medical: Not on file     Non-medical: Not on file   Tobacco Use    Smoking status: Never Smoker    Smokeless tobacco: Never Used   Substance and Sexual Activity    Alcohol use: Not Currently    Drug use: Never    Sexual activity: Not on file   Lifestyle    Physical activity     Days per week: Not on file     Minutes per session: Not on file    Stress: Not on file   Relationships    Social connections     Talks on phone: Not on file     Gets together: Not on file     Attends Evangelical service: Not on file     Active member of club or organization: Not on file     Attends meetings of clubs or organizations: Not on file     Relationship status: Not on file    Intimate partner violence     Fear of current or ex partner: Not on file     Emotionally abused: Not on file     Physically abused: Not on file     Forced sexual activity: Not on file   Other Topics Concern    Not on file   Social History Narrative    ** Merged History Encounter **        Drinks occ pop; no other caffeine. Smoking history: The patient is a Never smoker . ETOH:   reports previous alcohol use. Exposures: There  is not history of TB or TB exposure. There is not asbestos or silica dust exposure. The patient reports does not have coal, foundry, quarry or [employer] exposure. Recent travel history none. There is not  history of recreational or IV drug use. There is not hot tub exposure. The patient does not have any exotic pets, turtles or exotic birds. Vaccines: There is no immunization history on file for this patient.      Home Meds: Medications Prior to Admission: spironolactone (ALDACTONE) 25 MG tablet, Take 0.5 tablets by mouth daily  metoprolol succinate (TOPROL XL) 25 MG extended release tablet, Take 1 tablet by mouth daily  levothyroxine (SYNTHROID) 50 MCG tablet, Take 50 mcg by mouth Daily  cloNIDine (CATAPRES) 0.2 MG tablet, Take 0.2 mg by mouth 2 times daily   bumetanide (BUMEX) 1 MG tablet, Take 1 tablet by mouth daily  nateglinide (STARLIX) 60 MG tablet, Take 1 tablet by mouth 3 times daily (before meals) Take with meals only, if you do not EAT, DO NOT take a pill, and DO NOT take more than 3 a day  aspirin EC 81 MG EC tablet, Take 1 tablet by mouth daily  [DISCONTINUED] cefUROXime (CEFTIN) 250 MG tablet, Take 1 tablet by mouth 2 times daily for 4 days  [DISCONTINUED] magnesium oxide (MAG-OX) 400 MG tablet, Take 400 mg by mouth 2 times daily  acetaminophen-codeine (TYLENOL #3) 300-30 MG per tablet, TAKE 1 TABLET BY MOUTH EVERY 4 TO 6 HOURS AS NEEDED FOR PAIN  acetaminophen (TYLENOL) 500 MG tablet, Take 500 mg by mouth every 6 hours as needed for Pain    CURRENT MEDS :  Scheduled Meds:   aspirin EC  81 mg Oral Daily    levothyroxine  50 mcg Oral Daily    spironolactone  12.5 mg Oral Daily    repaglinide  1 mg Oral TID AC    insulin lispro  0-6 Units Subcutaneous TID WC    insulin lispro  0-3 Units Subcutaneous Nightly    sodium chloride flush  10 mL Intravenous 2 times per day    enoxaparin  40 mg Subcutaneous Daily    bumetanide  1 mg Intravenous Daily    cefepime  2,000 mg Intravenous Q12H    metoprolol succinate  25 mg Oral BID    [START ON 3/31/2021] vancomycin  750 mg Intravenous Q24H       Continuous Infusions:   dextrose      sodium chloride      sodium chloride Stopped (03/30/21 0930)       Allergies   Allergen Reactions    Aspirin Other (See Comments)     \"tears up my stomach\"       REVIEW OF SYSTEMS:  Constitutional: Denies fever, weight loss, night sweats, and fatigue  Skin: Denies pigmentation, dark lesions, and rashes   HEENT: Denies hearing loss, tinnitus, ear drainage, epistaxis, sore throat, and hoarseness. Cardiovascular: Denies palpitations, chest pain, and chest pressure. Respiratory: Positive for nonproductive cough, positive with dyspnea at rest and exertion.   Gastrointestinal: Denies nausea, vomiting, poor appetite, diarrhea, heartburn or reflux  Genitourinary: Denies dysuria, frequency, urgency or hematuria  Musculoskeletal: Denies myalgias, muscle weakness, and bone pain  Neurological: Denies dizziness, vertigo, headache, and focal weakness  Psychological: Denies anxiety and depression  Endocrine: Denies heat intolerance and cold intolerance  Hematopoietic/Lymphatic: Denies bleeding problems and blood transfusions    OBJECTIVE:   /63   Pulse 63   Temp 98.9 °F (37.2 °C) (Axillary)   Resp 23   Ht 5' 4\" (1.626 m)   Wt 171 lb 4.8 oz (77.7 kg)   SpO2 92%   BMI 29.40 kg/m²   SpO2 Readings from Last 1 Encounters:   03/30/21 92%        I/O:    Intake/Output Summary (Last 24 hours) at 3/30/2021 1451  Last data filed at 3/30/2021 1130  Gross per 24 hour   Intake --   Output 550 ml   Net -550 ml     Vent Information  Skin Assessment: Clean, dry, & intact  FiO2 : 50 %  SpO2: 92 %  I Time/ I Time %: 0.9 s  Mask Type: Full face mask  Mask Size: Medium       IPAP: 15 cmH20  CPAP/EPAP: 6 cmH2O     Physical Exam:  General: The patient is lying in bed comfortably without any distress. Breathing is not labored  HEENT: Pupils are equal round and reactive to light, there are no oral lesions and no post-nasal drip   Neck: supple without adenopathy  Cardiovascular: regular rate and rhythm without murmur or gallop  Respiratory: Patient has diminished breathing sounds at bases  Abdomen: soft, non-tender, non-distended, normal bowel sounds  Extremities: warm, no edema, no clubbing  Skin: no rash or lesion  Neurologic: CN II-XII grossly intact, no focal deficits    Pulmonary Function Testing personally reviewed and interpreted      Imaging personally reviewed:    Impression   No evidence of pulmonary emboli.       Cardiomegaly with prominent coronary atherosclerotic calcifications and small   pericardial effusion.       Bilateral pleural effusions, mildly increased in the interval since the prior   examination.       Multifocal patchy and ground-glass airspace disease in the lungs bilaterally,   and more confluent airspace disease in the lower lobes bilaterally, most   consistent with multifocal pneumonia.  Pulmonary edema cannot be excluded.       Reflux of contrast into the IVC and hepatic veins which may be seen with   right heart failure.        Echo:      Labs:  Lab Results   Component Value Date    WBC 8.2 03/29/2021    HGB 10.4 03/29/2021    HCT 34.8 03/29/2021    MCV 93.0 03/29/2021    MCH 27.8 03/29/2021    MCHC 29.9 03/29/2021    RDW 14.8 03/29/2021     03/29/2021    MPV 10.4 03/29/2021     Lab Results   Component Value Date     03/30/2021    K 4.7 03/30/2021    K 4.2 06/20/2020     03/30/2021    CO2 26 03/30/2021    BUN 25 03/30/2021    CREATININE 1.3 03/30/2021    LABALBU 3.3 03/29/2021    LABALBU 4.0 03/21/2012    CALCIUM 8.4 03/30/2021    GFRAA 47 03/30/2021    LABGLOM 47 03/30/2021     Lab Results   Component Value Date    PROTIME 12.0 03/29/2021    INR 1.1 03/29/2021     Recent Labs     03/29/21  1859   PROBNP 3,514*     Recent Labs     03/29/21  1859 03/30/21  0440 03/30/21  1000   TROPONINI 0.09* 0.14* 0.13*     Recent Labs     03/30/21  0440   PROCAL 1.29*     This SmartLink has not been configured with any valid records. Micro:  No results for input(s): CULTRESP in the last 72 hours. No results for input(s): LABGRAM in the last 72 hours. No results for input(s): LEGUR in the last 72 hours. No results for input(s): STREPNEUMAGU in the last 72 hours. No results for input(s): LP1UAG in the last 72 hours. Assessment:  1. Acute hypoxic respiratory failure most likely combination of acute on chronic CHF and also possibility of healthcare associated pneumonia versus aspiration pneumonia  2. History of heart failure with preserved ejection fraction  3. Dysphagia    Plan:  1. Wean FiO2 for saturations above 94%  2. We will go ahead and check a swallow evaluation  3. Continue cefepime for now  4. Continue diuresis monitor I's and O's closely  5. We will check a respiratory viral panel and Legionella and strep antigen      Thank you for allowing me to participate in the care of Bollinger Fudge. Please feel free to call with questions. Electronically signed by Valentin Nelson MD on 3/30/2021 at 2:51 PM      Note: This report was completed utilizing computer voice recognition software.  Every effort has been made to ensure accuracy, however; inadvertent computerized transcription errors may be present

## 2021-03-30 NOTE — H&P
[facility] History and Physical      CHIEF COMPLAINT:  Shortness of breath    History of Present Illness: 81-year-old female with a history of diastolic heart failure, hypothyroidism, diabetes mellitus type 2, hypothyroidism, essential hypertension. Admitted twice this month [place] for CHF exacerbation discharge most recently 4 days ago. Reportedly had discharge she still had some dyspnea on exertion. This has progressed since then. Has been adherent to her diuretics and low-salt diet per patient. Last night when she walks she was too weak to do so and felt palpitations therefore presented to the ED for further evaluation. Found to be hypoxic as low as in the 40s, placed on nonrebreather subsequently on BiPAP. Feels some improvement since then. She does have a cough that she states is dry. No significant change in lower extremity edema. She reports intermittent left-sided chest pain the last for few seconds, approximately 3-4 times a day. Has been ongoing for a while. Informant(s) for H&P: Patient    REVIEW OF SYSTEMS:  A comprehensive 14 point review of systems was negative except for: what is in the HPI    PMH:  Past Medical History:   Diagnosis Date    (HFpEF) heart failure with preserved ejection fraction ([facility] [address] 05/26/2017    3/31/17- echo- LVEF 60-65%, mild LVH, mild MR    Arthritis     Bursitis     shoulders    Cervical radiculopathy     CHF [facility]congestive [facility] failure) ([facility])     CKD (chronic kidney disease) stage 3, GFR 30-59 ml/min     Diabetes mellitus (HCC)     GERD (gastroesophageal reflux disease)     [place] (hard of hearing)     Hypertension     Hypothyroidism     SSS (sick sinus syndrome) ([facility])     stable, per epic note.     Thyroid disease     Unsteadiness     Valvular heart disease     sees Dr. Delano Banda        Surgical History:  Past Surgical History:   Procedure Laterality Date    FOOT SURGERY      both    JOINT REPLACEMENT  1999    TOTAL KNEE ARTHROPLASTY Right 3/21/2019    RIGHT KNEE TOTAL ARTHROPLASTY (ILENE)++ADDUCTOR CANAL BLOCK++ performed by Hugo Eaton MD at [facility] OR       Medications Prior to Admission:    Prior to Admission medications    Medication Sig Start Date End Date Taking? Authorizing Provider   spironolactone (ALDACTONE) 25 MG tablet Take 0.5 tablets by mouth daily 3/27/21   Fredy Manuel,    bumetanide (BUMEX) 1 MG tablet Take 1 tablet by mouth daily 3/26/21   Fredy Manuel, DO   cefUROXime (CEFTIN) 250 MG tablet Take 1 tablet by mouth 2 times daily for 4 days 3/26/21 3/30/21  Fredy Manuel DO   nateglinide (STARLIX) 60 MG tablet Take 1 tablet by mouth 3 times daily (before meals) Take with meals only, if you do not EAT, DO NOT take a pill, and DO NOT take more than 3 a day 3/26/21 3/26/22  Fredy Manuel DO   aspirin EC 81 MG EC tablet Take 1 tablet by mouth daily 3/26/21   Fredy Manuel, DO   magnesium oxide (MAG-OX) 400 MG tablet Take 400 mg by mouth 2 times daily    Historical Provider, MD   acetaminophen-codeine (TYLENOL #3) 300-30 MG per tablet TAKE 1 TABLET BY MOUTH EVERY 4 TO 6 HOURS AS NEEDED FOR PAIN 8/5/20   Historical Provider, MD   metoprolol succinate (TOPROL XL) 25 MG extended release tablet Take 1 tablet by mouth daily 6/22/20   Brennan Castillo MD   levothyroxine (SYNTHROID) 50 MCG tablet Take 50 mcg by mouth Daily    Historical Provider, MD   cloNIDine (CATAPRES) 0.2 MG tablet Take 0.2 mg by mouth 2 times daily     Historical Provider, MD   acetaminophen (TYLENOL) 500 MG tablet Take 500 mg by mouth every 6 hours as needed for Pain    Historical Provider, MD       Allergies:    Aspirin    Social History:    reports that she has never smoked. She has never used smokeless tobacco. She reports previous alcohol use. She reports that she does not use drugs. Family History:   family history includes No Known Problems in her father and mother.        PHYSICAL EXAM:  Vitals:  BP (!) 142/70   Pulse 76   Temp 98.3 °F (36.8 °C)   Resp 26   Ht 5' 3\" (1.6 m)   Wt 159 lb (72.1 kg)   SpO2 98%   BMI 28.17 kg/m²   General Appearance: alert and oriented to person, place and time and in no acute distress  Skin: warm and dry, turgor not diminished  Head: normocephalic and atraumatic  Eyes: pupils equal, round, and reactive to light, extraocular eye movements intact, conjunctivae normal  Neck: neck supple and non tender without mass   Pulmonary/Chest: clear to auscultation bilaterally-mild crackles. On BiPAP  Cardiovascular: normal rate, normal S1 and S2 and no M/R/R  Abdomen: soft, non-tender, non-distended, normal bowel sounds, no masses or organomegaly  Extremities: no cyanosis, no clubbing and trace edema  Neurologic: no cranial nerve deficit and speech normal        LABS:  Recent Labs     03/29/21  1859      K 5.2*      CO2 22   BUN 26*   CREATININE 1.3*   GLUCOSE 279*   CALCIUM 8.9       Recent Labs     03/29/21  1859   WBC 8.2   RBC 3.74   HGB 10.4*   HCT 34.8   MCV 93.0   MCH 27.8   MCHC 29.9*   RDW 14.8      MPV 10.4       No results for input(s): POCGLU in the last 72 hours. Radiology:   XR CHEST PORTABLE   Final Result   Cardiomegaly with progressive perihilar and bibasilar infiltrates and pleural   effusions likely CHF/edema and or pneumonia. CTA PULMONARY W CONTRAST    (Results Pending)       EKG: No acute abnormality    ASSESSMENT:    Acute on chronic diastolic heart failure  Acute respiratory failure with hypoxia  ?bilateral pneumonia  Lactic acidosis  Non zero troponin  Hypothyroidism  DM type 2  Essential hypertension  ckd stage 3      PLAN:  Hypoxia: likely due to edema.  CT also shows consolidation, ?pneumonia  -IV bumex bid  -check procalcitonin, urine serology  -empiric vanc and cefepime for now pending above.   -trend lactic acid for now.  -continue bipap tonight, wean off in am    Non zero troponin: likely type 2, trend, consult cardiology    Hypothyroidism: synthroid    Dm type 2: SSI and rapaglinide    Code Status: full  DVT prophylaxis: lovenox      NOTE: This report was transcribed using voice recognition software. Every effort was made to ensure accuracy; however, inadvertent computerized transcription errors may be present.   Electronically signed by Blessing Vlades MD on 3/30/2021 at 12:18 AM

## 2021-03-30 NOTE — CONSULTS
INPATIENT CARDIOLOGY CONSULT    Name: Eddie Hardin    Age: 81 y.o. Date of Admission: 3/29/2021  5:48 PM    Date of Service: 3/30/2021    Reason for Consultation: Acute hypoxic respiratory failure, acute superimposed upon chronic diastolic heart failure, sinus node dysfunction, hypertension, cognitive dysfunction    Referring Physician: Haven Figueroa MD    History of Present Illness: The patient is an 21-year-old black female known to [facility] cardiology with primary cardiovascular care provided by [facility]. He has a known history of hypertension, diabetes with associated stage III chronic kidney disease, hyperlipidemia, sinus node dysfunction as well as that of remote [place] I second-degree atrioventricular block in the face of administration of clonidine and verapamil as well as that of significant cognitive dysfunction. She was hospitalized on 2 earlier separate occasions this month predominantly secondary to decompensated diastolic heart failure in the face of questionable compliance with medication administration dietary restriction with most recent hospitalization within the past week with reported weight gain in addition to that of atypically described chest discomfort and palpitations. At that time radiographic evidence of diffuse infiltrates were present compatible with that potentially both of interstitial edema as well as that of potential infection. At that time, a proBNP level of 1900 pg/mL was noted and with equivocal troponin levels not consistent with that of myocardial injury. She was optimally medically managed and had been discharged within the past 72 hours. She is a somewhat limited historian presently maintained on a BiPAP mask additional rate limiting a complete history but from review of medical records noted progressive weakness and palpitations in addition to that of dyspnea.   At the time of her emergency room evaluation hypoxia was documented requiring the use of BiPAP to achieve adequate oxygenation with a resting electrocardiogram reviewed at time of assessment demonstrating sinus tachycardia with occasional supraventricular ectopy and nonspecific ST changes essentially unchanged from that previously documented and a chest x-ray demonstrating evidence of cardiomegaly with diffuse infiltrates potentially in part of a pneumonic origin and possible small pleural effusions with a contrast CT angiogram additionally demonstrating diffuse infiltrates including that of a potential pneumonic origin with bilateral pleural effusions. During hospitalization she has been normotensive and afebrile with additional laboratory studies demonstrating evidence of abnormal troponin levels potentially compatible with that of a type II non-ST elevation myocardial infarction secondary to increased myocardial oxygen demands and a proBNP level of 3515 pg/mL is noted increased from that of her baseline. Persistent stage III chronic kidney disease is present initially associated with that of hyper kalemia which is presently resolved as well as that of a lactic acidosis again resolved. At the time of her presentation no evidence of a leukocytosis was present. .    Review of Systems: The remainder of a complete multisystem review including consitutional, central nervous, respiratory, circulatory, gastrointestinal, genitourinary, endocrinologic, hematologic, musculoskeletal and psychiatric are negative.     Past Medical History:  Past Medical History:   Diagnosis Date    (HFpEF) heart failure with preserved ejection fraction ([facility] [address] 05/26/2017    3/31/17- echo- LVEF 60-65%, mild LVH, mild MR    Arthritis     Bursitis     shoulders    Cervical radiculopathy     CHF (congestive heart failure) (HCC)     CKD (chronic kidney disease) stage 3, GFR 30-59 ml/min     Diabetes mellitus (HCC)     GERD (gastroesophageal reflux disease)     [place] (hard of hearing)     Hypertension     Hypothyroidism     SSS (ALDACTONE) tablet 12.5 mg  12.5 mg Oral Daily Tony Fleming MD        repaglinide (PRANDIN) tablet 1 mg  1 mg Oral TID AC Tony Fleming MD        glucose (GLUTOSE) 40 % oral gel 15 g  15 g Oral PRN Tony Fleming MD        dextrose 50 % IV solution  12.5 g Intravenous PRN Tony Fleming MD        glucagon (rDNA) injection 1 mg  1 mg Intramuscular PRN Tony Fleming MD        dextrose 5 % solution  100 mL/hr Intravenous PRN Tony Fleming MD        insulin lispro (HUMALOG) injection vial 0-6 Units  0-6 Units Subcutaneous TID WC Tony Fleming MD        insulin lispro (HUMALOG) injection vial 0-3 Units  0-3 Units Subcutaneous Nightly Tony Fleming MD        sodium chloride flush 0.9 % injection 10 mL  10 mL Intravenous 2 times per day Yulissa Haas MD        sodium chloride flush 0.9 % injection 10 mL  10 mL Intravenous PRN Tony Fleming MD        0.9 % sodium chloride infusion  25 mL Intravenous PRN Tony Fleming MD        promethazine (PHENERGAN) tablet 12.5 mg  12.5 mg Oral Q6H PRN Tony Fleming MD        Or    ondansetron (ZOFRAN) injection 4 mg  4 mg Intravenous Q6H PRN Tony Fleming MD        polyethylene glycol (GLYCOLAX) packet 17 g  17 g Oral Daily PRN Yulissa Haas MD        acetaminophen (TYLENOL) tablet 650 mg  650 mg Oral Q6H PRN Tony Fleming MD        Or    acetaminophen (TYLENOL) suppository 650 mg  650 mg Rectal Q6H PRN Tony Fleming MD        enoxaparin (LOVENOX) injection 40 mg  40 mg Subcutaneous Daily Tony Fleming MD        bumetanide (BUMEX) injection 1 mg  1 mg Intravenous Daily Tony Fleming MD        cefepime (MAXIPIME) 2000 mg IVPB extended (mini-bag)  2,000 mg Intravenous Q12H Tony Fleming MD 12.5 mL/hr at 03/30/21 0330 2,000 mg at 03/30/21 0330    0.9 % sodium chloride infusion  25 mL Intravenous Q12H Tony Fleming MD        vancomycin (VANCOCIN) 1,250 mg in dextrose 5 % 250 mL IVPB  15 mg/kg Intravenous Q24H Yulissa Haas MD Stopped at 03/30/21 0641    perflutren lipid microspheres (DEFINITY) injection 1.65 mg  1.5 mL Intravenous ONCE PRN Indiana Palafox MD          dextrose      sodium chloride      sodium chloride           Physical Exam:  BP (!) 144/66   Pulse 80   Temp 98.6 °F (37 °C) (Axillary)   Resp 27   Ht 5' 4\" (1.626 m)   Wt 171 lb 4.8 oz (77.7 kg)   SpO2 95%   BMI 29.40 kg/m²   Weight change: Wt Readings from Last 3 Encounters:   03/30/21 171 lb 4.8 oz (77.7 kg)   03/26/21 159 lb 3.2 oz (72.2 kg)   03/23/21 170 lb 6.4 oz (77.3 kg)     The patient is awake, alert and in no discomfort or distress while presently maintained on BiPAP. She appears chronically ill. No gross musculoskeletal deformity or lymphadenopathy are present. No significant skin or nail changes are present. Gross examination of head, eyes, nose and throat are negative. Jugular venous pressure is normal and no carotid bruits are present. No thyromegaly is noted. Normal respiratory effort is noted with no accessory muscle usage present. Lung fields demonstrate somewhat coarse breath sounds to ascultation. Cardiac examination is notable for a regular rate and rhythm with no palpable thrill. No gallop rhythm or cardiac murmur are identified. A benign abdominal examination is present with no masses or organomegaly. A normal abdominal aortic pulsation is present. Intact pulses are present throughout all extremities and no significant peripheral edema is present. No focal neurologic deficits are present. Intake/Output:    Intake/Output Summary (Last 24 hours) at 3/30/2021 0753  Last data filed at 3/30/2021 4818  Gross per 24 hour   Intake --   Output 200 ml   Net -200 ml     No intake/output data recorded.     Laboratory Tests:  Lab Results   Component Value Date    CREATININE 1.3 (H) 03/30/2021    BUN 25 (H) 03/30/2021     03/30/2021    K 4.7 03/30/2021     03/30/2021    CO2 26 03/30/2021     No results for input(s): CKTOTAL, CKMB in the last 72 hours.     Invalid input(s): TROPONONI  Lab Results   Component Value Date     (H) 02/21/2013     Lab Results   Component Value Date    WBC 8.2 03/29/2021    RBC 3.74 03/29/2021    RBC 3.68 12/19/2017    HGB 10.4 03/29/2021    HCT 34.8 03/29/2021    MCV 93.0 03/29/2021    MCH 27.8 03/29/2021    MCHC 29.9 03/29/2021    RDW 14.8 03/29/2021     03/29/2021    MPV 10.4 03/29/2021     Recent Labs     03/29/21  1859   ALKPHOS 74   ALT 22   AST 43*   PROT 7.3   BILITOT 0.8   LABALBU 3.3*     Lab Results   Component Value Date    MG 2.5 03/30/2021     Lab Results   Component Value Date    PROTIME 12.0 03/29/2021    INR 1.1 03/29/2021     Lab Results   Component Value Date    TSH 2.610 03/29/2021     No components found for: CHLPL  Lab Results   Component Value Date    TRIG 83 03/04/2021    TRIG 45 07/27/2020    TRIG 40 06/21/2020     Lab Results   Component Value Date    HDL 65 03/04/2021    HDL 73 07/27/2020    HDL 70 06/21/2020     Lab Results   Component Value Date    LDLCALC 52 03/04/2021    LDLCALC 49 07/27/2020    [address] 48 06/21/2020         Cardiac Tests:  ECG: A resting electrocardiogram reviewed time evaluation demonstrates evidence sinus tachycardia with occasional supraventricular ectopy and nonspecific ST and T wave changes the latter unchanged from previous tracing  Telemetry findings reviewed: sinus rhythm/sinus tachycardia, no new tachy/bradyarrhythmias overnight  Chest X-ray: A chest x-ray reviewed time evaluation demonstrates evidence of cardiomegaly with diffuse bilateral infiltrates and possible small pleural effusions and a contrast CT angiogram excluding the presence of a pulmonary embolism with evidence of cardiomegaly and coronary artery calcifications with a diffuse interstitial infiltrate potentially most compatible with that of pneumonia in addition to that of bilateral pleural effusions  Last Echocardiogram: An echocardiogram of June, 2020 demonstrated evidence of a normal-sized left ventricular chamber with concentric left ventricular hypertrophy and hyperdynamic left ventricular systolic function with stage II diastolic dysfunction and mild left atrial enlargement with no significant valvular pathology      ASSESSMENT / PLAN: On a clinical basis, the patient presents with evidence of acute superimposed upon chronic diastolic heart failure with associated acute hypoxic respiratory failure potentially with that of a superimposed pneumonia and questionable compliance, especially in light of her recurrent hospitalization and associated cognitive dysfunction. Presently, diuresis appears appropriate with need of careful monitoring of her volume status as well as that of her renal function and electrolytes with a repeat limited echocardiogram plan to reevaluate left ventricular systolic and diastolic function additionally to assist in optimization of medical management. Her abnormal troponin levels potentially are consistent with that of a type II non-ST elevation myocardial infarction with CPK and CPK-MB determinations pending. Additional assessment and management of a potential superimposed pneumonia will be deferred to primary care. In light of her existing chronic coronary artery calcification ongoing aggressive risk factor modification of blood pressure, diabetes and serum lipids will remain essential to reducing risk of future atherosclerotic development. Adequate assessment of her ability to remain cared for on an independent basis will be necessary during the course of her hospitalization, especially in view of her recent recurrent admissions in the face of questionable compliance. Thank you for allowing me to participate in your patient's care. Please feel free to contact me if you have any questions or concerns. Note: This report was completed using computerized voice recognition software.  Every effort has been made to ensure accuracy, however; inadvertent computerized transcription errors may be present. Everett Rivas.  [place] , [address] [facility]

## 2021-03-30 NOTE — PROGRESS NOTES
Pharmacy Consultation Note  (Antibiotic Dosing and Monitoring)    Initial consult date: 3-  Consulting physician: Jessica Umanzor  Drug: Vancomycin  Indication: Pneumonia (CAP)    Age/  Gender Height Weight IBW Dosing weight  Allergy Information   85 y.o./female 5' 3\" (160 cm) 159 lb (72.1 kg)     Ideal body weight: 54.7 kg (120 lb 9.5 oz)  Adjusted ideal body weight: 63.9 kg (140 lb 14 oz)  159 lb (72.1 kg)  Aspirin      Temp (24hrs), Av.3 °F (36.8 °C), Min:97.9 °F (36.6 °C), Max:98.6 °F (37 °C)          Date  WBC BUN SCr CrCl  (mL/min) Drug/Dose Time   Given Level(s)   (Time) Comments   3- 8.2 26 1.3  32                                               No intake or output data in the 24 hours ending 21 0301    Historical Cultures:  Organism   Date Value Ref Range Status   2017 Coagulase Negative Staphylococci (A)  Final     No results for input(s): BC in the last 72 hours. Cultures:  available culture and sensitivity results were reviewed in [facility]    Assessment:  · 81 y.o. female has been initiated Vancomycin.   · Estimated CrCl = 32 mL/min  · Goal trough level = 15-20 mcg/mL    Plan:  · Will initiate vancomycin at a dose of 1250 mg IV every 24 hours  · Monitor renal function   · Clinical pharmacy to follow      Julianne Spicer [facility] 3/30/2021 3:01 AM

## 2021-03-30 NOTE — HOME CARE
[facility] following for DC plans. Patient was initially a referral from [facility] for SN,PT/OT prior to this rehospitalization. Will need new home care orders if applicable at discharge.  Eduard Ascencio LPN [address] [facility]

## 2021-03-30 NOTE — DISCHARGE INSTR - DIET
 Good nutrition is important when healing from an illness, injury, or surgery. Follow any nutrition recommendations given to you during your hospital stay.  If you were given an oral nutrition supplement while in the hospital, continue to take this supplement at home. You can take it with meals, in-between meals, and/or before bedtime. These supplements can be purchased at most local grocery stores, pharmacies, and chain super-stores.  If you have any questions about your diet or nutrition, call your dietitian at: [phone]    Cardiac, Diabetic Diet:  A heart-healthy and consistent carbohydrate diet is recommended to manage heart disease and diabetes. To follow a heart-healthy and consistent carbohydrate diet,  Eat a balanced diet with whole grains, fruits and vegetables, and lean protein sources. Choose heart-healthy unsaturated fats. Limit saturated fats, trans fats, and cholesterol intake. Eat more plant-based or vegetarian meals using beans and soy foods for protein. Eat whole, unprocessed foods to limit the amount of sodium (salt) you eat. Choose a consistent amount of carbohydrate at each meal and snack. Limit refined carbohydrates especially sugar, sweets and sugar-sweetened beverages. If you drink alcohol, do so in moderation: one serving per day (women) and two servings per day (men). o One serving is equivalent to 12 ounces beer, 5 ounces wine, or 1.5 ounces distilled spirits    Tips  Tips for Choosing Heart-Healthy Fats  Choose lean protein and low-fat dairy foods to reduce saturated fat intake. Saturated fat is usually found in animal-based protein and is associated with certain health risks. Saturated fat is the biggest contributor to raise low-density lipoprotein (LDL) cholesterol levels. Research shows that limiting saturated fat lowers unhealthy cholesterol levels. Eat no more than 7% of your total calories each day from saturated fat.  Ask your RDN to help you determine how much saturated fat is right for you. There are many foods that do not contain large amounts of saturated fats. Swapping these foods to replace foods high in saturated fats will help you limit the saturated fat you eat and improve your cholesterol levels. You can also try eating more plant-based or vegetarian meals. Instead of Try:   Whole milk, cheese, yogurt, and ice cream 1% or skim milk, low-fat cheese, non-fat yogurt, and low-fat ice cream   Fatty, marbled beef and pork Lean beef, pork, or venison   Poultry with skin Poultry without skin   Butter, stick margarine Reduced-fat, whipped, or liquid spreads   Coconut oil, palm oil Liquid vegetable oils: corn, canola, olive, soybean and safflower oils       Avoid foods that contain trans fats. Trans fats increase levels of LDL-cholesterol. Hydrogenated fat in processed foods is the main source of trans fats in foods. Trans fats can be found in stick margarine, shortening, processed sweets, baked goods, some fried foods, and packaged foods made with hydrogenated oils. Avoid foods with partially hydrogenated oil on the ingredient list such as: cookies, pastries, baked goods, biscuits, crackers, microwave popcorn, and frozen dinners. Choose foods with heart healthy fats. Polyunsaturated and monounsaturated fat are unsaturated fats that may help lower your blood cholesterol level when used in place of saturated fat in your diet. Ask your RDN about taking a dietary supplement with plant sterols and stanols to help lower your cholesterol level. Research shows that substituting saturated fats with unsaturated fats is beneficial to cholesterol levels. Try these easy swaps:    Instead of Try:   Butter, stick margarine, or solid shortening Reduced-fat, whipped, or liquid spreads   Beef, pork, or poultry with skin    Fish and seafood   Chips, crackers, snack foods Raw or unsalted nuts and seeds or nut butters  Hummus with vegetables  Avocado on toast   Coconut oil, palm oil Liquid vegetable oils: corn, canola, olive, soybean and safflower oils      Limit the amount of cholesterol you eat to less than 200 milligrams per day. Cholesterol is a substance carried through the bloodstream via lipoproteins, which are known as transporters of fat. Some body functions need cholesterol to work properly, but too much cholesterol in the bloodstream can damage arteries and build up blood vessel linings (which can lead to heart attack and stroke). You should eat less than 200 milligrams cholesterol per day. People respond differently to eating cholesterol. There is no test available right now that can figure out which people will respond more to dietary cholesterol and which will respond less. For individuals with high intake of dietary cholesterol, different types of increase (none, small, moderate, large) in LDL-cholesterol levels are all possible. Food sources of cholesterol include egg yolks and organ meats such as liver, gizzards. Limit egg yolks to two to four per week and avoid organ meats like liver and gizzards to control cholesterol intake. Tips for Choosing Heart-Healthy Carbohydrates  Consume a consistent amount of carbohydrate  It is important to eat foods with carbohydrates in moderation because they impact your blood glucose level. Carbohydrates can be found in many foods such as:  Grains (breads, crackers, rice, pasta, and cereals)  Starchy Vegetables (potatoes, corn, and peas)  Beans and legumes  Milk, soy milk, and yogurt  Fruit and fruit juice  Sweets (cakes, cookies, ice cream, jam and jelly)  Your RDN will help you set a goal for how many carbohydrate servings to eat at your meals and snacks. For many adults, eating 3 to 5 servings of carbohydrate foods at each meal and 1 or 2 carbohydrate servings for each snack works well. Check your blood glucose level regularly. It can tell you if you need to adjust when you eat carbohydrates.   Choose foods rich in viscous (soluble) fiber  Viscous, or soluble, is found in the walls of plant cells. Viscous fiber is found only in plant-based foods. Eating foods with fiber helps to lower your unhealthy cholesterol and keep your blood glucose in range  Rich sources of viscous fiber include vegetables (asparagus, [place] sprouts, sweet potatoes, turnips) fruit (apricots, mangoes, oranges), legumes, and whole grains (barley, oats, and oat bran). As you increase your fiber intake gradually, also increase the amount of water you drink. This will help prevent constipation. If you have difficulty achieving this goal, ask your RDN about fiber laxatives. Choose fiber supplements made with viscous fibers such as psyllium seed husks or methylcellulose to help lower unhealthy cholesterol. Limit refined carbohydrates   There are three types of carbohydrates: starches, sugar, and fiber. Some carbohydrates occur naturally in food, like the starches in rice or corn or the sugars in fruits and milk. Refined carbohydrates--foods with high amounts of simple sugars--can raise triglyceride levels. High triglyceride levels are associated with coronary heart disease. Some examples of refined carbohydrate foods are table sugar, sweets, and beverages sweetened with added sugar. Tips for Reducing Sodium (Salt)  Although sodium is important for your body to function, too much sodium can be harmful for people with high blood pressure. As sodium and fluid buildup in your tissues and bloodstream, your blood pressure increases. High blood pressure may cause damage to other organs and increase your risk for a stroke. Even if you take a pill for blood pressure or a water pill (diuretic) to remove fluid, it is still important to have less salt in your diet. Ask your doctor and RDN what amount of sodium is right for you. Avoid processed foods. Eat more fresh foods.    Fresh fruits and vegetables are naturally low in sodium, as well as frozen vegetables and fruits that have no added juices or sauces. Fresh meats are lower in sodium than processed meats, such as fermin, sausage, and hotdogs. Read the nutrition label or ask your  to help you find a fresh meat that is low in sodium. Eat less salt--at the table and when cooking. A single teaspoon of table salt has 2,300 mg of sodium. Leave the salt out of recipes for pasta, casseroles, and soups. Ask your RDN how to cook your favorite recipes without sodium  Be a smart . Look for food packages that say salt-free or sodium-free.  These items contain less than 5 milligrams of sodium per serving. Very low-sodium products contain less than 35 milligrams of sodium per serving. Low-sodium products contain less than 140 milligrams of sodium per serving. Beware for Unsalted or No Added Salt products. These items may still be high in sodium. Check the nutrition label. Add flavors to your food without adding sodium. Try lemon juice, lime juice, fruit juice or vinegar. Dry or fresh herbs add flavor. Try basil, bay leaf, dill, rosemary, parsley, gwen, dry mustard, nutmeg, thyme, and paprika. Pepper, red pepper flakes, and cayenne pepper can add spice t your meals without adding sodium. Hot sauce contains sodium, but if you use just a drop or two, it will not add up to much. Buy a sodium-free seasoning blend or make your own at home. Additional Lifestyle Tips  Achieve and maintain a healthy weight. Talk with your RDN or your doctor about what is a healthy weight for you. Set goals to reach and maintain that weight. To lose weight, reduce your calorie intake along with increasing your physical activity. A weight loss of 10 to 15 pounds could reduce LDL-cholesterol by 5 milligrams per deciliter. Participate in physical activity. Talk with your health care team to find out what types of physical activity are best for you.  Set a plan to get about 30 minutes of exercise on or hot dogs  Cold cuts, such as salami or bologna, deli meats, cured meats, corned beef  Organ meats (liver, brains, gizzards, sweetbreads)  Poultry with skin  Fried or smoked meat, poultry, and fish  Whole eggs and egg yolks (more than 2-4 per week)  Salted legumes, nuts, seeds, or nut/seed butters  Meat alternatives with high levels of sodium (>300 mg per serving) or saturated fat (>5 g per serving)   Dairy Whole milk,?2% fat milk, buttermilk  Whole milk yogurt or ice cream  Cream  Half-&-half  Cream cheese  Sour cream  Cheese   Vegetables Canned or frozen vegetables with salt, fresh vegetables prepared with salt, butter, cheese, or cream sauce  Fried vegetables  Pickled vegetables such as olives, pickles, or sauerkraut   Fruits Fried fruits  Fruits served with butter or cream   Oils Butter, stick margarine, shortening  Partially hydrogenated oils or trans fats  Tropical oils (coconut, palm, palm kernel oils)   Other Candy, sugar sweetened soft drinks and desserts  Salt, sea salt, garlic salt, and seasoning mixes containing salt  Bouillon cubes  Ketchup, barbecue sauce, Worcestershire sauce, soy sauce, teriyaki sauce  Miso  Salsa  Pickles, olives, relish     Heart Healthy Consistent Carbohydrate Sample 1-Day Menu View Nutrient Info   Breakfast 1 cup cooked oatmeal (2 carbohydrate servings)  3/4 cup blueberries (1 carbohydrate serving)  1 ounce almonds  1 cup skim milk (1 carbohydrate serving)  1 cup coffee   Morning Snack 1 cup sugar-free nonfat yogurt (1 carbohydrate serving)   Lunch 2 slices whole-wheat bread (2 carbohydrate servings)  2 ounces lean turkey breast  1 ounce low-fat Swiss cheese  1 teaspoon mustard  1 slice tomato  1 lettuce leaf  1 small pear (1 carbohydrate serving)  1 cup skim milk (1 carbohydrate serving)   Afternoon Snack 1 ounce trail mix with unsalted nuts, seeds, and raisins (1 carbohydrate serving)   Evening Meal 3 ounces salmon  2/3 cup cooked brown rice (2 carbohydrate servings)  1 teaspoon soft margarine  1 cup cooked broccoli with 1/2 cup cooked carrots (1 carbohydrate serving  Carrots, cooked, boiled, drained, without salt  1 cup lettuce  1 teaspoon olive oil with vinegar for dressing  1 small whole grain roll (1 carbohydrate serving)  1 teaspoon soft margarine  1 cup unsweetened tea   Evening Snack 1 extra-small banana (1 carbohydrate serving)

## 2021-03-30 NOTE — PROGRESS NOTES
03/29/21 2109   NIV Type   Mode Bilevel   Mask Type Full face mask   Mask Size Medium   Settings/Measurements   IPAP 15 cmH20  (for comfort and vol.)   CPAP/EPAP 6 cmH2O   Rate Ordered 12   Resp 26   FiO2  100 %  (decreased to 80)   Vt Exhaled 486 mL   Minute Volume 13.3 Liters   Mask Leak (lpm) 24 lpm   Comfort Level Good   Using Accessory Muscles No   SpO2 100

## 2021-03-30 NOTE — CONSULTS
3/30/2021  8:24 AM      Nutrition Education    Consult for CHF Diet teaching. Note pt recently inpt at [facility] and CHF, Diabetic Diet teaching completed by RD on 3/24 during that admission. Will attach additional written copy and RD contact information in discharge instructions to assure pt/family have info after discharge and follow up per policy. · Written educational materials provided. · Contact name and number provided. · Refer to Patient Education activity for more details.     Electronically signed by Chelo Rojas RD, CNSC, LD on 3/30/21 at 8:25 AM EDT    Contact: [phone]

## 2021-03-31 NOTE — PROGRESS NOTES
SPEECH/LANGUAGE PATHOLOGY  VIDEOFLUOROSCOPIC STUDY OF SWALLOWING (MBS)      PATIENT NAME:  Manuela Dinero      :  1935      TODAY'S DATE:  3/31/2021   ROOM:  [address] [place] [address]    SUMMARY OF EVALUATION  Chart reviewed, including most recent chest radiograph. Pt does not report any difficulties swallowing. DYSPHAGIA DIAGNOSIS:  Mild pharyngeal dysphagia; no aspiration or penetration observed during this study      Due to significant pharyngeal delay, pt is at a high risk of aspiration of thin liquids with large sips      DIET RECOMMENDATIONS:  Dysphagia 3, Soft/advanced (Soft & Bite-sized) solids with  thin liquids as tolerated    Soft & Bite-Sized diet recommended for energy conservation during mastication       FEEDING RECOMMENDATIONS:     Assistance level:  Stand by assistance is needed during all oral intake      Compensatory strategies recommended: Small bites/sips and Alternate solids and liquids    THERAPY RECOMMENDATIONS:      Dysphagia therapy is recommended 3-5 times per week for LOS or when goals are met. Patient will participate in DPNS to address functional deficits identified during swallow evaluation  Ongoing meal endurance analysis to provide diet modification and compensatory strategy implementation to minimize risk of aspiration associated with PO intake                  PROCEDURE     Consistencies Administered During the Evaluation   Liquids: thin liquid and nectar thick liquid   Solids:  pureed foods and solid foods      Method of Intake:   cup, straw, spoon  Self fed, Fed by clinician      Position:   Seated, upright, Lateral plane    Current Respiratory Status   room air                RESULTS     ORAL STAGE       The oral stage of swallowing was within functional limits. PHARYNGEAL STAGE           ONSET TIME     Delayed initiation of the pharyngeal swallow was noted with swallow reflex triggered at the level of the pyriform sinus for thin liquids.         PHARYNGEAL RESIDUALS hyperglycemia (HCC)    Chronic diastolic CHF (congestive heart failure) (HCC)    Chest pain    LVH (left ventricular hypertrophy)    Non-rheumatic mitral regurgitation    CKD (chronic kidney disease) stage 3, GFR 30-59 ml/min (HCC)    SSS (sick sinus syndrome) (HCC)    Dizziness    Unsteadiness    Leukopenia    Primary osteoarthritis involving multiple joints    Cervical radiculopathy    Bilateral shoulder bursitis    Aspirin intolerance    Primary osteoarthritis of one knee, right    Primary osteoarthritis of right knee    Acute blood loss anemia    HF (heart failure) (HCC)    Chronic pain syndrome    Cervicalgia    Myalgia    Intractable headache    Acute on chronic diastolic congestive heart failure (HCC)    Metabolic acidosis    Anemia    Edema    Neck pain    Congestive heart failure of unknown etiology (HCC)    AMANDEEP (acute kidney injury) (HCC)    Elevated troponin    Obesity (BMI 30.0-34. 9)       WISAM Broderick. Speech-Language Pathology Student    Ronaldo ARGUELLO CCC/SLP [id number]  Speech-Language Pathologist

## 2021-03-31 NOTE — PROGRESS NOTES
Consulted for new IV site due to \"Pt requesting new site bc hers burns with Vanc\". Upon entry to Pt room, current site is actively infusing with Cefepime. Pt denies any discomfort at this time. Pt educated on the possibility of vessel irritation caused by the infusion of the vessicant. Negative signs of infiltration detected. No signs of bruising or bleeding noted. Site is cleared for continued use. Primary RN notified. Aristides Gao

## 2021-03-31 NOTE — CARE COORDINATION
3/31/2021  Social Work Discharge Planning:Awaiting therapy orders and evals. Pt resides alone in her home and son lives nearby and very supportive. Pt is active with [address]. ALICE order will be needed if appropriate at discharge. Pt is on 6l o2 here and uses 3l via [address] [place] [address] DME. Wean o2 when appropriate. Barium swallow today . Pulmonary is following. Pt is also on IV ATB.  Electronically signed by JANELLE Robins on 3/31/2021 at 11:06 AM

## 2021-03-31 NOTE — PROGRESS NOTES
[id number] Hospitalist   Progress Note    Admitting Date and Time: 3/29/2021  5:48 PM  Admit Dx: Acute respiratory failure with hypoxia ([address] [J96.01]    Subjective:    Pt feels worse today. Patient requiring 7L HFNC. Patient returning from barium swallow. Mild pharyngeal dysphagia noted with no aspiration or penetration observed during study. Placed on dysphagia 3 soft/advanced soft and bite-size diet with thin liquids. Per RN: Patient noted with increased hypoxia. Recently returned from barium swallow. ROS: denies fever, chills, cp, n/v, HA unless stated above.      enoxaparin  30 mg Subcutaneous Daily    bumetanide  1 mg Oral Daily    metoprolol succinate  37.5 mg Oral BID    aspirin EC  81 mg Oral Daily    levothyroxine  50 mcg Oral Daily    spironolactone  12.5 mg Oral Daily    repaglinide  1 mg Oral TID AC    insulin lispro  0-6 Units Subcutaneous TID WC    insulin lispro  0-3 Units Subcutaneous Nightly    sodium chloride flush  10 mL Intravenous 2 times per day    cefepime  2,000 mg Intravenous Q12H    vancomycin  750 mg Intravenous Q24H     glucose, 15 g, PRN  dextrose, 12.5 g, PRN  glucagon (rDNA), 1 mg, PRN  dextrose, 100 mL/hr, PRN  sodium chloride flush, 10 mL, PRN  sodium chloride, 25 mL, PRN  promethazine, 12.5 mg, Q6H PRN    Or  ondansetron, 4 mg, Q6H PRN  polyethylene glycol, 17 g, Daily PRN  acetaminophen, 650 mg, Q6H PRN    Or  acetaminophen, 650 mg, Q6H PRN  perflutren lipid microspheres, 1.5 mL, ONCE PRN         Objective:    /62   Pulse 80   Temp 98 °F (36.7 °C) (Oral)   Resp 22   Ht 5' 4\" (1.626 m)   Wt 168 lb 3.2 oz (76.3 kg)   SpO2 90%   BMI 28.87 kg/m²   General Appearance: alert and oriented to person, place and time and in no acute distress  Skin: warm and dry  Head: normocephalic and atraumatic  Eyes: pupils equal, round, and reactive to light, extraocular eye movements intact, conjunctivae normal  Neck: neck supple and non tender without mass   Pulmonary/Chest: Diminished/coarse to auscultation bilaterally - no wheezes,or rhonchi, normal air movement, no respiratory distress  Cardiovascular: normal rate, normal S1 and S2 and no rubs, gallops, murmur noted. Abdomen: soft, non-tender, non-distended, normal bowel sounds, no masses or organomegaly  Extremities: no cyanosis, no clubbing and trace edema  Neurologic: no cranial nerve deficit and speech normall      Recent Labs     03/29/21  1859 03/30/21  0440 03/31/21  0310    141 138   K 5.2* 4.7 4.3    106 106   CO2 22 26 27   BUN 26* 25* 26*   CREATININE 1.3* 1.3* 1.4*   GLUCOSE 279* 96 84   CALCIUM 8.9 8.4* 8.2*       Recent Labs     03/29/21  1859   ALKPHOS 74   PROT 7.3   LABALBU 3.3*   BILITOT 0.8   AST 43*   ALT 22       Recent Labs     03/29/21  1859   WBC 8.2   RBC 3.74   HGB 10.4*   HCT 34.8   MCV 93.0   MCH 27.8   MCHC 29.9*   RDW 14.8      MPV 10.4           Radiology:   CTA PULMONARY W CONTRAST   Final Result   No evidence of pulmonary emboli. Cardiomegaly with prominent coronary atherosclerotic calcifications and small   pericardial effusion. Bilateral pleural effusions, mildly increased in the interval since the prior   examination. Multifocal patchy and ground-glass airspace disease in the lungs bilaterally,   and more confluent airspace disease in the lower lobes bilaterally, most   consistent with multifocal pneumonia. Pulmonary edema cannot be excluded. Reflux of contrast into the IVC and hepatic veins which may be seen with   right heart failure. XR CHEST PORTABLE   Final Result   Cardiomegaly with progressive perihilar and bibasilar infiltrates and pleural   effusions likely CHF/edema and or pneumonia. Fluoroscopy modified barium swallow with video    (Results Pending)       Assessment:  Active Problems:    Acute respiratory failure with hypoxia (HCC)  Resolved Problems:    * No resolved hospital problems. *      Plan:      1. Hypoxia-2/2 HF/pneumonia? CXR cardiomegaly with progressive perihilar and bibasilar infiltrates and pleural effusions likely CHF/edema and/or pneumonia. 3/30/21 CTA of chest with no evidence of pulmonary emboli. Cardiomegaly with prominent coronary arthrosclerotic calcifications and small pericardial effusion. Bilateral pleural effusions, mildly increased in the interval since the prior exam.  Multifocal patchy and groundglass airspace disease in the lungs bilaterally most consistent with multifocal pneumonia. Pulmonary edema cannot be excluded. Received furosemide 40 mg IV x1 in ED course. Respiratory panel negative MRSA screening pending. Urine antigens negative. Currently on 7L HFNC tolerating well. Continue to wean as tolerated maintain SPO2 >92%. BP at bedtime. Continue diuretics transition to oral today. continue Abx. ?Thoracentesis. Pulmonology input appreciated. 2. ? HCAP-CTA as above. Procalcitonin 1.29. Received cefepime/vancomycin in ED course. Continue on cefepime. Sputum culture. MRSA nares. Urine antigens negative     3.  Elevated troponin-troponin 0.09> 0.14>0. 13. Consistent with type II non-ST elevation MI per cardiology.  CK- MB 6.0 Cardiology input appreciated.     4.  Acute on chronic diastolic HF- ? Compliance. 6/20/2020 ECHO mild concentric LVH. EF 75%. Doppler evidence of stage II diastolic dysfunction. Mild mitral regurg. Mild aortic stenosis. Mild tricuspid regurg. Mild pulmonary hypertension. 3/30/2021 ECHO EF 65%  no regional wall abnormalities seen. Focal calcification mitral valve leaflets. Mild mitral annular calcification. Mild mitral regurgitation. Mild aortic stenosis. Tricuspid regurgitation. Mild to moderate. Pulmonary hypertension. Moderate left pleural effusion. Received 40 mg IV Lasix in ED course. Continues on Bumex daily transition to oral today. A 6.3 kg                strict I's/O. Weights daily. Cardiology input appreciated.     5. CKD stage III -BUN/Crt 25/1.3. Appears to be at baseline per chart review. Need to follow closely. Avoid nephrotoxic agents.     6. Hypothyroidism-continue levothyroxine.     7. DM-A1c 6.1 continue repaglinide. SSI/hypoglycemic protocol/carb controlled diet. Continue to follow closely adjust as needed     8. HTN- Currently well controlled. Continue spironolactone/Toprol-XL. Continue to follow closely adjust as needed     9.  Elevated lactic acid-2/2 HF/CKD/infection? Resolved upon presentation lactic acid 6.2. Repeat lactic 1.0    10. Anemia-chronic disease? Hgb 10.4 remaining stable. No overt signs of bleeding noted continue to follow closely transfuse as needed        NOTE: This report was transcribed using voice recognition software. Every effort was made to ensure accuracy; however, inadvertent computerized transcription errors may be present. Electronically signed by Marcelo Greenberg CNP on 3/31/2021 at 9:48 AM

## 2021-03-31 NOTE — PROGRESS NOTES
INPATIENT CARDIOLOGY FOLLOW-UP    Name: Princess Nair    Age: 81 y.o. Date of Admission: 3/29/2021  5:48 PM    Date of Service: 3/31/2021    Chief Complaint: Follow-up for acute hypoxic respiratory failure, acute superimposed upon chronic diastolic heart failure, sinus node dysfunction, hypertension, possible pneumonia, cognitive dysfunction    Interim History: The patient is both subjectively and objectively improved with significant reduction of her oxygen needs following diuresis. Complete assessment of cardiac biomarkers excludes the presence of an acute coronary syndrome and a limited echocardiogram excludes the presence of regional wall motion abnormalities with overall normal left ventricular systolic function no significant valvular pathology with mild to moderate pulmonary hypertension. A low-grade fever is noted with her pulmonary assessment reviewed and no evidence of a viral etiology of potential pneumonia. Review of Systems: The remainder of a complete multisystem review including consitutional, central nervous, respiratory, circulatory, gastrointestinal, genitourinary, endocrinologic, hematologic, musculoskeletal and psychiatric are negative.     Problem List:  Patient Active Problem List   Diagnosis    HTN (hypertension), benign    Hypertensive heart disease    Hypothyroidism    DM (diabetes mellitus), type 2 ([address]    Acute [facility] failure with hypoxia [facility]    Uncontrolled type 2 diabetes mellitus with hyperglycemia [facility]    Chronic diastolic CHF (congestive heart failure) ([facility])    Chest pain    LVH (left ventricular hypertrophy)    Non-rheumatic mitral regurgitation    CKD (chronic kidney disease) stage 3, GFR 30-59 ml/min ([facility])    SSS (sick sinus syndrome) ([facility])    Dizziness    Unsteadiness    Leukopenia    Primary osteoarthritis involving multiple joints    Cervical radiculopathy    Bilateral shoulder bursitis    Aspirin intolerance    Primary osteoarthritis of one knee, right    Primary osteoarthritis of right knee    Acute blood loss anemia    [facility] failure) [facility]    Chronic pain syndrome    Cervicalgia    Myalgia    Intractable headache    Acute on chronic diastolic congestive heart failure (HCC)    Metabolic acidosis    Anemia    Edema    Neck pain    Congestive heart failure of unknown etiology (HCC)    AMANDEEP (acute kidney injury) [facility]    Elevated troponin    Obesity (BMI 30.0-34. 9)       Allergies:   Allergies   Allergen Reactions    Aspirin Other (See Comments)     \"tears up my stomach\"       Current Medications:  Current Facility-Administered Medications   Medication Dose Route Frequency Provider Last Rate Last Admin    enoxaparin (LOVENOX) injection 30 mg  30 mg Subcutaneous Daily Tony Fleming MD        aspirin EC tablet 81 mg  81 mg Oral Daily Tony Fleming MD   81 mg at 03/30/21 0914    levothyroxine (SYNTHROID) tablet 50 mcg  50 mcg Oral Daily Tony Fleming MD   50 mcg at 03/31/21 4051    spironolactone (ALDACTONE) tablet 12.5 mg  12.5 mg Oral Daily Tony Fleming MD   12.5 mg at 03/30/21 0914    repaglinide (PRANDIN) tablet 1 mg  1 mg Oral TID AC Tony Fleming MD   1 mg at 03/31/21 0623    glucose (GLUTOSE) 40 % oral gel 15 g  15 g Oral PRN Tony Fleming MD        dextrose 50 % IV solution  12.5 g Intravenous PRN Tony Fleming MD        glucagon (rDNA) injection 1 mg  1 mg Intramuscular PRN Tony Fleming MD        dextrose 5 % solution  100 mL/hr Intravenous PRN Tony Fleming MD        insulin lispro (HUMALOG) injection vial 0-6 Units  0-6 Units Subcutaneous TID WC Tony Fleming MD   1 Units at 03/30/21 1658    insulin lispro (HUMALOG) injection vial 0-3 Units  0-3 Units Subcutaneous Nightly Tony Fleming MD   1 Units at 03/30/21 2042    sodium chloride flush 0.9 % injection 10 mL  10 mL Intravenous 2 times per day Velma Newton MD   10 mL at 03/30/21 2046    sodium chloride flush 0.9 % injection 10 mL 10 mL Intravenous PRN Tony Fleming MD   10 mL at 03/30/21 2015    0.9 % sodium chloride infusion  25 mL Intravenous PRN Tony Fleming MD        promethazine (PHENERGAN) tablet 12.5 mg  12.5 mg Oral Q6H PRN Tony Fleming MD        Or    ondansetron (ZOFRAN) injection 4 mg  4 mg Intravenous Q6H PRN Tony Fleming MD        polyethylene glycol (GLYCOLAX) packet 17 g  17 g Oral Daily PRN Edison Haas MD        acetaminophen (TYLENOL) tablet 650 mg  650 mg Oral Q6H PRN Edison Haas MD   650 mg at 03/31/21 0023    Or    acetaminophen (TYLENOL) suppository 650 mg  650 mg Rectal Q6H PRN Tony Fleming MD        bumetanide (BUMEX) injection 1 mg  1 mg Intravenous Daily Tony Fleming MD   1 mg at 03/30/21 0915    cefepime (MAXIPIME) 2000 mg IVPB extended (mini-bag)  2,000 mg Intravenous Q12H Tony Fleming MD 12.5 mL/hr at 03/31/21 0314 2,000 mg at 03/31/21 0314    0.9 % sodium chloride infusion  25 mL Intravenous Q12H Tony Fleming MD   Stopped at 03/30/21 2300    perflutren lipid microspheres (DEFINITY) injection 1.65 mg  1.5 mL Intravenous ONCE PRN Veronica Mistry MD        metoprolol succinate (TOPROL XL) extended release tablet 25 mg  25 mg Oral BID Veronica Mistry MD   25 mg at 03/30/21 2045    vancomycin (VANCOCIN) 750 mg in dextrose 5 % 250 mL IVPB  750 mg Intravenous Q24H Tony Fleming MD          dextrose      sodium chloride      sodium chloride Stopped (03/30/21 2300)       Physical Exam:  /63   Pulse 81   Temp 99.9 °F (37.7 °C) (Oral)   Resp 22   Ht 5' 4\" (1.626 m)   Wt 168 lb 3.2 oz (76.3 kg)   SpO2 93%   BMI 28.87 kg/m²   Weight change: 9 lb 3.2 oz (4.173 kg)  Wt Readings from Last 3 Encounters:   03/31/21 168 lb 3.2 oz (76.3 kg)   03/26/21 159 lb 3.2 oz (72.2 kg)   03/23/21 170 lb 6.4 oz (77.3 kg)     The patient is awake, alert and in no discomfort or distress. No gross musculoskeletal deformity is present. No significant skin or nail changes are present. Gross examination of head, eyes, nose and throat are negative. Jugular venous pressure is normal and no carotid bruits are present. Normal respiratory effort is noted with no accessory muscle usage present. Lung fields are clear to ascultation. Cardiac examination is notable for a regular rate and rhythm with no palpable thrill. No gallop rhythm or cardiac murmur are identified. A benign abdominal examination is present with no masses or organomegaly. Intact pulses are present throughout all extremities and no peripheral edema is present. No focal neurologic deficits are present. Intake/Output:    Intake/Output Summary (Last 24 hours) at 3/31/2021 0743  Last data filed at 3/31/2021 0508  Gross per 24 hour   Intake 360 ml   Output 875 ml   Net -515 ml     No intake/output data recorded.     Laboratory Tests:  Lab Results   Component Value Date    CREATININE 1.4 (H) 03/31/2021    BUN 26 (H) 03/31/2021     03/31/2021    K 4.3 03/31/2021     03/31/2021    CO2 27 03/31/2021     Recent Labs     03/30/21  0440   CKTOTAL 521*   CKMB 6.0*     Lab Results   Component Value Date     (H) 02/21/2013     Lab Results   Component Value Date    WBC 8.2 03/29/2021    RBC 3.74 03/29/2021    RBC 3.68 12/19/2017    HGB 10.4 03/29/2021    HCT 34.8 03/29/2021    MCV 93.0 03/29/2021    MCH 27.8 03/29/2021    MCHC 29.9 03/29/2021    RDW 14.8 03/29/2021     03/29/2021    MPV 10.4 03/29/2021     Recent Labs     03/29/21  1859   ALKPHOS 74   ALT 22   AST 43*   PROT 7.3   BILITOT 0.8   LABALBU 3.3*     Lab Results   Component Value Date    MG 2.4 03/31/2021     Lab Results   Component Value Date    PROTIME 12.0 03/29/2021    INR 1.1 03/29/2021     Lab Results   Component Value Date    TSH 2.610 03/29/2021     No components found for: CHLPL  Lab Results   Component Value Date    TRIG 73 03/31/2021    TRIG 83 03/04/2021    TRIG 45 07/27/2020     Lab Results   Component Value Date    HDL 41 03/31/2021    HDL 65 03/04/2021    HDL 73 07/27/2020     Lab Results   Component Value Date    LDLCALC 50 03/31/2021    LDLCALC 52 03/04/2021    LDLCALC 49 07/27/2020       Cardiac Tests:  Telemetry findings reviewed: sinus rhythm, no new tachy/bradyarrhythmias overnight  Last Echocardiogram: A limited echocardiogram demonstrated evidence of a normal-sized left ventricular chamber with mild concentric left ventricular hypertrophy and no evidence of regional wall motion apparatus with overall normal left ventricular systolic function. No significant valvular pathology is identified with mild to moderate pulmonary hypertension and right ventricular systolic pressures of approximately 50 mmHg with a trivial, hemodynamically insignificant pericardial effusion      ASSESSMENT / PLAN: On a clinical basis, the patient appears objectively compensated following diuresis with additional cardiac biomarkers and the results of her echocardiogram excluding the presence of an acute coronary syndrome. Continued fluid mobilization and optimization of diastolic heart failure management will be necessary in addition to that of further evaluation of her febrile illness and potential pneumonia by the pulmonary service in the absence of an identified viral component. Presently conversion to oral diuretics appear indicated with needs of ongoing monitoring of her volume status as well as that of renal function and electrolytes and with plans of a repeat chest x-ray tomorrow. Ongoing aggressive risk factor modification blood pressure, diabetes and serum lipids will remain essential to reducing risk of future atherosclerotic development. As outlined in initial correspondence, based on her recurrent hospitalization and cognitive dysfunction, assessment of the adequacy of her present living arrangements will be necessary to assist in providing appropriate care post discharge and reduce risk of ongoing recurrent hospitalizations.       Note: This report was completed utilizing computer voice recognition software. Every effort has been made to ensure accuracy, however; inadvertent computerized transcription errors may be present. Kiara Holt.  Laruth Click, [address] [facility]

## 2021-03-31 NOTE — CONSULTS
I provided Brien Everette with the Heart Failure book, the HF Zones, and the Sodium Content pamphlet. We reviewed the HF zones, signs and symptoms to report on day 1 of onset, medication compliance, daily weights, low sodium diet (label reading)   I advised patient you can reduce the risk for heart failure exacerbations by modifying/controlling the risk factors they have. We discussed self-managed care which includes the following: to take medications as prescribed- to call the doctor with any side effects do not just stop taking the medication, follow a cardiac heart healthy / low sodium diet, do daily weights, exercise regularly- per doctor recommendation and not to smoke. I stressed the importance of informing the cardiologist the first day of onset of signs and symptoms in the \"Yellow Zone\" of the HF Zones. Verbalizes understanding     Echocardiogram / EF: 3/30/2021   Left Ventricle   Left ventricle is normal in size . Mild left ventricular concentric hypertrophy noted. No regional wall motion abnormalities seen. Normal left ventricular ejection fraction. Ejection fraction is visually estimated at 65%. Indeterminate diastolic function.     Lab Results   Component Value Date     (H) 02/21/2013      Lab Results   Component Value Date    PROBNP 3,514 (H) 03/29/2021        History of: HTN, hypothroidism, HFpEF, CHF, DM, valvular heart disease, SSS, GERD     Medications:    enoxaparin  30 mg Subcutaneous Daily    bumetanide  1 mg Oral Daily    metoprolol succinate  37.5 mg Oral BID    aspirin EC  81 mg Oral Daily    levothyroxine  50 mcg Oral Daily    spironolactone  12.5 mg Oral Daily    repaglinide  1 mg Oral TID AC    insulin lispro  0-6 Units Subcutaneous TID WC    insulin lispro  0-3 Units Subcutaneous Nightly    sodium chloride flush  10 mL Intravenous 2 times per day    cefepime  2,000 mg Intravenous Q12H    vancomycin  750 mg Intravenous Q24H      dextrose      sodium chloride  sodium chloride Stopped (03/30/21 2300)       Code Status: Full Code     The patient is ordered:  Diet (sodium restriction): low sodium 2 gm  Sodium/fluid restriction daily ordered (fluid restriction recommended if patient is hyponatremic and/or diuretic is initiated or increased): No  FR: na  Daily Weights:   Patient Vitals for the past 96 hrs (Last 3 readings):   Weight   03/31/21 0101 168 lb 3.2 oz (76.3 kg)   03/30/21 0216 171 lb 4.8 oz (77.7 kg)   03/29/21 1743 159 lb (72.1 kg)     I/O:     Intake/Output Summary (Last 24 hours) at 3/31/2021 1110  Last data filed at 3/31/2021 8908  Gross per 24 hour   Intake 360 ml   Output 875 ml   Net -515 ml        I provided the patient with the following:   The [address] [place] [address] Dr, \"Caring for Your Heart: Living Well with Heart Failure\"    The Heart Failure Zones    Sodium Content of Foods    Sodium-Free Flavoring Tips      ? The Heart Failure Booklet was given to the patient, which addresses:   Signs and symptoms of HF to report    Home Self Management- activity, weight tracking, taking medications as prescribed, meals/diet planning (sodium and fluid restriction), how to read food labels, keeping physician follow ups, smoking cessation, follow the Heart Failure Zones      Instructed to call 911 if you have any of the following symptoms: ?   Struggling to breathe unrelieved with rest,    Having chest pain, confusion or can't think clearly    Have confusion or cant think clearly  Discharge Plan: I placed HF Home Instructions in the discharge instructions. Readmission Risk Score: 26       Reminder: Discharge Instructions: activity, daily weights, diet, S/S, discharge medications & when to call the doctor. Patient interested in attending the CHF clinic in [place] (patient lives on the [facility]) She needs to talk to her son about transport and will let me know.   Heart failure added to care plan and pt education   Has a follow on 4/7  Electronically signed by Kaelyn Harp RN on 3/31/2021 at 11:28 AM

## 2021-03-31 NOTE — PROGRESS NOTES
Viktor Lozano M.D.,[facility]  Jimmy Persaud D.O., F.A.C.O.I., Halley Guerra M.D. Preet Mcgregor M.D., Claribel Saucedo M.D. Chuck Alexandra D.O. Daily Pulmonary Progress Note    Patient:  Rajiv Chopra 81 y.o. female MRN: [id number]     Date of Service: 3/31/2021      Synopsis     We are following patient for acute hypoxia and abnormal CT chest    \"CC\" ; Hartness of breath    Code status: Full code      Subjective      Patient was seen and examined. Patient has just returned from her swallow evaluation and was found to be hypoxic and the low 70s on high flow was placed on the BiPAP. Patient says she had some difficulty with the test.      Review of Systems:  Constitutional: Denies fever, weight loss, night sweats, and fatigue  Skin: Denies pigmentation, dark lesions, and rashes   HEENT: Denies hearing loss, tinnitus, ear drainage, epistaxis, sore throat, and hoarseness. Cardiovascular: Denies palpitations, chest pain, and chest pressure.   Respiratory: Positive for cough and difficulty swallowing gastrointestinal: Denies nausea, vomiting, poor appetite, diarrhea, heartburn or reflux  Genitourinary: Denies dysuria, frequency, urgency or hematuria  Musculoskeletal: Denies myalgias, muscle weakness, and bone pain  Neurological: Denies dizziness, vertigo, headache, and focal weakness  Psychological: Denies anxiety and depression  Endocrine: Denies heat intolerance and cold intolerance  Hematopoietic/Lymphatic: Denies bleeding problems and blood transfusions    24-hour events:      Objective   Vitals: BP (!) 146/68   Pulse 78   Temp 98.4 °F (36.9 °C) (Oral)   Resp 22   Ht 5' 4\" (1.626 m)   Wt 168 lb 3.2 oz (76.3 kg)   SpO2 94%   BMI 28.87 kg/m²     I/O:    Intake/Output Summary (Last 24 hours) at 3/31/2021 1839  Last data filed at 3/31/2021 1435  Gross per 24 hour   Intake 320 ml   Output 1525 ml   Net -1205 ml       Vent Information  Skin Assessment: Clean, dry, & intact  FiO2 : (S) 60 %  SpO2: 94 %  I Time/ I Time %: 0.9 s  Mask Type: Full face mask  Mask Size: Medium       IPAP: 15 cmH20  CPAP/EPAP: 6 cmH2O     CURRENT MEDS :  Scheduled Meds:   enoxaparin  30 mg Subcutaneous Daily    bumetanide  1 mg Oral Daily    metoprolol succinate  37.5 mg Oral BID    aspirin EC  81 mg Oral Daily    levothyroxine  50 mcg Oral Daily    spironolactone  12.5 mg Oral Daily    repaglinide  1 mg Oral TID AC    insulin lispro  0-6 Units Subcutaneous TID WC    insulin lispro  0-3 Units Subcutaneous Nightly    sodium chloride flush  10 mL Intravenous 2 times per day    cefepime  2,000 mg Intravenous Q12H    vancomycin  750 mg Intravenous Q24H       Physical Exam:  General Appearance: appears comfortable in no acute distress. HEENT: Normocephalic atraumatic without obvious abnormality   Neck: Lips, mucosa, and tongue normal.  Supple, symmetrical, trachea midline, no adenopathy;thyroid:  no enlargement/tenderness/nodules or JVD. Lung: Managed breathing sounds at the bases  Heart: RRR, normal S1, S2. No MRG  Abdomen: Soft, NT, ND. BS present x 4 quadrants. No bruit or organomegaly. Extremities: Pedal pulses 2+ symmetric b/l. Extremities normal, no cyanosis, clubbing, or edema. Musculokeletal: No joint swelling, no muscle tenderness. ROM normal in all joints of extremities. Neurologic: Mental status: Alert and Oriented X3 .     Pertinent/ New Labs and Imaging Studies     Imaging Personally Reviewed:      ECHO      Labs:  Lab Results   Component Value Date    WBC 8.2 03/29/2021    HGB 10.4 03/29/2021    HCT 34.8 03/29/2021    MCV 93.0 03/29/2021    MCH 27.8 03/29/2021    MCHC 29.9 03/29/2021    RDW 14.8 03/29/2021     03/29/2021    MPV 10.4 03/29/2021     Lab Results   Component Value Date     03/31/2021    K 4.3 03/31/2021    K 4.2 06/20/2020     03/31/2021    CO2 27 03/31/2021    BUN 26 03/31/2021    CREATININE 1.4 03/31/2021    LABALBU 3.3 03/29/2021    LABALBU 4.0 03/21/2012    CALCIUM 8.2 03/31/2021    GFRAA 43 03/31/2021    LABGLOM 43 03/31/2021     Lab Results   Component Value Date    PROTIME 12.0 03/29/2021    INR 1.1 03/29/2021     Recent Labs     03/29/21  1859   PROBNP 3,514*     Recent Labs     03/30/21  0440   PROCAL 1.29*     This SmartLink has not been configured with any valid records. Micro:  No results for input(s): CULTRESP in the last 72 hours. No results for input(s): LABGRAM in the last 72 hours. No results for input(s): LEGUR in the last 72 hours. Recent Labs     03/30/21  0624   STREPNEUMAGU Presumptive negative- suggests no current or recent  pneumococcal infection. Infection due to Strep pneumoniae cannot be  ruled out since the antigen present in the sample  may be below the detection limit of the test.  Normal Range:Presumptive Negative       Recent Labs     03/30/21  0624   LP1UAG Presumptive Negative -suggesting no recent or current infections  with Legionella pneumophila serogroup 1. Infection to Legionella cannot be ruled out since other serogroups  and species may cause infection, antigen may not be present in  early infection, or level of antigen may be below the  detection limit. Normal Range: Presumptive Negative            Assessment:    1. Acute hypoxic respiratory failure most likely combination of acute on chronic CHF and also possibility of healthcare associated pneumonia versus aspiration pneumonia  2. History of heart failure with preserved ejection fraction  3. Mild pharyngeal dysphagia      Plan:   1. Continue BiPAP as needed and nightly  2. Continue cefepime, Legionella and strep urinary antigens were negative viral panel was negative  3. Repeat chest x-ray in a.m. 4. Continue dysphagia 3 soft advance diet.   5. Continue to monitor I's and O's closely continue diuresis per nephrology      Electronically signed by Carolynne Sever, MD on 3/31/2021 at 6:39 PM

## 2021-03-31 NOTE — PROGRESS NOTES
Called to patient room following nursing placing bipap d/t desat. Patient tachypnic, tolerating bipap, FiO2 increased to 60%. Breath sounds clear in upper lobes, diminished in the bases.

## 2021-03-31 NOTE — PROGRESS NOTES
Isak Nicholson,    Your patient is on a medication that requires a renal dose adjustment. Renal Function Assessment:    Date Body Weight IBW Adj. Body Weight SCr CrCl Dialysis status   3/31/2021 76.3 kg 54.7 kg   1.4 26 ml/min         Pharmacy has renally dose-adjusted the following medication(s):    Date Medication Original Dosing Regimen New Dosing Regimen   03/30/2021 Enoxaparin 40 mg daily 30 mg daily           These changes were made per protocol according to the [facility] Renal Function-Based Dose Adjustments Policy    *Please note this dose may need readjusted if your patient's renal function significantly improves. Please contact pharmacy with any questions regarding these changes.

## 2021-03-31 NOTE — PROGRESS NOTES
Pharmacy Consultation Note  (Antibiotic Dosing and Monitoring)    Initial consult date: 3/30  Consulting physician: Rubi Reynolds  Drug(s): Vancomycin  Indication: PNA    Ht Readings from Last 1 Encounters:   21 5' 4\" (1.626 m)     Wt Readings from Last 1 Encounters:   21 168 lb 3.2 oz (76.3 kg)         Age/Gender Height IBW Weight  Allergy Information   81 y.o. female 5' 3\" (160 cm) Ideal body weight: 54.7 kg (120 lb 9.5 oz)  Adjusted ideal body weight: 63.3 kg (139 lb 10.2 oz) 159 lb (72.1 kg)  Aspirin                Date  WBC BUN/CR Drug/Dose Time   Given Level(s)   (Time) Comments   3/30  (#1) 8.2 25/1.3 Vanco 1250mg IV q24h 0408     3/31  (#2) -- 26/1.4 Vanco 750mg IV q24h 0847       (#3)           (#4)           Estimated Creatinine Clearance: 30 mL/min (A) (based on SCr of 1.4 mg/dL (H)). UOP over the past 24 hours:     Intake/Output Summary (Last 24 hours) at 3/31/2021 1203  Last data filed at 3/31/2021 0508  Gross per 24 hour   Intake 120 ml   Output 525 ml   Net -405 ml       Temp max: Temp (24hrs), Av.9 °F (37.2 °C), Min:98 °F (36.7 °C), Max:99.9 °F (37.7 °C)        Cultures:  available culture and sensitivity results were reviewed in EPIC  Cultures sent and are pending. Recent Labs     21  0059   BLOODCULT2 24 Hours no growth        Historical Cultures:  Organism   Date Value Ref Range Status   2021 Diphtheroids (A)  Final     Recent Labs     21  2220   BC 24 Hours no growth       Assessment:  · Pt is a 81 y.o. female who presented with SOB and hypoxia  · Consulted by Dr. Rubi Reynolds to dose/monitor vancomycin  · Goal trough level:  10-20 mcg/mL;  Goal AUC/-600  · Serum creatinine today: 1.3 ;CrCl ~ 32; baseline Scr ~ 1.0  · Pt received Vanco 1250mg IV x1 and Cefepime   · Procal 1.29    Plan:  · Vancomycin 750mg IV q24h  · Will check a vancomycin trough when steady state attained if vancomycin continues  · Pharmacist will follow and monitor/adjust dosing as necessary      Thank you for the consult,    Ruby LaurentD, BCPS 3/31/2021 12:03 PM

## 2021-03-31 NOTE — PROGRESS NOTES
Date: 3/30/2021    Time: 9:59 PM    Patient Placed On BIPAP/CPAP/ Non-Invasive Ventilation? No    If no must comment. Facial area red/color change? No           If YES are Blister/Lesion present? No   If yes must notify nursing staff  BIPAP/CPAP skin barrier? No    Skin barrier type:n/a       Comments: Patient stated she did not feel she could wear the BiPAP. She said she will call if she needs it.         Jamarcus Cárdenas

## 2021-04-01 NOTE — PROGRESS NOTES
Physical Therapy    Facility/Department: [address] INTERNAL MEDICINE 2  Initial Assessment    NAME: Emilia Fontaine  : 1935  MRN: [id number]    Date of Service: 2021       Patient Diagnosis(es): The primary encounter diagnosis was Acute respiratory failure with hypoxia ([address] A diagnosis of Congestive heart failure, unspecified HF chronicity, unspecified heart failure type [facility]) was also pertinent to this visit. has a past medical history of (HFpEF) heart failure with preserved ejection fraction ([address] Arthritis, Bursitis, Cervical radiculopathy, CHF (congestive heart failure) ([address] CKD (chronic kidney disease) stage 3, GFR 30-59 ml/min, Diabetes mellitus ([address] GERD (gastroesophageal reflux disease), [place] (hard of hearing), Hypertension, Hypothyroidism, SSS (sick sinus syndrome) ([address] Thyroid disease, Unsteadiness, and Valvular heart disease. has a past surgical history that includes Foot surgery; Total knee arthroplasty (Right, 3/21/2019); and joint replacement (). Referring Provider:  Carl Rust RN    Evaluating PT:  Arron Hale PT, DPT [id number]    Room #:  6170/4232-B  Diagnosis:  Acute respiratory failure  Precautions:  Fall risk, high flow O2  Equipment Needs:  None. Per chart, pt owns walker and cane. SUBJECTIVE:    Per chart, pt lives alone in a 1 story home with 3 steps to enter with 1 rail. Pt's son lives close by. Pt used walker or cane for ambulation. OBJECTIVE:   Initial Evaluation  Date:  Treatment Short Term/ Long Term   Goals   Was pt agreeable to Eval/treatment? yes     Does pt have pain? Abdominal pain     Bed Mobility  Rolling: NT  Supine to sit: Mod A  Sit to supine: Mod A  Scooting: independent while in bed.   SBA   Transfers Sit to stand: NT  Stand to sit: NT  Stand pivot: NT  SBA   Ambulation   NT  50+ feet with w/w SBA    Stair negotiation: ascended and descended  NT  3 steps with 1 rail SBA   ROM BLE:  WFL     Strength BLE:  Grossly 3+/5  Grossly 4/5   Balance Sitting EOB:  SBA  Dynamic Standing:  NT  Sitting EOB:  independent  Dynamic Standing:  SBA with device   AM-PAC 6 Clicks 41/57       Orientation:  WFL  Sensation:  Pt denies numbness and tingling to extremities    Patient education  Pt educated on PT objectives during eval and while in the hospital    Patient response to education:   Pt verbalized understanding Pt demonstrated skill Pt requires further education in this area       yes     ASSESSMENT:    Comments:  Pt found in bed. Functional mobility limited since pt feeling dizzy once sitting EOB. At end of eval, pt left in bed with call light in reach and bed alarm on.     Pt's/ family goals   1. None stated      PLAN OF CARE:    Current Treatment Recommendations     [x] Strengthening     [] ROM   [x] Balance Training   [x] Endurance Training   [x] Transfer Training   [x] Gait Training   [x] Stair Training   [] Positioning   [x] Safety and Education Training   [x] Patient/Caregiver Education   [] HEP  [] Other     Frequency of treatments: 2-5x/week x 1-2 weeks. Time in  1110  Time out  1120    Evaluation Time includes thorough review of current medical information, gathering information on past medical history/social history and prior level of function, completion of standardized testing/informal observation of tasks, assessment of data and education on plan of care and goals.     CPT codes:  [x] Low Complexity PT evaluation 30087  [] Moderate Complexity PT evaluation 24936  [] High Complexity PT evaluation 07671  [] PT Re-evaluation 94137  [] Gait training 86318 _ minutes  [] Therapeutic activities 20281 _ minutes  [] Therapeutic exercises 41694 _ minutes      Wayne Diaz PT, DPT  PT [id number]

## 2021-04-01 NOTE — PROGRESS NOTES
0.9 s  Mask Type: Full face mask  Mask Size: Medium       IPAP: 15 cmH20  CPAP/EPAP: 6 cmH2O     CURRENT MEDS :  Scheduled Meds:   hydrALAZINE  25 mg Oral 2 times per day    enoxaparin  30 mg Subcutaneous Daily    bumetanide  1 mg Oral Daily    metoprolol succinate  37.5 mg Oral BID    aspirin EC  81 mg Oral Daily    levothyroxine  50 mcg Oral Daily    spironolactone  12.5 mg Oral Daily    repaglinide  1 mg Oral TID AC    insulin lispro  0-6 Units Subcutaneous TID WC    insulin lispro  0-3 Units Subcutaneous Nightly    sodium chloride flush  10 mL Intravenous 2 times per day    cefepime  2,000 mg Intravenous Q12H    vancomycin  750 mg Intravenous Q24H       Physical Exam:  General Appearance: appears comfortable in no acute distress. HEENT: Normocephalic atraumatic without obvious abnormality   Neck: Lips, mucosa, and tongue normal.  Supple, symmetrical, trachea midline, no adenopathy;thyroid:  no enlargement/tenderness/nodules or JVD. Lung: clear and diminished in the bases  Heart: RRR, normal S1, S2. No MRG  Abdomen: Soft, NT, ND. BS present x 4 quadrants. No bruit or organomegaly. Extremities: Pedal pulses 2+ symmetric b/l. Extremities normal, no cyanosis, clubbing, or edema. Musculokeletal: No joint swelling, no muscle tenderness. ROM normal in all joints of extremities. Neurologic: Mental status: Alert and Oriented X3 . Pertinent/ New Labs and Imaging Studies     Imaging Personally Reviewed:  3/29  Cardiomegaly with progressive perihilar and bibasilar infiltrates and pleural   effusions likely CHF/edema and or pneumonia. ECHO  3/30/2021  Left ventricle is normal in size . Mild left ventricular concentric hypertrophy noted. No regional wall motion abnormalities seen. Normal left ventricular ejection fraction. The left atrium is mildly dilated. Focal calcification mitral valve leaflets. Mild mitral annular calcification. Mild mitral regurgitation is present.    Mild aortic stenosis. Moderate tricuspid regurgitation. Pulmonary hypertension is mild to moderate . There is a trivial circumferential pericardial effusion noted. Moderate left pleural effusion. Labs:  Lab Results   Component Value Date    WBC 8.2 03/29/2021    HGB 10.4 03/29/2021    HCT 34.8 03/29/2021    MCV 93.0 03/29/2021    MCH 27.8 03/29/2021    MCHC 29.9 03/29/2021    RDW 14.8 03/29/2021     03/29/2021    MPV 10.4 03/29/2021     Lab Results   Component Value Date     04/01/2021    K 4.1 04/01/2021    K 4.2 06/20/2020     04/01/2021    CO2 26 04/01/2021    BUN 21 04/01/2021    CREATININE 1.2 04/01/2021    LABALBU 3.3 03/29/2021    LABALBU 4.0 03/21/2012    CALCIUM 8.0 04/01/2021    GFRAA 52 04/01/2021    LABGLOM 52 04/01/2021     Lab Results   Component Value Date    PROTIME 12.0 03/29/2021    INR 1.1 03/29/2021     Recent Labs     03/29/21  1859   PROBNP 3,514*     Recent Labs     03/30/21  0440   PROCAL 1.29*     This SmartLink has not been configured with any valid records. Micro:  No results for input(s): CULTRESP in the last 72 hours. No results for input(s): LABGRAM in the last 72 hours. No results for input(s): LEGUR in the last 72 hours. Recent Labs     03/31/21  1200   STREPNEUMAGU Presumptive negative- suggests no current or recent  pneumococcal infection. Infection due to Strep pneumoniae cannot be  ruled out since the antigen present in the sample  may be below the detection limit of the test.  Normal Range:Presumptive Negative       Recent Labs     03/31/21  1200   LP1UAG Presumptive Negative -suggesting no recent or current infections  with Legionella pneumophila serogroup 1. Infection to Legionella cannot be ruled out since other serogroups  and species may cause infection, antigen may not be present in  early infection, or level of antigen may be below the  detection limit. Normal Range: Presumptive Negative            Assessment:    1.  Acute hypoxic respiratory failure most likely combination of acute on chronic CHF and also possibility of healthcare associated pneumonia versus aspiration pneumonia  2. History of heart failure with preserved ejection fraction  3. Mild pharyngeal dysphagia      Plan:   1. Continue BiPAP as needed and nightly  2. Continue cefepime, Legionella and strep urinary antigens were negative viral panel was negative  3. Cxr, see above  4. Continue dysphagia 3 soft advance diet. 5. Continue to monitor I's and O's closely continue diuresis per nephrology    Electronically signed by KASSIE Jefferson CNP on 4/1/2021 at 9:44 AM      Addendum:  Repeat chest x-ray shows bilateral pleural right side moderate with bibasilar infiltrates unchanged. Will consult IR for thoracentesis. Orders were placed. Continue BiPAP as needed and nightly  Add incentive spirometry flutter valve  We will add bronchodilators  PT OT    I personally saw, examined, and cared for the patient. Labs, medications, radiographs reviewed. I agree with history exam and plans detailed in NP note.   Umesh Aldridge

## 2021-04-01 NOTE — PROGRESS NOTES
[facility] Progress Note    Admitting Date and Time: 3/29/2021  5:48 PM  Admit Dx: Acute respiratory failure with hypoxia ([facility] [J96.01]    Subjective:  Patient is being followed for Acute respiratory failure with hypoxia ([facility] [J96.01]     Pt sitting up in bed  Asking to have her lunch reheated  Appetite good  On 7L NC  Reporting she felt \" sick\" earlier today  Feeling better        ROS: denies fever, chills, cp, sob, n/v, HA unless stated above.       hydrALAZINE  25 mg Oral 2 times per day    enoxaparin  30 mg Subcutaneous Daily    bumetanide  1 mg Oral Daily    metoprolol succinate  37.5 mg Oral BID    aspirin EC  81 mg Oral Daily    levothyroxine  50 mcg Oral Daily    spironolactone  12.5 mg Oral Daily    repaglinide  1 mg Oral TID AC    insulin lispro  0-6 Units Subcutaneous TID WC    insulin lispro  0-3 Units Subcutaneous Nightly    sodium chloride flush  10 mL Intravenous 2 times per day    cefepime  2,000 mg Intravenous Q12H    vancomycin  750 mg Intravenous Q24H     glucose, 15 g, PRN  dextrose, 12.5 g, PRN  glucagon (rDNA), 1 mg, PRN  dextrose, 100 mL/hr, PRN  sodium chloride flush, 10 mL, PRN  sodium chloride, 25 mL, PRN  promethazine, 12.5 mg, Q6H PRN    Or  ondansetron, 4 mg, Q6H PRN  polyethylene glycol, 17 g, Daily PRN  acetaminophen, 650 mg, Q6H PRN    Or  acetaminophen, 650 mg, Q6H PRN  perflutren lipid microspheres, 1.5 mL, ONCE PRN         Objective:    BP (!) 142/71   Pulse 76   Temp 98.6 °F (37 °C) (Temporal)   Resp 20   Ht 5' 4\" (1.626 m)   Wt 168 lb 3.2 oz (76.3 kg)   SpO2 95%   BMI 28.87 kg/m²   General Appearance: alert and oriented to person, place and time and in no acute distress  Skin: warm and dry  Head: normocephalic and atraumatic  Neck: neck supple and non tender without mass   Pulmonary/Chest: diminished throughout to auscultation   Cardiovascular: normal rate, normal S1 and S2 and no carotid bruits  Abdomen: soft, non-tender, non-distended, normal bowel sounds, no masses or organomegaly  Extremities: no cyanosis, no clubbing and no edema  Neurologic: speech normal         Recent Labs     03/30/21  0440 03/31/21  0310 04/01/21  0306    138 137   K 4.7 4.3 4.1    106 105   CO2 26 27 26   BUN 25* 26* 21   CREATININE 1.3* 1.4* 1.2*   GLUCOSE 96 84 81   CALCIUM 8.4* 8.2* 8.0*       Recent Labs     03/29/21  1859   WBC 8.2   RBC 3.74   HGB 10.4*   HCT 34.8   MCV 93.0   MCH 27.8   MCHC 29.9*   RDW 14.8      MPV 10.4         Assessment:    Active Problems:    Acute respiratory failure with hypoxia (HCC)  Resolved Problems:    * No resolved hospital problems. *      Plan:  1. Acute on chronic respiratory failure with hypoxia likely related to possible pneumonia vs CHF exacerbation:she presented to the ER from home with c/o sob and palpitations. She was admitted downtown twice this month for CHF exacerbations. 02 sat 40% on arrival to ER. She wears 3 L NC at baseline. CTA reviewed. Resp panel neg. Urinary antigens neg. Continue diuresis/ abx. Pulmonology/cardiology following. Currently on 7 LNC - wean as tolerated. CXR appears worse today. Plans for possible thoracentesis tomorrow. 2. ? HCAP: Procal 1.29. Pt received cefepime/ Vancomcyin in ED. Continued On IV cefepime. Sputum culture. 3. Acute on chronic diastolic CHF: Echo reviewed 3/30- indeterminate diastolic function. EF 65%. Continue diuresis- changed to oral. Neg 1.9 L off. ? Compliance. Multiple frequent admissions. Cardiology following. Continue strict intake/output. Daily weights. FR. CHF educator. Monitor electrolytes. 4. Bilateral pleural effusions: pulm following. CXR appears worse today. For thoracentesis today. 5. CKD  Stage III; appears to be baseline. Monitor    6. Hypothyroid disease: synthroid    7.DM; hg a2c 6. 1. continue repaglinide. SSI    8. Anemia: ? Chronic disease; no s/s bleeding. Check occult stool    9. HTN ;continue meds    10.  Lactic acidosis: on presentation 6.2. Resolved. 1.0    11. Deconditioning: PT /OT am pac 11. Pt reporting she is interested in [facility]- however mentioned that she needs to take care of some issues at home first. Discussed if [facility] set up- likely would not be able to go home first. Will have social work discuss further with pt. NOTE: This report was transcribed using voice recognition software. Every effort was made to ensure accuracy; however, inadvertent computerized transcription errors may be present.   Electronically signed by FATUMA Brown on 4/1/2021 at 10:27 AM

## 2021-04-01 NOTE — PROGRESS NOTES
Pt refusing bipap at this time. Stated it gave her a headache. Told pt would continue to check overnight and if any distress we can place bipap on.

## 2021-04-01 NOTE — CARE COORDINATION
4/1/2021  Social Work Discharge Planning:Awaiting therapy evals. Pt is on 7 l o2 here and uses 3l via [employer]. Wean o2 as tolerated. Pt is active with [address]. ALICE order will be needed if appropriate at discharge. Pt is on IV ATB. Electronically signed by JANELLE Cuevas on 4/1/2021 at 10:12 AM  '  4/1/2021  Social Work Discharge Planning:SW went to discuss AMPAC of 11/24 with Pt and recommendation of MATA. Pt is not in agreement and keeps saying her son is looking for a different place for her to live. Son lives a short distance from [facility], not in the same household. SW called and left son a detailed voicemail  regarding discussion of MATA and discharge planning. Awaiting return call. Electronically signed by JANELLE Cuevas on 4/1/2021 at 2:57 PM 39+2 wk AGA female born via  to a 36 y/o  mother. IOL for pre-eclampsia, sent in for elevated BPs & received Mag x 10 hr. Maternal medical history of +PPD & +quantiferon, negative CXR. Saw pulm who attributed positive labs to likely exposure in [place] prior to pt immigrating. No significant prenatal history. Maternal labs include Blood Type B+ , HIV - , RPR NR , Rubella I , Hep B - , GBS + (; received amp x 9). Mother COVID -, father vaccinated. AROM at 10:19 on  with clear fluids (ROM hours: 7H54M).  Baby emerged vigorous, crying, was w/d/s/s with APGARS of 8 (color)/9 . Mom plans to initiate breastfeeding , consents to Hep B vaccine. Highest maternal temp: 37.2. EOS 0.11.    BW: 3240 g  :   TOB: 18:13 39+2 wk AGA female born via  to a 36 y/o  B+ mother. IOL for pre-eclampsia, sent in for elevated BPs & received Mag x 10 hr. Maternal medical history of +PPD & +quantiferon, negative CXR. Saw pulm who attributed positive labs to likely exposure in [place] prior to pt immigrating. No significant prenatal history. Prenatal labs: HIV non-reactive, HbsAg non-reactive, rubella immune and syphilis screen negative.  GBS + (; received amp x 9). Mother COVID -, father vaccinated. AROM at 10:19 on  with clear fluids (ROM hours: 7H54M).  Baby emerged vigorous, crying, was w/d/s/s with APGARS of 8 (color)/9 . Mom plans to initiate breastfeeding , consents to Hep B vaccine. Highest maternal temp: 37.2. EOS 0.11.    This patient was noted to have early hypothermia, which was evaluated by a physician and treated with warming techniques    Gen: awake, alert, active  HEENT: +significant molding with caput, anterior fontanel open soft and flat, no cleft lip, no cleft palate by palpation, ears normal set, no ear pits or tags, no lesions in mouth/throat,  red reflex positive bilaterally, nares clinically patent  Resp: good air entry and clear to auscultation bilaterally  Cardiac: Normal S1/S2, regular rate and rhythm, II/VI systolic murmur, no rubs or gallops, 2+ femoral pulses bilaterally  Abd: soft, non tender, non distended, normal bowel sounds, no organomegaly,  umbilicus clean/dry/intact  Neuro: +grasp/suck/giselle, normal tone  Extremities: negative drew and ortolani, full range of motion x 4, no crepitus  Skin: pink  Genital Exam: normal female anatomy, rohan 1, anus visually patent

## 2021-04-01 NOTE — PROGRESS NOTES
INPATIENT CARDIOLOGY FOLLOW-UP    Name: Nessa Sorto    Age: 81 y.o. Date of Admission: 3/29/2021  5:48 PM    Date of Service: 4/1/2021    Chief Complaint: Follow-up for acute hypoxic respiratory failure, acute superimposed upon chronic diastolic heart failure, sinus node dysfunction, hypertension, possible pneumonia, cognitive dysfunction    Interim History: The patient remains improved with persistent oxygen requirements and most recent assessment of the pulmonary service reviewed. A repeat chest x-ray is pending. Evidence of systolic hypertension persists with no changes of renal function or electrolytes. Review of Systems: The remainder of a complete multisystem review including consitutional, central nervous, respiratory, circulatory, gastrointestinal, genitourinary, endocrinologic, hematologic, musculoskeletal and psychiatric are negative.     Problem List:  Patient Active Problem List   Diagnosis    HTN (hypertension), benign    Hypertensive heart disease    Hypothyroidism    DM (diabetes mellitus), type 2 ([facility]    Acute respiratory failure with hypoxia ([facility]    Uncontrolled type 2 diabetes mellitus with hyperglycemia ([facility])    Chronic diastolic CHF (congestive heart failure) ([facility])    Chest pain    LVH (left ventricular hypertrophy)    Non-rheumatic mitral regurgitation    CKD (chronic kidney disease) stage 3, GFR 30-59 ml/min ([facility])    SSS (sick sinus syndrome) ([facility])    Dizziness    Unsteadiness    Leukopenia    Primary osteoarthritis involving multiple joints    Cervical radiculopathy    Bilateral shoulder bursitis    Aspirin intolerance    Primary osteoarthritis of one knee, right    Primary osteoarthritis of right knee    Acute blood loss anemia    HF (heart failure) ([facility])    Chronic pain syndrome    Cervicalgia    Myalgia    Intractable headache    Acute on chronic diastolic congestive heart failure ([facility])    Metabolic acidosis    Anemia    Edema    Neck pain  Congestive heart failure of unknown etiology ([facility]    [facility]    Elevated troponin    Obesity (BMI 30.0-34. 9)       Allergies:   Allergies   Allergen Reactions    Aspirin Other (See Comments)     \"tears up my stomach\"       Current Medications:  Current Facility-Administered Medications   Medication Dose Route Frequency Provider Last Rate Last Admin    enoxaparin (LOVENOX) injection 30 mg  30 mg Subcutaneous Daily Tony Fleming MD   30 mg at 03/31/21 0840    bumetanide (BUMEX) tablet 1 mg  1 mg Oral Daily Charlene Scanlon MD   1 mg at 04/01/21 0610    metoprolol succinate (TOPROL XL) extended release tablet 37.5 mg  37.5 mg Oral BID Charlene Scanlon MD   37.5 mg at 03/31/21 2052    aspirin EC tablet 81 mg  81 mg Oral Daily Tony Fleming MD   81 mg at 03/31/21 0844    levothyroxine (SYNTHROID) tablet 50 mcg  50 mcg Oral Daily Tony Fleming MD   50 mcg at 04/01/21 0610    spironolactone (ALDACTONE) tablet 12.5 mg  12.5 mg Oral Daily Tony Fleming MD   12.5 mg at 03/31/21 0845    repaglinide (PRANDIN) tablet 1 mg  1 mg Oral TID AC Tony Fleming MD   1 mg at 04/01/21 0610    glucose (GLUTOSE) 40 % oral gel 15 g  15 g Oral PRN Tony Fleming MD        dextrose 50 % IV solution  12.5 g Intravenous PRN Tony Fleming MD        glucagon (rDNA) injection 1 mg  1 mg Intramuscular PRN Tony Fleming MD        dextrose 5 % solution  100 mL/hr Intravenous PRN Tony Fleming MD        insulin lispro (HUMALOG) injection vial 0-6 Units  0-6 Units Subcutaneous TID WC Tony Fleming MD   1 Units at 03/31/21 1100    insulin lispro (HUMALOG) injection vial 0-3 Units  0-3 Units Subcutaneous Nightly Tony Fleming MD   1 Units at 03/30/21 2042    sodium chloride flush 0.9 % injection 10 mL  10 mL Intravenous 2 times per day Halley Stone MD   10 mL at 03/31/21 2053    sodium chloride flush 0.9 % injection 10 mL  10 mL Intravenous PRN Halley Stone MD   10 mL at 03/30/21 2015    0.9 % sodium chloride infusion  25 mL Intravenous PRN Tony Fleming MD        promethazine (PHENERGAN) tablet 12.5 mg  12.5 mg Oral Q6H PRN Tony Fleming MD        Or    ondansetron (ZOFRAN) injection 4 mg  4 mg Intravenous Q6H PRN Tony Fleming MD        polyethylene glycol (GLYCOLAX) packet 17 g  17 g Oral Daily PRN Umm Diaz MD        acetaminophen (TYLENOL) tablet 650 mg  650 mg Oral Q6H PRN Umm Diaz MD   650 mg at 03/31/21 1819    Or    acetaminophen (TYLENOL) suppository 650 mg  650 mg Rectal Q6H PRN Tony Fleming MD        cefepime (MAXIPIME) 2000 mg IVPB extended (mini-bag)  2,000 mg Intravenous Q12H Umm Diaz MD   Stopped at 04/01/21 0620    0.9 % sodium chloride infusion  25 mL Intravenous Q12H Tony Fleming MD 12.5 mL/hr at 04/01/21 0620 25 mL at 04/01/21 4849    perflutren lipid microspheres (DEFINITY) injection 1.65 mg  1.5 mL Intravenous ONCE PRN Zeny Puente MD        vancomycin (VANCOCIN) 750 mg in dextrose 5 % 250 mL IVPB  750 mg Intravenous Q24H Tony Fleming  mL/hr at 04/01/21 0610 750 mg at 04/01/21 0610      dextrose      sodium chloride      sodium chloride 25 mL (04/01/21 0620)       Physical Exam:  BP (!) 142/71   Pulse 76   Temp 98.6 °F (37 °C) (Temporal)   Resp 26   Ht 5' 4\" (1.626 m)   Wt 168 lb 3.2 oz (76.3 kg)   SpO2 95%   BMI 28.87 kg/m²   Weight change: Wt Readings from Last 3 Encounters:   03/31/21 168 lb 3.2 oz (76.3 kg)   03/26/21 159 lb 3.2 oz (72.2 kg)   03/23/21 170 lb 6.4 oz (77.3 kg)     The patient is awake, alert and in no discomfort or distress. No gross musculoskeletal deformity is present. No significant skin or nail changes are present. Gross examination of head, eyes, nose and throat are negative. Jugular venous pressure is normal and no carotid bruits are present. Normal respiratory effort is noted with no accessory muscle usage present.  Lung fields demonstrate slightly coarse breath sounds to ascultation. Cardiac examination is notable for a regular rate and rhythm with no palpable thrill. No gallop rhythm or cardiac murmur are identified. A benign abdominal examination is present with no masses or organomegaly. Intact pulses are present throughout all extremities and no peripheral edema is present. No focal neurologic deficits are present. Intake/Output:    Intake/Output Summary (Last 24 hours) at 4/1/2021 0802  Last data filed at 3/31/2021 1435  Gross per 24 hour   Intake 200 ml   Output 1400 ml   Net -1200 ml     No intake/output data recorded.     Laboratory Tests:  Lab Results   Component Value Date    CREATININE 1.2 (H) 04/01/2021    BUN 21 04/01/2021     04/01/2021    K 4.1 04/01/2021     04/01/2021    CO2 26 04/01/2021     Recent Labs     03/30/21  0440   CKTOTAL 521*   CKMB 6.0*     Lab Results   Component Value Date     (H) 02/21/2013     Lab Results   Component Value Date    WBC 8.2 03/29/2021    RBC 3.74 03/29/2021    RBC 3.68 12/19/2017    HGB 10.4 03/29/2021    HCT 34.8 03/29/2021    MCV 93.0 03/29/2021    MCH 27.8 03/29/2021    MCHC 29.9 03/29/2021    RDW 14.8 03/29/2021     03/29/2021    MPV 10.4 03/29/2021     Recent Labs     03/29/21  1859   ALKPHOS 74   ALT 22   AST 43*   PROT 7.3   BILITOT 0.8   LABALBU 3.3*     Lab Results   Component Value Date    MG 2.2 04/01/2021     Lab Results   Component Value Date    PROTIME 12.0 03/29/2021    INR 1.1 03/29/2021     Lab Results   Component Value Date    TSH 2.610 03/29/2021     No components found for: CHLPL  Lab Results   Component Value Date    TRIG 73 03/31/2021    TRIG 83 03/04/2021    TRIG 45 07/27/2020     Lab Results   Component Value Date    HDL 41 03/31/2021    HDL 65 03/04/2021    HDL 73 07/27/2020     Lab Results   Component Value Date    LDLCALC 50 03/31/2021    LDLCALC 52 03/04/2021    LDLCALC 49 07/27/2020       Cardiac Tests:  Telemetry findings reviewed: sinus rhythm, no new tachy/bradyarrhythmias overnight  Chest X-ray: pending      ASSESSMENT / PLAN: On a clinical basis, the patient presently remains compensated from a cardiovascular standpoint with persistent hypoxic respiratory failure potentially in part related to an associated pulmonary infection with a repeat chest x-ray pending. Ongoing fluid mobilization is suggested with incomplete maintenance of intake and output limiting present assessment. In light of her persistent hypertension adversely affecting diastolic cardiac performance in the face of her chronic kidney disease, hydralazine will be added to her medical regimen to assist blood pressure stabilization and the optimization of diastolic cardiac performance. Continued careful monitoring of her volume status as well as that of renal function electrolytes will be necessary during the optimization of her medical regimen. Additional management will be deferred to the pulmonary service following review of her pending chest x-ray. On long-term basis, aggressive risk factor modification blood pressure, diabetes and serum lipids remain essential to reducing risk of future atherosclerotic development. Note: This report was completed utilizing computer voice recognition software. Every effort has been made to ensure accuracy, however; inadvertent computerized transcription errors may be present. Aminata Maxwell.  Sylvester Amaral, [facility]

## 2021-04-01 NOTE — PROGRESS NOTES
SPEECH LANGUAGE PATHOLOGY  DAILY PROGRESS NOTE        PATIENT NAME:  Dayron Diaz      :  1935          TODAY'S DATE:  2021 ROOM:  [address] [place] [address]        SWALLOWING  Pt in chair, alert. Pt reports good toleration of current diet; no symptoms of dysphagia. Pt accepted thin liquid by straw with no clinical indicators of aspiration observed. DYSPHAGIA DIAGNOSIS:  Mild pharyngeal dysphagia; no aspiration or penetration observed during video swallow study       Due to significant pharyngeal delay, pt is at a high risk of aspiration of thin liquids with large sips                            DIET RECOMMENDATIONS:  Dysphagia 3, Soft/advanced (Soft & Bite-sized) solids with  thin liquids as tolerated     Soft & Bite-Sized diet recommended for energy conservation during mastication                   FEEDING RECOMMENDATIONS:                           Assistance level:  Stand by assistance is needed during all oral intake                             Compensatory strategies recommended: Small bites/sips and Alternate solids and liquids        Oral- adequate labial seal and A-P transfer, no oral residue      Pharyngeal- No overt s/s of aspiration, no multiple swallows      Education- Pt educated on results of video swallow study and POC. Pt trained on compensatory strategies for safe swallow via handout with good outcome. Questions answered. Will continue SP intervention as per previously established POC. Valentina Toussaint B.S. Speech-Language Pathology Student        Larance Aric A. CCC/SLP [id number]  Speech Pathologist              CPT code(s) 20428  dysphagia tx  Total minutes :  15 minutes

## 2021-04-01 NOTE — PROGRESS NOTES
Pharmacy Consultation Note  (Antibiotic Dosing and Monitoring)      Note vancomycin discontinued. Clinical pharmacy will sign-off. Please re-consult if we can be of further assistance.     Thanks for this consult,  Darwin Marquez [facility], PharmD, BCPS  4/1/2021  2:36 PM

## 2021-04-01 NOTE — PROGRESS NOTES
Presented IR request to Dr. John Patiño for thoracentesis. Will schedule tomorrow due to recent Lovenox given at 0940. Floor called and nurse notified.

## 2021-04-01 NOTE — PROGRESS NOTES
standing gabe x6-7 min with fair plus balance during self care tasks             Treatment: Patient educated on techniques for completion of ADL, safe functional transfers and functional mobility. Patient required cues for follow through with proper hand/foot placement, pacing, safety, compensatory strategies, breathing, attention, sequencing and technique in bed mobility, self feeding, UB dressing and LB dressing in preparation for maximum independence in all self care tasks.      Hand Dominance: Right []  Left []   Strength ROM Additional Info:    RUE  3+/5 WFL grossly good  and FMC/dexterity noted during ADL tasks     LUE 3+/5 WFL grossly good  and FMC/dexterity noted during ADL tasks         Hearing: WFL   Vision: WFL   Sensation:  No c/o numbness or tingling   Tone: WFL   Edema: none                            Long Term Goal (1-3 wks): Pt will maximize functional performance in all self care tasks/functional transfers with good follow through of all trained techniques for safe transition to next level of care    Assessment of current deficits   Functional mobility [x]  ADLs [x] Strength [x]  Cognition []  Functional transfers  [x] IADLs [x] Safety Awareness [x]  Endurance [x]  Fine Motor Coordination [] Balance [x] Vision/perception [] Sensation []   Gross Motor Coordination [] ROM [] Delirium []                  Motor Control []    Plan of Care: 2-5 days/week for 1-2 weeks PRN   [x]ADL retraining/adaptive techniques and AE recommendations to increase functional independence within precautions                    [x]Energy conservation techniques to improve tolerance for ADL/IADLs  [x]Functional transfer/mobility training/DME recommendations for increased independence, safety and fall prevention         [x]Patient/family education to increase safety and functional independence during daily routine          [x]Environmental modifications for safe mobility and completion of ADLs []Cognitive retraining to improve problem solving skills & safe participation in ADLs/IADLs     []Sensory re-education techniques to improve extremity awareness, maintain skin integrity and improve hand function                             []Visual/Perceptual retraining to improve body awareness and safety during transfers and ADLs  []Splinting/positioning needs to maintain joint/skin integrity and contracture prevention  [x]Therapeutic activity to improve functional performance during ADLs                                        [x]Therapeutic exercise to improve tolerance and functional strength for ADLs   [x]Balance retraining/tolerance tasks for facilitation of postural control with dynamic challenges during ADLs  []Neuromuscular re-education to facilitate righting/equilibrium reactions, midline orientation, scapular stability/mobility, normalize muscle tone and facilitate active functional movement                        []Delirium prevention/treatment    [x]Positioning to improve functional independence and decrease risk of skin breakdown  []Other:     Rehab Potential: Fair for established goals     Patient/Family Goal: To get home. Patient and/or family were instructed on functional diagnosis, prognosis/goals and OT plan of care. Pt verbalized fair understanding. Upon arrival, patient supine in bed. At end of session, patient supine in bed with call light and phone within reach, all lines and tubes intact. Pt would benefit from continued skilled OT to increase safety and independence with completion of ADL/IADL tasks for functional independence and quality of life.  Bed/chair alarm: ON    Low Evaluation [id number]  Time In: 1110   Time Out: 1122      Evaluation time includes thorough review of current medical information, gathering information on past medical history/social history and prior level of function, completion of standardized testing/informal observation of tasks, assessment of data, and development of POC/Goals    Aure Tenorio OTR/L  [id number]

## 2021-04-02 NOTE — PROGRESS NOTES
INPATIENT CARDIOLOGY FOLLOW-UP    Name: Rajiv Chopra    Age: 81 y.o. Date of Admission: 3/29/2021  5:48 PM    Date of Service: 4/2/2021    Chief Complaint: Follow-up for acute hypoxic respiratory failure, acute superimposed upon chronic diastolic heart failure, sinus node dysfunction, hypertension, possible pneumonia, pleural effusion, cognitive dysfunction    Interim History: The patient while confused relates no significant symptomatology. Objectively, her chest x-ray demonstrates persistent interstitial infiltrates and pleural effusions with persistent elevation of proBNP levels insignificantly changed from that initially noted. Incomplete maintenance of intake and output has limited an assessment of her response to present therapy. Review of Systems: The remainder of a complete multisystem review including consitutional, central nervous, respiratory, circulatory, gastrointestinal, genitourinary, endocrinologic, hematologic, musculoskeletal and psychiatric are negative.     Problem List:  Patient Active Problem List   Diagnosis    HTN (hypertension), benign    Hypertensive heart disease    Hypothyroidism    DM (diabetes mellitus), type 2 ([address]    [facility] hypoxia [facility]    Uncontrolled type 2 diabetes mellitus with hyperglycemia ([facility])    Chronic diastolic CHF (congestive heart failure) ([facility])    Chest pain    LVH (left ventricular hypertrophy)    Non-rheumatic mitral regurgitation    CKD (chronic kidney disease) stage 3, GFR 30-59 ml/min ([facility])    SSS (sick sinus syndrome) ([facility])    Dizziness    Unsteadiness    Leukopenia    Primary osteoarthritis involving multiple joints    Cervical radiculopathy    Bilateral shoulder bursitis    Aspirin intolerance    Primary osteoarthritis of one knee, right    Primary osteoarthritis of right knee    Acute blood loss anemia    HF (heart failure) ([facility])    Chronic pain syndrome    Cervicalgia    Myalgia    Intractable headache    Acute on chronic diastolic congestive heart failure (HCC)    Metabolic acidosis    Anemia    Edema    Neck pain    Congestive heart failure of unknown etiology (HCC)    [facility]ANDEEP [facility]) [facility] [facility]    Elevated troponin    Obesity (BMI 30.0-34. 9)       Allergies:   Allergies   Allergen Reactions    Aspirin Other (See Comments)     \"tears up my stomach\"       Current Medications:  Current Facility-Administered Medications   Medication Dose Route Frequency Provider Last Rate Last Admin    hydrALAZINE (APRESOLINE) tablet 25 mg  25 mg Oral 2 times per day Murtaza Patricio MD   25 mg at 04/01/21 2022    enoxaparin (LOVENOX) injection 30 mg  30 mg Subcutaneous Daily Tony Fleming MD   Stopped at 04/02/21 0900    bumetanide (BUMEX) tablet 1 mg  1 mg Oral Daily Murtaza Patricio MD   1 mg at 04/02/21 8306    metoprolol succinate (TOPROL XL) extended release tablet 37.5 mg  37.5 mg Oral BID Murtaza Patricio MD   37.5 mg at 04/01/21 2022    aspirin EC tablet 81 mg  81 mg Oral Daily Tony Fleming MD   81 mg at 04/01/21 1532    levothyroxine (SYNTHROID) tablet 50 mcg  50 mcg Oral Daily Tony Fleming MD   50 mcg at 04/02/21 6173    spironolactone (ALDACTONE) tablet 12.5 mg  12.5 mg Oral Daily Tony Fleming MD   12.5 mg at 04/01/21 0940    repaglinide (PRANDIN) tablet 1 mg  1 mg Oral TID AC Tony Fleming MD   1 mg at 04/02/21 0611    glucose (GLUTOSE) 40 % oral gel 15 g  15 g Oral PRN Tony Fleming MD        dextrose 50 % IV solution  12.5 g Intravenous PRN Tony Fleming MD        glucagon (rDNA) injection 1 mg  1 mg Intramuscular PRN Tony Fleming MD        dextrose 5 % solution  100 mL/hr Intravenous PRN Tony Fleming MD        insulin lispro (HUMALOG) injection vial 0-6 Units  0-6 Units Subcutaneous TID WC Tony Fleming MD   1 Units at 04/01/21 1623    insulin lispro (HUMALOG) injection vial 0-3 Units  0-3 Units Subcutaneous Nightly Tony Fleming MD   1 Units at 03/30/21 2042    sodium chloride flush 0.9 % injection 10 mL  10 mL Intravenous 2 times per day Deny Ardon MD   10 mL at 04/01/21 2023    sodium chloride flush 0.9 % injection 10 mL  10 mL Intravenous PRN Tony Fleming MD   10 mL at 04/01/21 1422    0.9 % sodium chloride infusion  25 mL Intravenous PRN Tony Fleming MD        promethazine (PHENERGAN) tablet 12.5 mg  12.5 mg Oral Q6H PRN Tony Fleming MD        Or    ondansetron (ZOFRAN) injection 4 mg  4 mg Intravenous Q6H PRN Tony Fleming MD        polyethylene glycol (GLYCOLAX) packet 17 g  17 g Oral Daily PRN Tony Fleming MD        acetaminophen (TYLENOL) tablet 650 mg  650 mg Oral Q6H PRN Tony Fleming MD   650 mg at 03/31/21 1819    Or    acetaminophen (TYLENOL) suppository 650 mg  650 mg Rectal Q6H PRN Tony Fleming MD        cefepime (MAXIPIME) 2000 mg IVPB extended (mini-bag)  2,000 mg Intravenous Q12H Tony Fleming MD 12.5 mL/hr at 04/02/21 0227 2,000 mg at 04/02/21 0227    0.9 % sodium chloride infusion  25 mL Intravenous Q12H Tony Fleming MD   Stopped at 04/01/21 2346    perflutren lipid microspheres (DEFINITY) injection 1.65 mg  1.5 mL Intravenous ONCE PRN Marva Garcia MD          dextrose      sodium chloride      sodium chloride Stopped (04/01/21 2346)       Physical Exam:  /67   Pulse 87   Temp 97.4 °F (36.3 °C) (Temporal)   Resp 18   Ht 5' 4\" (1.626 m)   Wt 167 lb 4.8 oz (75.9 kg)   SpO2 94%   BMI 28.72 kg/m²   Weight change: Wt Readings from Last 3 Encounters:   04/02/21 167 lb 4.8 oz (75.9 kg)   03/26/21 159 lb 3.2 oz (72.2 kg)   03/23/21 170 lb 6.4 oz (77.3 kg)     The patient is awake, alert and in no discomfort or distress. She remains confused. No gross musculoskeletal deformity is present. No significant skin or nail changes are present. Gross examination of head, eyes, nose and throat are negative. Jugular venous pressure is normal and no carotid bruits are present.  Normal respiratory effort is noted with no accessory muscle usage present. Lung fields demonstrate scattered rhonchi to ascultation. Cardiac examination is notable for a regular rate and rhythm with no palpable thrill. No gallop rhythm or cardiac murmur are identified. A benign abdominal examination is present with no masses or organomegaly. Intact pulses are present throughout all extremities and no significant peripheral edema is present. No focal neurologic deficits are present. Intake/Output:    Intake/Output Summary (Last 24 hours) at 4/2/2021 0718  Last data filed at 4/2/2021 0532  Gross per 24 hour   Intake 360 ml   Output --   Net 360 ml     No intake/output data recorded. Laboratory Tests:  Lab Results   Component Value Date    CREATININE 1.3 (H) 04/02/2021    BUN 20 04/02/2021     04/02/2021    K 3.7 04/02/2021    CL 99 04/02/2021    CO2 27 04/02/2021     No results for input(s): CKTOTAL, CKMB in the last 72 hours. Invalid input(s): TROPONONI  Lab Results   Component Value Date     (H) 02/21/2013     Lab Results   Component Value Date    WBC 8.2 03/29/2021    RBC 3.74 03/29/2021    RBC 3.68 12/19/2017    HGB 10.4 03/29/2021    HCT 34.8 03/29/2021    MCV 93.0 03/29/2021    MCH 27.8 03/29/2021    MCHC 29.9 03/29/2021    RDW 14.8 03/29/2021     03/29/2021    MPV 10.4 03/29/2021     No results for input(s): ALKPHOS, ALT, AST, PROT, BILITOT, BILIDIR, LABALBU in the last 72 hours.   Lab Results   Component Value Date    MG 2.1 04/02/2021     Lab Results   Component Value Date    PROTIME 12.0 03/29/2021    INR 1.1 03/29/2021     Lab Results   Component Value Date    TSH 2.610 03/29/2021     No components found for: CHLPL  Lab Results   Component Value Date    TRIG 73 03/31/2021    TRIG 83 03/04/2021    TRIG 45 07/27/2020     Lab Results   Component Value Date    HDL 41 03/31/2021    HDL 65 03/04/2021    HDL 73 07/27/2020     Lab Results   Component Value Date    LDLCALC 50 03/31/2021 [address] 03/04/2021    LDLCALC 49 07/27/2020       Cardiac Tests:  Telemetry findings reviewed: sinus rhythm/sinus bradycardia with occasional supraventricular ectopy, no new tachy/bradyarrhythmias overnight  Chest X-ray: A repeat chest x-ray reviewed time evaluation demonstrates evidence of cardiomegaly with interstitial infiltrates and probable small bilateral pleural effusions      ASSESSMENT / PLAN: On a clinical basis, the patient continues to manifest evidence of interstitial edema with persistent elevation of her proBNP levels in the face of incompletely maintained intake and output limiting adequate assessment of her response to present therapy. On this basis, temporary intravenous diuretics will be resumed in conjunction with optimization of her medical regimen present with the addition of oral nitrates to her present hydralazine to provide appropriate preload and afterload reduction in the face of her existing diastolically mediated heart failure. Additional management of her potential superimposed pneumonia and pleural effusions will be deferred to the pulmonary service. Continue careful monitoring of renal function and electrolytes will be necessary with optimization of medical therapy accompanied by appropriate risk factor modification blood pressure, diabetes and serum lipids to reduce risk of future atherosclerotic development. Note: This report was completed utilizing computer voice recognition software. Every effort has been made to ensure accuracy, however; inadvertent computerized transcription errors may be present. Damian Contreras.  Efrain Dunlap, [address] [facility]

## 2021-04-02 NOTE — PROGRESS NOTES
Occupational Therapy  Date:4/2/2021  Patient Name: Shahana Harvey  Room: 2907/2692-C     Occupational Therapy (OT) treatment attempted this morning; patient declined to participate in EOB/OOB activities due to nausea/dizziness (nursing notified). Continue OT POC, as appropriate/able. Norma Ramirez, OTR/L  License Number: [id number]

## 2021-04-02 NOTE — PROGRESS NOTES
Chao Veronica M.D.,[facility]  Janie Casanova D.O., F.A.C.O.I., Olinda Mckeon M.D. Sherry Blanca M.D., Shama Tapia M.D. Ankush Bedoya D.O. Daily Pulmonary Progress Note    Patient:  Caden Jc 81 y.o. female MRN: [id number]     Date of Service: 4/2/2021      Synopsis     We are following patient for acute hypoxia and abnormal CT chest    \"CC\" ; Hartness of breath    Code status: Full code      Subjective      Patient was seen and examined. She got back from IR. She is awake in no acute distress although sometimes appears forgetful she is saturating 98% on 6 L of oxygen. Review of Systems:  Constitutional: Denies fever, weight loss, night sweats, and fatigue  Skin: Denies pigmentation, dark lesions, and rashes   HEENT: Denies hearing loss, tinnitus, ear drainage, epistaxis, sore throat, and hoarseness. Cardiovascular: Denies palpitations, chest pain, and chest pressure.   Respiratory: Positive for cough and difficulty swallowing gastrointestinal: Denies nausea, vomiting, poor appetite, diarrhea, heartburn or reflux  Genitourinary: Denies dysuria, frequency, urgency or hematuria  Musculoskeletal: Denies myalgias, muscle weakness, and bone pain  Neurological: Denies dizziness, vertigo, headache, and focal weakness  Psychological: Denies anxiety and depression  Endocrine: Denies heat intolerance and cold intolerance  Hematopoietic/Lymphatic: Denies bleeding problems and blood transfusions    24-hour events:      Objective   Vitals: /65   Pulse 75   Temp 98.5 °F (36.9 °C) (Oral)   Resp 18   Ht 5' 4\" (1.626 m)   Wt 167 lb 4.8 oz (75.9 kg)   SpO2 97%   BMI 28.72 kg/m²     I/O:    Intake/Output Summary (Last 24 hours) at 4/2/2021 1408  Last data filed at 4/2/2021 0809  Gross per 24 hour   Intake 240 ml   Output 150 ml   Net 90 ml       Vent Information  Skin Assessment: Clean, dry, & intact  FiO2 : (S) 60 %  SpO2: 97 %  I Time/ I Time %: 0.9 s  Mask Type: Full face mask  Mask Size: Medium       IPAP: 15 cmH20  CPAP/EPAP: 6 cmH2O     CURRENT MEDS :  Scheduled Meds:   bumetanide  1 mg Intravenous BID    isosorbide dinitrate  10 mg Oral BID WC    hydrALAZINE  25 mg Oral 2 times per day    enoxaparin  30 mg Subcutaneous Daily    metoprolol succinate  37.5 mg Oral BID    aspirin EC  81 mg Oral Daily    levothyroxine  50 mcg Oral Daily    spironolactone  12.5 mg Oral Daily    repaglinide  1 mg Oral TID AC    insulin lispro  0-6 Units Subcutaneous TID WC    insulin lispro  0-3 Units Subcutaneous Nightly    sodium chloride flush  10 mL Intravenous 2 times per day    cefepime  2,000 mg Intravenous Q12H       Physical Exam:  General Appearance: appears comfortable in no acute distress. HEENT: Normocephalic atraumatic without obvious abnormality   Neck: Lips, mucosa, and tongue normal.  Supple, symmetrical, trachea midline, no adenopathy;thyroid:  no enlargement/tenderness/nodules or JVD. Lung: clear and diminished in the bases  Heart: RRR, normal S1, S2. No MRG  Abdomen: Soft, NT, ND. BS present x 4 quadrants. No bruit or organomegaly. Extremities: Pedal pulses 2+ symmetric b/l. Extremities normal, no cyanosis, clubbing, or edema. Musculokeletal: No joint swelling, no muscle tenderness. ROM normal in all joints of extremities. Neurologic: Mental status: Alert and Oriented X3 . Pertinent/ New Labs and Imaging Studies     Imaging Personally Reviewed:  3/29  Cardiomegaly with progressive perihilar and bibasilar infiltrates and pleural   effusions likely CHF/edema and or pneumonia. ECHO  3/30/2021  Left ventricle is normal in size . Mild left ventricular concentric hypertrophy noted. No regional wall motion abnormalities seen. Normal left ventricular ejection fraction. The left atrium is mildly dilated. Focal calcification mitral valve leaflets. Mild mitral annular calcification. Mild mitral regurgitation is present. Mild aortic stenosis. Moderate tricuspid regurgitation. Pulmonary hypertension is mild to moderate . There is a trivial circumferential pericardial effusion noted. Moderate left pleural effusion. Labs:  Lab Results   Component Value Date    WBC 8.2 03/29/2021    HGB 10.4 03/29/2021    HCT 34.8 03/29/2021    MCV 93.0 03/29/2021    MCH 27.8 03/29/2021    MCHC 29.9 03/29/2021    RDW 14.8 03/29/2021     03/29/2021    MPV 10.4 03/29/2021     Lab Results   Component Value Date     04/02/2021    K 3.7 04/02/2021    K 4.2 06/20/2020    CL 99 04/02/2021    CO2 27 04/02/2021    BUN 20 04/02/2021    CREATININE 1.3 04/02/2021    LABALBU 3.3 03/29/2021    LABALBU 4.0 03/21/2012    CALCIUM 8.0 04/02/2021    GFRAA 47 04/02/2021    LABGLOM 47 04/02/2021     Lab Results   Component Value Date    PROTIME 12.0 03/29/2021    INR 1.1 03/29/2021     Recent Labs     04/02/21  0310   PROBNP 3,467*     No results for input(s): PROCAL in the last 72 hours. This SmartLink has not been configured with any valid records. Micro:  No results for input(s): CULTRESP in the last 72 hours. No results for input(s): LABGRAM in the last 72 hours. No results for input(s): LEGUR in the last 72 hours. Recent Labs     03/31/21  1200   STREPNEUMAGU Presumptive negative- suggests no current or recent  pneumococcal infection. Infection due to Strep pneumoniae cannot be  ruled out since the antigen present in the sample  may be below the detection limit of the test.  Normal Range:Presumptive Negative       Recent Labs     03/31/21  1200   LP1UAG Presumptive Negative -suggesting no recent or current infections  with Legionella pneumophila serogroup 1. Infection to Legionella cannot be ruled out since other serogroups  and species may cause infection, antigen may not be present in  early infection, or level of antigen may be below the  detection limit. Normal Range: Presumptive Negative            Assessment:    1.  Acute hypoxic respiratory failure

## 2021-04-02 NOTE — PROGRESS NOTES
[facility] Progress Note    Admitting Date and Time: 3/29/2021  5:48 PM  Admit Dx: Acute respiratory failure with hypoxia ([facility] [address] [J96.01]    Subjective:  Patient is being followed for Acute respiratory failure with hypoxia ([facility] [address] [J96.01]     Pt awake, alert, resting in bed in no acute distress  Reporting she felt \" sick this morning\" due to NPO status and taking pills  For thoracentesis today  Ongoing weakness         ROS: denies fever, chills, cp, sob, n/v, HA unless stated above.      bumetanide  1 mg Intravenous BID    isosorbide dinitrate  10 mg Oral BID WC    hydrALAZINE  25 mg Oral 2 times per day    enoxaparin  30 mg Subcutaneous Daily    metoprolol succinate  37.5 mg Oral BID    aspirin EC  81 mg Oral Daily    levothyroxine  50 mcg Oral Daily    spironolactone  12.5 mg Oral Daily    repaglinide  1 mg Oral TID AC    insulin lispro  0-6 Units Subcutaneous TID WC    insulin lispro  0-3 Units Subcutaneous Nightly    sodium chloride flush  10 mL Intravenous 2 times per day    cefepime  2,000 mg Intravenous Q12H     glucose, 15 g, PRN  dextrose, 12.5 g, PRN  glucagon (rDNA), 1 mg, PRN  dextrose, 100 mL/hr, PRN  sodium chloride flush, 10 mL, PRN  sodium chloride, 25 mL, PRN  promethazine, 12.5 mg, Q6H PRN    Or  ondansetron, 4 mg, Q6H PRN  polyethylene glycol, 17 g, Daily PRN  acetaminophen, 650 mg, Q6H PRN    Or  acetaminophen, 650 mg, Q6H PRN  perflutren lipid microspheres, 1.5 mL, ONCE PRN         Objective:    /60   Pulse 93   Temp 98.5 °F (36.9 °C) (Oral)   Resp 18   Ht 5' 4\" (1.626 m)   Wt 167 lb 4.8 oz (75.9 kg)   SpO2 92% Comment: 86 with movement  BMI 28.72 kg/m²   General Appearance: alert and oriented to person, place and time and in no acute distress  Skin: warm and dry  Head: normocephalic and atraumatic  Neck: neck supple and non tender without mass   Pulmonary/Chest: diminished throughout to auscultation   Cardiovascular: normal rate, normal S1 and S2 and Lactic acidosis: on presentation 6.2. Resolved. 1.0     11. Deconditioning: PT /OT am pac 11. Pt reporting she is interested in [facility]- however mentioned that she needs to take care of some issues at home first. Discussed if [facility] set up- likely would not be able to go home first. Will have social work discuss further with pt.          NOTE: This report was transcribed using voice recognition software. Every effort was made to ensure accuracy; however, inadvertent computerized transcription errors may be present.   Electronically signed by FATUMA Garcia on 4/2/2021 at 9:44 AM

## 2021-04-02 NOTE — CARE COORDINATION
4/2/2021  Social Work Discharge Planning:SW left another voicemail with Pts son Padmini Hartley to discuss discharge planning. Jessica García was 11/24 and Pt lives alone at home. Awaiting return call. Pt said she wants to return home with the assist of her aides who come twice a day. Need to confirm discharge plan with son. Pt is active with [facility] and is on IVATB. Pt is  7l o2 here and uses 3l via [facility]. Wean o2 as tolerated. Electronically signed by JANELLE Winters on 4/2/2021 at 9:59 AM

## 2021-04-03 NOTE — PROGRESS NOTES
Subcutaneous TID WC    insulin lispro  0-3 Units Subcutaneous Nightly    sodium chloride flush  10 mL Intravenous 2 times per day    cefepime  2,000 mg Intravenous Q12H     glucose, 15 g, PRN  dextrose, 12.5 g, PRN  glucagon (rDNA), 1 mg, PRN  dextrose, 100 mL/hr, PRN  sodium chloride flush, 10 mL, PRN  sodium chloride, 25 mL, PRN  promethazine, 12.5 mg, Q6H PRN    Or  ondansetron, 4 mg, Q6H PRN  polyethylene glycol, 17 g, Daily PRN  acetaminophen, 650 mg, Q6H PRN    Or  acetaminophen, 650 mg, Q6H PRN  perflutren lipid microspheres, 1.5 mL, ONCE PRN         Objective:    /60   Pulse 69   Temp 98.7 °F (37.1 °C) (Axillary)   Resp 18   Ht 5' 4\" (1.626 m)   Wt 163 lb (73.9 kg)   SpO2 96%   BMI 27.98 kg/m²     General Appearance: alert and oriented to person, place and time and in no acute distress  Skin: warm and dry  Head: normocephalic and atraumatic  Eyes: pupils equal, round, and reactive to light, extraocular eye movements intact, conjunctivae normal  Neck: neck supple and non tender without mass   Pulmonary/Chest: there are crackles noted in the left lung base more than the right; reduced air flow throughout the right lower lung. Cardiovascular: normal rate, normal S1 and S2 and no carotid bruits  Abdomen: soft, non-tender, non-distended, normal bowel sounds, no masses or organomegaly  Extremities: no cyanosis, no clubbing and no edema  Neurologic: no cranial nerve deficit and speech normal        Recent Labs     04/01/21  0306 04/02/21  0310 04/03/21  0338    134 139   K 4.1 3.7 3.6    99 103   CO2 26 27 28   BUN 21 20 22   CREATININE 1.2* 1.3* 1.5*   GLUCOSE 81 112* 94   CALCIUM 8.0* 8.0* 8.1*       No results for input(s): WBC, RBC, HGB, HCT, MCV, MCH, MCHC, RDW, PLT, MPV in the last 72 hours.     Radiology:   EXAMINATION:   ONE XRAY VIEW OF THE CHEST       4/2/2021 12:06 pm       COMPARISON:   None.       HISTORY:   ORDERING SYSTEM PROVIDED HISTORY: post left thoracentesis TECHNOLOGIST PROVIDED HISTORY:   Patient is in IR room. Reason for exam:->post left thoracentesis       FINDINGS:   Heart size is unable to be accurately assessed on this single portable view   of the chest, but appears to be stable.  Patchy bilateral airspace opacities   are felt to be stable compared with yesterday's examination, when taking   account differences in technique.  A trace right pleural effusion remains. Difficult to exclude a trace residual left pleural effusion.  No evidence of   a pneumothorax following left-sided thoracentesis.         Impression   No convincing evidence of a pneumothorax following thoracentesis.       Unchanged patchy bilateral airspace opacities which may be on the basis of   multifocal pneumonia or pulmonary edema. Echocardiogram 3/30/21:  Summary   Left ventricle is normal in size . Ejection fraction is visually estimated at 65%. Indeterminate diastolic function. Mild left ventricular concentric hypertrophy noted. No regional wall motion abnormalities seen. Normal left ventricular ejection fraction. The left atrium is mildly dilated. Focal calcification mitral valve leaflets. Mild mitral annular calcification. Mild mitral regurgitation is present. Mild aortic stenosis. Moderate tricuspid regurgitation. Pulmonary hypertension is mild to moderate . There is a trivial circumferential pericardial effusion noted. Moderate left pleural effusion. Assessment:    Active Problems:    Acute respiratory failure with hypoxia (HCC)  Resolved Problems:    * No resolved hospital problems. *      Plan:  1. Acute on chronic diastolic respiratory failure with hypoxia 2/2 possible pneumonia vs. CHF exacerbation-wean o2 as tolerated  -interstitial edema on CXR  -patient remains on diuretics. They were changed from IV to PO by cardiology; bumex 2 mg PO daily.   -proBNP has not changed significantly thus far.  -cardiology and pulmonology are following

## 2021-04-03 NOTE — PROGRESS NOTES
Sheba Mata M.D.,[facility]  Paul Ventura D.O., F.LISSETH.OGuichoI., Giuliana Walsh M.D. Nikole Valadez M.D., Alondra Moseley M.D. Ashlyn Herbert D.O. Daily Pulmonary Progress Note    Patient:  Princess Nair 81 y.o. female MRN: [id number]     Date of Service: 4/3/2021      Synopsis     We are following patient for acute hypoxia and abnormal CT chest    \"CC\" ; Hartness of breath    Code status: Full code      Subjective      Patient was seen and examined. She is laying in bed in no acute distress. Her oxygen requirements remain stable saturating 96% on 7 to 8 L of oxygen. Review of Systems:  Constitutional: Denies fever, weight loss, night sweats, and fatigue  Skin: Denies pigmentation, dark lesions, and rashes   HEENT: Denies hearing loss, tinnitus, ear drainage, epistaxis, sore throat, and hoarseness. Cardiovascular: Denies palpitations, chest pain, and chest pressure.   Respiratory: Positive for cough and difficulty swallowing gastrointestinal: Denies nausea, vomiting, poor appetite, diarrhea, heartburn or reflux  Genitourinary: Denies dysuria, frequency, urgency or hematuria  Musculoskeletal: Denies myalgias, muscle weakness, and bone pain  Neurological: Denies dizziness, vertigo, headache, and focal weakness  Psychological: Denies anxiety and depression  Endocrine: Denies heat intolerance and cold intolerance  Hematopoietic/Lymphatic: Denies bleeding problems and blood transfusions    24-hour events:      Objective   Vitals: /65   Pulse 72   Temp 97.4 °F (36.3 °C) (Axillary)   Resp 18   Ht 5' 4\" (1.626 m)   Wt 163 lb (73.9 kg)   SpO2 96%   BMI 27.98 kg/m²     I/O:  No intake or output data in the 24 hours ending 04/03/21 1727    Vent Information  Skin Assessment: Clean, dry, & intact  FiO2 : 50 %  SpO2: 96 %  SpO2/FiO2 ratio: 192  I Time/ I Time %: 0.9 s  Mask Type: Full face mask  Mask Size: Medium       IPAP: 15 cmH20  CPAP/EPAP: 6 cmH2O     CURRENT MEDS :  Scheduled Meds:   bumetanide  2 mg Oral Daily    potassium bicarb-citric acid  40 mEq Oral Daily    isosorbide dinitrate  10 mg Oral BID WC    hydrALAZINE  25 mg Oral 2 times per day    enoxaparin  30 mg Subcutaneous Daily    metoprolol succinate  37.5 mg Oral BID    aspirin EC  81 mg Oral Daily    levothyroxine  50 mcg Oral Daily    spironolactone  12.5 mg Oral Daily    repaglinide  1 mg Oral TID AC    insulin lispro  0-6 Units Subcutaneous TID WC    insulin lispro  0-3 Units Subcutaneous Nightly    sodium chloride flush  10 mL Intravenous 2 times per day    cefepime  2,000 mg Intravenous Q12H       Physical Exam:  General Appearance: appears comfortable in no acute distress. HEENT: Normocephalic atraumatic without obvious abnormality   Neck: Lips, mucosa, and tongue normal.  Supple, symmetrical, trachea midline, no adenopathy;thyroid:  no enlargement/tenderness/nodules or JVD. Lung: clear and diminished in the bases  Heart: RRR, normal S1, S2. No MRG  Abdomen: Soft, NT, ND. BS present x 4 quadrants. No bruit or organomegaly. Extremities: Pedal pulses 2+ symmetric b/l. Extremities normal, no cyanosis, clubbing, or edema. Musculokeletal: No joint swelling, no muscle tenderness. ROM normal in all joints of extremities. Neurologic: Mental status: Alert and Oriented X3 . Pertinent/ New Labs and Imaging Studies     X-ray reviewed today no new changes. ECHO  3/30/2021  Left ventricle is normal in size . Mild left ventricular concentric hypertrophy noted. No regional wall motion abnormalities seen. Normal left ventricular ejection fraction. The left atrium is mildly dilated. Focal calcification mitral valve leaflets. Mild mitral annular calcification. Mild mitral regurgitation is present. Mild aortic stenosis. Moderate tricuspid regurgitation. Pulmonary hypertension is mild to moderate . There is a trivial circumferential pericardial effusion noted.    Moderate left pleural effusion. Labs:  Lab Results   Component Value Date    WBC 8.2 03/29/2021    HGB 10.4 03/29/2021    HCT 34.8 03/29/2021    MCV 93.0 03/29/2021    MCH 27.8 03/29/2021    MCHC 29.9 03/29/2021    RDW 14.8 03/29/2021     03/29/2021    MPV 10.4 03/29/2021     Lab Results   Component Value Date     04/03/2021    K 3.6 04/03/2021    K 4.2 06/20/2020     04/03/2021    CO2 28 04/03/2021    BUN 22 04/03/2021    CREATININE 1.5 04/03/2021    LABALBU 3.3 03/29/2021    LABALBU 4.0 03/21/2012    CALCIUM 8.1 04/03/2021    GFRAA 40 04/03/2021    LABGLOM 40 04/03/2021     Lab Results   Component Value Date    PROTIME 12.0 03/29/2021    INR 1.1 03/29/2021     Recent Labs     04/02/21  0310   PROBNP 3,467*     No results for input(s): PROCAL in the last 72 hours. This SmartLink has not been configured with any valid records. Micro:  No results for input(s): CULTRESP in the last 72 hours. Recent Labs     04/02/21  1145   LABGRAM Gram stain performed on unspun fluid  Rare Polymorphonuclear leukocytes  Epithelial cells not seen  No organisms seen       No results for input(s): LEGUR in the last 72 hours. No results for input(s): STREPNEUMAGU in the last 72 hours. No results for input(s): LP1UAG in the last 72 hours. Assessment:    1. Acute hypoxic respiratory failure most likely combination of acute on chronic CHF and also possibility of healthcare associated pneumonia versus aspiration pneumonia  2. History of heart failure with preserved ejection fraction  3. Mild pharyngeal dysphagia      Plan:   1. Wean FiO2 for saturations above 92%  2. Continue BiPAP as needed and nightly  3. Continue cefepime for total 8 days, Legionella and strep urinary antigens were negative viral panel was negative  4. Patient came back from IR only 75 cc pleural fluid was drained from the left side. Cytology is pending   5. continue dysphagia 3 soft advance diet.   6. Continue to monitor I's and O's closely continue diuresis per nephrology  7.   PT OT      Electronically signed by Corey Short MD on 4/3/2021 at 5:27 PM

## 2021-04-03 NOTE — PROGRESS NOTES
INPATIENT CARDIOLOGY FOLLOW-UP    Name: Sharyl Holstein    Age: 81 y.o. Date of Admission: 3/29/2021  5:48 PM    Date of Service: 4/3/2021    Chief Complaint: Follow-up for resolving acute hypoxic respiratory failure, acute superimposed upon chronic diastolic heart failure, sinus node dysfunction, hypertension, possible pneumonia, cognitive dysfunction    Interim History: The patient is somewhat lethargic with nursing staff reporting earlier assessments being alert and awake. Interim incomplete maintenance of intake and output continues to limit adequate assessment of her response to present medical therapy with an appropriate response to optimization of blood pressure management and stable renal function and electrolytes. Her thoracentesis was reviewed with minimal fluid obtained from her left pleural space and persistent infiltrates on her follow-up chest x-ray. Review of Systems: The remainder of a complete multisystem review including consitutional, central nervous, respiratory, circulatory, gastrointestinal, genitourinary, endocrinologic, hematologic, musculoskeletal and psychiatric are negative.     Problem List:  Patient Active Problem List   Diagnosis    HTN (hypertension), benign    Hypertensive heart disease    Hypothyroidism    DM (diabetes mellitus), type 2 ([facility] [address]    Acute respiratory failure with hypoxia ([facility] [address]    Uncontrolled type 2 diabetes mellitus with hyperglycemia ([facility]    Chronic diastolic CHF (congestive heart failure) ([facility])    Chest pain    LVH (left ventricular hypertrophy)    Non-rheumatic mitral regurgitation    CKD (chronic kidney disease) stage 3, GFR 30-59 ml/min ([facility])    SSS (sick sinus syndrome) ([facility])    Dizziness    Unsteadiness    Leukopenia    Primary osteoarthritis involving multiple joints    Cervical radiculopathy    Bilateral shoulder bursitis    Aspirin intolerance    Primary osteoarthritis of one knee, right    Primary osteoarthritis of right knee    Acute blood loss anemia    HF (heart failure) ([facility]    Chronic pain syndrome    Cervicalgia    Myalgia    Intractable headache    Acute on chronic diastolic congestive heart failure (HCC)    Metabolic acidosis    Anemia    Edema    Neck pain    Congestive heart failure of unknown etiology ([facility])    AMANDEEP (acute kidney injury) ([facility] [address]    Elevated troponin    Obesity (BMI 30.0-34. 9)       Allergies:   Allergies   Allergen Reactions    Aspirin Other (See Comments)     \"tears up my stomach\"       Current Medications:  Current Facility-Administered Medications   Medication Dose Route Frequency Provider Last Rate Last Admin    bumetanide (BUMEX) tablet 2 mg  2 mg Oral Daily Isaac Hernandez MD        isosorbide dinitrate (ISORDIL) tablet 10 mg  10 mg Oral BID WC Isaac Hernandez MD   10 mg at 04/02/21 1638    hydrALAZINE (APRESOLINE) tablet 25 mg  25 mg Oral 2 times per day Isaac Hernandez MD   25 mg at 04/02/21 2023    enoxaparin (LOVENOX) injection 30 mg  30 mg Subcutaneous Daily Tony Fleming MD   Stopped at 04/02/21 0900    metoprolol succinate (TOPROL XL) extended release tablet 37.5 mg  37.5 mg Oral BID Isaac Hernandez MD   37.5 mg at 04/02/21 2023    aspirin EC tablet 81 mg  81 mg Oral Daily Tony Fleming MD   81 mg at 04/02/21 0818    levothyroxine (SYNTHROID) tablet 50 mcg  50 mcg Oral Daily Tony Fleming MD   50 mcg at 04/02/21 9638    spironolactone (ALDACTONE) tablet 12.5 mg  12.5 mg Oral Daily Tony Fleming MD   12.5 mg at 04/02/21 8783    repaglinide (PRANDIN) tablet 1 mg  1 mg Oral TID AC Tony Fleming MD   1 mg at 04/02/21 1631    glucose (GLUTOSE) 40 % oral gel 15 g  15 g Oral PRN Tony Fleming MD        dextrose 50 % IV solution  12.5 g Intravenous PRN Tony Fleming MD        glucagon (rDNA) injection 1 mg  1 mg Intramuscular PRN Tony Fleming MD        dextrose 5 % solution  100 mL/hr Intravenous PRN Tony Fleming MD        insulin lispro (HUMALOG) injection vial 0-6 Units  0-6 Units Subcutaneous TID WC Tony Fleming MD   1 Units at 04/02/21 1630    insulin lispro (HUMALOG) injection vial 0-3 Units  0-3 Units Subcutaneous Nightly Tony Fleming MD   1 Units at 03/30/21 2042    sodium chloride flush 0.9 % injection 10 mL  10 mL Intravenous 2 times per day Ac Banuelos MD   10 mL at 04/02/21 0819    sodium chloride flush 0.9 % injection 10 mL  10 mL Intravenous PRN Tony Fleming MD   10 mL at 04/01/21 1422    0.9 % sodium chloride infusion  25 mL Intravenous PRN Tony Fleming MD        promethazine (PHENERGAN) tablet 12.5 mg  12.5 mg Oral Q6H PRN Tony Fleming MD        Or    ondansetron (ZOFRAN) injection 4 mg  4 mg Intravenous Q6H PRN Tony Fleming MD        polyethylene glycol (GLYCOLAX) packet 17 g  17 g Oral Daily PRN Ac Banuelos MD        acetaminophen (TYLENOL) tablet 650 mg  650 mg Oral Q6H PRN Ac Banuelos MD   650 mg at 03/31/21 1819    Or    acetaminophen (TYLENOL) suppository 650 mg  650 mg Rectal Q6H PRN Tony Fleming MD        cefepime (MAXIPIME) 2000 mg IVPB extended (mini-bag)  2,000 mg Intravenous Q12H Tony Fleming MD 12.5 mL/hr at 04/03/21 0310 2,000 mg at 04/03/21 0310    0.9 % sodium chloride infusion  25 mL Intravenous Q12H Ac Banuelos MD   Stopped at 04/02/21 2230    perflutren lipid microspheres (DEFINITY) injection 1.65 mg  1.5 mL Intravenous ONCE PRN Kenny Willett MD          dextrose      sodium chloride      sodium chloride Stopped (04/02/21 2230)       Physical Exam:  /63   Pulse 66   Temp 98.4 °F (36.9 °C) (Axillary)   Resp 29   Ht 5' 4\" (1.626 m)   Wt 163 lb (73.9 kg)   SpO2 96%   BMI 27.98 kg/m²   Weight change: -4 lb 4.8 oz (-1.95 kg)  Wt Readings from Last 3 Encounters:   04/03/21 163 lb (73.9 kg)   03/26/21 159 lb 3.2 oz (72.2 kg)   03/23/21 170 lb 6.4 oz (77.3 kg)     The patient is somewhat lethargic and in no discomfort or distress.  No gross musculoskeletal deformity is present. No significant skin or nail changes are present. Gross examination of head, eyes, nose and throat are negative. Jugular venous pressure is normal and no carotid bruits are present. Normal respiratory effort is noted with no accessory muscle usage present. Lung fields are clear to ascultation. Cardiac examination is notable for a regular rate and rhythm with no palpable thrill. No gallop rhythm and a soft systolic murmur at the left sternal border are identified. A benign abdominal examination is present with no masses or organomegaly. Intact pulses are present throughout all extremities and no peripheral edema is present. No focal neurologic deficits are present. Intake/Output:    Intake/Output Summary (Last 24 hours) at 4/3/2021 0659  Last data filed at 4/2/2021 0809  Gross per 24 hour   Intake 120 ml   Output 150 ml   Net -30 ml     No intake/output data recorded. Laboratory Tests:  Lab Results   Component Value Date    CREATININE 1.5 (H) 04/03/2021    BUN 22 04/03/2021     04/03/2021    K 3.6 04/03/2021     04/03/2021    CO2 28 04/03/2021     No results for input(s): CKTOTAL, CKMB in the last 72 hours. Invalid input(s): TROPONONI  Lab Results   Component Value Date     (H) 02/21/2013     Lab Results   Component Value Date    WBC 8.2 03/29/2021    RBC 3.74 03/29/2021    RBC 3.68 12/19/2017    HGB 10.4 03/29/2021    HCT 34.8 03/29/2021    MCV 93.0 03/29/2021    MCH 27.8 03/29/2021    MCHC 29.9 03/29/2021    RDW 14.8 03/29/2021     03/29/2021    MPV 10.4 03/29/2021     No results for input(s): ALKPHOS, ALT, AST, PROT, BILITOT, BILIDIR, LABALBU in the last 72 hours.   Lab Results   Component Value Date    MG 1.9 04/03/2021     Lab Results   Component Value Date    PROTIME 12.0 03/29/2021    INR 1.1 03/29/2021     Lab Results   Component Value Date    TSH 2.610 03/29/2021     No components found for: CHLPL  Lab Results   Component Value Date    TRIG 73 03/31/2021 software. Every effort has been made to ensure accuracy, however; inadvertent computerized transcription errors may be present. Mercy Avila.  Rob Escamilla, [address] [facility]

## 2021-04-04 NOTE — PROGRESS NOTES
MD Bernadette        insulin lispro (HUMALOG) injection vial 0-6 Units  0-6 Units Subcutaneous TID WC Tony Fleming MD   2 Units at 04/03/21 1119    insulin lispro (HUMALOG) injection vial 0-3 Units  0-3 Units Subcutaneous Nightly Tony Fleming MD   1 Units at 03/30/21 2042    sodium chloride flush 0.9 % injection 10 mL  10 mL Intravenous 2 times per day Margarita Maldonado MD   10 mL at 04/03/21 0946    sodium chloride flush 0.9 % injection 10 mL  10 mL Intravenous PRN Tony Fleming MD   10 mL at 04/01/21 1422    0.9 % sodium chloride infusion  25 mL Intravenous PRN Tony Fleming MD        promethazine (PHENERGAN) tablet 12.5 mg  12.5 mg Oral Q6H PRN Tony Fleming MD        Or    ondansetron (ZOFRAN) injection 4 mg  4 mg Intravenous Q6H PRN Tony Fleming MD   4 mg at 04/03/21 1106    polyethylene glycol (GLYCOLAX) packet 17 g  17 g Oral Daily PRN Margarita Maldonado MD        acetaminophen (TYLENOL) tablet 650 mg  650 mg Oral Q6H PRN Margarita Maldonado MD   650 mg at 04/03/21 1817    Or    acetaminophen (TYLENOL) suppository 650 mg  650 mg Rectal Q6H PRN Tony Fleming MD        cefepime (MAXIPIME) 2000 mg IVPB extended (mini-bag)  2,000 mg Intravenous Q12H Tony Fleming MD 12.5 mL/hr at 04/04/21 0321 2,000 mg at 04/04/21 0321    0.9 % sodium chloride infusion  25 mL Intravenous Q12H Margarita Maldonado MD   Stopped at 04/03/21 2132    perflutren lipid microspheres (DEFINITY) injection 1.65 mg  1.5 mL Intravenous ONCE PRN Castillo Nagel MD          dextrose      sodium chloride      sodium chloride Stopped (04/03/21 2132)       Physical Exam:  BP (!) 140/67   Pulse 75   Temp 97.6 °F (36.4 °C) (Oral)   Resp 18   Ht 5' 4\" (1.626 m)   Wt 165 lb (74.8 kg)   SpO2 97%   BMI 28.32 kg/m²   Weight change: 2 lb (0.907 kg)  Wt Readings from Last 3 Encounters:   04/04/21 165 lb (74.8 kg)   03/26/21 159 lb 3.2 oz (72.2 kg)   03/23/21 170 lb 6.4 oz (77.3 kg)     The patient is awake, alert and in no discomfort or distress. She remains debilitated and chronically ill. No gross musculoskeletal deformity is present. No significant skin or nail changes are present. Gross examination of head, eyes, nose and throat are negative. Jugular venous pressure is normal and no carotid bruits are present. Normal respiratory effort is noted with no accessory muscle usage present. Lung fields are in demonstrated diminished breath sounds in both lung bases as well as that of scattered rhonchi to ascultation. Cardiac examination is notable for a regular rate and rhythm with no palpable thrill. No gallop rhythm and a soft systolic murmur at the left sternal border are identified. A benign abdominal examination is present with no masses or organomegaly. Intact pulses are present throughout all extremities and no peripheral edema is present. No focal neurologic deficits are present. Intake/Output:    Intake/Output Summary (Last 24 hours) at 4/4/2021 0743  Last data filed at 4/3/2021 2152  Gross per 24 hour   Intake 120 ml   Output --   Net 120 ml     No intake/output data recorded. Laboratory Tests:  Lab Results   Component Value Date    CREATININE 1.5 (H) 04/03/2021    BUN 22 04/03/2021     04/03/2021    K 3.6 04/03/2021     04/03/2021    CO2 28 04/03/2021     No results for input(s): CKTOTAL, CKMB in the last 72 hours. Invalid input(s): TROPONONI  Lab Results   Component Value Date     (H) 02/21/2013     Lab Results   Component Value Date    WBC 8.2 03/29/2021    RBC 3.74 03/29/2021    RBC 3.68 12/19/2017    HGB 10.4 03/29/2021    HCT 34.8 03/29/2021    MCV 93.0 03/29/2021    MCH 27.8 03/29/2021    MCHC 29.9 03/29/2021    RDW 14.8 03/29/2021     03/29/2021    MPV 10.4 03/29/2021     No results for input(s): ALKPHOS, ALT, AST, PROT, BILITOT, BILIDIR, LABALBU in the last 72 hours.   Lab Results   Component Value Date    MG 1.9 04/03/2021     Lab Results   Component Value Date    PROTIME 12.0 03/29/2021    INR 1.1 03/29/2021     Lab Results   Component Value Date    TSH 2.610 03/29/2021     No components found for: CHLPL  Lab Results   Component Value Date    TRIG 73 03/31/2021    TRIG 83 03/04/2021    TRIG 45 07/27/2020     Lab Results   Component Value Date    HDL 41 03/31/2021    HDL 65 03/04/2021    HDL 73 07/27/2020     Lab Results   Component Value Date    LDLCALC 50 03/31/2021    LDLCALC 52 03/04/2021    LDLCALC 49 07/27/2020       Cardiac Tests:  Telemetry findings reviewed: sinus rhythm/sinus bradycardia, no new tachy/bradyarrhythmias overnight  Chest X-ray: A repeat chest x-ray reviewed at the time evaluation continues to demonstrate evidence of cardiomegaly with diffuse interstitial infiltrates and bilateral pleural effusions more prominent on the left than right      ASSESSMENT / PLAN: On a clinical basis, the patient remains subjectively unchanged from a cardiovascular standpoint with persistent diffuse interstitial infiltrates in the absence of additional manifestations of significant volume overload and limitations of assessment of her response to therapy by persistent incompletely maintained intake and output. Persistent stage I predominantly systolic hypertension hypertension is noted adversely affecting diastolic cardiac performance with plans of modification of her hydralazine and oral nitrates. Continued careful monitoring of her renal function and electrolytes will be necessary in addition to that of monitoring of her volume status with plans of a repeat proBNP level tomorrow to further assist management. Additional management will be deferred to the pulmonary service in light of her pleural effusions and considered potential pneumonia. Ongoing aggressive management of blood pressure, diabetes and serum lipids will be essential to reducing risk of future atherosclerotic development. Note: This report was completed utilizing computer voice recognition software.  Every effort has been made to ensure accuracy, however; inadvertent computerized transcription errors may be present. Kassy Bruner.  Mis Willis, [address] [facility]

## 2021-04-04 NOTE — PROGRESS NOTES
I Time %: 0.9 s  Mask Type: Full face mask  Mask Size: Medium       IPAP: 15 cmH20  CPAP/EPAP: 6 cmH2O     CURRENT MEDS :  Scheduled Meds:   hydrALAZINE  25 mg Oral 3 times per day    isosorbide dinitrate  10 mg Oral TID    bumetanide  2 mg Oral Daily    potassium bicarb-citric acid  40 mEq Oral Daily    enoxaparin  30 mg Subcutaneous Daily    metoprolol succinate  37.5 mg Oral BID    aspirin EC  81 mg Oral Daily    levothyroxine  50 mcg Oral Daily    spironolactone  12.5 mg Oral Daily    repaglinide  1 mg Oral TID AC    insulin lispro  0-6 Units Subcutaneous TID WC    insulin lispro  0-3 Units Subcutaneous Nightly    sodium chloride flush  10 mL Intravenous 2 times per day    cefepime  2,000 mg Intravenous Q12H       Physical Exam:  General Appearance: appears comfortable in no acute distress. HEENT: Normocephalic atraumatic without obvious abnormality   Neck: Lips, mucosa, and tongue normal.  Supple, symmetrical, trachea midline, no adenopathy;thyroid:  no enlargement/tenderness/nodules or JVD. Lung: clear and diminished in the bases  Heart: RRR, normal S1, S2. No MRG  Abdomen: Soft, NT, ND. BS present x 4 quadrants. No bruit or organomegaly. Extremities: Pedal pulses 2+ symmetric b/l. Extremities normal, no cyanosis, clubbing, or edema. Musculokeletal: No joint swelling, no muscle tenderness. ROM normal in all joints of extremities. Neurologic: Mental status: Alert and Oriented X3 . Pertinent/ New Labs and Imaging Studies     X-ray reviewed today no new changes. ECHO  3/30/2021  Left ventricle is normal in size . Mild left ventricular concentric hypertrophy noted. No regional wall motion abnormalities seen. Normal left ventricular ejection fraction. The left atrium is mildly dilated. Focal calcification mitral valve leaflets. Mild mitral annular calcification. Mild mitral regurgitation is present. Mild aortic stenosis. Moderate tricuspid regurgitation. was drained from the left side. Cytology is pending   6. continue dysphagia 3 soft advance diet. 7. Continue to monitor I's and O's closely continue diuresis per nephrology  7.   PT OT      Electronically signed by Matilda Diehl MD on 4/4/2021 at 2:41 PM

## 2021-04-04 NOTE — PROGRESS NOTES
and reactive to light, extraocular eye movements intact, conjunctivae normal  Neck: neck supple and non tender without mass   Pulmonary/Chest: there are crackles noted in the left lung base more than the right; reduced air flow throughout the right lower lung. Cardiovascular: normal rate, normal S1 and S2 and no carotid bruits  Abdomen: soft, non-tender, non-distended, normal bowel sounds, no masses or organomegaly  Extremities: no cyanosis, no clubbing and no edema  Neurologic: no cranial nerve deficit and speech normal        Recent Labs     04/02/21  0310 04/03/21  0338    139   K 3.7 3.6   CL 99 103   CO2 27 28   BUN 20 22   CREATININE 1.3* 1.5*   GLUCOSE 112* 94   CALCIUM 8.0* 8.1*       No results for input(s): WBC, RBC, HGB, HCT, MCV, MCH, MCHC, RDW, PLT, MPV in the last 72 hours. Radiology:   EXAMINATION:   ONE XRAY VIEW OF THE CHEST       4/2/2021 12:06 pm       COMPARISON:   None.       HISTORY:   ORDERING SYSTEM PROVIDED HISTORY: post left thoracentesis   TECHNOLOGIST PROVIDED HISTORY:   Patient is in IR room. Reason for exam:->post left thoracentesis       FINDINGS:   Heart size is unable to be accurately assessed on this single portable view   of the chest, but appears to be stable.  Patchy bilateral airspace opacities   are felt to be stable compared with yesterday's examination, when taking   account differences in technique.  A trace right pleural effusion remains. Difficult to exclude a trace residual left pleural effusion.  No evidence of   a pneumothorax following left-sided thoracentesis.         Impression   No convincing evidence of a pneumothorax following thoracentesis.       Unchanged patchy bilateral airspace opacities which may be on the basis of   multifocal pneumonia or pulmonary edema. Echocardiogram 3/30/21:  Summary   Left ventricle is normal in size . Ejection fraction is visually estimated at 65%. Indeterminate diastolic function.    Mild left ventricular concentric hypertrophy noted. No regional wall motion abnormalities seen. Normal left ventricular ejection fraction. The left atrium is mildly dilated. Focal calcification mitral valve leaflets. Mild mitral annular calcification. Mild mitral regurgitation is present. Mild aortic stenosis. Moderate tricuspid regurgitation. Pulmonary hypertension is mild to moderate . There is a trivial circumferential pericardial effusion noted. Moderate left pleural effusion. EXAMINATION:   ONE XRAY VIEW OF THE CHEST       4/3/2021 9:22 am       COMPARISON:   04/02/2021       HISTORY:   ORDERING SYSTEM PROVIDED HISTORY: hypoxia   TECHNOLOGIST PROVIDED HISTORY:   Reason for exam:->hypoxia       FINDINGS:   Cardiac silhouette is enlarged.  No pneumothorax.  Moderate left pleural   effusion.  Small right pleural effusion.  Hazy airspace opacity centrally and   in the bases.           Impression   No significant change in appearance of the chest with persistent left pleural   effusion and multifocal airspace opacities. Assessment:    Active Problems:    Acute respiratory failure with hypoxia (HCC)  Resolved Problems:    * No resolved hospital problems. *      Plan:  1. Acute on chronic diastolic respiratory failure with hypoxia 2/2 possible pneumonia vs. CHF exacerbation-wean o2 as tolerated  -interstitial edema on CXR (multifocal airspace opacities with left pleural effusion)  -patient remains on diuretics. They were changed from IV to PO by cardiology; bumex 2 mg PO daily.   -proBNP has not changed significantly thus far.  -cardiology and pulmonology are following the patient  -continue cefepime  -echocardiogram 3/30/21 shows normal EF without systolic dysfunction  -thoracentesis performed 4/2/21-only 75 cc of pleural fluid was drained from the left side; fluids sent for culture and have been negative thus far  -mild pharyngeal dysphagia-continue dysphagia 3 soft advance diet  -monitor I/O closely (this has not been clearly charted)  -BMP this morning shows worsening creatinine which is likely related to the diuretic therapy that she continues to require    2.  Probable HCAP  -continue abx  -flutter valve  -bipap as needed and nightly per pulm    3.  Acute on chronic diastolic CHF-cardiology following. 4.  bilateral pleural effusions  -s/p thoracentesis 4/2 with 75 cc pleural fluid removed from left side    5.  CKD stage 3- baseline GFR 47-58, creatinine 0.9-1.3  -reduced GFR with diuresis; now creatinine of 1.9  -IV diuretics changed to PO by cardiology  -continue to monitor  -could consider nephrology consult     6.  Hypothyroidism, acquired-continue synthroid    7.  DM2-fairly well controlled  -continue repaglinide;ISS      NOTE: This report was transcribed using voice recognition software. Every effort was made to ensure accuracy; however, inadvertent computerized transcription errors may be present.   Electronically signed by Denzel Martinez MD on 4/4/2021 at 7:39 AM

## 2021-04-05 NOTE — DISCHARGE INSTR - COC
Continuity of Care Form    Patient Name: Nessa Sorto   :  1935  MRN:  [id number]    [address] [place] [address] date:  3/29/2021  Discharge date:  ***    Code Status Order: Full Code   Advance Directives:   Advance Care Flowsheet Documentation     Date/Time Healthcare Directive Type of Healthcare Directive Copy in [address] Agent's Name Healthcare Agent's Phone Number    21 0221  No, patient does not have an advance directive for healthcare treatment -- -- -- -- --          Admitting Physician:  Courtney Atwood MD  PCP: Brian Malloy MD    Discharging Nurse: [facility] Unit/Room#: 3014/5462-P  Discharging Unit Phone Number: ***    Emergency Contact:   Extended Emergency Contact Information  Primary Emergency Contact: [address] of [address] [place] [address] Phone: [phone]  Relation: Child  Secondary Emergency Contact: Casandra Chaudhry  [employer] Phone: [phone]  Relation: Child    Past Surgical History:  Past Surgical History:   Procedure Laterality Date    FOOT SURGERY      both    JOINT REPLACEMENT      TOTAL KNEE ARTHROPLASTY Right 3/21/2019    RIGHT KNEE TOTAL ARTHROPLASTY (ILENE)++ADDUCTOR CANAL BLOCK++ performed by Saba Fuentes MD at [facility] OR       Immunization History: There is no immunization history on file for this patient.     Active Problems:  Patient Active Problem List   Diagnosis Code    HTN (hypertension), benign I10    Hypertensive heart disease I11.9    Hypothyroidism E03.9    DM (diabetes mellitus), type 2 ([facility] E11.9    Acute respiratory failure with hypoxia ([facility] J96.01    Uncontrolled type 2 diabetes mellitus with hyperglycemia ([facility]) E11.65    Chronic diastolic CHF (congestive heart failure) ([facility]) I50.32    Chest pain R07.9    LVH (left ventricular hypertrophy) I51.7    Non-rheumatic mitral regurgitation I34.0    CKD (chronic kidney disease) stage 3, GFR 30-59 ml/min ([facility]) N18.30    SSS (sick sinus syndrome) ([facility] I49.5    Dizziness R42    Unsteadiness R26.81    Leukopenia D72.819    Primary osteoarthritis involving multiple joints M89.49    Cervical radiculopathy M54.12    Bilateral shoulder bursitis M75.51, M75.52    Aspirin intolerance Z78.9    Primary osteoarthritis of one knee, right M17.11    Primary osteoarthritis of right knee M17.11    Acute blood loss anemia D62    HF (heart failure) ([facility]) I50.9    Chronic pain syndrome G89.4    Cervicalgia M54.2    Myalgia M79.10    Intractable headache R51.9    Acute on chronic diastolic congestive heart failure (HCC) C10.97    Metabolic acidosis I72.7    Anemia D64.9    Edema R60.9    Neck pain M54.2    Congestive heart failure of unknown etiology ([facility]) I50.9    AMANDEEP (acute kidney injury) ([facility] N17.9    Elevated troponin R77.8    Obesity (BMI 30.0-34. 9) E66.9       Isolation/Infection:   Isolation          No Isolation        Patient Infection Status     Infection Onset Added Last Indicated Last Indicated By Review Planned Expiration Resolved Resolved By    None active    Resolved    COVID-19 Rule Out 03/30/21 03/30/21 03/30/21 Respiratory Panel, Molecular, with COVID-19 (Restricted: peds pts or suitable admitted adults) (Ordered)   03/31/21 Rule-Out Test Resulted    COVID-19 Rule Out 03/23/21 03/23/21 03/23/21 COVID-19, Rapid (Ordered)   03/23/21 Rule-Out Test Resulted    COVID-19 Rule Out 03/03/21 03/03/21 03/03/21 COVID-19, Rapid (Ordered)   03/03/21 Rule-Out Test Resulted          Nurse Assessment:  Last Vital Signs: /65   Pulse 78   Temp 97.8 °F (36.6 °C) (Axillary)   Resp 22   Ht 5' 4\" (1.626 m)   Wt 167 lb 8 oz (76 kg)   SpO2 99%   BMI 28.75 kg/m²     Last documented pain score (0-10 scale): Pain Level: 0  Last Weight:   Wt Readings from Last 1 Encounters:   04/05/21 167 lb 8 oz (76 kg)     Mental Status:  {IP PT MENTAL STATUS:79535}    IV Access:  {[facility] [place]:832254339}    Nursing Mobility/ADLs:  Walking   {P DME HTMW:513416437}  Transfer  {CHP DME YKYL:536874798}  Bathing  {CHP DME WMLL:112806122}  Dressing  {CHP DME FYYT:392989180}  Toileting  {CHP DME HTTD:035057836}  Feeding  {CHP DME UVLE:608851275}  Med Admin  {CHP DME ZDCS:117585904}  Med Delivery   { PERRI MED Delivery:667048501}    Wound Care Documentation and Therapy:        Elimination:  Continence:   · Bowel: {YES / HX:57175}  · Bladder: {YES / JT:05172}  Urinary Catheter: {Urinary Catheter:007720013}   Colostomy/Ileostomy/Ileal Conduit: {YES / Q}       Date of Last BM: ***    Intake/Output Summary (Last 24 hours) at 2021 1424  Last data filed at 2021 2200  Gross per 24 hour   Intake 240 ml   Output 750 ml   Net -510 ml     I/O last 3 completed shifts:   In: 240 [P.O.:240]  Out: 750 [Urine:750]    Safety Concerns:     [address] Safety Concerns:747432336}    Impairments/Disabilities:      [address] Impairments/Disabilities:424252482}    Nutrition Therapy:  Current Nutrition Therapy:   [address] Diet List:795822793}    Routes of Feeding: {[facility] DME Other Feedings:485308489}  Liquids: {Slp liquid thickness:64020}  Daily Fluid Restriction: {P DME Yes amt example:371140919}  Last Modified Barium Swallow with Video (Video Swallowing Test): {Done Not Done LTWZ:187420028}    Treatments at the Time of Hospital Discharge:   Respiratory Treatments: ***  Oxygen Therapy:  {Therapy; copd oxygen:65299}  Ventilator:    { CC Vent EWJR:380826830}    Rehab Therapies: {THERAPEUTIC INTERVENTION:4635648950}  Weight Bearing Status/Restrictions: [address]  Weight Bearin}  Other Medical Equipment (for information only, NOT a DME order):  {EQUIPMENT:403437754}  Other Treatments: ***    Patient's personal belongings (please select all that are sent with patient):  {[facility] DME Belongings:594412672}    RN SIGNATURE:  {Esignature:336405190}    CASE MANAGEMENT/SOCIAL WORK SECTION    Inpatient Status Date: 3/30/2021    Readmission Risk Assessment Score:  Readmission Risk Risk of Unplanned Readmission:        31           Discharging to Facility/ Agency   · Name:[facility]   · Address: [address] [address]  · Phone:[phone]  · OUI:[phone]    Dialysis Facility (if applicable)   · Jinny Gilbert  · [address] [facility], [facility]  · Dialysis Schedule:TTS AT 5:40AM  · Phone:[phone]  · Fax:[phone]    / signature: Electronically signed by JANELLE Lipscomb on 4/5/2021 at 2:24 PM      PHYSICIAN SECTION    Prognosis: {Prognosis:7209044279}    Condition at Discharge: Madeline Ruiz Umair Patient Condition:603481134}    Rehab Potential (if transferring to Rehab): {Prognosis:0974687370}    Recommended Labs or Other Treatments After Discharge: ***    Physician Certification: I certify the above information and transfer of Robbin Villalba  is necessary for the continuing treatment of the diagnosis listed and that she requires led snf for less 30 days.      Update Admission H&P: {CHP DME Changes in AGCXY:929347325}    PHYSICIAN SIGNATURE:  {Esignature:893697725}

## 2021-04-05 NOTE — CARE COORDINATION
4/5/2021 Social Work Discharge Planning:SW still unable to reach son Rupert Ortiz but did reach son Alexis Basurto who said Pt will definatey need rehab before returning home. Alexis Basurto informed of Pt and family friend Bentley Roche [phone] who they would like TIFFANIE to get AMTA choices from . SW called Bentley Roche and left a voicemail requesting a call back. Electronically signed by JANELLE Oconnor on 4/5/2021 at 10:52 AM    4/5/2021  Social Work Discharge Planning:TIFFANIE received MATA choices from friend Jayy Share -[phone] . [address] [place] [address] , Sascha Mcqueen and [facility]. Referral was made to liaison at [address] [place] [address] . Waiting reply. Electronically signed by JANELLE Oconnor on 4/5/2021 at 12:08 PM 4/5/2021 4/5/2021  Social Work Discharge Planning:  Pt was accepted by [address] [place] [address]  and they will start precert. N-17 generated, hens completed and transport form is in chart. Electronically signed by JANELLE Oocnnor on 4/5/2021 at 2:23 PM'    4/5/2021  Social Work Discharge Planning:TIFFANIE was just informed by liason at [facility]. that they are not in network with Pts specific ins. SW made a referral to Juan A Watt with [facility]. Waiting reply. Electronically signed by JANELLE Oconnor on 4/5/2021 at 3:17 PM

## 2021-04-05 NOTE — PROGRESS NOTES
Leslie Abebe M.D.,[facility]  Francy Martinez D.O., F.A.C.O.I., Jesse Clifford M.D. Jose Carlos Holland M.D., Eliezer Schilder ,M.D. Bianca Christensen D.O. Daily Pulmonary Progress Note    Patient:  Bill Lozano 81 y.o. female MRN: [id number]     Date of Service: 4/5/2021      Synopsis     We are following patient for acute hypoxia and abnormal CT chest    \"CC\" ; Hartness of breath    Code status: Full code      Subjective      Patient was seen and examined. She is laying in bed in no acute distress. Still requiring 12 L of oxygen to maintain saturations 96%. She is at her baseline intermittently pleasantly confused. [place]. Review of Systems:  Constitutional: Denies fever, weight loss, night sweats, and fatigue  Skin: Denies pigmentation, dark lesions, and rashes   HEENT: Denies hearing loss, tinnitus, ear drainage, epistaxis, sore throat, and hoarseness. Cardiovascular: Denies palpitations, chest pain, and chest pressure.   Respiratory: Positive for cough and difficulty swallowing gastrointestinal: Denies nausea, vomiting, poor appetite, diarrhea, heartburn or reflux  Genitourinary: Denies dysuria, frequency, urgency or hematuria  Musculoskeletal: Denies myalgias, muscle weakness, and bone pain  Neurological: Denies dizziness, vertigo, headache, and focal weakness  Psychological: Denies anxiety and depression  Endocrine: Denies heat intolerance and cold intolerance  Hematopoietic/Lymphatic: Denies bleeding problems and blood transfusions    24-hour events:  None     Objective   Vitals: /66   Pulse 77   Temp 98.4 °F (36.9 °C) (Axillary)   Resp 20   Ht 5' 4\" (1.626 m)   Wt 167 lb 8 oz (76 kg)   SpO2 100%   BMI 28.75 kg/m²     I/O:    Intake/Output Summary (Last 24 hours) at 4/5/2021 1625  Last data filed at 4/4/2021 2200  Gross per 24 hour   Intake 240 ml   Output 250 ml   Net -10 ml       Vent Information  Skin Assessment: Clean, dry, & intact  FiO2 : 50 %  SpO2: 100 %  SpO2/FiO2 ratio: 192  I Time/ I Time %: 0.9 s  Mask Type: Full face mask  Mask Size: Medium       IPAP: 15 cmH20  CPAP/EPAP: 6 cmH2O     CURRENT MEDS :  Scheduled Meds:   guaiFENesin  400 mg Oral TID    hydrALAZINE  25 mg Oral 3 times per day    isosorbide dinitrate  10 mg Oral TID    albuterol  2.5 mg Nebulization 6 times per day    bumetanide  2 mg Oral Daily    potassium bicarb-citric acid  40 mEq Oral Daily    enoxaparin  30 mg Subcutaneous Daily    metoprolol succinate  37.5 mg Oral BID    aspirin EC  81 mg Oral Daily    levothyroxine  50 mcg Oral Daily    spironolactone  12.5 mg Oral Daily    repaglinide  1 mg Oral TID AC    insulin lispro  0-6 Units Subcutaneous TID WC    insulin lispro  0-3 Units Subcutaneous Nightly    sodium chloride flush  10 mL Intravenous 2 times per day    cefepime  2,000 mg Intravenous Q12H       Physical Exam:  General Appearance: appears comfortable in no acute distress. HEENT: Normocephalic atraumatic without obvious abnormality   Neck: Lips, mucosa, and tongue normal.  Supple, symmetrical, trachea midline, no adenopathy;thyroid:  no enlargement/tenderness/nodules or JVD. Lung: FEW RHONCHI and diminished in the bases  Heart: RRR, normal S1, S2. No MRG  Abdomen: Soft, NT, ND. BS present x 4 quadrants. No bruit or organomegaly. Extremities: Pedal pulses 2+ symmetric b/l. Extremities normal, no cyanosis, clubbing, or edema. Musculokeletal: No joint swelling, no muscle tenderness. ROM normal in all joints of extremities. Neurologic: Mental status: Alert and Oriented X3 .      PERTINENT IMAGING:  CXR 4/3/21       FINDINGS:   Cardiac silhouette is enlarged.  No pneumothorax.  Moderate left pleural   effusion.  Small right pleural effusion.  Hazy airspace opacity centrally and   in the bases.           Impression   No significant change in appearance of the chest with persistent left pleural   effusion and multifocal airspace opacities.               ECHO  3/30/2021  Left ventricle is normal in size . Mild left ventricular concentric hypertrophy noted. No regional wall motion abnormalities seen. Normal left ventricular ejection fraction. The left atrium is mildly dilated. Focal calcification mitral valve leaflets. Mild mitral annular calcification. Mild mitral regurgitation is present. Mild aortic stenosis. Moderate tricuspid regurgitation. Pulmonary hypertension is mild to moderate . There is a trivial circumferential pericardial effusion noted. Moderate left pleural effusion. Labs:  Lab Results   Component Value Date    WBC 6.9 04/05/2021    HGB 10.0 04/05/2021    HCT 33.2 04/05/2021    MCV 92.0 04/05/2021    MCH 27.7 04/05/2021    MCHC 30.1 04/05/2021    RDW 14.6 04/05/2021     04/05/2021    MPV 9.8 04/05/2021     Lab Results   Component Value Date     04/05/2021    K 4.3 04/05/2021    K 4.2 06/20/2020     04/05/2021    CO2 27 04/05/2021    BUN 32 04/05/2021    CREATININE 2.1 04/05/2021    LABALBU 2.7 04/05/2021    LABALBU 4.0 03/21/2012    CALCIUM 8.5 04/05/2021    GFRAA 27 04/05/2021    LABGLOM 27 04/05/2021     Lab Results   Component Value Date    PROTIME 12.0 03/29/2021    INR 1.1 03/29/2021     Recent Labs     04/05/21  0043   PROBNP 2,450*     Recent Labs     04/05/21  1040   PROCAL 0.54*     This SmartLink has not been configured with any valid records. Micro:  No results for input(s): CULTRESP in the last 72 hours. No results for input(s): LABGRAM in the last 72 hours. No results for input(s): LEGUR in the last 72 hours. No results for input(s): STREPNEUMAGU in the last 72 hours. No results for input(s): LP1UAG in the last 72 hours. Assessment:    1. Acute hypoxic respiratory failure  2. combination of acute on chronic CHF  3. healthcare associated pneumonia versus aspiration pneumonia  4. History of heart failure with preserved ejection fraction  5. Mild pharyngeal dysphagia  6.  Left pleural fluid diagnostic tap 75 cc removed, exudate. Cytology negative 4/3/21      Plan:   1. Wean FiO2 for saturations above 92% 12 L HF  2. Continue BiPAP as needed and nightly  3. We will add EZ Pap with albuterol Q 4 hrs   4. Continue cefepime for total 8 days, Legionella and strep urinary antigens were negative viral panel was negative  5. IR only 75 cc pleural fluid was drained from the left side. Cytology is negative. Fluid appears exudate. 6. continue dysphagia 3 soft advance diet. 7. Continue to monitor I's and O's closely continue diuresis bumex 2 mg po per nephrology  7. PT OT    This plan of care was reviewed in collaboration with Dr. Lilliana Burks  Electronically signed by KASSIE Isaac CNP on 4/5/2021 at 4:25 PM    I personally saw, examined, and cared for the patient. Labs, medications, radiographs reviewed. I agree with history exam and plans detailed in NP note. Cathryn Duncan M.D.    Pulmonary/Critical Care Medicine

## 2021-04-05 NOTE — PROGRESS NOTES
Occupational Therapy  OT BEDSIDE TREATMENT NOTE      Date:2021  Patient Name: Eddie Hardin  MRN: [id number]  : 1935  Room: [address] [place] [address]     Referring Provider: David Fisher MD     Evaluating OT: Santy Louis OTR/L [id number]     AM-PAC Daily Activity Raw Score:      Recommended Adaptive Equipment: TBD     Diagnosis: acute respiratory failure with hypoxia. Pt presents to ED from home with dyspnea on exertion. Pertinent Medical History: arthritis, CHF, DM, HTN, CKD   Precautions:  Falls, high flow O2     Home Living: Pt lives alone in a single story home. Bathroom setup: tub/shower combo with indwelling shower chair, has 3:1      Prior Level of Function: Per pt report Mod I with ADLs, son assists? with IADLs; completed functional mobility with [employer]. No home O2.     Pain Level: pt did not report pain.      Cognition: Awake and alert. Limited safety noted. Problem solving:  poor              Judgement/safety:  poor                Functional Assessment:    Initial Eval Status  Date: 21 Treatment session:  Short Term Goals      Feeding SBA  Set up of lunch tray, pt reports increasingly hungry as no food this AM for tests.  Pt fatigued and cues to attend to meal and take bits   Set up   Grooming Min A   Set up   UB Dressing Min A  Management of hospital gown   Set up   LB Dressing Mod A  Management of socks   SBA   Bathing Max A   SBA   Toileting Max A Max A toilet hygiene and clothing management after sitting on BSC.   SBA   Bed Mobility  Supine <> sit: Mod A  Scooting to HOB: SBA Max A sit to supine safely       Functional Transfers STS: pt declines this session reporting does not feel well Mod A sit to stand from [facility] SBA   Functional Mobility Unable to tolerate Mod A pivot BSC to bed.   SBA during ADLs   Balance Sitting: fair at EOB     Standing: unable to tolerate       Activity Tolerance poor Poor   standing gabe x6-7 min with fair plus balance during self care tasks Comments:  Pt sitting on BSC. SOB and wanting to return to bed. Assisted pt with ADL/toileting then assisted back to the bed. Nursing present and states pt not able to tolerate any other activity at this time. Education/treatment:  ADL retraining with facilitation of movement to increase self care skills. Therapeutic activity to address balance and endurance for ADL and transfers. · Pt has made limited  progress towards set goals. Plan of Care: 2-5 days/week for 1-2 weeks PRN   [x]? ?ADL retraining/adaptive techniques and AE recommendations to increase functional independence within precautions                    [x]? ? Energy conservation techniques to improve tolerance for ADL/IADLs  [x]? ? Functional transfer/mobility training/DME recommendations for increased independence, safety and fall prevention         [x]? ?Patient/family education to increase safety and functional independence during daily routine          [x]? ? Environmental modifications for safe mobility and completion of ADLs                             []? ? Cognitive retraining to improve problem solving skills & safe participation in ADLs/IADLs     []? ?Sensory re-education techniques to improve extremity awareness, maintain skin integrity and improve hand function                             []? ? Visual/Perceptual retraining to improve body awareness and safety during transfers and ADLs  []? ? Splinting/positioning needs to maintain joint/skin integrity and contracture prevention  [x]? ? Therapeutic activity to improve functional performance during ADLs                                        [x]? ? Therapeutic exercise to improve tolerance and functional strength for ADLs   [x]? ? Balance retraining/tolerance tasks for facilitation of postural control with dynamic challenges during ADLs  []? ?Neuromuscular re-education to facilitate righting/equilibrium reactions, midline orientation, scapular stability/mobility, normalize muscle tone and

## 2021-04-05 NOTE — CONSULTS
Consult Note  NEPHROLOGY    Reason for Consult: Management of acute kidney injury    Requesting Physician: Dr. Dennis Barrera    Chief Complaint: Admitted with shortness of breath    History Obtained From:  patient, electronic medical record    History of Present Ilness: This is a 28-year-old female with a history of diastolic heart failure, hypothyroidism, diabetes mellitus type 2, hypothyroidism, essential hypertension who has been admitted twice this month for CHF exacerbation with discharge most recently 4 days ago. The night prior to admission she was complaining of generalized weakness and palpitations: therefore presented to the ED for further evaluation on 3/29/2021. Vitals upon arrival included  BP (!) 142/70   Pulse 76   Temp 98.3 °F (36.8 °C)   Resp 26   Ht 5' 3\" (1.6 m)   Wt 159 lb (72.1 kg)   SpO2 98%   BMI 28.17 kg/m². Pertinent labs included WBC 8.2 and hemoglobin 10.4. Initial BMP revealed sodium 140, potassium 5.2, chloride 103, CO2 22, BUN 26 and creatinine 1.3. BNP 3514. Initial lactic acid 6.2. Initial ABGs 7.397, PCO2 35.6, PO2 61.5, bicarb 21 and 90% O2 saturation. Chest x-ray revealed cardiomegaly with progressive perihilar and bibasilar infiltrates and pleural effusions. Patient was subsequently admitted with acute respiratory failure with hypoxia. Patient currently examined sitting up in bed in mild acute distress. She is a very poor historian. ROS difficult to accurately obtain. Per the nursing staff did require 15 L NRB mask this morning (she is refusing the Bipap). She continues to have diffuse interstitial infiltrates on CXR (Pulmonology is following). Cardiology is also following: Patient was initially on IV bumex, but was changed to PO on 4/3/21. She has a history of stage I systolic hypertension.       Past Medical History:        Diagnosis Date    (HFpEF) heart failure with preserved ejection fraction ([facility] [address] 05/26/2017    3/31/17- echo- LVEF 60-65%, mild LVH, mild MR    Arthritis     Bursitis     shoulders    Cervical radiculopathy     [facility]congestive [facility] failure[facility]     CKD (chronic kidney disease) stage 3, GFR 30-59 ml/min     Diabetes mellitus ([facility])     GERD (gastroesophageal reflux disease)     [place] (hard of hearing)     Hypertension     Hypothyroidism     SSS (sick sinus syndrome) ([facility])     stable, per epic note.     Thyroid disease     Unsteadiness     Valvular heart disease     sees Dr. Cris Rodriguez        Past Surgical History:        Procedure Laterality Date    FOOT SURGERY      both    JOINT REPLACEMENT  1999    TOTAL KNEE ARTHROPLASTY Right 3/21/2019    RIGHT KNEE TOTAL ARTHROPLASTY (LIENE)++ADDUCTOR CANAL BLOCK++ performed by Lacey Cummings MD at [facility] OR       Current Medications:    Current Facility-Administered Medications: hydrALAZINE (APRESOLINE) tablet 25 mg, 25 mg, Oral, 3 times per day  isosorbide dinitrate (ISORDIL) tablet 10 mg, 10 mg, Oral, TID  albuterol (PROVENTIL) nebulizer solution 2.5 mg, 2.5 mg, Nebulization, 6 times per day  bumetanide (BUMEX) tablet 2 mg, 2 mg, Oral, Daily  potassium bicarb-citric acid (EFFER-K) effervescent tablet 40 mEq, 40 mEq, Oral, Daily  enoxaparin (LOVENOX) injection 30 mg, 30 mg, Subcutaneous, Daily  metoprolol succinate (TOPROL XL) extended release tablet 37.5 mg, 37.5 mg, Oral, BID  aspirin EC tablet 81 mg, 81 mg, Oral, Daily  levothyroxine (SYNTHROID) tablet 50 mcg, 50 mcg, Oral, Daily  spironolactone (ALDACTONE) tablet 12.5 mg, 12.5 mg, Oral, Daily  repaglinide (PRANDIN) tablet 1 mg, 1 mg, Oral, TID AC  glucose (GLUTOSE) 40 % oral gel 15 g, 15 g, Oral, PRN  dextrose 50 % IV solution, 12.5 g, Intravenous, PRN  glucagon (rDNA) injection 1 mg, 1 mg, Intramuscular, PRN  dextrose 5 % solution, 100 mL/hr, Intravenous, PRN  insulin lispro (HUMALOG) injection vial 0-6 Units, 0-6 Units, Subcutaneous, TID WC  insulin lispro (HUMALOG) injection vial 0-3 Units, 0-3 Units, Subcutaneous, Nightly  sodium chloride flush 0.9 % injection 10 mL, 10 mL, Intravenous, 2 times per day  sodium chloride flush 0.9 % injection 10 mL, 10 mL, Intravenous, PRN  0.9 % sodium chloride infusion, 25 mL, Intravenous, PRN  promethazine (PHENERGAN) tablet 12.5 mg, 12.5 mg, Oral, Q6H PRN **OR** ondansetron (ZOFRAN) injection 4 mg, 4 mg, Intravenous, Q6H PRN  polyethylene glycol (GLYCOLAX) packet 17 g, 17 g, Oral, Daily PRN  acetaminophen (TYLENOL) tablet 650 mg, 650 mg, Oral, Q6H PRN **OR** acetaminophen (TYLENOL) suppository 650 mg, 650 mg, Rectal, Q6H PRN  cefepime (MAXIPIME) 2000 mg IVPB extended (mini-bag), 2,000 mg, Intravenous, Q12H  0.9 % sodium chloride infusion, 25 mL, Intravenous, Q12H  perflutren lipid microspheres (DEFINITY) injection 1.65 mg, 1.5 mL, Intravenous, ONCE PRN    Allergies:  Aspirin    Social History:      reports that she has never smoked. She has never used smokeless tobacco. She reports previous alcohol use. She reports that she does not use drugs.       Family History:     Family History   Problem Relation Age of Onset    No Known Problems Mother     No Known Problems Father          Review of Systems:       Pertinent positives stated above in HPI. All other systems were reviewed and were negative.     Physical exam:   Constitutional:  VITALS:  /65   Pulse 78   Temp 97.8 °F (36.6 °C) (Axillary)   Resp 22   Ht 5' 4\" (1.626 m)   Wt 167 lb 8 oz (76 kg)   SpO2 99%   BMI 28.75 kg/m²   CURRENT TEMPERATURE:  Temp: 97.8 °F (36.6 °C)  CURRENT RESPIRATORY RATE:  Resp: 22  CURRENT PULSE:  Pulse: 78  CURRENT BLOOD PRESSURE:  BP: 118/65  24HR BLOOD PRESSURE RANGE:  Systolic (50PDP), TFO:940 , Min:118 , LRX:983   ; Diastolic (16JIT), EPW:22, Min:59, Max:72    24HR INTAKE/OUTPUT:      Intake/Output Summary (Last 24 hours) at 4/5/2021 0956  Last data filed at 4/4/2021 2200  Gross per 24 hour   Intake 240 ml   Output 750 ml   Net -510 ml       General Appearance: appears comfortable in mild acute distress. HEENT: Normocephalic atraumatic without obvious abnormality   Neck: Lips, mucosa, and tongue normal.  Supple, symmetrical, trachea midline, no adenopathy;thyroid:  no enlargement/tenderness/nodules or JVD. Lung: clear and diminished in the bases  Heart: RRR, normal S1, S2. No MRG  Abdomen: Soft, NT, ND. BS present x 4 quadrants. No bruit or organomegaly. Extremities: Pedal pulses 2+ symmetric b/l. Extremities normal, no cyanosis, clubbing, or edema. Musculokeletal: No joint swelling, no muscle tenderness. ROM normal in all joints of extremities.    Neurologic: Mental status: Underlying confusion      DATA:      CBC:   Lab Results   Component Value Date    WBC 6.9 04/05/2021    RBC 3.61 04/05/2021    RBC 3.68 12/19/2017    HGB 10.0 04/05/2021    HCT 33.2 04/05/2021    MCV 92.0 04/05/2021    MCH 27.7 04/05/2021    MCHC 30.1 04/05/2021    RDW 14.6 04/05/2021     04/05/2021    MPV 9.8 04/05/2021     BMP:    Lab Results   Component Value Date     04/05/2021    K 4.3 04/05/2021    K 4.2 06/20/2020     04/05/2021    CO2 27 04/05/2021    BUN 32 04/05/2021    LABALBU 2.7 04/05/2021    LABALBU 4.0 03/21/2012    CREATININE 2.1 04/05/2021    CALCIUM 8.5 04/05/2021    GFRAA 27 04/05/2021    LABGLOM 27 04/05/2021    GLUCOSE 193 04/05/2021    GLUCOSE 77 04/18/2012       RAD:  Echo Limited    Result Date: 3/30/2021  Transthoracic Echocardiography Report (TTE)  Demographics   Patient Name    Joselito Shahid  Gender            Female                  P   Medical Record  [id number]     Room Number       1733  Number   Account #       [de-identified]    Procedure Date    03/30/2021   Corporate ID                 Ordering          Daniella Nicholas MD                               Physician   Accession       [id number]   Referring         Hernandez Ansari                       Physician   Date of Birth   1935   Sonographer       Cliff Barcenas RDCS   Age             81 year(s)   Interpreting      [address] [place] [address] - [facility])                               Physician         Physician Cardiology                                                 Rafael Drew MD                                Any Other  Procedure Type of Study   TTE procedure:Echo Limited Study. Procedure Date Date: 03/30/2021 Start: 07:22 AM Study Location: Portable Technical Quality: Adequate visualization Indications:Congestive heart failure, diastolic dysfunction. Patient Status: Routine Height: 64 inches Weight: 171 pounds BSA: 1.83 m^2 BMI: 29.35 kg/m^2 HR: 61 bpm BP: 144/66 mmHg  Findings   Left Ventricle  Left ventricle is normal in size . Mild left ventricular concentric hypertrophy noted. No regional wall motion abnormalities seen. Normal left ventricular ejection fraction. Ejection fraction is visually estimated at 65%. Indeterminate diastolic function. Right Ventricle  Normal right ventricular size and function. Left Atrium  The left atrium is mildly dilated. Interatrial septum appears intact. Right Atrium  Normal right atrium size. Mitral Valve  Focal calcification mitral valve leaflets. Mild mitral annular calcification. Mild mitral regurgitation is present. Tricuspid Valve  The tricuspid valve appears structurally normal.  Moderate tricuspid regurgitation. RVSP is 50 mmHg. Pulmonary hypertension is mild to moderate . Aortic Valve  The aortic valve appears mildly sclerotic. Mean transaortic gradient (Doppler) 11 mm Hg . Mild aortic stenosis. Pulmonic Valve  The pulmonic valve was not well visualized. Mild pulmonic regurgitation present. Pericardial Effusion  There is a trivial circumferential pericardial effusion noted. Pleural Effusion  Moderate left pleural effusion. Aorta  Aortic root dimension within normal limits. Conclusions   Summary  Left ventricle is normal in size . Mild left ventricular concentric hypertrophy noted. No regional wall motion abnormalities seen.   Normal left ventricular ejection fraction. The left atrium is mildly dilated. Focal calcification mitral valve leaflets. Mild mitral annular calcification. Mild mitral regurgitation is present. Mild aortic stenosis. Moderate tricuspid regurgitation. Pulmonary hypertension is mild to moderate . There is a trivial circumferential pericardial effusion noted. Moderate left pleural effusion. Signature   ----------------------------------------------------------------  Electronically signed by Robert Burton MD(Interpreting  physician) on 03/30/2021 05:36 PM  ----------------------------------------------------------------  M-Mode/2D Measurements & Calculations   LV Diastolic       LV Systolic Dimension: 2.5 cm  Dimension: 3.9 cm  LV Volume Diastolic: 62.3 ml  LV DP:80.1 %       LV Volume Systolic: 22 ml  LV PW Diastolic:   LV EDV/LV EDV Index: 65.7 RL/05      RV Diastolic  1.2 cm             ml/m^2LV ESV/LV ESV Index: 22        Dimension: 3 cm  LV PW Systolic:    WB/11CA/ m^2  1. 6 cm             EF Calculated: 66.5 %  Septum Diastolic:  LV Mass Index: 87 l/min*m^2  1. 2 cm  Septum Systolic:  1.5 cm             LVOT: 2 cm                           IVC Expiration:  CO: 5.36 l/min                                          2.4 cm  CI: 2.93 l/m*m^2  LV Mass: 159.29 g  Doppler Measurements & Calculations                      AV Peak Velocity: 2.29 LVOT Peak Velocity: 1.3 m/s                     m/s                    LVOT Mean Velocity: 0.93 m/s                     AV Peak Gradient:      LVOT Peak Gradient: 6.7 mmHgLVOT                     21.03 mmHg             Mean Gradient: 4 mmHg                     AV Mean Velocity: 1.62  MV E' Septal       m/s  Velocity: 0.05 m/s AV Mean Gradient: 11.3  MV E' Lateral      mmHg                   TR Velocity:3.56 m/s  Velocity: 6 m/s    AV VTI: 47.8 cm        TR Gradient:50.81 mmHg                     AV Area                     (Continuity):1.84 cm^2                      LVOT VTI: 28 cm                     IVRT: 96.9 msec  [email or web address] [id number]    Xr Chest Portable    Result Date: 3/29/2021  EXAMINATION: ONE XRAY VIEW OF THE CHEST 3/29/2021 7:03 pm COMPARISON: 03/23/2021. HISTORY: ORDERING SYSTEM PROVIDED HISTORY: dyspnea TECHNOLOGIST PROVIDED HISTORY: Reason for exam:->dyspnea FINDINGS: There is cardiomegaly with prominence of the superior mediastinum. There is vascular congestion with patchy perihilar infiltrates extending to the lung bases. Pleural effusions are suspected. Cardiomegaly with progressive perihilar and bibasilar infiltrates and pleural effusions likely CHF/edema and or pneumonia. Cta Pulmonary W Contrast    Result Date: 3/30/2021  EXAMINATION: CTA OF THE CHEST 3/29/2021 11:46 pm TECHNIQUE: CTA of the chest was performed after the administration of intravenous contrast.  Multiplanar reformatted images are provided for review. MIP images are provided for review. Dose modulation, iterative reconstruction, and/or weight based adjustment of the mA/kV was utilized to reduce the radiation dose to as low as reasonably achievable. COMPARISON: March 24 HISTORY: ORDERING SYSTEM PROVIDED HISTORY: rule out PE TECHNOLOGIST PROVIDED HISTORY: Reason for exam:->rule out PE Decision Support Exception->Emergency Medical Condition (MA) FINDINGS: Pulmonary Arteries: Pulmonary arteries are adequately opacified for evaluation. No evidence of intraluminal filling defect to suggest pulmonary embolism. Main pulmonary artery is normal in caliber. Mediastinum: Cardiomegaly with prominent coronary atherosclerotic calcifications. Small pericardial effusion. . No hilar or mediastinal adenopathy. Calcified hilar and mediastinal nodes. Lungs/pleura: Bilateral pleural effusions, mildly increased in the interval since the prior examination.  Multifocal patchy and ground-glass airspace disease in the lungs bilaterally, care of Ms.  Roxanne Mann, APRN - CNP  4/5/2021  9:41 AM

## 2021-04-05 NOTE — PROGRESS NOTES
[facility] Progress Note    Admitting Date and Time: 3/29/2021  5:48 PM  Admit Dx: Acute respiratory failure with hypoxia ([facility] [address] [J96.01]    Subjective:  Patient is being followed for Acute respiratory failure with hypoxia ([facility] [address] [J96.01]     Pt feels very weak and tired. She denies chest pain or shortness of breath. Per RN: patient is now requiring 15 L NRB mask this morning. She is refusing the Bipap. She continues to have diffuse interstitial infiltrates on CXR. Pulmonology is following. They suggested EZpap yesterday. They recommend continuing cefepiime for a total of 8 days. Cardiology is following. Patient was initially on IV bumex, but was changed to PO on 4/3/21. She has a history of stage I systolic hypertension. Her repeat proBNP was without significant change once again this morning. She also has CKD, stage 3, and her creatinine remained mostly stable until yesterday. Nephrology is consulted. Today's labs are pending. ROS: denies fever, chills, cp, sob, n/v, HA unless stated above.       hydrALAZINE  25 mg Oral 3 times per day    isosorbide dinitrate  10 mg Oral TID    albuterol  2.5 mg Nebulization 6 times per day    bumetanide  2 mg Oral Daily    potassium bicarb-citric acid  40 mEq Oral Daily    enoxaparin  30 mg Subcutaneous Daily    metoprolol succinate  37.5 mg Oral BID    aspirin EC  81 mg Oral Daily    levothyroxine  50 mcg Oral Daily    spironolactone  12.5 mg Oral Daily    repaglinide  1 mg Oral TID AC    insulin lispro  0-6 Units Subcutaneous TID WC    insulin lispro  0-3 Units Subcutaneous Nightly    sodium chloride flush  10 mL Intravenous 2 times per day    cefepime  2,000 mg Intravenous Q12H     glucose, 15 g, PRN  dextrose, 12.5 g, PRN  glucagon (rDNA), 1 mg, PRN  dextrose, 100 mL/hr, PRN  sodium chloride flush, 10 mL, PRN  sodium chloride, 25 mL, PRN  promethazine, 12.5 mg, Q6H PRN    Or  ondansetron, 4 mg, Q6H PRN  polyethylene glycol, 17 g, Daily PRN  acetaminophen, 650 mg, Q6H PRN    Or  acetaminophen, 650 mg, Q6H PRN  perflutren lipid microspheres, 1.5 mL, ONCE PRN         Objective:    /65   Pulse 78   Temp 97.8 °F (36.6 °C) (Axillary)   Resp 22   Ht 5' 4\" (1.626 m)   Wt 167 lb 8 oz (76 kg)   SpO2 99%   BMI 28.75 kg/m²     General Appearance: alert and oriented to person, place and time and in no acute distress  Skin: warm and dry  Head: normocephalic and atraumatic  Eyes: pupils equal, round, and reactive to light, extraocular eye movements intact, conjunctivae normal  Neck: neck supple and non tender without mass   Pulmonary/Chest: there are crackles noted in the left lung base more than the right; reduced air flow throughout the right lower lung. Cardiovascular: normal rate, normal S1 and S2 and no carotid bruits  Abdomen: soft, non-tender, non-distended, normal bowel sounds, no masses or organomegaly  Extremities: no cyanosis, no clubbing and no edema  Neurologic: no cranial nerve deficit and speech normal        Recent Labs     04/03/21  0338 04/04/21  1115    136   K 3.6 4.4    100   CO2 28 28   BUN 22 27*   CREATININE 1.5* 1.9*   GLUCOSE 94 176*   CALCIUM 8.1* 8.4*       Recent Labs     04/04/21  1115   WBC 5.2   RBC 3.50   HGB 9.6*   HCT 32.1*   MCV 91.7   MCH 27.4   MCHC 29.9*   RDW 14.6      MPV 9.8       Radiology:   EXAMINATION:   ONE XRAY VIEW OF THE CHEST       4/2/2021 12:06 pm       COMPARISON:   None.       HISTORY:   ORDERING SYSTEM PROVIDED HISTORY: post left thoracentesis   TECHNOLOGIST PROVIDED HISTORY:   Patient is in IR room. Reason for exam:->post left thoracentesis       FINDINGS:   Heart size is unable to be accurately assessed on this single portable view   of the chest, but appears to be stable.  Patchy bilateral airspace opacities   are felt to be stable compared with yesterday's examination, when taking   account differences in technique.  A trace right pleural effusion remains. Difficult to exclude a trace residual left pleural effusion.  No evidence of   a pneumothorax following left-sided thoracentesis.         Impression   No convincing evidence of a pneumothorax following thoracentesis.       Unchanged patchy bilateral airspace opacities which may be on the basis of   multifocal pneumonia or pulmonary edema. Echocardiogram 3/30/21:  Summary   Left ventricle is normal in size . Ejection fraction is visually estimated at 65%. Indeterminate diastolic function. Mild left ventricular concentric hypertrophy noted. No regional wall motion abnormalities seen. Normal left ventricular ejection fraction. The left atrium is mildly dilated. Focal calcification mitral valve leaflets. Mild mitral annular calcification. Mild mitral regurgitation is present. Mild aortic stenosis. Moderate tricuspid regurgitation. Pulmonary hypertension is mild to moderate . There is a trivial circumferential pericardial effusion noted. Moderate left pleural effusion. EXAMINATION:   ONE XRAY VIEW OF THE CHEST       4/3/2021 9:22 am       COMPARISON:   04/02/2021       HISTORY:   ORDERING SYSTEM PROVIDED HISTORY: hypoxia   TECHNOLOGIST PROVIDED HISTORY:   Reason for exam:->hypoxia       FINDINGS:   Cardiac silhouette is enlarged.  No pneumothorax.  Moderate left pleural   effusion.  Small right pleural effusion.  Hazy airspace opacity centrally and   in the bases.           Impression   No significant change in appearance of the chest with persistent left pleural   effusion and multifocal airspace opacities. Assessment:    Active Problems:    Acute respiratory failure with hypoxia (HCC)  Resolved Problems:    * No resolved hospital problems. *      Plan:  1.   Acute respiratory failure with hypoxia 2/2 possible pneumonia vs. CHF exacerbation-wean o2 as tolerated  -interstitial edema on CXR (multifocal airspace opacities with left pleural effusion)  -patient remains on diuretics. They were changed from IV to PO by cardiology; bumex 2 mg PO daily. -proBNP has not changed significantly thus far.  -cardiology and pulmonology are following the patient  -continue cefepime  -echocardiogram 3/30/21 shows normal EF without systolic dysfunction  -thoracentesis performed 4/2/21-only 75 cc of pleural fluid was drained from the left side; fluids sent for culture and have been negative thus far  -mild pharyngeal dysphagia-continue dysphagia 3 soft advance diet  -monitor I/O closely (this has not been clearly charted)  -BMP this morning pending  -nephrology consulted for any further recs  -would consider CT, but would appreciate recs from pulm and nephro regarding use of contrast    2.  Probable HCAP  -continue abx  -pulmonology following  -flutter valve  -bipap as needed and nightly per pulm  -viral panel negative, to include COVID 19  -resp culture from thoracentesis negative  -strep and legionella neg  -cefepime day 7    3.  Acute on chronic diastolic CHF  -cardiology following  -proBNP remains elevated  -bumex PO    4.  bilateral pleural effusions  -s/p thoracentesis 4/2 with 75 cc pleural fluid removed from left side    5.  CKD stage 3- baseline GFR 47-58, creatinine 0.9-1.3  -reduced GFR with diuresis; now creatinine of 1.9  -IV diuretics changed to PO by cardiology on 4/3/21  -continue to monitor  -nephrology consult placed  -incomplete monitoring of I/O    6.  Hypothyroidism, acquired-continue synthroid    7.  DM2-fairly well controlled  -continue repaglinide;ISS      NOTE: This report was transcribed using voice recognition software. Every effort was made to ensure accuracy; however, inadvertent computerized transcription errors may be present.   Electronically signed by Angi Rinaldi MD on 4/5/2021 at 9:01 AM

## 2021-04-05 NOTE — PROGRESS NOTES
INPATIENT CARDIOLOGY FOLLOW-UP    Name: Lorena Duke    Age: 81 y.o. Date of Admission: 3/29/2021  5:48 PM    Date of Service: 4/5/2021    Chief Complaint: Follow-up for acute superimposed upon chronic diastolic heart failure, resolved acute hypoxic respiratory failure, sinus node dysfunction, hypertension, possible pneumonia, pleural effusion, cognitive dysfunction    Interim History:  No new overnight cardiac complaints except for shortness of breath and cough. She denies any fever or chills or expectoration of sputum. . Currently with no complaints of CP, palpitations, dizziness, or lightheadedness. SR on telemetry.     Review of Systems:   Cardiac: As per HPI  General: No fever, chills  Pulmonary: As per HPI  HEENT: No visual disturbances, difficult swallowing  GI: No nausea, vomiting  Endocrine: No thyroid disease or DM  Musculoskeletal: QUIGLEY x 4, no focal motor deficits  Skin: Intact, no rashes  Neuro/Psych: No headache or seizures    Problem List:  Patient Active Problem List   Diagnosis    HTN (hypertension), benign    Hypertensive heart disease    Hypothyroidism    DM [facility]diabetes mellitus), type 2 ([facility] [address]    Acute respiratory failure with hypoxia ([facility] [address]    Uncontrolled type 2 diabetes mellitus with hyperglycemia ([facility]    Chronic diastolic CHF (congestive heart failure) ([facility])    Chest pain    LVH (left ventricular hypertrophy)    Non-rheumatic mitral regurgitation    CKD (chronic kidney disease) stage 3, GFR 30-59 ml/min ([facility])    SSS (sick sinus syndrome) ([facility])    Dizziness    Unsteadiness    Leukopenia    Primary osteoarthritis involving multiple joints    Cervical radiculopathy    Bilateral shoulder bursitis    Aspirin intolerance    Primary osteoarthritis of one knee, right    Primary osteoarthritis of right knee    Acute blood loss anemia    HF (heart failure) ([facility])    Chronic pain syndrome    Cervicalgia    Myalgia    Intractable headache    Acute on chronic diastolic congestive heart failure (HCC)    Metabolic acidosis    Anemia    Edema    Neck pain    Congestive heart failure of unknown etiology (HCC)    AMANDEEP (acute kidney injury) ([facility] [address]    Elevated troponin    Obesity (BMI 30.0-34. 9)       Allergies:   Allergies   Allergen Reactions    Aspirin Other (See Comments)     \"tears up my stomach\"       Current Medications:  Current Facility-Administered Medications   Medication Dose Route Frequency Provider Last Rate Last Admin    guaiFENesin tablet 400 mg  400 mg Oral TID Evaline Grit, APRN - CNP   400 mg at 04/05/21 1426    [START ON 4/6/2021] potassium bicarb-citric acid (EFFER-K) effervescent tablet 40 mEq  40 mEq Oral Daily Flavia Allen MD        [START ON 4/6/2021] cefepime (MAXIPIME) 2000 mg IVPB extended (mini-bag)  2,000 mg Intravenous Q24H Flavia Allen MD        hydrALAZINE (APRESOLINE) tablet 25 mg  25 mg Oral 3 times per day Bland Eisenmenger, MD   25 mg at 04/05/21 1426    isosorbide dinitrate (ISORDIL) tablet 10 mg  10 mg Oral TID Bland Eisenmenger, MD   10 mg at 04/05/21 1426    albuterol (PROVENTIL) nebulizer solution 2.5 mg  2.5 mg Nebulization 6 times per day John Stout MD   2.5 mg at 04/05/21 1620    bumetanide (BUMEX) tablet 2 mg  2 mg Oral Daily Flavia Allen MD   2 mg at 04/05/21 0939    enoxaparin (LOVENOX) injection 30 mg  30 mg Subcutaneous Daily Tony Fleming MD   30 mg at 04/05/21 0940    metoprolol succinate (TOPROL XL) extended release tablet 37.5 mg  37.5 mg Oral BID Bland Eisenmenger, MD   37.5 mg at 04/05/21 0423    aspirin EC tablet 81 mg  81 mg Oral Daily Tony Fleming MD   81 mg at 04/05/21 1359    levothyroxine (SYNTHROID) tablet 50 mcg  50 mcg Oral Daily Tony Fleming MD   50 mcg at 04/05/21 0651    spironolactone (ALDACTONE) tablet 12.5 mg  12.5 mg Oral Daily Tony Fleming MD   12.5 mg at 04/05/21 7361    repaglinide (PRANDIN) tablet 1 mg  1 mg Oral TID AC Tony Fleming MD   1 mg at oz (76 kg)   03/26/21 159 lb 3.2 oz (72.2 kg)   03/23/21 170 lb 6.4 oz (77.3 kg)     Appearance: Awake, alert, no acute respiratory distress  Skin: Intact, no rash  Head: Normocephalic, atraumatic  Eyes: EOMI, no conjunctival erythema  ENMT: No pharyngeal erythema, MMM, no rhinorrhea  Neck: Supple, no elevated JVP, no carotid bruits  Lungs: She has a bronchial breath sounds over the left base and also has few rails. Cardiac: Regular rate and rhythm, +S1H8, has pansystolic murmur 3/6 heard over the apex. Abdomen: Soft, nontender, +bowel sounds  Extremities: Moves all extremities x 4, no lower extremity edema  Neurologic: No focal motor deficits apparent, normal mood and affect  Peripheral Pulses: Intact posterior tibial pulses bilaterally    Intake/Output:    Intake/Output Summary (Last 24 hours) at 4/5/2021 1916  Last data filed at 4/4/2021 2200  Gross per 24 hour   Intake 240 ml   Output 250 ml   Net -10 ml     No intake/output data recorded.     Laboratory Tests:  Recent Labs     04/03/21  0338 04/04/21  1115 04/05/21  1040    136 138   K 3.6 4.4 4.3    100 102   CO2 28 28 27   BUN 22 27* 32*   CREATININE 1.5* 1.9* 2.1*   GLUCOSE 94 176* 193*   CALCIUM 8.1* 8.4* 8.5*     Lab Results   Component Value Date    MG 1.9 04/03/2021     Recent Labs     04/04/21  1115 04/05/21  1040   ALKPHOS 56 54   ALT 14 15   AST 34* 36*   PROT 6.6 6.6   BILITOT 0.4 0.4   LABALBU 2.7* 2.7*     Recent Labs     04/04/21  1115 04/05/21  1040   WBC 5.2 6.9   RBC 3.50 3.61   HGB 9.6* 10.0*   HCT 32.1* 33.2*   MCV 91.7 92.0   MCH 27.4 27.7   MCHC 29.9* 30.1*   RDW 14.6 14.6    168   MPV 9.8 9.8     Lab Results   Component Value Date    CKTOTAL 521 (H) 03/30/2021    CKMB 6.0 (H) 03/30/2021    TROPONINI 0.13 (H) 03/30/2021    TROPONINI 0.14 (H) 03/30/2021    TROPONINI 0.09 (H) 03/29/2021     Lab Results   Component Value Date    INR 1.1 03/29/2021    INR 1.1 03/24/2021    INR 1.1 03/04/2021    PROTIME 12.0 03/29/2021 PROTIME 12.4 03/24/2021    PROTIME 12.0 03/04/2021     Lab Results   Component Value Date    TSH 2.610 03/29/2021     Lab Results   Component Value Date    LABA1C 6.1 (H) 03/30/2021     Lab Results   Component Value Date     12/19/2017     Lab Results   Component Value Date    CHOL 106 03/31/2021    CHOL 134 03/04/2021    CHOL 131 07/27/2020     Lab Results   Component Value Date    TRIG 73 03/31/2021    TRIG 83 03/04/2021    TRIG 45 07/27/2020     Lab Results   Component Value Date    HDL 41 03/31/2021    HDL 65 03/04/2021    HDL 73 07/27/2020     Lab Results   Component Value Date    LDLCALC 50 03/31/2021    LDLCALC 52 03/04/2021    LDLCALC 49 07/27/2020    LDLCHOLESTEROL 73 12/19/2017     Lab Results   Component Value Date    LABVLDL 15 03/31/2021    LABVLDL 17 03/04/2021    LABVLDL 9 07/27/2020     Lab Results   Component Value Date    CHOLHDLRATIO 2 12/19/2017       Cardiac Tests:  ECG:     Telemetry findings reviewed: Sinus rhythm with heart rate in the 60s, no arrhythmias overnight    Vitals and labs were reviewed: Blood pressure 134/66 with heart rate of 77 O2 98% on 8 L of oxygen. Labs-BUNs/creatinine 32/2.1 rest of electrolytes normal.  proBNP 2450  WBC 6.9, H&H 10/33.2 platelets 996. AST/ALT 36/15. Chest X-ray: Persistent left pleural effusion multifocal airspace opacities. Echocardiogram-3/29/2021: LVEF 65%  Left ventricle is normal in size . Mild left ventricular concentric hypertrophy noted. No regional wall motion abnormalities seen. Normal left ventricular ejection fraction. The left atrium is mildly dilated. Focal calcification mitral valve leaflets. Mild mitral annular calcification. Mild mitral regurgitation is present. Mild aortic stenosis. Moderate tricuspid regurgitation. Pulmonary hypertension is mild to moderate . There is a trivial circumferential pericardial effusion noted.    Moderate left pleural effusion      Stress test:        Cardiac catheterization:         ASSESSMENT:  · Acute on chronic HFpEF, improving, urine output not accurate. · Acute hypoxic respiratory failure improved  · Sinus node dysfunction  · Hypertension  · Left lung pneumonia  · Cognitive impairment/dementia  · Moderate sized left pleural effusion  · CKD with AMANDEEP creatinine 1.5>>> 2.1    Plan:   · Continue oral diuretic therapy with Bumex daily. With close monitoring of renal functions and electrolytes, her creatinine continues to go up and will decrease Bumex to 1 mg p.o. daily  · Continue IV antibiotics as per ID service/primary service  · Continue rest of the current medications. Sherlyn Breen MD., [employer].   [facility]) Cardiology

## 2021-04-06 NOTE — PROGRESS NOTES
Андрей Naranjo M.D.,[facility]  Дмитрий Luo D.O., ALEXA, Jessica Renee M.D. Aletha Green M.D., Renu Ordaz M.D. Robert Toussaint D.O. Daily Pulmonary Progress Note    Patient:  Lorena Duke 81 y.o. female MRN: [id number]     Date of Service: 4/6/2021      Synopsis     We are following patient for acute hypoxia and abnormal CT chest    \"CC\" ; Hartness of breath    Code status: Full code      Subjective      Patient was seen and examined. She is laying in bed in no acute distress. Oxygen down to 8 L of oxygen to maintain saturations 96%. She is at her baseline intermittently pleasantly confused. [place]. Review of Systems:  Constitutional: Denies fever, weight loss, night sweats, and fatigue  Skin: Denies pigmentation, dark lesions, and rashes   HEENT: Denies hearing loss, tinnitus, ear drainage, epistaxis, sore throat, and hoarseness. Cardiovascular: Denies palpitations, chest pain, and chest pressure.   Respiratory: Positive for cough and difficulty swallowing gastrointestinal: Denies nausea, vomiting, poor appetite, diarrhea, heartburn or reflux  Genitourinary: Denies dysuria, frequency, urgency or hematuria  Musculoskeletal: Denies myalgias, muscle weakness, and bone pain  Neurological: Denies dizziness, vertigo, headache, and focal weakness  Psychological: Denies anxiety and depression  Endocrine: Denies heat intolerance and cold intolerance  Hematopoietic/Lymphatic: Denies bleeding problems and blood transfusions    24-hour events:  None     Objective   Vitals: BP (!) 140/73   Pulse 78   Temp 97.3 °F (36.3 °C) (Oral)   Resp 18   Ht 5' 4\" (1.626 m)   Wt 164 lb 9.6 oz (74.7 kg)   SpO2 96%   BMI 28.25 kg/m²     I/O:    Intake/Output Summary (Last 24 hours) at 4/6/2021 1517  Last data filed at 4/6/2021 1200  Gross per 24 hour   Intake 130 ml   Output 450 ml   Net -320 ml       Vent Information  Skin Assessment: Clean, dry, & intact  FiO2 : 50 %  SpO2: 96 %  SpO2/FiO2 ratio: 192  I Time/ I Time %: 0.9 s  Mask Type: Full face mask  Mask Size: Medium       IPAP: 15 cmH20  CPAP/EPAP: 6 cmH2O     CURRENT MEDS :  Scheduled Meds:   vitamin D  50,000 Units Oral Weekly    guaiFENesin  400 mg Oral TID    cefepime  2,000 mg Intravenous Q24H    [Held by provider] bumetanide  1 mg Oral Daily    hydrALAZINE  25 mg Oral 3 times per day    isosorbide dinitrate  10 mg Oral TID    albuterol  2.5 mg Nebulization 6 times per day    enoxaparin  30 mg Subcutaneous Daily    metoprolol succinate  37.5 mg Oral BID    aspirin EC  81 mg Oral Daily    levothyroxine  50 mcg Oral Daily    [Held by provider] spironolactone  12.5 mg Oral Daily    repaglinide  1 mg Oral TID AC    insulin lispro  0-6 Units Subcutaneous TID WC    insulin lispro  0-3 Units Subcutaneous Nightly    sodium chloride flush  10 mL Intravenous 2 times per day       Physical Exam:  General Appearance: appears comfortable in no acute distress. HEENT: Normocephalic atraumatic without obvious abnormality   Neck: Lips, mucosa, and tongue normal.  Supple, symmetrical, trachea midline, no adenopathy;thyroid:  no enlargement/tenderness/nodules or JVD. Lung: FEW RHONCHI and diminished in the bases  Heart: RRR, normal S1, S2. No MRG  Abdomen: Soft, NT, ND. BS present x 4 quadrants. No bruit or organomegaly. Extremities: Pedal pulses 2+ symmetric b/l. Extremities normal, no cyanosis, clubbing, or edema. Musculokeletal: No joint swelling, no muscle tenderness. ROM normal in all joints of extremities. Neurologic: Mental status: Alert and Oriented X3 .      PERTINENT IMAGING:  CXR 4/6/21       FINDINGS:   Stable cardiomediastinal silhouette.  There are bilateral perihilar lung base   airspace opacities and pleural effusions, which appear mildly increased   compared to the prior study.  No pneumothorax.  No acute osseous abnormality.           Impression   Mild increased bilateral pulmonary airspace opacities and pleural effusions. Findings may be related pulmonary edema and/or pneumonia.               ECHO  3/30/2021  Left ventricle is normal in size . Mild left ventricular concentric hypertrophy noted. No regional wall motion abnormalities seen. Normal left ventricular ejection fraction. The left atrium is mildly dilated. Focal calcification mitral valve leaflets. Mild mitral annular calcification. Mild mitral regurgitation is present. Mild aortic stenosis. Moderate tricuspid regurgitation. Pulmonary hypertension is mild to moderate . There is a trivial circumferential pericardial effusion noted. Moderate left pleural effusion. Labs:  Lab Results   Component Value Date    WBC 5.6 04/06/2021    HGB 9.8 04/06/2021    HCT 32.3 04/06/2021    MCV 90.7 04/06/2021    MCH 27.5 04/06/2021    MCHC 30.3 04/06/2021    RDW 14.6 04/06/2021     04/06/2021    MPV 10.1 04/06/2021     Lab Results   Component Value Date     04/06/2021    K 4.0 04/06/2021    K 4.2 06/20/2020    CL 99 04/06/2021    CO2 27 04/06/2021    BUN 34 04/06/2021    CREATININE 2.3 04/06/2021    LABALBU 2.6 04/06/2021    LABALBU 4.0 03/21/2012    CALCIUM 8.6 04/06/2021    GFRAA 24 04/06/2021    LABGLOM 24 04/06/2021     Lab Results   Component Value Date    PROTIME 12.0 03/29/2021    INR 1.1 03/29/2021     Recent Labs     04/05/21  0043   PROBNP 2,450*     Recent Labs     04/05/21  1040   PROCAL 0.54*     This SmartLink has not been configured with any valid records. Micro:  No results for input(s): CULTRESP in the last 72 hours. No results for input(s): LABGRAM in the last 72 hours. No results for input(s): LEGUR in the last 72 hours. No results for input(s): STREPNEUMAGU in the last 72 hours. No results for input(s): LP1UAG in the last 72 hours. Assessment:    1. Acute hypoxic respiratory failure  2. combination of acute on chronic CHF  3. healthcare associated pneumonia versus aspiration pneumonia  4.  History of heart failure with preserved ejection fraction  5. Mild pharyngeal dysphagia  6. Left pleural fluid diagnostic tap 75 cc removed, exudate. Cytology negative 4/3/21      Plan:   1. Wean FiO2 for saturations above 92% 8 L HF  2. Continue BiPAP as needed and nightly  3. Continue EZ Pap with albuterol Q 4 hrs   4. Continue cefepime decreased to daily dosing per nephrology, treat HCAP for total 8 days  5. IR only 75 cc pleural fluid was drained from the left side. Cytology is negative. Fluid appears exudate. Fluid cultures negative  6. continue dysphagia 3 soft advance diet per speech recommendations. 7. Continue to monitor I's and O's closely continue diuresis bumex 2 mg po per nephrology  7. PT OT    This plan of care was reviewed in collaboration with Dr. Albino Win  Electronically signed by KASSIE Thornton CNP on 4/6/2021 at 3:17 PM    I personally saw, examined, and cared for the patient. Labs, medications, radiographs reviewed. I agree with history exam and plans detailed in NP note. Radha Mayen M.D.    Pulmonary/Critical Care Medicine

## 2021-04-06 NOTE — PROGRESS NOTES
4/6/2021  1:08 PM      Nutrition Assessment     Type and Reason for Visit: RD Nutrition Re-Screen/LOS    Nutrition Recommendations/Plan: Continue current diet and ONS, as tolerated    Nutrition Assessment:  Pt admit for acute resp failure/CHF. Discharge planning in progress for [facility]. Pt intake <50-75% at most meals. Will initiate ONS and may recommend ONS after discharge if PO remains inadequate    Malnutrition Assessment:  Malnutrition Status: At risk for malnutrition (Comment)    Nutrition Related Findings: forgetful at times, +1 edema, +BS, -I/O 2L      Current Nutrition Therapies:    DIET LOW SODIUM 2 GM; Dysphagia Soft and Bite-Sized    Anthropometric Measures:  · Height: 5' 4\" (162.6 cm)  · Current Body Wt: 164 lb (74.4 kg)(4/6)   · BMI: 28.1    Nutrition Diagnosis:   No nutrition diagnosis at this time     Nutrition Interventions:   Food and/or Nutrient Delivery:  Continue Current Diet, Start Oral Nutrition Supplement(HP Ensure ONS BID)  Nutrition Education/Counseling:  Education not indicated(discharge plan to [facility])   Coordination of Nutrition Care:  Continue to monitor while inpatient    Goals:  PO >75% at meals       Nutrition Monitoring and Evaluation:   Behavioral-Environmental Outcomes:  None Identified   Food/Nutrient Intake Outcomes:  Food and Nutrient Intake, Supplement Intake  Physical Signs/Symptoms Outcomes:  GI Status, Biochemical Data, Fluid Status or Edema, Nutrition Focused Physical Findings, Skin, Weight     Discharge Planning:     Too soon to determine     Electronically signed by Ruby Vazquez RD, CNSC, LD on 4/6/21 at 1:08 PM EDT    Contact: [phone]

## 2021-04-06 NOTE — PROGRESS NOTES
Progress Note  NEPHROLOGY    Reason for Consult: Management of acute kidney injury      History of Present Iltracey from the 4/5/21 note: This is a 45-year-old female with a history of diastolic heart failure, hypothyroidism, diabetes mellitus type 2, hypothyroidism, essential hypertension who has been admitted twice this month for CHF exacerbation with discharge most recently 4 days ago. The night prior to admission she was complaining of generalized weakness and palpitations: therefore presented to the ED for further evaluation on 3/29/2021. Vitals upon arrival included  BP (!) 142/70   Pulse 76   Temp 98.3 °F (36.8 °C)   Resp 26   Ht 5' 3\" (1.6 m)   Wt 159 lb (72.1 kg)   SpO2 98%   BMI 28.17 kg/m². Pertinent labs included WBC 8.2 and hemoglobin 10.4. Initial BMP revealed sodium 140, potassium 5.2, chloride 103, CO2 22, BUN 26 and creatinine 1.3. BNP 3514. Initial lactic acid 6.2. Initial ABGs 7.397, PCO2 35.6, PO2 61.5, bicarb 21 and 90% O2 saturation. Chest x-ray revealed cardiomegaly with progressive perihilar and bibasilar infiltrates and pleural effusions. Patient was subsequently admitted with acute respiratory failure with hypoxia. Patient currently examined sitting up in bed in mild acute distress. She is a very poor historian. ROS difficult to accurately obtain. Per the nursing staff did require 15 L NRB mask this morning (she is refusing the Bipap). She continues to have diffuse interstitial infiltrates on CXR (Pulmonology is following). Cardiology is also following: Patient was initially on IV bumex, but was changed to PO on 4/3/21. She has a history of stage I systolic hypertension.       4/6/21: Pt awake alert up in room with the help of the aid this PM    Past Medical History:        Diagnosis Date    (HFpEF) heart failure with preserved ejection fraction ([address] 05/26/2017    3/31/17- echo- LVEF 60-65%, mild LVH, mild MR    Arthritis     Bursitis     shoulders    Cervical radiculopathy     [facility]congestive heart failure) ([facility])     CKD (chronic kidney disease) stage 3, GFR 30-59 ml/min     Diabetes mellitus ([facility])     GERD (gastroesophageal reflux disease)     Lime (hard of hearing)     Hypertension     Hypothyroidism     SSS (sick sinus syndrome) ([facility])     stable, per epic note.     Thyroid disease     Unsteadiness     Valvular heart disease     sees Dr. Delano Banda        Past Surgical History:        Procedure Laterality Date    FOOT SURGERY      both    JOINT REPLACEMENT  1999    TOTAL KNEE ARTHROPLASTY Right 3/21/2019    RIGHT KNEE TOTAL ARTHROPLASTY (ILENE)++ADDUCTOR CANAL BLOCK++ performed by Mahesh Puckett MD at [facility] OR       Current Medications:    Current Facility-Administered Medications: guaiFENesin tablet 400 mg, 400 mg, Oral, TID  potassium bicarb-citric acid (EFFER-K) effervescent tablet 40 mEq, 40 mEq, Oral, Daily  cefepime (MAXIPIME) 2000 mg IVPB extended (mini-bag), 2,000 mg, Intravenous, Q24H  bumetanide (BUMEX) tablet 1 mg, 1 mg, Oral, Daily  hydrALAZINE (APRESOLINE) tablet 25 mg, 25 mg, Oral, 3 times per day  isosorbide dinitrate (ISORDIL) tablet 10 mg, 10 mg, Oral, TID  albuterol (PROVENTIL) nebulizer solution 2.5 mg, 2.5 mg, Nebulization, 6 times per day  enoxaparin (LOVENOX) injection 30 mg, 30 mg, Subcutaneous, Daily  metoprolol succinate (TOPROL XL) extended release tablet 37.5 mg, 37.5 mg, Oral, BID  aspirin EC tablet 81 mg, 81 mg, Oral, Daily  levothyroxine (SYNTHROID) tablet 50 mcg, 50 mcg, Oral, Daily  spironolactone (ALDACTONE) tablet 12.5 mg, 12.5 mg, Oral, Daily  repaglinide (PRANDIN) tablet 1 mg, 1 mg, Oral, TID AC  glucose (GLUTOSE) 40 % oral gel 15 g, 15 g, Oral, PRN  dextrose 50 % IV solution, 12.5 g, Intravenous, PRN  glucagon (rDNA) injection 1 mg, 1 mg, Intramuscular, PRN  dextrose 5 % solution, 100 mL/hr, Intravenous, PRN  insulin lispro (HUMALOG) injection vial 0-6 Units, 0-6 Units, Subcutaneous, TID WC  insulin lispro (HUMALOG) injection vial 0-3 Units, 0-3 Units, Subcutaneous, Nightly  sodium chloride flush 0.9 % injection 10 mL, 10 mL, Intravenous, 2 times per day  sodium chloride flush 0.9 % injection 10 mL, 10 mL, Intravenous, PRN  0.9 % sodium chloride infusion, 25 mL, Intravenous, PRN  promethazine (PHENERGAN) tablet 12.5 mg, 12.5 mg, Oral, Q6H PRN **OR** ondansetron (ZOFRAN) injection 4 mg, 4 mg, Intravenous, Q6H PRN  polyethylene glycol (GLYCOLAX) packet 17 g, 17 g, Oral, Daily PRN  acetaminophen (TYLENOL) tablet 650 mg, 650 mg, Oral, Q6H PRN **OR** acetaminophen (TYLENOL) suppository 650 mg, 650 mg, Rectal, Q6H PRN  0.9 % sodium chloride infusion, 25 mL, Intravenous, Q12H  perflutren lipid microspheres (DEFINITY) injection 1.65 mg, 1.5 mL, Intravenous, ONCE PRN    Allergies:  Aspirin    Social History:      reports that she has never smoked. She has never used smokeless tobacco. She reports previous alcohol use. She reports that she does not use drugs.       Family History:     Family History   Problem Relation Age of Onset    No Known Problems Mother     No Known Problems Father          Review of Systems:       Pertinent positives stated above in HPI. All other systems were reviewed and were negative. Physical exam:   Constitutional:  VITALS:  BP (!) 140/73   Pulse 78   Temp 97.3 °F (36.3 °C) (Oral)   Resp 18   Ht 5' 4\" (1.626 m)   Wt 164 lb 9.6 oz (74.7 kg)   SpO2 96%   BMI 28.25 kg/m²   CURRENT TEMPERATURE:  Temp: 97.3 °F (36.3 °C)  CURRENT RESPIRATORY RATE:  Resp: 18  CURRENT PULSE:  Pulse: 78  CURRENT BLOOD PRESSURE:  BP: (!) 140/73  24HR BLOOD PRESSURE RANGE:  Systolic (51KAJ), FKB:670 , Min:127 , CWK:559   ; Diastolic (20LML), NWD:38, Min:60, Max:73    24HR INTAKE/OUTPUT:      Intake/Output Summary (Last 24 hours) at 4/6/2021 1358  Last data filed at 4/6/2021 1200  Gross per 24 hour   Intake 130 ml   Output 450 ml   Net -320 ml       General Appearance: appears comfortable in mild acute distress.    HEENT: Normocephalic atraumatic without obvious abnormality   Neck: Lips, mucosa, and tongue normal.  Supple, symmetrical, trachea midline, no adenopathy;thyroid:  no enlargement/tenderness/nodules or JVD. Lung: clear and diminished in the bases  Heart: RRR, normal S1, S2. No MRG  Abdomen: Soft, NT, ND. BS present x 4 quadrants. No bruit or organomegaly. Extremities: Pedal pulses 2+ symmetric b/l. Extremities normal, no cyanosis, clubbing, or edema. Musculokeletal: No joint swelling, no muscle tenderness. ROM normal in all joints of extremities.    Neurologic: Mental status: Underlying confusion      DATA:      CBC:   Lab Results   Component Value Date    WBC 5.6 04/06/2021    RBC 3.56 04/06/2021    RBC 3.68 12/19/2017    HGB 9.8 04/06/2021    HCT 32.3 04/06/2021    MCV 90.7 04/06/2021    MCH 27.5 04/06/2021    MCHC 30.3 04/06/2021    RDW 14.6 04/06/2021     04/06/2021    MPV 10.1 04/06/2021     BMP:    Lab Results   Component Value Date     04/06/2021    K 4.0 04/06/2021    K 4.2 06/20/2020    CL 99 04/06/2021    CO2 27 04/06/2021    BUN 34 04/06/2021    LABALBU 2.6 04/06/2021    LABALBU 4.0 03/21/2012    CREATININE 2.3 04/06/2021    CALCIUM 8.6 04/06/2021    GFRAA 24 04/06/2021    LABGLOM 24 04/06/2021    GLUCOSE 199 04/06/2021    GLUCOSE 77 04/18/2012       RAD:  Echo Limited    Result Date: 3/30/2021  Transthoracic Echocardiography Report (TTE)  Demographics   Patient Name    Eze Villagomez  Gender            Female                  P   Medical Record  [id number]     Room Number       3375  Number   Account #       [de-identified]    Procedure Date    03/30/2021   Corporate ID                 Ordering          Paula Maldonado MD                               Physician   Accession       [id number]   Referring         Dub Dandy  Number                       Physician   Date of Birth   1935   Sonographer       Ayaan Has RDCS   Age             81 year(s)   Interpreting      [address] [place] [address] Physician         Physician Cardiology                                                 Navin Isaac MD                                Any Other  Procedure Type of Study   TTE procedure:Echo Limited Study. Procedure Date Date: 03/30/2021 Start: 07:22 AM Study Location: Portable Technical Quality: Adequate visualization Indications:Congestive heart failure, diastolic dysfunction. Patient Status: Routine Height: 64 inches Weight: 171 pounds BSA: 1.83 m^2 BMI: 29.35 kg/m^2 HR: 61 bpm BP: 144/66 mmHg  Findings   Left Ventricle  Left ventricle is normal in size . Mild left ventricular concentric hypertrophy noted. No regional wall motion abnormalities seen. Normal left ventricular ejection fraction. Ejection fraction is visually estimated at 65%. Indeterminate diastolic function. Right Ventricle  Normal right ventricular size and function. Left Atrium  The left atrium is mildly dilated. Interatrial septum appears intact. Right Atrium  Normal right atrium size. Mitral Valve  Focal calcification mitral valve leaflets. Mild mitral annular calcification. Mild mitral regurgitation is present. Tricuspid Valve  The tricuspid valve appears structurally normal.  Moderate tricuspid regurgitation. RVSP is 50 mmHg. Pulmonary hypertension is mild to moderate . Aortic Valve  The aortic valve appears mildly sclerotic. Mean transaortic gradient (Doppler) 11 mm Hg . Mild aortic stenosis. Pulmonic Valve  The pulmonic valve was not well visualized. Mild pulmonic regurgitation present. Pericardial Effusion  There is a trivial circumferential pericardial effusion noted. Pleural Effusion  Moderate left pleural effusion. Aorta  Aortic root dimension within normal limits. Conclusions   Summary  Left ventricle is normal in size . Mild left ventricular concentric hypertrophy noted. No regional wall motion abnormalities seen. Normal left ventricular ejection fraction.   The left atrium is mildly dilated. Focal calcification mitral valve leaflets. Mild mitral annular calcification. Mild mitral regurgitation is present. Mild aortic stenosis. Moderate tricuspid regurgitation. Pulmonary hypertension is mild to moderate . There is a trivial circumferential pericardial effusion noted. Moderate left pleural effusion. Signature   ----------------------------------------------------------------  Electronically signed by Dia Sheets MD(Interpreting  physician) on 03/30/2021 05:36 PM  ----------------------------------------------------------------  M-Mode/2D Measurements & Calculations   LV Diastolic       LV Systolic Dimension: 2.5 cm  Dimension: 3.9 cm  LV Volume Diastolic: 50.8 ml  LV GD:33.9 %       LV Volume Systolic: 22 ml  LV PW Diastolic:   LV EDV/LV EDV Index: 65.7 BB/94      RV Diastolic  1.2 cm             ml/m^2LV ESV/LV ESV Index: 22        Dimension: 3 cm  LV PW Systolic:    WJ/74LF/ m^2  1. 6 cm             EF Calculated: 66.5 %  Septum Diastolic:  LV Mass Index: 87 l/min*m^2  1. 2 cm  Septum Systolic:  1.5 cm             LVOT: 2 cm                           IVC Expiration:  CO: 5.36 l/min                                          2.4 cm  CI: 2.93 l/m*m^2  LV Mass: 159.29 g  Doppler Measurements & Calculations                      AV Peak Velocity: 2.29 LVOT Peak Velocity: 1.3 m/s                     m/s                    LVOT Mean Velocity: 0.93 m/s                     AV Peak Gradient:      LVOT Peak Gradient: 6.7 mmHgLVOT                     21.03 mmHg             Mean Gradient: 4 mmHg                     AV Mean Velocity: 1.62  MV E' Septal       m/s  Velocity: 0.05 m/s AV Mean Gradient: 11.3  MV E' Lateral      mmHg                   TR Velocity:3.56 m/s  Velocity: 6 m/s    AV VTI: 47.8 cm        TR Gradient:50.81 mmHg                     AV Area                     (Continuity):1.84 cm^2                      LVOT VTI: 28 cm                     IVRT: 96.9 msec perfusion  Cr up to 2.3mg/dl  PLAN:1. Hold Bumetanide  2. Runge IVF     2. Hyocalcemia with hypoalbuminemia in the setting of the AMANDEEP and the Unspecified Vit D Def  Vit D level 9  PO4 WNL  Ionized Ca++ WNL  PLAN:1. Start Vit D,      3. HFpEF with a Hx of VHD-currently on bumetanide 2mg po qd and spironolactone 12.5mg po qd  PLAN:1. Hold the diuretics for present     4. Met alkalosis-progressive-sec to diuresis  PLAN:1. Supplement with Cl(-) salt     5. AMS which may reflect the cefepime in the setting of an elderly pt with worsening renal status  PLAN:1. Follow off cefepime    Thank you for allowing us to participate in care of Ms.  Hans Reyes MD  4/6/2021  1:58 PM

## 2021-04-06 NOTE — PROGRESS NOTES
SPEECH LANGUAGE PATHOLOGY  DAILY PROGRESS NOTE        PATIENT NAME:  Keily Colon      :  1935          TODAY'S DATE:  2021 ROOM:  [address] [place] [address]-F    SWALLOWING  Pt in bed, appeared confused, however was oriented x3. Pt noted to perseverate on topic of her son and the date. Pt also noted to produce unintelligible utterances; significant decline in cognition from last session on 2021. Pt reports good toleration of current diet. Pt accepted thin liquid by cup, puree by spoon, and solid foods. Pt noted to cough x1 after solid foods, however improved tolerance noted with subsequent oral trials. SLP to continue to assess. RN reports limited oral intake and emesis following AM pills. RN notified of decline in cognition. DYSPHAGIA DIAGNOSIS:  Mild pharyngeal dysphagia; no aspiration or penetration observed during video swallow study       Due to significant pharyngeal delay, pt is at a high risk of aspiration of thin liquids with large sips                            DIET RECOMMENDATIONS:  Dysphagia 3, Soft/advanced (Soft & Bite-sized) solids with  thin liquids as tolerated     Soft & Bite-Sized diet recommended for energy conservation during mastication                   FEEDING RECOMMENDATIONS:                           Assistance level:  Stand by assistance is needed during all oral intake                             Compensatory strategies recommended: Small bites/sips and Alternate solids and liquids        Oral- adequate labial seal and A-P transfer, no oral residue      Pharyngeal- Cough with solid x1 however unsure if related to aspiration, no multiple swallows      Education- Pt educated on results and POC. Pt trained on compensatory strategies for safe swallow with fair outcome. Questions answered. Will continue SP intervention as per previously established POC. WISAM Dorsey. Speech-Language Pathology Student        Jaylen ARGUELLO CCC/SLP [id number]  Speech Pathologist              CPT code(s) 58329  dysphagia tx  Total minutes :  15 minutes

## 2021-04-06 NOTE — PLAN OF CARE
Problem: Falls - Risk of:  Goal: Will remain free from falls  Description: Will remain free from falls  4/6/2021 0330 by Anabella Sun RN  Outcome: Met This Shift  4/5/2021 1606 by Gideon Iniguez RN  Outcome: Met This Shift  Goal: Absence of physical injury  Description: Absence of physical injury  Outcome: Met This Shift

## 2021-04-06 NOTE — PROGRESS NOTES
Occupational Therapy  OT BEDSIDE TREATMENT NOTE      Date:2021  Patient Name: Lorena Duke  MRN: [id number]  : 1935  Room: [address] [place] [address]     Referring Provider: Tricia Monique MD     Evaluating OT: Grover FRANCIS/L [id number]     AM-PAC Daily Activity Raw Score:      Recommended Adaptive Equipment: TBD     Diagnosis: acute respiratory failure with hypoxia. Pt presents to ED from home with dyspnea on exertion. Pertinent Medical History: arthritis, CHF, DM, HTN, CKD   Precautions:  Falls, high flow O2     Home Living: Pt lives alone in a single story home. Bathroom setup: tub/shower combo with indwelling shower chair, has 3:1      Prior Level of Function: Per pt report Mod I with ADLs, son assists? with IADLs; completed functional mobility with [employer]. No home O2.     Pain Level: pt did not report pain.      Cognition: Awake however lethargic. Minimal verbalizations. Limited direction following. Decreased motivation for activity. Problem solving:  poor              Judgement/safety:  poor                Functional Assessment:    Initial Eval Status  Date: 21 Treatment session:  Short Term Goals      Feeding SBA  Set up of lunch tray, pt reports increasingly hungry as no food this AM for tests. Pt fatigued and cues to attend to meal and take bits   Set up   Grooming Min A Max A   Set up   UB Dressing Min A  Management of hospital gown Max A   Set up   LB Dressing Mod A  Management of socks   SBA   Bathing Max A   SBA   Toileting Max A Max A toilet hygiene  SBA   Bed Mobility  Supine <> sit: Mod A  Scooting to HOB: SBA Max A x2 supine to sit   Min A sit to supine      Functional Transfers STS: pt declines this session reporting does not feel well Max A x 2 sit to stand to side step to the [facility].   SBA   Functional Mobility Unable to tolerate    SBA during ADLs   Balance Sitting: fair at EOB     Standing: unable to tolerate Poor sit balance on the EOB.        Activity Tolerance poor Poor   standing gabe x6-7 min with fair plus balance during self care tasks       Comments:  Pt agreeable to therapy however poor participation in activity. While seated, pt continually curling  her arm up to the side of her head and tucking her head into her elbow. Pt unsafe to sit in chair due to leaning forward while sitting upright. Assisted back to bed at end of the session. O2 saturation monitored during this session. 99% while seated. Education/treatment:  ADL retraining with facilitation of movement to increase self care skills. Therapeutic activity to address balance and endurance for ADL and transfers. · Pt has made limited  progress towards set goals. Plan of Care: 2-5 days/week for 1-2 weeks PRN   [x]? ?ADL retraining/adaptive techniques and AE recommendations to increase functional independence within precautions                    [x]? ? Energy conservation techniques to improve tolerance for ADL/IADLs  [x]? ? Functional transfer/mobility training/DME recommendations for increased independence, safety and fall prevention         [x]? ?Patient/family education to increase safety and functional independence during daily routine          [x]? ? Environmental modifications for safe mobility and completion of ADLs                             []? ? Cognitive retraining to improve problem solving skills & safe participation in ADLs/IADLs     []? ?Sensory re-education techniques to improve extremity awareness, maintain skin integrity and improve hand function                             []? ? Visual/Perceptual retraining to improve body awareness and safety during transfers and ADLs  []? ? Splinting/positioning needs to maintain joint/skin integrity and contracture prevention  [x]? ? Therapeutic activity to improve functional performance during ADLs                                        [x]? ? Therapeutic exercise to improve tolerance and functional strength for ADLs   [x]? ? Balance retraining/tolerance tasks for facilitation of postural control with dynamic challenges during ADLs  []? ?Neuromuscular re-education to facilitate righting/equilibrium reactions, midline orientation, scapular stability/mobility, normalize muscle tone and facilitate active functional movement                        []? ? Delirium prevention/treatment    [x]? Positioning to improve functional independence and decrease risk of skin breakdown  []? ?Other:        Time In: 10:01  Time Out: 10:15     Min Units   Therapeutic Ex 19281     Therapeutic Activities 26578 9 1   ADL/Self Care 47509 5    Orthotic Management 26250     Neuro Re-Ed 51805     Non-Billable Time     TOTAL TIMED TREATMENT 14 [address] [facility] YAMILE/L [id number]

## 2021-04-06 NOTE — PROGRESS NOTES
Physical Therapy  Facility/Department: [address] INTERNAL MEDICINE 2  Daily Treatment Note  NAME: Princess Nair  : 1935  MRN: [id number]    Date of Service: 2021    Patient Diagnosis(es): The primary encounter diagnosis was Acute respiratory failure with hypoxia ([address] A diagnosis of Congestive heart failure, unspecified HF chronicity, unspecified heart failure type [facility]) was also pertinent to this visit. has a past medical history of (HFpEF) heart failure with preserved ejection fraction ([address] Arthritis, Bursitis, Cervical radiculopathy, CHF (congestive heart failure) ([address] CKD (chronic kidney disease) stage 3, GFR 30-59 ml/min, Diabetes mellitus ([address] GERD (gastroesophageal reflux disease), [place] (hard of hearing), Hypertension, Hypothyroidism, SSS (sick sinus syndrome) ([facility] [address] Thyroid disease, Unsteadiness, and Valvular heart disease. has a past surgical history that includes Foot surgery; Total knee arthroplasty (Right, 3/21/2019); and joint replacement (). Referring Provider:  Maribell Driver RN     Evaluating PT:  Bruno Naranjo PT, DPT [id number]     Room #:  5110/5511-E  Diagnosis:  Acute respiratory failure  Precautions:  Fall risk, high flow O2  Equipment Needs:  None. Per chart, pt owns walker and cane.     SUBJECTIVE:     Per chart, pt lives alone in a 1 story home with 3 steps to enter with 1 rail. Pt's son lives close by. Pt used walker or cane for ambulation.         OBJECTIVE:    Initial Evaluation  Date:  Treatment   Short Term/ Long Term   Goals   Was pt agreeable to Eval/treatment? yes  yes     Does pt have pain? Abdominal pain  none reported     Bed Mobility  Rolling: NT  Supine to sit: Mod A  Sit to supine: Mod A  Scooting: independent while in bed.  rolling: Max A  Supine to sit: Max A X 2  Sit to supine:  SBA  Scooting: Max A to sitting EOB SBA   Transfers Sit to stand: NT  Stand to sit: NT  Stand pivot: NT  sit to stand:   Mod A x 2  Stand to sit:  Mod A x 2  Stand pivot: NT SBA   Ambulation   NT  2 side steps without device Mod A x 2 50+ feet with w/w SBA    Stair negotiation: ascended and descended  NT  NT 3 steps with 1 rail SBA   ROM BLE:  WFL       Strength BLE:  Grossly 3+/5   Grossly 4/5   Balance Sitting EOB:  SBA  Dynamic Standing:  NT  sitting EOB:  SBA  Standing without device Mod A x 2 Sitting EOB:  independent  Dynamic Standing:  SBA with device   AM-PAC 6 Clicks 50/10  78/14          Patient education  Pt educated on PT objectives during treatment session, posture while sitting EOB. Patient response to education:   Pt verbalized understanding Pt demonstrated skill Pt requires further education in this area        yes     ASSESSMENT:    Comments:  Pt found and left in bed with call light in reach and bed alarm on. Pt with limited communication throughout treatment session. Pt voiced yes for functional mobility and then not following commands when getting out of bed and sitting EOB. Pt would only answer questions with yes or no answers. Treatment:  Patient practiced and was instructed in the following treatment:    Functional mobility performed as documented above. Pt with knee instability when taking side steps. PLAN:    Patient is making fair progress towards established goals. Will continue with current POC.      Time in  1002  Time out  1016    Total Treatment Time  14 minutes     CPT codes:    [x] Therapeutic activities 39800 14minutes  [] Therapeutic exercises 07816  minutes      Darin Urias, [phone]

## 2021-04-06 NOTE — PROGRESS NOTES
[facility] Progress Note    Admitting Date and Time: 3/29/2021  5:48 PM  Admit Dx: Acute respiratory failure with hypoxia ([facility] [address] [J96.01]    Subjective:  Patient is being followed for Acute respiratory failure with hypoxia ([facility] [address] [J96.01]     Pt feels very weak and tired. She denies chest pain, but admits to SOB. She is still on 8 L HF    ROS: denies fever, chills, cp, sob, n/v, HA unless stated above.       guaiFENesin  400 mg Oral TID    potassium bicarb-citric acid  40 mEq Oral Daily    cefepime  2,000 mg Intravenous Q24H    bumetanide  1 mg Oral Daily    hydrALAZINE  25 mg Oral 3 times per day    isosorbide dinitrate  10 mg Oral TID    albuterol  2.5 mg Nebulization 6 times per day    enoxaparin  30 mg Subcutaneous Daily    metoprolol succinate  37.5 mg Oral BID    aspirin EC  81 mg Oral Daily    levothyroxine  50 mcg Oral Daily    spironolactone  12.5 mg Oral Daily    repaglinide  1 mg Oral TID AC    insulin lispro  0-6 Units Subcutaneous TID WC    insulin lispro  0-3 Units Subcutaneous Nightly    sodium chloride flush  10 mL Intravenous 2 times per day     glucose, 15 g, PRN  dextrose, 12.5 g, PRN  glucagon (rDNA), 1 mg, PRN  dextrose, 100 mL/hr, PRN  sodium chloride flush, 10 mL, PRN  sodium chloride, 25 mL, PRN  promethazine, 12.5 mg, Q6H PRN    Or  ondansetron, 4 mg, Q6H PRN  polyethylene glycol, 17 g, Daily PRN  acetaminophen, 650 mg, Q6H PRN    Or  acetaminophen, 650 mg, Q6H PRN  perflutren lipid microspheres, 1.5 mL, ONCE PRN         Objective:    /60   Pulse 78   Temp 98.1 °F (36.7 °C) (Oral)   Resp 20   Ht 5' 4\" (1.626 m)   Wt 164 lb 9.6 oz (74.7 kg)   SpO2 96%   BMI 28.25 kg/m²     General Appearance: alert and oriented to person, place and time and in no acute distress  Skin: warm and dry  Head: normocephalic and atraumatic  Eyes: pupils equal, round, and reactive to light, extraocular eye movements intact, conjunctivae normal  Neck: neck supple and non tender without mass   Pulmonary/Chest: there are crackles noted in the left lung base more than the right; reduced air flow throughout the right lower lung. Cardiovascular: normal rate, normal S1 and S2 and no carotid bruits  Abdomen: soft, non-tender, non-distended, normal bowel sounds, no masses or organomegaly  Extremities: no cyanosis, no clubbing and no edema  Neurologic: no cranial nerve deficit and speech normal        Recent Labs     04/04/21  1115 04/05/21  1040    138   K 4.4 4.3    102   CO2 28 27   BUN 27* 32*   CREATININE 1.9* 2.1*   GLUCOSE 176* 193*   CALCIUM 8.4* 8.5*       Recent Labs     04/04/21  1115 04/05/21  1040   WBC 5.2 6.9   RBC 3.50 3.61   HGB 9.6* 10.0*   HCT 32.1* 33.2*   MCV 91.7 92.0   MCH 27.4 27.7   MCHC 29.9* 30.1*   RDW 14.6 14.6    168   MPV 9.8 9.8       Radiology:   EXAMINATION:   ONE XRAY VIEW OF THE CHEST       4/2/2021 12:06 pm       COMPARISON:   None.       HISTORY:   ORDERING SYSTEM PROVIDED HISTORY: post left thoracentesis   TECHNOLOGIST PROVIDED HISTORY:   Patient is in IR room. Reason for exam:->post left thoracentesis       FINDINGS:   Heart size is unable to be accurately assessed on this single portable view   of the chest, but appears to be stable.  Patchy bilateral airspace opacities   are felt to be stable compared with yesterday's examination, when taking   account differences in technique.  A trace right pleural effusion remains. Difficult to exclude a trace residual left pleural effusion.  No evidence of   a pneumothorax following left-sided thoracentesis.         Impression   No convincing evidence of a pneumothorax following thoracentesis.       Unchanged patchy bilateral airspace opacities which may be on the basis of   multifocal pneumonia or pulmonary edema.      EXAMINATION:   ONE XRAY VIEW OF THE CHEST       4/6/2021 6:01 am       COMPARISON:   04/03/2021       HISTORY:   ORDERING SYSTEM PROVIDED HISTORY: pna   TECHNOLOGIST PROVIDED HISTORY:   Reason for exam:->pna       FINDINGS:   Stable cardiomediastinal silhouette.  There are bilateral perihilar lung base   airspace opacities and pleural effusions, which appear mildly increased   compared to the prior study.  No pneumothorax.  No acute osseous abnormality.           Impression   Mild increased bilateral pulmonary airspace opacities and pleural effusions. Findings may be related pulmonary edema and/or pneumonia.           Echocardiogram 3/30/21:  Summary   Left ventricle is normal in size . Ejection fraction is visually estimated at 65%. Indeterminate diastolic function. Mild left ventricular concentric hypertrophy noted. No regional wall motion abnormalities seen. Normal left ventricular ejection fraction. The left atrium is mildly dilated. Focal calcification mitral valve leaflets. Mild mitral annular calcification. Mild mitral regurgitation is present. Mild aortic stenosis. Moderate tricuspid regurgitation. Pulmonary hypertension is mild to moderate . There is a trivial circumferential pericardial effusion noted. Moderate left pleural effusion. Assessment:    Active Problems:    Acute respiratory failure with hypoxia (HCC)  Resolved Problems:    * No resolved hospital problems. *      Plan:  1. Acute respiratory failure with hypoxia 2/2 possible pneumonia vs. CHF exacerbation-UNABLE TO WEAN O2, WORSENING INFILTRATES ON CXR  -interstitial edema on CXR (multifocal airspace opacities with left pleural effusion)  -patient remains on diuretics. They were changed from IV to PO by cardiology; bumex 2 mg PO daily.   -proBNP has not changed significantly thus far.  -cardiology, nephrology and pulmonology are following the patient  -cefepime   -echocardiogram 3/30/21 shows normal EF without systolic dysfunction  -thoracentesis performed 4/2/21-only 75 cc of pleural fluid was drained from the left side; fluids sent for culture and have been negative thus far  -mild pharyngeal dysphagia-continue dysphagia 3 soft advance diet  -monitor I/O closely (this has not been clearly charted)  -BMP this morning showing worsening creatinine  -worsening infiltrates on CXR; procal improving; consider CT chest?    2.  Probable HCAP  -continue abx  -pulmonology following  -flutter valve  -bipap as needed and nightly per pulm  -viral panel negative, to include COVID 19  -resp culture from thoracentesis negative  -strep and legionella neg  -cefepime discontinued    3.  Acute on chronic diastolic CHF  -cardiology following  -proBNP remains elevated  -bumex PO    4.  bilateral pleural effusions  -s/p thoracentesis 4/2 with 75 cc pleural fluid removed from left side    5.  CKD stage 3- baseline GFR 47-58, creatinine 0.9-1.3  -reduced GFR with diuresis; now creatinine of 1.9  -IV diuretics changed to PO by cardiology on 4/3/21  -continue to monitor  -nephrology consult placed  -incomplete monitoring of I/O    6.  Hypothyroidism, acquired-continue synthroid    7.  DM2-fairly well controlled  -continue repaglinide;ISS      NOTE: This report was transcribed using voice recognition software. Every effort was made to ensure accuracy; however, inadvertent computerized transcription errors may be present.   Electronically signed by Lawrence Garcia MD on 4/6/2021 at 9:12 AM

## 2021-04-06 NOTE — CARE COORDINATION
Pt currently on 8lnc; bipap at night ( refuses at times). Iv cefipime. Deon Delgadillo at [facility] for caprice; states can accept pt on 8 liters. covid pended. await clearance for discharge by medical team. Will follow. Nora Harris.

## 2021-04-06 NOTE — CARE COORDINATION
4/6/2021  Social Work Discharge Planning:Awaiting decision from [facility] as to whether they can accept. IV ATB. Pt is on 8l o2 here. Electronically signed by JANELLE Everett on 4/6/2021 at 9:39 AM    4/6/2021  Social Work Discharge Planning:TIFFANIE was informed by Laura Witt that [facility] accept Pt. TIFFANIE asked liaison to start precert as soon as therapy eval is in. Pt will need a COVID test. N-17 generated, hens completed and transport form is in chart. Electronically signed by JANELLE Everett on 4/6/2021 at 10:38 AM'

## 2021-04-07 NOTE — PROGRESS NOTES
P Quality Flow/Interdisciplinary Rounds Progress Note        Quality Flow Rounds held on April 7, 2021    Disciplines Attending:  Bedside Nurse, ,  and Nursing Unit Leadership    Devan Spencer was admitted on 3/29/2021  5:48 PM    Anticipated Discharge Date:  Expected Discharge Date: 04/02/21    Disposition:    Jose F Score:  Jose F Scale Score: 17    Readmission Risk              Risk of Unplanned Readmission:        31           Discussed patient goal for the day, patient clinical progression, and barriers to discharge.   The following Goal(s) of the Day/Commitment(s) have been identified:  monitor SP02, check plan with consults/primary      Reese Romberg  April 7, 2021

## 2021-04-07 NOTE — PROGRESS NOTES
Date: 4/6/2021    Time: 10:52 PM    Patient Placed On BIPAP/CPAP/ Non-Invasive Ventilation? No    If no must comment. Facial area red/color change? No           If YES are Blister/Lesion present? No   If yes must notify nursing staff  BIPAP/CPAP skin barrier? No    Skin barrier type:n/a       Comments: Patient refused BiPAP. Tried face mask and nasal pillows patient could not tolerate either. Patient placed back on oxygen. Machine is at bedside if needed.         Sophie Levine

## 2021-04-07 NOTE — PROGRESS NOTES
Son Edy Silveira notified of results from respiratory panel and need to transfer to another floor, due to Being CoVid positive at this time. Will be transferred up to the 6th floor. All questions answered.

## 2021-04-07 NOTE — PROGRESS NOTES
(congestive heart failure) (HCC)     CKD (chronic kidney disease) stage 3, GFR 30-59 ml/min     Diabetes mellitus (HCC)     GERD (gastroesophageal reflux disease)     [place] (hard of hearing)     Hypertension     Hypothyroidism     SSS (sick sinus syndrome) ([facility])     stable, per epic note.     Thyroid disease     Unsteadiness     Valvular heart disease     sees Dr. Ariana Huerta        Past Surgical History:        Procedure Laterality Date    FOOT SURGERY      both    JOINT REPLACEMENT  1999    TOTAL KNEE ARTHROPLASTY Right 3/21/2019    RIGHT KNEE TOTAL ARTHROPLASTY (ILENE)++ADDUCTOR CANAL BLOCK++ performed by Aidan Palomino MD at [facility] OR       Current Medications:    Current Facility-Administered Medications: albuterol sulfate  (90 Base) MCG/ACT inhaler 2 puff, 2 puff, Inhalation, Q4H While awake  dexamethasone (DECADRON) injection 6 mg, 6 mg, Intravenous, Q12H  ascorbic acid (VITAMIN C) tablet 500 mg, 500 mg, Oral, Daily  zinc sulfate (ZINCATE) capsule 50 mg, 50 mg, Oral, Daily  vitamin D (ERGOCALCIFEROL) capsule 50,000 Units, 50,000 Units, Oral, Weekly  guaiFENesin tablet 400 mg, 400 mg, Oral, TID  cefepime (MAXIPIME) 2000 mg IVPB extended (mini-bag), 2,000 mg, Intravenous, Q24H  [Held by provider] bumetanide (BUMEX) tablet 1 mg, 1 mg, Oral, Daily  hydrALAZINE (APRESOLINE) tablet 25 mg, 25 mg, Oral, 3 times per day  isosorbide dinitrate (ISORDIL) tablet 10 mg, 10 mg, Oral, TID  enoxaparin (LOVENOX) injection 30 mg, 30 mg, Subcutaneous, Daily  metoprolol succinate (TOPROL XL) extended release tablet 37.5 mg, 37.5 mg, Oral, BID  aspirin EC tablet 81 mg, 81 mg, Oral, Daily  levothyroxine (SYNTHROID) tablet 50 mcg, 50 mcg, Oral, Daily  [Held by provider] spironolactone (ALDACTONE) tablet 12.5 mg, 12.5 mg, Oral, Daily  repaglinide (PRANDIN) tablet 1 mg, 1 mg, Oral, TID AC  glucose (GLUTOSE) 40 % oral gel 15 g, 15 g, Oral, PRN  dextrose 50 % IV solution, 12.5 g, Intravenous, PRN  glucagon (rDNA) injection 1 mg, 1 mg, Intramuscular, PRN  dextrose 5 % solution, 100 mL/hr, Intravenous, PRN  insulin lispro (HUMALOG) injection vial 0-6 Units, 0-6 Units, Subcutaneous, TID WC  insulin lispro (HUMALOG) injection vial 0-3 Units, 0-3 Units, Subcutaneous, Nightly  sodium chloride flush 0.9 % injection 10 mL, 10 mL, Intravenous, 2 times per day  sodium chloride flush 0.9 % injection 10 mL, 10 mL, Intravenous, PRN  0.9 % sodium chloride infusion, 25 mL, Intravenous, PRN  promethazine (PHENERGAN) tablet 12.5 mg, 12.5 mg, Oral, Q6H PRN **OR** ondansetron (ZOFRAN) injection 4 mg, 4 mg, Intravenous, Q6H PRN  polyethylene glycol (GLYCOLAX) packet 17 g, 17 g, Oral, Daily PRN  acetaminophen (TYLENOL) tablet 650 mg, 650 mg, Oral, Q6H PRN **OR** acetaminophen (TYLENOL) suppository 650 mg, 650 mg, Rectal, Q6H PRN  0.9 % sodium chloride infusion, 25 mL, Intravenous, Q12H  perflutren lipid microspheres (DEFINITY) injection 1.65 mg, 1.5 mL, Intravenous, ONCE PRN    Allergies:  Aspirin    Social History:      reports that she has never smoked. She has never used smokeless tobacco. She reports previous alcohol use. She reports that she does not use drugs.       Family History:     Family History   Problem Relation Age of Onset    No Known Problems Mother     No Known Problems Father          Review of Systems:       Pertinent positives stated above in HPI. All other systems were reviewed and were negative.     Physical exam:   Constitutional:  VITALS:  /63   Pulse 68   Temp 98.2 °F (36.8 °C)   Resp 18   Ht 5' 4\" (1.626 m)   Wt 164 lb 4 oz (74.5 kg)   SpO2 96%   BMI 28.19 kg/m²   CURRENT TEMPERATURE:  Temp: 98.2 °F (36.8 °C)  CURRENT RESPIRATORY RATE:  Resp: 18  CURRENT PULSE:  Pulse: 68  CURRENT BLOOD PRESSURE:  BP: 133/63  24HR BLOOD PRESSURE RANGE:  Systolic (06WZM), JXX:534 , Min:121 , BJX:251   ; Diastolic (80FFQ), SOS:30, Min:57, Max:67    24HR INTAKE/OUTPUT:      Intake/Output Summary (Last 24 hours) at 4/7/2021 [place]  Number                       Physician   Date of Birth   1935   Sonographer       Noé Felix RDCS   Age             81 year(s)   Interpreting      [address] [place] [address]                               Physician         Physician Cardiology                                                 Cassandra Levine MD                                Any Other  Procedure Type of Study   TTE procedure:Echo Limited Study. Procedure Date Date: 03/30/2021 Start: 07:22 AM Study Location: Portable Technical Quality: Adequate visualization Indications:Congestive heart failure, diastolic dysfunction. Patient Status: Routine Height: 64 inches Weight: 171 pounds BSA: 1.83 m^2 BMI: 29.35 kg/m^2 HR: 61 bpm BP: 144/66 mmHg  Findings   Left Ventricle  Left ventricle is normal in size . Mild left ventricular concentric hypertrophy noted. No regional wall motion abnormalities seen. Normal left ventricular ejection fraction. Ejection fraction is visually estimated at 65%. Indeterminate diastolic function. Right Ventricle  Normal right ventricular size and function. Left Atrium  The left atrium is mildly dilated. Interatrial septum appears intact. Right Atrium  Normal right atrium size. Mitral Valve  Focal calcification mitral valve leaflets. Mild mitral annular calcification. Mild mitral regurgitation is present. Tricuspid Valve  The tricuspid valve appears structurally normal.  Moderate tricuspid regurgitation. RVSP is 50 mmHg. Pulmonary hypertension is mild to moderate . Aortic Valve  The aortic valve appears mildly sclerotic. Mean transaortic gradient (Doppler) 11 mm Hg . Mild aortic stenosis. Pulmonic Valve  The pulmonic valve was not well visualized. Mild pulmonic regurgitation present. Pericardial Effusion  There is a trivial circumferential pericardial effusion noted. Pleural Effusion  Moderate left pleural effusion. Aorta  Aortic root dimension within normal limits.    Conclusions Summary  Left ventricle is normal in size . Mild left ventricular concentric hypertrophy noted. No regional wall motion abnormalities seen. Normal left ventricular ejection fraction. The left atrium is mildly dilated. Focal calcification mitral valve leaflets. Mild mitral annular calcification. Mild mitral regurgitation is present. Mild aortic stenosis. Moderate tricuspid regurgitation. Pulmonary hypertension is mild to moderate . There is a trivial circumferential pericardial effusion noted. Moderate left pleural effusion. Signature   ----------------------------------------------------------------  Electronically signed by Bia Morgan MD(Interpreting  physician) on 2021 05:36 PM  ----------------------------------------------------------------  M-Mode/2D Measurements & Calculations   LV Diastolic       LV Systolic Dimension: 2.5 cm  Dimension: 3.9 cm  LV Volume Diastolic: 81.9 ml  LV O.6 %       LV Volume Systolic: 22 ml  LV PW Diastolic:   LV EDV/LV EDV Index: 65.7 OO/31      RV Diastolic  1.2 cm             ml/m^2LV ESV/LV ESV Index: 22        Dimension: 3 cm  LV PW Systolic:    QQ/21RK/ m^2  1. 6 cm             EF Calculated: 66.5 %  Septum Diastolic:  LV Mass Index: 87 l/min*m^2  1. 2 cm  Septum Systolic:  1.5 cm             LVOT: 2 cm                           IVC Expiration:  CO: 5.36 l/min                                          2.4 cm  CI: 2.93 l/m*m^2  LV Mass: 159.29 g  Doppler Measurements & Calculations                      AV Peak Velocity: 2.29 LVOT Peak Velocity: 1.3 m/s                     m/s                    LVOT Mean Velocity: 0.93 m/s                     AV Peak Gradient:      LVOT Peak Gradient: 6.7 mmHgLVOT                     21.03 mmHg             Mean Gradient: 4 mmHg                     AV Mean Velocity: 1.62  MV E' Septal       m/s  Velocity: 0.05 m/s AV Mean Gradient: 11.3  MV E' Lateral      mmHg                   TR Velocity:3.56 m/s  Velocity: 6 m/s    AV VTI: 47.8 cm        TR Gradient:50.81 mmHg                     AV Area                     (Continuity):1.84 cm^2                      LVOT VTI: 28 cm                     IVRT: 96.9 msec  [email or web address] [id number]    Xr Chest Portable    Result Date: 3/29/2021  EXAMINATION: ONE XRAY VIEW OF THE CHEST 3/29/2021 7:03 pm COMPARISON: 03/23/2021. HISTORY: ORDERING SYSTEM PROVIDED HISTORY: dyspnea TECHNOLOGIST PROVIDED HISTORY: Reason for exam:->dyspnea FINDINGS: There is cardiomegaly with prominence of the superior mediastinum. There is vascular congestion with patchy perihilar infiltrates extending to the lung bases. Pleural effusions are suspected. Cardiomegaly with progressive perihilar and bibasilar infiltrates and pleural effusions likely CHF/edema and or pneumonia. Cta Pulmonary W Contrast    Result Date: 3/30/2021  EXAMINATION: CTA OF THE CHEST 3/29/2021 11:46 pm TECHNIQUE: CTA of the chest was performed after the administration of intravenous contrast.  Multiplanar reformatted images are provided for review. MIP images are provided for review. Dose modulation, iterative reconstruction, and/or weight based adjustment of the mA/kV was utilized to reduce the radiation dose to as low as reasonably achievable. COMPARISON: March 24 HISTORY: ORDERING SYSTEM PROVIDED HISTORY: rule out PE TECHNOLOGIST PROVIDED HISTORY: Reason for exam:->rule out PE Decision Support Exception->Emergency Medical Condition (MA) FINDINGS: Pulmonary Arteries: Pulmonary arteries are adequately opacified for evaluation. No evidence of intraluminal filling defect to suggest pulmonary embolism. Main pulmonary artery is normal in caliber. Mediastinum: Cardiomegaly with prominent coronary atherosclerotic calcifications. Small pericardial effusion. . No hilar or mediastinal adenopathy. Calcified hilar and mediastinal nodes. Lungs/pleura: Bilateral pleural effusions, mildly increased in the interval since the prior examination. Multifocal patchy and ground-glass airspace disease in the lungs bilaterally, also present on the prior examination. There is more confluent airspace disease in the lower lobes bilaterally, most consistent with multifocal pneumonia. Images are degraded by respiratory motion artifact. Calcified granuloma in the right lower lobe. Upper Abdomen: Splenic calcifications which may represent granuloma. Reflux of contrast into the IVC and hepatic veins which may be seen with right heart failure. Soft Tissues/Bones: No acute bone or soft tissue abnormality. No evidence of pulmonary emboli. Cardiomegaly with prominent coronary atherosclerotic calcifications and small pericardial effusion. Bilateral pleural effusions, mildly increased in the interval since the prior examination. Multifocal patchy and ground-glass airspace disease in the lungs bilaterally, and more confluent airspace disease in the lower lobes bilaterally, most consistent with multifocal pneumonia. Pulmonary edema cannot be excluded. Reflux of contrast into the IVC and hepatic veins which may be seen with right heart failure. EXAMINATION:   ONE XRAY VIEW OF THE CHEST       4/6/2021 6:01 am       COMPARISON:   04/03/2021       HISTORY:   ORDERING SYSTEM PROVIDED HISTORY: pna   TECHNOLOGIST PROVIDED HISTORY:   Reason for exam:->pna       FINDINGS:   Stable cardiomediastinal silhouette.  There are bilateral perihilar lung base   airspace opacities and pleural effusions, which appear mildly increased   compared to the prior study.  No pneumothorax.  No acute osseous abnormality.           Impression   Mild increased bilateral pulmonary airspace opacities and pleural effusions. Findings may be related pulmonary edema and/or pneumonia. Assessment/Plan  1.  Stage II AMANDEEP in the setting of the diuresis as well as the recent cefepime and the doses Vanc the last one being on 4/1/21-she did have a CTA of the chest but the timing was off for RCIN  FeNa <1% and FeUrea <35% consistent with a component of decreased effective renal perfusion  Cr up from 2.3-->2.6mg/dl  PLAN:1. Holding Bumetanide  2. reorder IVF     2. Hyocalcemia with hypoalbuminemia in the setting of the AMANDEEP and the Unspecified Vit D Def  Vit D level 9  PO4 WNL  Ionized Ca++ WNL  PLAN:1. Continue  Vit D,      3. HFpEF with a Hx of VHD-currently on bumetanide 2mg po qd and spironolactone 12.5mg po qd  PLAN:1. Holding the diuretics for present     4. Met alkalosis-progressive-sec to diuresis  PLAN:1. Supplement with Cl(-) salt     5. AMS which may reflect the cefepime in the setting of an elderly pt with worsening renal status  PLAN:1. Follow off cefepime    6. Acute hypoxic Resp Failure/ HCAP vs Asp inn the setting of COVID 19    Thank you for allowing us to participate in care of Ms.  Monik Hong MD  4/7/2021  3:15 PM

## 2021-04-07 NOTE — CARE COORDINATION
COVID PCR + 4/6. Requiring O2 7 liters high flow NC. Per [facility] @ [facility] is still pending- requesting send out COVID test- if negative, pt can be accepted at [facility] pending insurance approval- nursing prompted for send out covid test.Tentative referral made to Luann @ [employer] via VM-awaiting response. Continues on iv abx. Confused. PT am-pac 11 from 4/6. Will follow Kristel Moore     Addendeum 1pm: Per [facility] @ [facility], they do not accept pt's insurance.  Kristel Moore

## 2021-04-07 NOTE — PLAN OF CARE
Problem: Falls - Risk of:  Goal: Will remain free from falls  Description: Will remain free from falls  4/7/2021 1457 by Shweta Bai RN  Outcome: Met This Shift  4/7/2021 0112 by Osman Mike RN  Outcome: Met This Shift     Problem: Falls - Risk of:  Goal: Absence of physical injury  Description: Absence of physical injury  4/7/2021 1457 by Shweta Bai RN  Outcome: Met This Shift  4/7/2021 0112 by Osman Mike RN  Outcome: Met This Shift

## 2021-04-07 NOTE — PROGRESS NOTES
Patient son Chelo Walter called in for update on his mother, as he was informed by his brother that she is now testing positive for covid. All questions asked ans answered.

## 2021-04-07 NOTE — PROGRESS NOTES
Leslie Abebe M.D.,[facility]  Francy Martinez D.O., F.A.C.O.I., Jesse Clifford M.D. Jose Carlos Holland M.D., Eliezer Schilder ,M.D. Bianca Christensen D.O. Daily Pulmonary Progress Note    Patient:  Bill Lozano 81 y.o. female MRN: [id number]     Date of Service: 4/7/2021      Synopsis     We are following patient for acute hypoxia and abnormal CT chest    \"CC\" ; Hartness of breath    Code status: Full code      Subjective      Patient was seen and examined. COVID-19 positive on molecular viral panel. She is laying in bed in no acute distress. Oxygen down to 7 L of oxygen to maintain saturations 96%. She is at her baseline intermittently pleasantly confused. [place].     Review of Systems:  Difficult to obtain patient is confused and hard of hearing    24-hour events:  None     Objective   Vitals: /63   Pulse 68   Temp 98.2 °F (36.8 °C)   Resp 18   Ht 5' 4\" (1.626 m)   Wt 164 lb 4 oz (74.5 kg)   SpO2 96%   BMI 28.19 kg/m²     I/O:    Intake/Output Summary (Last 24 hours) at 4/7/2021 1530  Last data filed at 4/7/2021 0608  Gross per 24 hour   Intake 130 ml   Output 200 ml   Net -70 ml       Vent Information  Skin Assessment: Clean, dry, & intact  FiO2 : 50 %  SpO2: 96 %  SpO2/FiO2 ratio: 192  I Time/ I Time %: 0.9 s  Mask Type: Full face mask  Mask Size: Medium       IPAP: 15 cmH20  CPAP/EPAP: 6 cmH2O     CURRENT MEDS :  Scheduled Meds:   albuterol sulfate HFA  2 puff Inhalation Q4H While awake    dexamethasone  6 mg Intravenous Q12H    ascorbic acid  500 mg Oral Daily    zinc sulfate  50 mg Oral Daily    vitamin D  50,000 Units Oral Weekly    guaiFENesin  400 mg Oral TID    cefepime  2,000 mg Intravenous Q24H    [Held by provider] bumetanide  1 mg Oral Daily    hydrALAZINE  25 mg Oral 3 times per day    isosorbide dinitrate  10 mg Oral TID    enoxaparin  30 mg Subcutaneous Daily    metoprolol succinate  37.5 mg Oral BID    aspirin EC  81 mg Oral Daily    levothyroxine  50 mcg Oral Daily    [Held by provider] spironolactone  12.5 mg Oral Daily    repaglinide  1 mg Oral TID AC    insulin lispro  0-6 Units Subcutaneous TID WC    insulin lispro  0-3 Units Subcutaneous Nightly    sodium chloride flush  10 mL Intravenous 2 times per day       Physical Exam:  General Appearance: appears comfortable in no acute distress. HEENT: Normocephalic atraumatic without obvious abnormality   Neck: Lips, mucosa, and tongue normal.  Supple, symmetrical, trachea midline, no adenopathy;thyroid:  no enlargement/tenderness/nodules or JVD. Lung: FEW RHONCHI and diminished in the bases  Heart: RRR, normal S1, S2. No MRG  Abdomen: Soft, NT, ND. BS present x 4 quadrants. No bruit or organomegaly. Extremities: Pedal pulses 2+ symmetric b/l. Extremities normal, no cyanosis, clubbing, or edema. Musculokeletal: No joint swelling, no muscle tenderness. ROM normal in all joints of extremities. Neurologic: Mental status: Alert and Oriented X3 . PERTINENT IMAGING:  CXR 4/6/21       FINDINGS:   Stable cardiomediastinal silhouette.  There are bilateral perihilar lung base   airspace opacities and pleural effusions, which appear mildly increased   compared to the prior study.  No pneumothorax.  No acute osseous abnormality.           Impression   Mild increased bilateral pulmonary airspace opacities and pleural effusions. Findings may be related pulmonary edema and/or pneumonia.           CTA chest 3/30/2021    ECHO  3/30/2021  Left ventricle is normal in size . Mild left ventricular concentric hypertrophy noted. No regional wall motion abnormalities seen. Normal left ventricular ejection fraction. The left atrium is mildly dilated. Focal calcification mitral valve leaflets. Mild mitral annular calcification. Mild mitral regurgitation is present. Mild aortic stenosis. Moderate tricuspid regurgitation. Pulmonary hypertension is mild to moderate .    There is a trivial circumferential pericardial effusion noted. Moderate left pleural effusion. Labs:  Lab Results   Component Value Date    WBC 5.9 04/07/2021    HGB 10.6 04/07/2021    HCT 33.9 04/07/2021    MCV 89.7 04/07/2021    MCH 28.0 04/07/2021    MCHC 31.3 04/07/2021    RDW 14.6 04/07/2021     04/07/2021    MPV 9.3 04/07/2021     Lab Results   Component Value Date     04/07/2021    K 4.1 04/07/2021    K 4.2 06/20/2020     04/07/2021    CO2 27 04/07/2021    BUN 36 04/07/2021    CREATININE 2.6 04/07/2021    LABALBU 2.8 04/07/2021    LABALBU 4.0 03/21/2012    CALCIUM 8.6 04/07/2021    GFRAA 21 04/07/2021    LABGLOM 21 04/07/2021     Lab Results   Component Value Date    PROTIME 12.0 03/29/2021    INR 1.1 03/29/2021     Recent Labs     04/05/21  0043   PROBNP 2,450*     Recent Labs     04/05/21  1040   PROCAL 0.54*     This SmartLink has not been configured with any valid records. Micro:  No results for input(s): CULTRESP in the last 72 hours. No results for input(s): LABGRAM in the last 72 hours. No results for input(s): LEGUR in the last 72 hours. No results for input(s): STREPNEUMAGU in the last 72 hours. No results for input(s): LP1UAG in the last 72 hours. Assessment:    1. Acute hypoxic respiratory failure  2. COVID-19 pneumonia  3. combination of acute on chronic CHF  4. healthcare associated pneumonia versus aspiration pneumonia  5. History of heart failure with preserved ejection fraction  6. Mild pharyngeal dysphagia  7. Left pleural fluid diagnostic tap 75 cc removed, exudate. Cytology negative 4/3/21      Plan:   1. Wean FiO2 for saturations above 92% 7 L HF  2. Continue BiPAP as needed and nightly for respiratory support-not using  3. Albuterol HFA every 4 hours while awake, stop EZ Pap  4. Completed cefepime 8 total days. 5. Dexamethasone 6 mg IV every 12 hours, vitamin cocktail  6. IR only 75 cc pleural fluid was drained from the left side. Cytology is negative. Fluid appears exudate.  Fluid cultures negative  7. continue dysphagia 3 soft advance diet per speech recommendations. 8. Continue to monitor I's and O's closely holding bumex per nephrology  7. PT OT    This plan of care was reviewed in collaboration with Dr. Ness Liu  Electronically signed by KASSIE Patiño CNP on 4/7/2021 at 3:30 PM    I personally saw, examined, and cared for the patient. Labs, medications, radiographs reviewed. I agree with history exam and plans detailed in NP note. Sherry Blanca M.D.    Pulmonary/Critical Care Medicine

## 2021-04-07 NOTE — TELEPHONE ENCOUNTER
Cancelled today's apt with NP Gino Beebe still inpatient. Is now covid +   Pending placement to [facility] if accepts patient . Still in. Not reschedule at present as inpatient. Will be on discharge list once discharged  Known to DR BRUMFIELD [facility].

## 2021-04-07 NOTE — PROGRESS NOTES
[facility] Progress Note    Admitting Date and Time: 3/29/2021  5:48 PM  Admit Dx: Acute respiratory failure with hypoxia ([facility] [address] [J96.01]    Subjective:  Patient is being followed for Acute respiratory failure with hypoxia ([facility] [address] [J96.01]     Pt feels weak and tired. She is oriented x 3. She does admit to shortness of breath. I explained that she did now test positive for COVID. Per RN:  Patient is doing okay. Still requiring 7-8 L HF    ROS: denies fever, chills, cp, sob, n/v, HA unless stated above.       albuterol sulfate HFA  2 puff Inhalation Q4H While awake    dexamethasone  6 mg Intravenous Q12H    ascorbic acid  500 mg Oral Daily    zinc sulfate  50 mg Oral Daily    vitamin D  50,000 Units Oral Weekly    guaiFENesin  400 mg Oral TID    cefepime  2,000 mg Intravenous Q24H    [Held by provider] bumetanide  1 mg Oral Daily    hydrALAZINE  25 mg Oral 3 times per day    isosorbide dinitrate  10 mg Oral TID    enoxaparin  30 mg Subcutaneous Daily    metoprolol succinate  37.5 mg Oral BID    aspirin EC  81 mg Oral Daily    levothyroxine  50 mcg Oral Daily    [Held by provider] spironolactone  12.5 mg Oral Daily    repaglinide  1 mg Oral TID AC    insulin lispro  0-6 Units Subcutaneous TID WC    insulin lispro  0-3 Units Subcutaneous Nightly    sodium chloride flush  10 mL Intravenous 2 times per day     glucose, 15 g, PRN  dextrose, 12.5 g, PRN  glucagon (rDNA), 1 mg, PRN  dextrose, 100 mL/hr, PRN  sodium chloride flush, 10 mL, PRN  sodium chloride, 25 mL, PRN  promethazine, 12.5 mg, Q6H PRN    Or  ondansetron, 4 mg, Q6H PRN  polyethylene glycol, 17 g, Daily PRN  acetaminophen, 650 mg, Q6H PRN    Or  acetaminophen, 650 mg, Q6H PRN  perflutren lipid microspheres, 1.5 mL, ONCE PRN         Objective:    BP (!) 121/57   Pulse 80   Temp 97.3 °F (36.3 °C) (Oral)   Resp 18   Ht 5' 4\" (1.626 m)   Wt 164 lb 4 oz (74.5 kg)   SpO2 94%   BMI 28.19 kg/m²     General Appearance: alert and oriented to person, place and time and in no acute distress  Skin: warm and dry  Head: normocephalic and atraumatic  Eyes: pupils equal, round, and reactive to light, extraocular eye movements intact, conjunctivae normal  Neck: neck supple and non tender without mass   Pulmonary/Chest: there are crackles noted in the left lung base more than the right; reduced air flow throughout the right lower lung. Cardiovascular: normal rate, normal S1 and S2 and no carotid bruits  Abdomen: soft, non-tender, non-distended, normal bowel sounds, no masses or organomegaly  Extremities: no cyanosis, no clubbing and no edema  Neurologic: no cranial nerve deficit and speech normal        Recent Labs     04/05/21  1040 04/06/21  1052 04/07/21  0700    135 139   K 4.3 4.0 4.1    99 103   CO2 27 27 27   BUN 32* 34* 36*   CREATININE 2.1* 2.3* 2.6*   GLUCOSE 193* 199* 114*   CALCIUM 8.5* 8.6 8.6       Recent Labs     04/05/21  1040 04/06/21  1052 04/07/21  0700   WBC 6.9 5.6 5.9   RBC 3.61 3.56 3.78   HGB 10.0* 9.8* 10.6*   HCT 33.2* 32.3* 33.9*   MCV 92.0 90.7 89.7   MCH 27.7 27.5 28.0   MCHC 30.1* 30.3* 31.3*   RDW 14.6 14.6 14.6    150 151   MPV 9.8 10.1 9.3       Radiology:   EXAMINATION:   ONE XRAY VIEW OF THE CHEST       4/2/2021 12:06 pm       COMPARISON:   None.       HISTORY:   ORDERING SYSTEM PROVIDED HISTORY: post left thoracentesis   TECHNOLOGIST PROVIDED HISTORY:   Patient is in IR room. Reason for exam:->post left thoracentesis       FINDINGS:   Heart size is unable to be accurately assessed on this single portable view   of the chest, but appears to be stable.  Patchy bilateral airspace opacities   are felt to be stable compared with yesterday's examination, when taking   account differences in technique.  A trace right pleural effusion remains. Difficult to exclude a trace residual left pleural effusion.  No evidence of   a pneumothorax following left-sided thoracentesis.          Impression   No convincing evidence of a pneumothorax following thoracentesis.       Unchanged patchy bilateral airspace opacities which may be on the basis of   multifocal pneumonia or pulmonary edema. EXAMINATION:   ONE XRAY VIEW OF THE CHEST       4/6/2021 6:01 am       COMPARISON:   04/03/2021       HISTORY:   ORDERING SYSTEM PROVIDED HISTORY: pna   TECHNOLOGIST PROVIDED HISTORY:   Reason for exam:->pna       FINDINGS:   Stable cardiomediastinal silhouette.  There are bilateral perihilar lung base   airspace opacities and pleural effusions, which appear mildly increased   compared to the prior study.  No pneumothorax.  No acute osseous abnormality.           Impression   Mild increased bilateral pulmonary airspace opacities and pleural effusions. Findings may be related pulmonary edema and/or pneumonia.           Echocardiogram 3/30/21:  Summary   Left ventricle is normal in size . Ejection fraction is visually estimated at 65%. Indeterminate diastolic function. Mild left ventricular concentric hypertrophy noted. No regional wall motion abnormalities seen. Normal left ventricular ejection fraction. The left atrium is mildly dilated. Focal calcification mitral valve leaflets. Mild mitral annular calcification. Mild mitral regurgitation is present. Mild aortic stenosis. Moderate tricuspid regurgitation. Pulmonary hypertension is mild to moderate . There is a trivial circumferential pericardial effusion noted. Moderate left pleural effusion. Assessment:    Active Problems:    Acute respiratory failure with hypoxia (HCC)  Resolved Problems:    * No resolved hospital problems. *      Plan:  1. Acute respiratory failure with hypoxia 2/2 possible pneumonia vs. CHF exacerbation-now found to be COVID 19 positive  -interstitial edema on CXR (multifocal airspace opacities with left pleural effusion)  -patient now off of diuretics. bumex 2 mg PO daily.   -proBNP has not changed

## 2021-04-07 NOTE — CARE COORDINATION
VM left w/ son Scarlet Master to discuss discharge planning-ACP also needs completed- [phone] return call Taylor Lucero

## 2021-04-08 NOTE — PROGRESS NOTES
Department of Internal Medicine  Nephrology Attending Progress Note        SUBJECTIVE:  Mrs [facility] resting in bed. P.o. intake remains poor. Remains on 7 L high flow nasal cannula, minimal sputum production.   Minimal peripheral edema    Physical Exam:    Vitals:    04/08/21 0815   BP: (!) 165/79   Pulse: 79   Resp: 18   Temp:    SpO2: 95%       I/O last 24 hours:  Intake/Output 1000/500    Weight: 165    General Appearance:  awake, alert, oriented, in mild respiratory distress  Skin: No rash  Neck:  neck- supple, no mass, non-tender and no bruits  Lungs: Decreased breath sounds at the bases  Heart: Regular rhythm no murmur     Abdominal: Positive bowel sounds no abdominal masses no abdominal bruits  Extremities: Trace edema  Peripheral Pulses: +2    DATA:    CBC with Differential:    Lab Results   Component Value Date    WBC 9.7 04/08/2021    RBC 3.55 04/08/2021    RBC 3.68 12/19/2017    HGB 9.6 04/08/2021    HCT 32.2 04/08/2021     04/08/2021    MCV 90.7 04/08/2021    MCH 27.0 04/08/2021    MCHC 29.8 04/08/2021    RDW 14.5 04/08/2021    NRBC 0.0 04/07/2021    SEGSPCT 58 06/24/2013    LYMPHOPCT 4.9 04/08/2021    LYMPHOPCT 24.4 09/03/2017    PROMYELOPCT 0.9 04/07/2021    MONOPCT 3.8 04/08/2021    MYELOPCT 0.9 04/04/2021    BASOPCT 0.1 04/08/2021    MONOSABS 0.37 04/08/2021    LYMPHSABS 0.48 04/08/2021    EOSABS 0.00 04/08/2021    BASOSABS 0.01 04/08/2021     CMP:    Lab Results   Component Value Date     04/08/2021    K 4.5 04/08/2021    K 4.2 06/20/2020     04/08/2021    CO2 24 04/08/2021    BUN 44 04/08/2021    CREATININE 2.6 04/08/2021    GFRAA 21 04/08/2021    LABGLOM 21 04/08/2021    GLUCOSE 141 04/08/2021    GLUCOSE 77 04/18/2012    PROT 6.6 04/08/2021    LABALBU 2.8 04/08/2021    LABALBU 4.0 03/21/2012    CALCIUM 8.7 04/08/2021    BILITOT 0.3 04/08/2021    ALKPHOS 54 04/08/2021    AST 36 04/08/2021    ALT 17 04/08/2021          albuterol sulfate HFA  2 puff Inhalation Q4H While awake    dexamethasone  6 mg Intravenous Q12H    ascorbic acid  500 mg Oral Daily    zinc sulfate  50 mg Oral Daily    vitamin D  50,000 Units Oral Weekly    guaiFENesin  400 mg Oral TID    [Held by provider] bumetanide  1 mg Oral Daily    hydrALAZINE  25 mg Oral 3 times per day    isosorbide dinitrate  10 mg Oral TID    enoxaparin  30 mg Subcutaneous Daily    metoprolol succinate  37.5 mg Oral BID    aspirin EC  81 mg Oral Daily    levothyroxine  50 mcg Oral Daily    [Held by provider] spironolactone  12.5 mg Oral Daily    repaglinide  1 mg Oral TID AC    insulin lispro  0-6 Units Subcutaneous TID WC    insulin lispro  0-3 Units Subcutaneous Nightly    sodium chloride flush  10 mL Intravenous 2 times per day      dextrose      sodium chloride      sodium chloride Stopped (04/08/21 0300)     glucose, dextrose, glucagon (rDNA), dextrose, sodium chloride flush, sodium chloride, promethazine **OR** ondansetron, polyethylene glycol, acetaminophen **OR** acetaminophen, perflutren lipid microspheres    IMPRESSION/RECOMMENDATIONS:      Acute kidney injury with prerenal indices superimposed on CKD 3B no evidence of improvement, could be consistent with sepsis, heart failure, cardiorenal syndrome, contrast-induced nephropathy  COVID-19 pneumonia making interpretation of chest difficult  History of heart failure with preserved ejection fracture no improvement despite trial of some IV fluids we will continue to hold diuretics, difficult to interpret proBNP levels with change in creatinines  Vitamin D deficiency on supplementation  Metabolic alkalosis improving  Poor nutrition patient encouraged to increase p.o. intake  Anemia begin some NIYA therapy        Agueda Cintron MD  4/8/2021 10:10 AM

## 2021-04-08 NOTE — PROGRESS NOTES
[facility]   Progress Note    Admitting Date and Time: 3/29/2021  5:48 PM  Admit Dx: Acute respiratory failure with hypoxia ([facility] [address] [J96.01]    Subjective:    Patient was admitted with Acute respiratory failure with hypoxia ([facility] [address] [J96.01]. Patient denies fever, chills, cp, sob, n/v.     epoetin emile-epbx  10,000 Units Subcutaneous Q7 Days    albuterol sulfate HFA  2 puff Inhalation Q4H While awake    dexamethasone  6 mg Intravenous Q12H    ascorbic acid  500 mg Oral Daily    zinc sulfate  50 mg Oral Daily    vitamin D  50,000 Units Oral Weekly    guaiFENesin  400 mg Oral TID    hydrALAZINE  25 mg Oral 3 times per day    isosorbide dinitrate  10 mg Oral TID    enoxaparin  30 mg Subcutaneous Daily    metoprolol succinate  37.5 mg Oral BID    aspirin EC  81 mg Oral Daily    levothyroxine  50 mcg Oral Daily    repaglinide  1 mg Oral TID AC    insulin lispro  0-6 Units Subcutaneous TID WC    insulin lispro  0-3 Units Subcutaneous Nightly    sodium chloride flush  10 mL Intravenous 2 times per day     glucose, 15 g, PRN  dextrose, 12.5 g, PRN  glucagon (rDNA), 1 mg, PRN  dextrose, 100 mL/hr, PRN  sodium chloride flush, 10 mL, PRN  sodium chloride, 25 mL, PRN  promethazine, 12.5 mg, Q6H PRN    Or  ondansetron, 4 mg, Q6H PRN  polyethylene glycol, 17 g, Daily PRN  acetaminophen, 650 mg, Q6H PRN    Or  acetaminophen, 650 mg, Q6H PRN  perflutren lipid microspheres, 1.5 mL, ONCE PRN         Objective:          PHYSICAL EXAM:    Vitals:  BP (!) 150/69   Pulse 70   Temp 97.6 °F (36.4 °C) (Oral)   Resp 20   Ht 5' 4\" (1.626 m)   Wt 164 lb 4 oz (74.5 kg)   SpO2 92%   BMI 28.19 kg/m²     General:  Appears comfortable. Answers questions appropriately and cooperative with exam  HEENT:  Mucous membranes moist. No erythema, rhinorrhea, or post-nasal drip noted. Neck:  No carotid bruits. Heart:  Rhythm regular at rate of 72  Lungs:  CTA.   No wheeze, rales, or rhonchi  Abdomen: found for: IONCA     Radiology:   XR CHEST PORTABLE   Final Result   CHF with findings similar to the prior study. XR CHEST PORTABLE   Final Result   Mild increased bilateral pulmonary airspace opacities and pleural effusions. Findings may be related pulmonary edema and/or pneumonia. XR CHEST PORTABLE   Final Result   No significant change in appearance of the chest with persistent left pleural   effusion and multifocal airspace opacities. US THORACENTESIS Which side should the procedure be performed? Right   Final Result   Status post left thoracentesis. XR CHEST 1 VIEW   Final Result   No convincing evidence of a pneumothorax following thoracentesis. Unchanged patchy bilateral airspace opacities which may be on the basis of   multifocal pneumonia or pulmonary edema. XR CHEST (2 VW)   Final Result   CHF with likely cardiogenic edema. Fluoroscopy modified barium swallow with video   Final Result   Swallowing mechanism grossly within normal limits without evidence of   aspiration. Please see separate speech pathology report for full discussion of findings   and recommendations. CTA PULMONARY W CONTRAST   Final Result   No evidence of pulmonary emboli. Cardiomegaly with prominent coronary atherosclerotic calcifications and small   pericardial effusion. Bilateral pleural effusions, mildly increased in the interval since the prior   examination. Multifocal patchy and ground-glass airspace disease in the lungs bilaterally,   and more confluent airspace disease in the lower lobes bilaterally, most   consistent with multifocal pneumonia. Pulmonary edema cannot be excluded. Reflux of contrast into the IVC and hepatic veins which may be seen with   right heart failure. XR CHEST PORTABLE   Final Result   Cardiomegaly with progressive perihilar and bibasilar infiltrates and pleural   effusions likely CHF/edema and or pneumonia. Assessment:    Active Problems:    Acute respiratory failure with hypoxia (HCC)  Resolved Problems:    * No resolved hospital problems. *      Plan:  1. Acute respiratory failure with hypoxia wean o2 as able pulmonary following   2. Bacterial pneumonia finished abx   3. Acute on chronic diastolic chf improving continue current management  4. sher on ckd 3b  Nephrology following  5. htn continue med  6. Dm type 2 controlled monitor and treat prn  7. hypothyroidism continue med        Pt had repeat covid testing come back negative. Discussed with pulmonologist. Will d/c isolation.  Will stop steroids    Chart reviewed and updated by nursing    Time spent is 35 min    Electronically signed by Asim Ham DO on 4/8/2021 at 4:51 PM

## 2021-04-08 NOTE — PROGRESS NOTES
P Quality Flow/Interdisciplinary Rounds Progress Note        Quality Flow Rounds held on April 8, 2021    Disciplines Attending:  Bedside Nurse and     Emilia Fontaine was admitted on 3/29/2021  5:48 PM    Anticipated Discharge Date:  Expected Discharge Date: 04/10/21    Disposition:    Jose F Score:  Jose F Scale Score: 17    Readmission Risk              Risk of Unplanned Readmission:        33           Discussed patient goal for the day, patient clinical progression, and barriers to discharge. The following Goal(s) of the Day/Commitment(s) have been identified:  wean oxygen, monitor urine output.       Jean-Paul Villegas  April 8, 2021

## 2021-04-08 NOTE — CARE COORDINATION
Send out Covid test has resulted NEGATIVE. [facility] is able to accept with negative result. Voicemail left with Khanh Cody at [facility] to update on negative result---awaiting her response    Attempted to reach patient's son, Giuseppe Luo, to update and discuss dc plans, however, there was no answer. Voicemail left. Will await his call back. **addendum**    Spoke to patient's son, Martha Vyas, regarding dc plan ([address] [place] [address]). [facility] has called back----they can now accept patient now that she has resulted negative. They still have pending snf auth ongoing  (they never cancelled the precert). Will continue to await auth.

## 2021-04-08 NOTE — PROGRESS NOTES
Occupational Therapy  OT BEDSIDE TREATMENT NOTE      Date:2021  Patient Name: Gerry Adan  MRN: [id number]  : 1935  Room: [address] [place] [address]     Referring Yandy Aguilera MD     Evaluating OT: Chauncey Leavitt OTR/L [id number]     AM-PAC Daily Activity Raw Score: 15/24     Recommended Adaptive Equipment: TBD     Diagnosis: acute respiratory failure with hypoxia. Pt presents to ED from home with dyspnea on exertion.      Pertinent Medical History: arthritis, CHF, DM, HTN, CKD, COVID+  Precautions:  Falls, high flow O2, droplet +     Home Living: Pt lives alone in a single story home. Bathroom setup: tub/shower combo with indwelling shower chair, has 3:1      Prior Level of Function: Per pt report Mod I with ADLs, son Nicole Keep IADLs; completed functional mobility with [employer]. No home O2.     Pain Level: No c/o pain     Cognition: Pt alert and conversing with cues for safety required. Functional Assessment:    Initial Eval Status  Date: 21 Treatment session: 21 Short Term Goals      Feeding SBA  Set up of lunch tray, pt reports increasingly hungry as no food this AM for tests. Pt fatigued and cues to attend to meal and take bits   Set up   Grooming Min A  Set up   UB Dressing Min A  Management of hospital gown  Set up   LB Dressing Mod A  Management of socks   SBA   Bathing Max A   SBA   Toileting Max A Mod A  Pt found soiled requiring assistance with lauren care standing SBA   Bed Mobility  Supine <> sit: Mod A  Scooting to HOB: SBA Supine to sit: Min A  Sit to supine: Mod A  Scooting: Min A     Functional Transfers STS: pt declines this session reporting does not feel well STS: Min A 2xs from EOB  SBA   Functional Mobility Unable to tolerate    SBA during ADLs   Balance Sitting: fair at EOB     Standing: unable to tolerate Sitting: Supervision  Standing: Min A     Activity Tolerance poor Poor    SpO2 desaturated to 79% with activity. See comments.  standing gabe x6-7 min with fair plus balance during self care tasks      Treatment: Upon arrival pt was supine in bed. Pt able to be seen for OOB activity per RN. Pt c/o SOB post ADL. SpO2 was 79% and pt was assisted back to bed resolving SOB. Alarm on, all lines and tubes intact and call light within reach. Education: Transfer training, pursed lip breathing and benefits of OOB activity    · Pt has made good progress towards set goals. Plan of Care: 2-5 days/week for 1-2 weeks PRN   [x]? ?? ADL retraining/adaptive techniques and AE recommendations to increase functional independence within precautions                    [x]? ? ? Energy conservation techniques to improve tolerance for ADL/IADLs  [x]? ? ? Functional transfer/mobility training/DME recommendations for increased independence, safety and fall prevention         [x]? ? ? Patient/family education to increase safety and functional independence during daily routine          [x]? ? ? Environmental modifications for safe mobility and completion of ADLs                             []? ?? Cognitive retraining to improve problem solving skills & safe participation in ADLs/IADLs     []? ? ?Sensory re-education techniques to improve extremity awareness, maintain skin integrity and improve hand function                             []? ? ? Visual/Perceptual retraining to improve body awareness and safety during transfers and ADLs  []? ?? Splinting/positioning needs to maintain joint/skin integrity and contracture prevention  [x]? ? ? Therapeutic activity to improve functional performance during ADLs                                        [x]? ? ? Therapeutic exercise to improve tolerance and functional strength for ADLs   [x]? ? ? Balance retraining/tolerance tasks for facilitation of postural control with dynamic challenges during ADLs  []? ? ?Neuromuscular re-education to facilitate righting/equilibrium reactions, midline orientation, scapular stability/mobility, normalize muscle tone and facilitate active functional movement                        []? ? ? Delirium prevention/treatment    [x]? ? Positioning to improve functional independence and decrease risk of skin breakdown  []? ??Other:     Treatment Charges: Mins Units   Ther Ex  [id number]     Manual Therapy Kyara Jeter [id number] [phone]   ADL/Home Mgt [id number] 10 1   Neuro Re-ed [id number]     Group Therapy      Orthotic manage/training  [id number]     Non-Billable Time         Total Time: 395 Isa Malloy YAÑEZ/L [id number]

## 2021-04-08 NOTE — PROGRESS NOTES
Robert Armenta M.D.,[facility]  Lucina Sahu D.O., F.JOSC.OGuichoI., Demi Escalante M.D. Katrin Dial M.D., Cristina Dalton M.D. Selma Lennox, D.O. Daily Pulmonary Progress Note    Patient:  Gerry Adan 81 y.o. female MRN: [id number]     Date of Service: 4/8/2021      Synopsis     We are following patient for acute hypoxia and abnormal CT chest    \"CC\" ; Hartness of breath    Code status: Full code      Subjective      Patient was seen and examined. COVID-19 positive on molecular viral panel. She is laying in bed in no acute distress. Oxygen down to 6 L of oxygen to maintain saturations 96%. She is at her baseline intermittently pleasantly confused. [place]. Remdesivir ad Ceftin completed. She is still on Decadron.      Review of Systems:  Difficult to obtain patient is confused and hard of hearing    24-hour events:  None     Objective   Vitals: BP (!) 165/79   Pulse 79   Temp 97.9 °F (36.6 °C) (Oral)   Resp 18   Ht 5' 4\" (1.626 m)   Wt 164 lb 4 oz (74.5 kg)   SpO2 95%   BMI 28.19 kg/m²     I/O:    Intake/Output Summary (Last 24 hours) at 4/8/2021 1007  Last data filed at 4/8/2021 0657  Gross per 24 hour   Intake 1000 ml   Output 500 ml   Net 500 ml       Vent Information  Skin Assessment: Clean, dry, & intact  FiO2 : 50 %  SpO2: 95 %  SpO2/FiO2 ratio: 192  I Time/ I Time %: 0.9 s  Mask Type: Full face mask  Mask Size: Medium       IPAP: 15 cmH20  CPAP/EPAP: 6 cmH2O     CURRENT MEDS :  Scheduled Meds:   albuterol sulfate HFA  2 puff Inhalation Q4H While awake    dexamethasone  6 mg Intravenous Q12H    ascorbic acid  500 mg Oral Daily    zinc sulfate  50 mg Oral Daily    vitamin D  50,000 Units Oral Weekly    guaiFENesin  400 mg Oral TID    [Held by provider] bumetanide  1 mg Oral Daily    hydrALAZINE  25 mg Oral 3 times per day    isosorbide dinitrate  10 mg Oral TID    enoxaparin  30 mg Subcutaneous Daily    metoprolol succinate  37.5 mg Oral BID    aspirin EC  81 mg Oral Daily    levothyroxine  50 mcg Oral Daily    [Held by provider] spironolactone  12.5 mg Oral Daily    repaglinide  1 mg Oral TID AC    insulin lispro  0-6 Units Subcutaneous TID WC    insulin lispro  0-3 Units Subcutaneous Nightly    sodium chloride flush  10 mL Intravenous 2 times per day       Physical Exam:  General Appearance: appears comfortable in no acute distress. HEENT: Normocephalic atraumatic without obvious abnormality   Neck: Lips, mucosa, and tongue normal.  Supple, symmetrical, trachea midline, no adenopathy;thyroid:  no enlargement/tenderness/nodules or JVD. Lung: FEW RHONCHI and diminished in the bases  Heart: RRR, normal S1, S2. No MRG  Abdomen: Soft, NT, ND. BS present x 4 quadrants. No bruit or organomegaly. Extremities: Pedal pulses 2+ symmetric b/l. Extremities normal, no cyanosis, clubbing, or edema. Musculokeletal: No joint swelling, no muscle tenderness. ROM normal in all joints of extremities. Neurologic: Mental status: Alert and pleasantly confused. PERTINENT IMAGING:  CXR 4/6/21       FINDINGS:   Stable cardiomediastinal silhouette.  There are bilateral perihilar lung base   airspace opacities and pleural effusions, which appear mildly increased   compared to the prior study.  No pneumothorax.  No acute osseous abnormality.           Impression   Mild increased bilateral pulmonary airspace opacities and pleural effusions. Findings may be related pulmonary edema and/or pneumonia.           CTA chest 3/30/2021    ECHO  3/30/2021  Left ventricle is normal in size . Mild left ventricular concentric hypertrophy noted. No regional wall motion abnormalities seen. Normal left ventricular ejection fraction. The left atrium is mildly dilated. Focal calcification mitral valve leaflets. Mild mitral annular calcification. Mild mitral regurgitation is present. Mild aortic stenosis. Moderate tricuspid regurgitation.    Pulmonary hypertension is mild to moderate . There is a trivial circumferential pericardial effusion noted. Moderate left pleural effusion. Labs:  Lab Results   Component Value Date    WBC 9.7 04/08/2021    HGB 9.6 04/08/2021    HCT 32.2 04/08/2021    MCV 90.7 04/08/2021    MCH 27.0 04/08/2021    MCHC 29.8 04/08/2021    RDW 14.5 04/08/2021     04/08/2021    MPV 10.4 04/08/2021     Lab Results   Component Value Date     04/08/2021    K 4.5 04/08/2021    K 4.2 06/20/2020     04/08/2021    CO2 24 04/08/2021    BUN 44 04/08/2021    CREATININE 2.6 04/08/2021    LABALBU 2.8 04/08/2021    LABALBU 4.0 03/21/2012    CALCIUM 8.7 04/08/2021    GFRAA 21 04/08/2021    LABGLOM 21 04/08/2021     Lab Results   Component Value Date    PROTIME 12.0 03/29/2021    INR 1.1 03/29/2021     Recent Labs     04/08/21  0600   PROBNP 6,551*     Recent Labs     04/05/21  1040   PROCAL 0.54*     This SmartLink has not been configured with any valid records. Micro:  No results for input(s): CULTRESP in the last 72 hours. No results for input(s): LABGRAM in the last 72 hours. No results for input(s): LEGUR in the last 72 hours. No results for input(s): STREPNEUMAGU in the last 72 hours. No results for input(s): LP1UAG in the last 72 hours. Assessment:    1. Acute hypoxic respiratory failure  2. COVID-19 pneumonia  3. combination of acute on chronic CHF  4. healthcare associated pneumonia versus aspiration pneumonia  5. History of heart failure with preserved ejection fraction  6. Mild pharyngeal dysphagia  7. Left pleural fluid diagnostic tap 75 cc removed, exudate. Cytology negative 4/3/21      Plan:   1. Wean FiO2 for saturations above 92% 6 L HF  2. Continue BiPAP as needed and nightly for respiratory support-not using  3. Albuterol HFA every 4 hours while awake, stop EZ Pap  4. Completed cefepime 8 total days. 5. Dexamethasone 6 mg IV every 12 hours, vitamin cocktail  6. IR only 75 cc pleural fluid was drained from the left side. Cytology is negative. Fluid appears exudate. Fluid cultures negative  7. continue dysphagia 3 soft advance diet per speech recommendations. 8. Continue to monitor I's and O's closely holding bumex per nephrology  7. PT OT    This plan of care was reviewed in collaboration with Dr. Catracho Richards  Electronically signed by KASSIE Lema CNP on 4/8/2021 at 10:07 AM     I personally saw, examined, and cared for the patient. Labs, medications, radiographs reviewed. I agree with history exam and plans detailed in NP note. Mara Chaudhary M.D.    Pulmonary/Critical Care Medicine

## 2021-04-08 NOTE — CARE COORDINATION
COVID + 4/6. Send out pending 4/7. Requiring O2 7 liters O2 high flow NC. Elevated BNP, creatinine- renal following. [facility] will accept pt if send out COVID is negative.  [facility] is reviewing pt. OSORIO IRENE, transport form on chart for [facility] per [address]

## 2021-04-08 NOTE — PROGRESS NOTES
SPEECH LANGUAGE PATHOLOGY  DAILY PROGRESS NOTE        PATIENT NAME:  Bill Lozano      :  1935          TODAY'S DATE:  2021 ROOM:  [address] [place] [address]    SWALLOWING    Chart reviewed and swallowing status discussed with RN. Reports good toleration of current diet with improved toleration with thin liquids per straw. Occasional cough noted with thin liquids per cup. Recommend continue small sips thin liquids per straw. DYSPHAGIA DIAGNOSIS:  Mild pharyngeal dysphagia; no aspiration or penetration observed during video swallow study       Due to significant pharyngeal delay, pt is at a high risk of aspiration of thin liquids with large sips                            DIET RECOMMENDATIONS:  Dysphagia 3, Soft/advanced (Soft & Bite-sized) solids with  thin liquids as tolerated     Soft & Bite-Sized diet recommended for energy conservation during mastication      If change in respiratory status occurs and pt requiring significant increase in oxygen needs please notify SLP to reassess toleration of current diet.                    FEEDING RECOMMENDATIONS:                           Assistance level:  Stand by assistance is needed during all oral intake                             Compensatory strategies recommended: Small bites/sips and Alternate solids and liquid, small sips liquid per straw. Will continue SP intervention as per previously established POC. Thor ARGUELLO CCC/SLP [id number]  Speech Pathologist              CPT code(s) No charge for RN discussion

## 2021-04-09 NOTE — ACP (ADVANCE CARE PLANNING)
Advance Care Planning     Advance Care Planning Activator (Inpatient)  Conversation Note      Date of ACP Conversation: 4/9/21  Conversation Conducted with: Marine Townsendjem    ACP Activator: [address] Decision Maker:     Current Designated Health Care Decision Maker:     Primary Decision Maker: Laz [place] - [phone]    Secondary Decision Maker: Bushra Alvarez - [phone]  Click here to complete Healthcare Decision Makers including section of the Healthcare Decision Maker Relationship (ie \"Primary\")    Care Preferences    Ventilation: \"If you were in your present state of health and suddenly became very ill and were unable to breathe on your own, what would your preference be about the use of a ventilator (breathing machine) if it were available to you? \"      Would the patient desire the use of ventilator (breathing machine)?: yes    \"If your health worsens and it becomes clear that your chance of recovery is unlikely, what would your preference be about the use of a ventilator (breathing machine) if it were available to you? \"     Would the patient desire the use of ventilator (breathing machine)?: yes      Resuscitation  \"CPR works best to restart the heart when there is a sudden event, like a heart attack, in someone who is otherwise healthy. Unfortunately, CPR does not typically restart the heart for people who have serious health conditions or who are very sick. \"    \"In the event your heart stopped as a result of an underlying serious health condition, would you want attempts to be made to restart your heart (answer \"yes\" for attempt to resuscitate) or would you prefer a natural death (answer \"no\" for do not attempt to resuscitate)? \" yes       [x] Yes   [] No   Educated Patient / Alfred Chisholm regarding differences between Advance Directives and portable DNR orders.     Length of ACP Conversation in minutes:      Conversation Outcomes:  [x] ACP discussion

## 2021-04-09 NOTE — PROGRESS NOTES
Attempted x2 throughout the day to reach Infection Prevention re isolation order. No return phone call. This RN then called the on call number for Infection Prevention and spoke to Gerry Ignacio who states the pt needs to have 2 negative COVID results at least 24h apart to d/c isolation so another test will need to be ordered prior to d/c isolation. Spoke with Dr Ada Christie on unit and ok to place order for another COVID test at this time.  New order placed

## 2021-04-09 NOTE — CARE COORDINATION
Spoke to patient's son, Che Nath. He confirms the family wants [facility] to go to [facility] at time of discharge. [facility] has been updated. Per Julien Griffith is still pending. The patient's insurance company wants the hospital to contact their  directly to continue the precert process. I called [facility]'s Case Management number (# [phone], ref# [id number]) and left a voicemail. Will await their return call so that we can proceed with SNF authorization.

## 2021-04-09 NOTE — CARE COORDINATION
Called [employer] company again, as I still have not heard back from their . Another message was left with a live representative, with a request they respond asap.

## 2021-04-09 NOTE — PLAN OF CARE
Problem: Falls - Risk of:  Goal: Will remain free from falls  Description: Will remain free from falls  Outcome: Met This Shift     Problem:  Activity:  Goal: Will verbalize the importance of balancing activity with adequate rest periods  Description: Will verbalize the importance of balancing activity with adequate rest periods  Outcome: Met This Shift     Problem: Cardiac:  Goal: Ability to maintain an adequate cardiac output will improve  Description: Ability to maintain an adequate cardiac output will improve  Outcome: Ongoing     Problem: Coping:  Goal: Verbalizations of decreased anxiety will decrease  Description: Verbalizations of decreased anxiety will decrease  Outcome: Ongoing     Problem: Respiratory:  Goal: Respiratory status will improve  Description: Respiratory status will improve  Outcome: Ongoing

## 2021-04-09 NOTE — CARE COORDINATION
Per jesus, the insurance company is requesting the hospital fax updated therapy notes to fax# [phone]. I have faxed the notes. They are also requesting updated clinical.  I submitted the updated clinical via the MMO review link.   Will continue to await snf auth

## 2021-04-09 NOTE — PROGRESS NOTES
 levothyroxine  50 mcg Oral Daily    insulin lispro  0-6 Units Subcutaneous TID WC    insulin lispro  0-3 Units Subcutaneous Nightly    sodium chloride flush  10 mL Intravenous 2 times per day       Physical Exam:  General Appearance: appears comfortable in no acute distress. HEENT: Normocephalic atraumatic without obvious abnormality   Neck: Lips, mucosa, and tongue normal.  Supple, symmetrical, trachea midline, no adenopathy;thyroid:  no enlargement/tenderness/nodules or JVD. Lung: FEW RHONCHI and diminished in the bases  Heart: RRR, normal S1, S2. No MRG  Abdomen: Soft, NT, ND. BS present x 4 quadrants. No bruit or organomegaly. Extremities: Pedal pulses 2+ symmetric b/l. Extremities normal, no cyanosis, clubbing, or edema. Musculokeletal: No joint swelling, no muscle tenderness. ROM normal in all joints of extremities. Neurologic: Mental status: Alert and pleasantly confused. PERTINENT IMAGING:  CXR 4/6/21       FINDINGS:   Stable cardiomediastinal silhouette.  There are bilateral perihilar lung base   airspace opacities and pleural effusions, which appear mildly increased   compared to the prior study.  No pneumothorax.  No acute osseous abnormality.           Impression   Mild increased bilateral pulmonary airspace opacities and pleural effusions. Findings may be related pulmonary edema and/or pneumonia.           CTA chest 3/30/2021    ECHO  3/30/2021  Left ventricle is normal in size . Mild left ventricular concentric hypertrophy noted. No regional wall motion abnormalities seen. Normal left ventricular ejection fraction. The left atrium is mildly dilated. Focal calcification mitral valve leaflets. Mild mitral annular calcification. Mild mitral regurgitation is present. Mild aortic stenosis. Moderate tricuspid regurgitation. Pulmonary hypertension is mild to moderate . There is a trivial circumferential pericardial effusion noted.    Moderate left pleural effusion. Labs:  Lab Results   Component Value Date    WBC 16.5 04/09/2021    HGB 10.0 04/09/2021    HCT 32.9 04/09/2021    MCV 89.9 04/09/2021    MCH 27.3 04/09/2021    MCHC 30.4 04/09/2021    RDW 14.6 04/09/2021     04/09/2021    MPV 10.2 04/09/2021     Lab Results   Component Value Date     04/09/2021    K 4.2 04/09/2021    K 4.2 06/20/2020     04/09/2021    CO2 24 04/09/2021    BUN 58 04/09/2021    CREATININE 3.0 04/09/2021    LABALBU 2.7 04/09/2021    LABALBU 4.0 03/21/2012    CALCIUM 8.6 04/09/2021    GFRAA 18 04/09/2021    LABGLOM 18 04/09/2021     Lab Results   Component Value Date    PROTIME 12.0 03/29/2021    INR 1.1 03/29/2021     Recent Labs     04/08/21  0600   PROBNP 6,551*     No results for input(s): PROCAL in the last 72 hours. This SmartLink has not been configured with any valid records. Micro:  No results for input(s): CULTRESP in the last 72 hours. No results for input(s): LABGRAM in the last 72 hours. No results for input(s): LEGUR in the last 72 hours. No results for input(s): STREPNEUMAGU in the last 72 hours. No results for input(s): LP1UAG in the last 72 hours. Assessment:    1. Acute hypoxic respiratory failure  2. COVID-19 pneumonia  3. combination of acute on chronic CHF  4. healthcare associated pneumonia versus aspiration pneumonia  5. History of heart failure with preserved ejection fraction  6. Mild pharyngeal dysphagia  7. Left pleural fluid diagnostic tap 75 cc removed, exudate. Cytology negative 4/3/21      Plan:   1. Wean FiO2 for saturations above 92% 5 L HF  2. Continue BiPAP as needed and nightly for respiratory support-not using  3. Follow chest x-ray  4. Albuterol HFA every 4 hours while awake  5. Completed cefepime 8 total days. 6. Dexamethasone 6 mg IV every 12 hours, vitamin cocktail  7. IR only 75 cc pleural fluid was drained from the left side. Cytology is negative. Fluid appears exudate.  Fluid cultures negative  8. continue

## 2021-04-09 NOTE — PROGRESS NOTES
Department of Internal Medicine  Nephrology Attending Progress Note        SUBJECTIVE: Mrs Silvio Hayes seems more awake today. Her O2 requirements have improved she is now down to 4 L renal parameters continue to worsen despite receiving no IV diuretics, her p.o. intake is good. Apresoline is a new medication we will check DOUGLAS to rule out lupus reaction.   Physical Exam:    Vitals:    04/09/21 0858   BP:    Pulse:    Resp: 20   Temp:    SpO2: 95%       I/O last 24 hours:  Intake/Output inaccurate:    Weight: 165    General Appearance:  awake, alert, oriented, in mild respiratory distress  Skin: No rash  Neck:  neck- supple, no mass, non-tender and no bruits  Lungs: Decreased breath sounds at the bases  Heart: Regular rhythm no murmur     Abdominal: Positive bowel sounds no abdominal masses no abdominal bruits  Extremities: Trace edema  Peripheral Pulses: +2    DATA:    CBC with Differential:    Lab Results   Component Value Date    WBC 16.5 04/09/2021    RBC 3.66 04/09/2021    RBC 3.68 12/19/2017    HGB 10.0 04/09/2021    HCT 32.9 04/09/2021     04/09/2021    MCV 89.9 04/09/2021    MCH 27.3 04/09/2021    MCHC 30.4 04/09/2021    RDW 14.6 04/09/2021    NRBC 0.0 04/07/2021    SEGSPCT 58 06/24/2013    LYMPHOPCT 4.6 04/09/2021    LYMPHOPCT 24.4 09/03/2017    PROMYELOPCT 0.9 04/07/2021    MONOPCT 9.5 04/09/2021    MYELOPCT 0.9 04/04/2021    BASOPCT 0.2 04/09/2021    MONOSABS 1.56 04/09/2021    LYMPHSABS 0.76 04/09/2021    EOSABS 0.04 04/09/2021    BASOSABS 0.04 04/09/2021     BMP:    Lab Results   Component Value Date     04/09/2021    K 4.2 04/09/2021    K 4.2 06/20/2020     04/09/2021    CO2 24 04/09/2021    BUN 58 04/09/2021    LABALBU 2.7 04/09/2021    LABALBU 4.0 03/21/2012    CREATININE 3.0 04/09/2021    CALCIUM 8.6 04/09/2021    GFRAA 18 04/09/2021    LABGLOM 18 04/09/2021    GLUCOSE 103 04/09/2021    GLUCOSE 77 04/18/2012     Magnesium:    Lab Results   Component Value Date    MG 2.3 04/06/2021 Phosphorus:    Lab Results   Component Value Date    PHOS 4.4 04/06/2021        epoetin emile-epbx  10,000 Units Subcutaneous Q7 Days    albuterol sulfate HFA  2 puff Inhalation Q4H While awake    ascorbic acid  500 mg Oral Daily    zinc sulfate  50 mg Oral Daily    vitamin D  50,000 Units Oral Weekly    guaiFENesin  400 mg Oral TID    hydrALAZINE  25 mg Oral 3 times per day    isosorbide dinitrate  10 mg Oral TID    enoxaparin  30 mg Subcutaneous Daily    metoprolol succinate  37.5 mg Oral BID    aspirin EC  81 mg Oral Daily    levothyroxine  50 mcg Oral Daily    repaglinide  1 mg Oral TID AC    insulin lispro  0-6 Units Subcutaneous TID WC    insulin lispro  0-3 Units Subcutaneous Nightly    sodium chloride flush  10 mL Intravenous 2 times per day      dextrose      sodium chloride      sodium chloride Stopped (04/08/21 0300)     glucose, dextrose, glucagon (rDNA), dextrose, sodium chloride flush, sodium chloride, promethazine **OR** ondansetron, polyethylene glycol, acetaminophen **OR** acetaminophen, perflutren lipid microspheres    IMPRESSION/RECOMMENDATIONS:      Acute kidney injury with prerenal indices 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 intake  Anemia begin some NIYA therapy      Corey Boykin MD  4/9/2021 1:15 PM

## 2021-04-10 NOTE — PROGRESS NOTES
Educated patient on the importance of Incentive Spirometer, Patient returned demonstration of 750, tolerated poorly.  Will continue to educate

## 2021-04-10 NOTE — PROGRESS NOTES
Nephrology Note  Damaris Rinaldi MD          Patient not seen in a face-to-face encounter due to the patient being in isolation for COVID-19 infection. Patient's condition discussed in detail with the nurse taking care of the patient. As per the nursing staff the patient is overall feeling the same. Appetite is good. She continues to require significant supplemental oxygen. I .  No nausea vomiting or dysgeusia. Chart reviewed. Vital SignsBP (!) 116/56   Pulse 75   Temp 98.6 °F (37 °C) (Oral)   Resp 18   Ht 5' 4\" (1.626 m)   Wt 169 lb (76.7 kg)   SpO2 91%   BMI 29.01 kg/m²   24HR INTAKE/OUTPUT:    Intake/Output Summary (Last 24 hours) at 4/10/2021 1615  Last data filed at 4/10/2021 0524  Gross per 24 hour   Intake --   Output 400 ml   Net -400 ml         Physical Exam    According to institutional recommendations and guidelines, a face-to-face encounter was not performed due to the current efforts to prevent transmission of COVID-19 and the need to preserve PPE for other caregivers. Relevant records, nursing assessment, and diagnostic testing including laboratory results and imaging were reviewed. Please reference any relevant documentation elsewhere. Care will be coordinated with the primary services and consultants.               Current Facility-Administered Medications   Medication Dose Route Frequency Provider Last Rate Last Admin    epoetin emile-epbx (RETACRIT) injection 10,000 Units  10,000 Units Subcutaneous Q7 Days Shannon Richmond MD   10,000 Units at 04/08/21 1700    albuterol sulfate  (90 Base) MCG/ACT inhaler 2 puff  2 puff Inhalation Q4H While awake Moris Horowitz MD   2 puff at 04/10/21 1350    ascorbic acid (VITAMIN C) tablet 500 mg  500 mg Oral Daily Bonifacio Paget, APRN - CNP   500 mg at 04/10/21 0940    zinc sulfate (ZINCATE) capsule 50 mg  50 mg Oral Daily Bonifacio Paget, APRN - CNP   50 mg at 04/10/21 0940    vitamin D (ERGOCALCIFEROL) capsule 50,000 Units  50,000 Units Oral Weekly Carla Rey MD   50,000 Units at 04/06/21 2047    guaiFENesin tablet 400 mg  400 mg Oral TID Da LylaKASSIE alcantar - CNP   400 mg at 04/10/21 1350    hydrALAZINE (APRESOLINE) tablet 25 mg  25 mg Oral 3 times per day Rainer Perla MD   25 mg at 04/10/21 1351    isosorbide dinitrate (ISORDIL) tablet 10 mg  10 mg Oral TID Rainer Perla MD   10 mg at 04/10/21 1511    enoxaparin (LOVENOX) injection 30 mg  30 mg Subcutaneous Daily Tony Fleming MD   30 mg at 04/10/21 0940    metoprolol succinate (TOPROL XL) extended release tablet 37.5 mg  37.5 mg Oral BID Rainer Perla MD   37.5 mg at 04/10/21 0940    aspirin EC tablet 81 mg  81 mg Oral Daily Tony Fleming MD   81 mg at 04/10/21 0940    levothyroxine (SYNTHROID) tablet 50 mcg  50 mcg Oral Daily Tony Fleming MD   50 mcg at 04/10/21 0515    glucose (GLUTOSE) 40 % oral gel 15 g  15 g Oral PRN Tony Fleming MD        dextrose 50 % IV solution  12.5 g Intravenous PRN Ivory Gibson MD        glucagon (rDNA) injection 1 mg  1 mg Intramuscular PRN Tony Fleming MD        dextrose 5 % solution  100 mL/hr Intravenous PRN Tony Fleming MD        insulin lispro (HUMALOG) injection vial 0-6 Units  0-6 Units Subcutaneous TID WC Tony Fleming MD   1 Units at 04/10/21 1155    insulin lispro (HUMALOG) injection vial 0-3 Units  0-3 Units Subcutaneous Nightly Tony Fleming MD   1 Units at 04/09/21 2129    sodium chloride flush 0.9 % injection 10 mL  10 mL Intravenous 2 times per day Ivory Gibson MD   10 mL at 04/10/21 0940    sodium chloride flush 0.9 % injection 10 mL  10 mL Intravenous PRN Tony Fleming MD   10 mL at 04/01/21 1422    0.9 % sodium chloride infusion  25 mL Intravenous PRN Tony Fleming MD        promethazine (PHENERGAN) tablet 12.5 mg  12.5 mg Oral Q6H PRN Tony Fleming MD        Or    ondansetron (ZOFRAN) injection 4 mg  4 mg Intravenous Q6H PRN Tony Fleming MD   4 mg at 04/03/21 1106    polyethylene glycol (GLYCOLAX) packet 17 g  17 g Oral Daily PRN Tony Fleming MD   17 g at 04/06/21 1308    acetaminophen (TYLENOL) tablet 650 mg  650 mg Oral Q6H PRN Tony Fleming MD   650 mg at 04/10/21 0603    Or    acetaminophen (TYLENOL) suppository 650 mg  650 mg Rectal Q6H PRN Tony Fleming MD        0.9 % sodium chloride infusion  25 mL Intravenous Q12H Tony Fleming MD 12.5 mL/hr at 04/10/21 0115 Restarted at 04/10/21 0115    perflutren lipid microspheres (DEFINITY) injection 1.65 mg  1.5 mL Intravenous ONCE PRN hCarlene Scanlon MD           XR CHEST PORTABLE   Final Result   Worsening of infiltrates which may be related to CHF/edema. Pneumonia less   likely. Continued follow-up recommended. XR CHEST PORTABLE   Final Result   CHF with findings similar to the prior study. XR CHEST PORTABLE   Final Result   Mild increased bilateral pulmonary airspace opacities and pleural effusions. Findings may be related pulmonary edema and/or pneumonia. XR CHEST PORTABLE   Final Result   No significant change in appearance of the chest with persistent left pleural   effusion and multifocal airspace opacities. US THORACENTESIS Which side should the procedure be performed? Right   Final Result   Status post left thoracentesis. XR CHEST 1 VIEW   Final Result   No convincing evidence of a pneumothorax following thoracentesis. Unchanged patchy bilateral airspace opacities which may be on the basis of   multifocal pneumonia or pulmonary edema. XR CHEST (2 VW)   Final Result   CHF with likely cardiogenic edema. Fluoroscopy modified barium swallow with video   Final Result   Addendum 1 of 1   ADDENDUM:   The study was demonstrated the due the clinical findings to indicated the   possibility for aspiration or penetration. Difficulty swallowing.          Final      CTA PULMONARY W CONTRAST   Final Result   No evidence of pulmonary pressure at target of less than 130/80 mmHg. 3.   Metabolic acidosis with normal anion gap. This may be reflection of acute kidney injury will follow. If needed we will supplement bicarbonate to target a bicarbonate of 22 mmol/L. Ronald Lantigua Follow hemoglobin hematocrit. 4.   Anemia in association with acute kidney injury. On NIYA. .       5.  Vitamin D deficiency. On supplement. Hang Reed MD  Nephrology  Electronically signed by Hang Reed MD on 4/10/2021 at 4:14 PM      Note: This report was completed utilizing computer voice recognition software. Every effort has been made to ensure accuracy, however; inadvertent computerized transcription errors may be present.

## 2021-04-10 NOTE — PLAN OF CARE
Problem: Falls - Risk of:  Goal: Will remain free from falls  Description: Will remain free from falls  4/10/2021 1355 by Ernie Yoder, RN  Outcome: Met This Shift     Problem: Coping:  Goal: Verbalizations of decreased anxiety will decrease  Description: Verbalizations of decreased anxiety will decrease  4/10/2021 1355 by Ernie Yoder, RN  Outcome: Met This Shift     Problem: Physical Regulation:  Goal: Complications related to the disease process, condition or treatment will be avoided or minimized  Description: Complications related to the disease process, condition or treatment will be avoided or minimized  Outcome: Met This Shift     Problem: Skin Integrity:  Goal: Will show no infection signs and symptoms  Description: Will show no infection signs and symptoms  Outcome: Met This Shift

## 2021-04-10 NOTE — PROGRESS NOTES
[facility] Hospitalist   Progress Note    Admitting Date and Time: 3/29/2021  5:48 PM  Admit Dx: [facility] [J96.01]    Subjective:    Patient was admitted with Acute respiratory failure with hypoxia ([facility] [J96.01].  Patient denies fever, chills, cp, sob, n/v.     epoetin emile-epbx  10,000 Units Subcutaneous Q7 Days    albuterol sulfate HFA  2 puff Inhalation Q4H While awake    ascorbic acid  500 mg Oral Daily    zinc sulfate  50 mg Oral Daily    vitamin D  50,000 Units Oral Weekly    guaiFENesin  400 mg Oral TID    hydrALAZINE  25 mg Oral 3 times per day    isosorbide dinitrate  10 mg Oral TID    enoxaparin  30 mg Subcutaneous Daily    metoprolol succinate  37.5 mg Oral BID    aspirin EC  81 mg Oral Daily    levothyroxine  50 mcg Oral Daily    insulin lispro  0-6 Units Subcutaneous TID WC    insulin lispro  0-3 Units Subcutaneous Nightly    sodium chloride flush  10 mL Intravenous 2 times per day     glucose, 15 g, PRN  dextrose, 12.5 g, PRN  glucagon (rDNA), 1 mg, PRN  dextrose, 100 mL/hr, PRN  sodium chloride flush, 10 mL, PRN  sodium chloride, 25 mL, PRN  promethazine, 12.5 mg, Q6H PRN    Or  ondansetron, 4 mg, Q6H PRN  polyethylene glycol, 17 g, Daily PRN  acetaminophen, 650 mg, Q6H PRN    Or  acetaminophen, 650 mg, Q6H PRN  perflutren lipid microspheres, 1.5 mL, ONCE PRN         Objective:    BP (!) 120/57   Pulse 79   Temp 97.7 °F (36.5 °C) (Oral)   Resp 20   Ht 5' 4\" (1.626 m)   Wt 165 lb 2 oz (74.9 kg)   SpO2 94%   BMI 28.34 kg/m²   Skin: warm and dry, no rash or erythema  Pulmonary/Chest: clear to auscultation bilaterally- no wheezes, rales or rhonchi, normal air movement, no respiratory distress  Cardiovascular: rhythm reg at rate of 80  Abdomen: soft, non-tender, non-distended, normal bowel sounds, no masses or organomegaly  Extremities: no cyanosis, no clubbing and no edema      Recent Labs     04/07/21  0700 04/08/21  0600 04/09/21  0504    136 136   K 4.1 4.5 4.2    102 103   CO2 27 24 24   BUN 36* 44* 58*   CREATININE 2.6* 2.6* 3.0*   GLUCOSE 114* 141* 103*   CALCIUM 8.6 8.7 8.6       Recent Labs     04/07/21  0700 04/08/21  0600 04/09/21  0504   WBC 5.9 9.7 16.5*   RBC 3.78 3.55 3.66   HGB 10.6* 9.6* 10.0*   HCT 33.9* 32.2* 32.9*   MCV 89.7 90.7 89.9   MCH 28.0 27.0 27.3   MCHC 31.3* 29.8* 30.4*   RDW 14.6 14.5 14.6    186 229   MPV 9.3 10.4 10.2       CBC with Differential:    Lab Results   Component Value Date    WBC 16.5 04/09/2021    RBC 3.66 04/09/2021    RBC 3.68 12/19/2017    HGB 10.0 04/09/2021    HCT 32.9 04/09/2021     04/09/2021    MCV 89.9 04/09/2021    MCH 27.3 04/09/2021    MCHC 30.4 04/09/2021    RDW 14.6 04/09/2021    NRBC 0.0 04/07/2021    SEGSPCT 58 06/24/2013    LYMPHOPCT 4.6 04/09/2021    LYMPHOPCT 24.4 09/03/2017    PROMYELOPCT 0.9 04/07/2021    MONOPCT 9.5 04/09/2021    MYELOPCT 0.9 04/04/2021    BASOPCT 0.2 04/09/2021    MONOSABS 1.56 04/09/2021    LYMPHSABS 0.76 04/09/2021    EOSABS 0.04 04/09/2021    BASOSABS 0.04 04/09/2021     CMP:    Lab Results   Component Value Date     04/09/2021    K 4.2 04/09/2021    K 4.2 06/20/2020     04/09/2021    CO2 24 04/09/2021    BUN 58 04/09/2021    CREATININE 3.0 04/09/2021    GFRAA 18 04/09/2021    LABGLOM 18 04/09/2021    GLUCOSE 103 04/09/2021    GLUCOSE 77 04/18/2012    PROT 6.6 04/09/2021    LABALBU 2.7 04/09/2021    LABALBU 4.0 03/21/2012    CALCIUM 8.6 04/09/2021    BILITOT 0.3 04/09/2021    ALKPHOS 56 04/09/2021    AST 39 04/09/2021    ALT 22 04/09/2021        Radiology:   XR CHEST PORTABLE   Final Result   CHF with findings similar to the prior study. XR CHEST PORTABLE   Final Result   Mild increased bilateral pulmonary airspace opacities and pleural effusions. Findings may be related pulmonary edema and/or pneumonia.          XR CHEST PORTABLE   Final Result   No significant change in appearance of the chest with finished abx   3. Acute on chronic diastolic chf improving continue current management  4. Leukocytosis monitor  5. sher on ckd 3b  Nephrology following  6. htn continue med  7.  Dm type 2 controlled monitor and treat prn  8. hypothyroidism continue med    covid testing negative        Electronically signed by Chaney Nissen, DO on 4/9/2021 at 10:52 PM

## 2021-04-10 NOTE — FLOWSHEET NOTE
Patient woke up from her sleep crying very loudly. She state that she is having a terrible headache and that she is feeling chilled. PRN tylenol given and on call hospitalist notified. Will continue to monitor.

## 2021-04-10 NOTE — PROGRESS NOTES
Attempted to lower patient's oxygen to 5 liters and patient SpO2 decreased to 87%. Patient is on 6.5 Liters now with a SpO2 of 91%.  Will continue to monitor closely

## 2021-04-10 NOTE — PLAN OF CARE
Problem: Falls - Risk of:  Goal: Will remain free from falls  Description: Will remain free from falls  Outcome: Ongoing  Goal: Absence of physical injury  Description: Absence of physical injury  Outcome: Ongoing     Problem: Activity:  Goal: Will verbalize the importance of balancing activity with adequate rest periods  Description: Will verbalize the importance of balancing activity with adequate rest periods  Outcome: Ongoing     Problem: Cardiac:  Goal: Ability to maintain an adequate cardiac output will improve  Description: Ability to maintain an adequate cardiac output will improve  Outcome: Ongoing     Problem: Coping:  Goal: Verbalizations of decreased anxiety will decrease  Description: Verbalizations of decreased anxiety will decrease  Outcome: Ongoing     Problem: Fluid Volume:  Goal: Risk for excess fluid volume will decrease  Description: Risk for excess fluid volume will decrease  Outcome: Ongoing  Goal: Will show no signs and symptoms of electrolyte imbalance  Description: Will show no signs and symptoms of electrolyte imbalance  Outcome: Ongoing     Problem: Respiratory:  Goal: Respiratory status will improve  Description: Respiratory status will improve  Outcome: Ongoing     Problem: Musculor/Skeletal Functional Status  Goal: Highest potential functional level  Outcome: Ongoing  Goal: Absence of falls  Outcome: Ongoing   Aox4, VSS stable. Pain 6-9/10 this shift. She was medicated per [facility], see same for details. O2 sat WDL % on 4L via nasal cannula. Will continue to monitor.

## 2021-04-11 NOTE — PROGRESS NOTES
[facility]   Progress Note    Admitting Date and Time: 3/29/2021  5:48 PM  Admit Dx: Acute respiratory failure with hypoxia ([facility] [J96.01]    Subjective:    Patient was admitted with Acute respiratory failure with hypoxia ([facility] [address] [J96.01].  Patient denies fever, chills, cp, sob, n/v.     epoetin emile-epbx  10,000 Units Subcutaneous Q7 Days    albuterol sulfate HFA  2 puff Inhalation Q4H While awake    ascorbic acid  500 mg Oral Daily    zinc sulfate  50 mg Oral Daily    vitamin D  50,000 Units Oral Weekly    guaiFENesin  400 mg Oral TID    hydrALAZINE  25 mg Oral 3 times per day    isosorbide dinitrate  10 mg Oral TID    enoxaparin  30 mg Subcutaneous Daily    metoprolol succinate  37.5 mg Oral BID    aspirin EC  81 mg Oral Daily    levothyroxine  50 mcg Oral Daily    insulin lispro  0-6 Units Subcutaneous TID WC    insulin lispro  0-3 Units Subcutaneous Nightly    sodium chloride flush  10 mL Intravenous 2 times per day     glucose, 15 g, PRN  dextrose, 12.5 g, PRN  glucagon (rDNA), 1 mg, PRN  dextrose, 100 mL/hr, PRN  sodium chloride flush, 10 mL, PRN  sodium chloride, 25 mL, PRN  promethazine, 12.5 mg, Q6H PRN    Or  ondansetron, 4 mg, Q6H PRN  polyethylene glycol, 17 g, Daily PRN  acetaminophen, 650 mg, Q6H PRN    Or  acetaminophen, 650 mg, Q6H PRN  perflutren lipid microspheres, 1.5 mL, ONCE PRN         Objective:    BP (!) 120/58   Pulse 84   Temp 98.8 °F (37.1 °C) (Axillary)   Resp 18   Ht 5' 4\" (1.626 m)   Wt 169 lb (76.7 kg)   SpO2 92%   BMI 29.01 kg/m²   Skin: warm and dry, no rash or erythema  Pulmonary/Chest: clear to auscultation bilaterally- no wheezes, rales or rhonchi, normal air movement, no respiratory distress  Cardiovascular: rhythm reg at rate of 88  Abdomen: soft, non-tender, non-distended, normal bowel sounds, no masses or organomegaly  Extremities: no cyanosis, no clubbing and no edema      Recent Labs     04/08/21  0600 04/09/21  0504 effusions. Findings may be related pulmonary edema and/or pneumonia. XR CHEST PORTABLE   Final Result   No significant change in appearance of the chest with persistent left pleural   effusion and multifocal airspace opacities. US THORACENTESIS Which side should the procedure be performed? Right   Final Result   Status post left thoracentesis. XR CHEST 1 VIEW   Final Result   No convincing evidence of a pneumothorax following thoracentesis. Unchanged patchy bilateral airspace opacities which may be on the basis of   multifocal pneumonia or pulmonary edema. XR CHEST (2 VW)   Final Result   CHF with likely cardiogenic edema. Fluoroscopy modified barium swallow with video   Final Result   Addendum 1 of 1   ADDENDUM:   The study was demonstrated the due the clinical findings to indicated the   possibility for aspiration or penetration. Difficulty swallowing. Final      CTA PULMONARY W CONTRAST   Final Result   No evidence of pulmonary emboli. Cardiomegaly with prominent coronary atherosclerotic calcifications and small   pericardial effusion. Bilateral pleural effusions, mildly increased in the interval since the prior   examination. Multifocal patchy and ground-glass airspace disease in the lungs bilaterally,   and more confluent airspace disease in the lower lobes bilaterally, most   consistent with multifocal pneumonia. Pulmonary edema cannot be excluded. Reflux of contrast into the IVC and hepatic veins which may be seen with   right heart failure. XR CHEST PORTABLE   Final Result   Cardiomegaly with progressive perihilar and bibasilar infiltrates and pleural   effusions likely CHF/edema and or pneumonia.              Assessment:    Active Problems:    Acute respiratory failure with hypoxia (HCC)    Essential hypertension    Controlled type 2 diabetes mellitus without complication ([address]  Resolved Problems:    * No resolved hospital problems. *      Plan:  1. Acute respiratory failure with hypoxia wean o2 as able pulmonary following   2. Bacterial pneumonia finished abx   3. Acute on chronic diastolic chf improving continue current management  4. Leukocytosis monitor improved  5. Acidosis monitor  Nephrology following  6. sher on ckd 3b  Nephrology following. Note reviewed  7. htn continue med  8.  Dm type 2 controlled monitor and treat prn  9. hypothyroidism continue med    Pt transferred off of covid floor      Electronically signed by Hunter Boss DO on 4/10/2021 at 10:50 PM

## 2021-04-11 NOTE — PROGRESS NOTES
Dinesh Tapia, APRN - CNP   400 mg at 04/11/21 1541    hydrALAZINE (APRESOLINE) tablet 25 mg  25 mg Oral 3 times per day Jefferson Deluna MD   25 mg at 04/11/21 1541    isosorbide dinitrate (ISORDIL) tablet 10 mg  10 mg Oral TID Jefferson Deluna MD   10 mg at 04/11/21 1541    enoxaparin (LOVENOX) injection 30 mg  30 mg Subcutaneous Daily Tony Fleming MD   30 mg at 04/11/21 1030    metoprolol succinate (TOPROL XL) extended release tablet 37.5 mg  37.5 mg Oral BID Jefferson Deluna MD   37.5 mg at 04/11/21 1029    aspirin EC tablet 81 mg  81 mg Oral Daily Tony Fleming MD   81 mg at 04/11/21 1029    levothyroxine (SYNTHROID) tablet 50 mcg  50 mcg Oral Daily Tony Fleming MD   50 mcg at 04/11/21 0628    glucose (GLUTOSE) 40 % oral gel 15 g  15 g Oral PRN Tony Fleming MD        dextrose 50 % IV solution  12.5 g Intravenous PRN Tania Kearney MD        glucagon (rDNA) injection 1 mg  1 mg Intramuscular PRN Tony Fleming MD        dextrose 5 % solution  100 mL/hr Intravenous PRN Tony Fleming MD        insulin lispro (HUMALOG) injection vial 0-6 Units  0-6 Units Subcutaneous TID WC Tony Fleming MD   1 Units at 04/11/21 1610    insulin lispro (HUMALOG) injection vial 0-3 Units  0-3 Units Subcutaneous Nightly Tony Fleming MD   1 Units at 04/09/21 2129    sodium chloride flush 0.9 % injection 10 mL  10 mL Intravenous 2 times per day Tony Fleming MD   10 mL at 04/11/21 1031    sodium chloride flush 0.9 % injection 10 mL  10 mL Intravenous PRN Tony Fleming MD   10 mL at 04/01/21 1422    0.9 % sodium chloride infusion  25 mL Intravenous PRN Tony Fleming MD        promethazine (PHENERGAN) tablet 12.5 mg  12.5 mg Oral Q6H PRN Tony Fleming MD        Or    ondansetron (ZOFRAN) injection 4 mg  4 mg Intravenous Q6H PRN Tony Fleming MD   4 mg at 04/03/21 1106    polyethylene glycol (GLYCOLAX) packet 17 g  17 g Oral Daily PRN Tania Kearney MD   17 g at 04/06/21 1308    acetaminophen (TYLENOL) tablet 650 mg  650 mg Oral Q6H PRN Tony Fleming MD   650 mg at 04/10/21 0603    Or    acetaminophen (TYLENOL) suppository 650 mg  650 mg Rectal Q6H PRN Tony Fleming MD        0.9 % sodium chloride infusion  25 mL Intravenous Q12H Tony Fleming MD 12.5 mL/hr at 04/10/21 0115 Restarted at 04/10/21 0115    perflutren lipid microspheres (DEFINITY) injection 1.65 mg  1.5 mL Intravenous ONCE PRN Joel Owens MD           XR CHEST PORTABLE   Final Result   Worsening of infiltrates which may be related to CHF/edema. Pneumonia less   likely. Continued follow-up recommended. XR CHEST PORTABLE   Final Result   CHF with findings similar to the prior study. XR CHEST PORTABLE   Final Result   Mild increased bilateral pulmonary airspace opacities and pleural effusions. Findings may be related pulmonary edema and/or pneumonia. XR CHEST PORTABLE   Final Result   No significant change in appearance of the chest with persistent left pleural   effusion and multifocal airspace opacities. US THORACENTESIS Which side should the procedure be performed? Right   Final Result   Status post left thoracentesis. XR CHEST 1 VIEW   Final Result   No convincing evidence of a pneumothorax following thoracentesis. Unchanged patchy bilateral airspace opacities which may be on the basis of   multifocal pneumonia or pulmonary edema. XR CHEST (2 VW)   Final Result   CHF with likely cardiogenic edema. Fluoroscopy modified barium swallow with video   Final Result   Addendum 1 of 1   ADDENDUM:   The study was demonstrated the due the clinical findings to indicated the   possibility for aspiration or penetration. Difficulty swallowing. Final      CTA PULMONARY W CONTRAST   Final Result   No evidence of pulmonary emboli. Cardiomegaly with prominent coronary atherosclerotic calcifications and small   pericardial effusion.       Bilateral pleural effusions, mildly increased in the interval since the prior   examination. Multifocal patchy and ground-glass airspace disease in the lungs bilaterally,   and more confluent airspace disease in the lower lobes bilaterally, most   consistent with multifocal pneumonia. Pulmonary edema cannot be excluded. Reflux of contrast into the IVC and hepatic veins which may be seen with   right heart failure. XR CHEST PORTABLE   Final Result   Cardiomegaly with progressive perihilar and bibasilar infiltrates and pleural   effusions likely CHF/edema and or pneumonia. Labs:    CBC:   Recent Labs     04/09/21  0504 04/10/21  0358 04/11/21  1115   WBC 16.5* 9.8 7.1   HGB 10.0* 10.0* 11.5    204 212        Lab Results   Component Value Date    IRON 42 03/30/2021    TIBC 224 (L) 03/30/2021    FERRITIN 295 04/07/2021       Lab Results   Component Value Date    CALCIUM 8.0 (L) 04/11/2021    CALCIUM 8.4 (L) 04/10/2021    CALCIUM 8.6 04/09/2021    CAION 1.21 04/08/2021    CAION 1.21 04/07/2021    CAION 1.17 04/06/2021    PHOS 4.4 04/06/2021    PHOS 3.5 05/26/2017    MG 2.3 04/06/2021    MG 1.9 04/03/2021    MG 2.1 04/02/2021       BMP:   Recent Labs     04/09/21  0504 04/10/21  0358 04/11/21  1115    136 132   K 4.2 4.8 4.8    102 100   CO2 24 20* 22   BUN 58* 68* 76*   CREATININE 3.0* 3.5* 3.6*   GLUCOSE 103* 95 177*             Assessment: / Plan:       1. Acute kidney injury. Acute kidney injury of under dermatology. Patient with rising serum creatinine. We will start gentle hydration for 500 cc of saline at the rate of 40 cc an hour  keeping in mind her history of congestive failure with preserved ejection fraction. If renal function does not improve we may need to intervene with renal placement therapy.     2. Hypertension with chronic kidney disease stage G1 to G4. Blood pressure at target of less than 130/80 mmHg.     3. Metabolic acidosis with normal anion gap.   This may be reflection of acute kidney injury will follow. Improved. If needed we will supplement bicarbonate to target a bicarbonate of 22 mmol/L. Sharon Yarbrough Follow hemoglobin hematocrit.     4. Anemia in association with acute kidney injury. On NIYA. .        5.  Vitamin D deficiency. On supplement. Luis Enrique Klein MD  Nephrology  Electronically signed by Luis Enrique Klein MD on 4/11/2021 at 4:11 PM      Note: This report was completed utilizing computer voice recognition software. Every effort has been made to ensure accuracy, however; inadvertent computerized transcription errors may be present.

## 2021-04-11 NOTE — PROGRESS NOTES
Physical Therapy  Facility/Department: [facility]  Daily Treatment Note  NAME: Herminia Riedel  : 1935  MRN: [id number]    Date of Service: 2021          Patient Diagnosis(es): The primary encounter diagnosis was Acute respiratory failure with hypoxia ([facility] [address] A diagnosis of Congestive heart failure, unspecified HF chronicity, unspecified heart failure type [facility]) was also pertinent to this visit. has a past medical history of (HFpEF) heart failure with preserved ejection fraction ([address] Arthritis, Bursitis, Cervical radiculopathy, CHF (congestive heart failure) ([address] CKD (chronic kidney disease) stage 3, GFR 30-59 ml/min, Diabetes mellitus ([address] GERD (gastroesophageal reflux disease), [place] (hard of hearing), Hypertension, Hypothyroidism, SSS (sick sinus syndrome) ([facility] [address] Thyroid disease, Unsteadiness, and Valvular heart disease. has a past surgical history that includes Foot surgery; Total knee arthroplasty (Right, 3/21/2019); and joint replacement (). Referring Nessa Rios RN     Evaluating PT: Wing Pj PT, DPT [id number]     Room #:  354  Diagnosis:  Acute respiratory failure  Tests: COVID negative 21  Precautions:  Fall risk, high flow O2 6L, droplet plus  Equipment Needs:  None.  Per chart, pt owns walker and cane.     SUBJECTIVE:     Per chart, pt lives alone in a 1 story home with 3 steps to enter with 1 rail.  Pt's son lives close by.  Pt used walker or cane for ambulation.         OBJECTIVE:    Initial Evaluation  Date:  Treatment   2021 Short Term/ Long Term   Goals   Was pt agreeable to Eval/treatment? yes  yes     Does pt have pain? Abdominal pain No c/o pain     Bed Mobility  Rolling: NT  Supine to sit: Mod A  Sit to supine: Mod A  Scooting: independent while in bed.  Rolling: MIN A  Supine to sit: MIN A  Sit to supine: MOD A  Scooting: MIN A SBA   Transfers Sit to stand: NT  Stand to sit: NT  Stand pivot: NT  Sit to stand: MIN A  Stand to sit: MIN A  Stand pivot: MOD A  SBA   Ambulation   NT 2 feet with ww mod assist  50+ feet with w/w SBA    Stair negotiation: ascended and descended  NT  NA 3 steps with 1 rail SBA   AM-PAC 6 Clicks 35/36 00/38          Balance: sitting EOB supervision and standing with ww MIN A     Vitals:  Pt on 6 L O2. Pulse ox at rest 94%. 93% after transfer to [facility]. Pt removed O2 to blow nose and pulse ox decreased to 65%. Donned O2 with recovery to 92%. RN present and aware. Pt instructed to leave O2 on and in proper breathing technique     ASSESSMENT:     Comments: Pt reports dizziness with positional changes and throughout session. RTB after mobility for safety  Needed assist for hygiene due to bowel incontinence and after BSC use.       Treatment:  Patient practiced and was instructed in the following treatment:    · Mobility as above. Cues for hand placement with transfers and on walker. Cues needed for postural correction in stand due to posterior lean - high fall risk      Pt was left supine in bed with call light left by patient.     Chair/bed alarm: bed alarm was re-activated.      PLAN:     Pt is making fair progress toward established Physical Therapy goals. Continue with physical therapy current plan of care.     Time in: 1032  Time out  1102     Total Treatment Time  20 minutes           CPT codes:  []? Low Complexity PT evaluation [id number]  []? Moderate Complexity PT evaluation 41665  []? High Complexity PT evaluation [id number]  []? PT Re-evaluation [id number]  []? Gait training 11680 ** minutes  []? Manual therapy 84140 ** minutes  [x]? Therapeutic activities 86728 25 minutes  []? Therapeutic exercises 01602 ** minutes  []?  Neuromuscular reeducation 04469 ** minutes     Mariola Landis   PT [id number]

## 2021-04-11 NOTE — PROGRESS NOTES
[facility]   Progress Note    Admitting Date and Time: 3/29/2021  5:48 PM  Admit Dx: Acute respiratory failure with hypoxia ([facility] [address] [J96.01]    Subjective:    Patient was admitted with Acute respiratory failure with hypoxia ([facility] [address] [J96.01].  Patient denies fever, chills, cp, sob, n/v.     albuterol sulfate HFA  2 puff Inhalation Q4H While awake    epoetin emile-epbx  10,000 Units Subcutaneous Q7 Days    ascorbic acid  500 mg Oral Daily    zinc sulfate  50 mg Oral Daily    vitamin D  50,000 Units Oral Weekly    guaiFENesin  400 mg Oral TID    hydrALAZINE  25 mg Oral 3 times per day    isosorbide dinitrate  10 mg Oral TID    enoxaparin  30 mg Subcutaneous Daily    metoprolol succinate  37.5 mg Oral BID    aspirin EC  81 mg Oral Daily    levothyroxine  50 mcg Oral Daily    insulin lispro  0-6 Units Subcutaneous TID WC    insulin lispro  0-3 Units Subcutaneous Nightly    sodium chloride flush  10 mL Intravenous 2 times per day     glucose, 15 g, PRN  dextrose, 12.5 g, PRN  glucagon (rDNA), 1 mg, PRN  dextrose, 100 mL/hr, PRN  sodium chloride flush, 10 mL, PRN  sodium chloride, 25 mL, PRN  promethazine, 12.5 mg, Q6H PRN    Or  ondansetron, 4 mg, Q6H PRN  polyethylene glycol, 17 g, Daily PRN  acetaminophen, 650 mg, Q6H PRN    Or  acetaminophen, 650 mg, Q6H PRN  perflutren lipid microspheres, 1.5 mL, ONCE PRN         Objective:    BP (!) 117/55   Pulse 68   Temp 98.4 °F (36.9 °C) (Oral)   Resp 16   Ht 5' 4\" (1.626 m)   Wt 171 lb 2.2 oz (77.6 kg)   SpO2 97%   BMI 29.38 kg/m²   Skin: warm and dry, no rash or erythema  Pulmonary/Chest: clear to auscultation bilaterally- no wheezes, rales or rhonchi, normal air movement, no respiratory distress  Cardiovascular: rhythm reg at rate of 64  Abdomen: soft, non-tender, non-distended, normal bowel sounds, no masses or organomegaly  Extremities: no cyanosis, no clubbing and mild b/l le edema      Recent Labs     04/09/21  0500 04/10/21  0358 04/11/21  1115    136 132   K 4.2 4.8 4.8    102 100   CO2 24 20* 22   BUN 58* 68* 76*   CREATININE 3.0* 3.5* 3.6*   GLUCOSE 103* 95 177*   CALCIUM 8.6 8.4* 8.0*       Recent Labs     04/09/21  0504 04/10/21  0358 04/11/21  1115   WBC 16.5* 9.8 7.1   RBC 3.66 3.63 4.15   HGB 10.0* 10.0* 11.5   HCT 32.9* 33.3* 37.7   MCV 89.9 91.7 90.8   MCH 27.3 27.5 27.7   MCHC 30.4* 30.0* 30.5*   RDW 14.6 15.0 15.5*    204 212   MPV 10.2 10.4 10.4       CBC with Differential:    Lab Results   Component Value Date    WBC 7.1 04/11/2021    RBC 4.15 04/11/2021    RBC 3.68 12/19/2017    HGB 11.5 04/11/2021    HCT 37.7 04/11/2021     04/11/2021    MCV 90.8 04/11/2021    MCH 27.7 04/11/2021    MCHC 30.5 04/11/2021    RDW 15.5 04/11/2021    NRBC 0.0 04/07/2021    SEGSPCT 58 06/24/2013    METASPCT 1.0 04/11/2021    LYMPHOPCT 5.0 04/11/2021    LYMPHOPCT 24.4 09/03/2017    PROMYELOPCT 0.9 04/07/2021    MONOPCT 7.0 04/11/2021    MYELOPCT 0.9 04/04/2021    BASOPCT 0.0 04/11/2021    MONOSABS 0.50 04/11/2021    LYMPHSABS 0.36 04/11/2021    EOSABS 0.28 04/11/2021    BASOSABS 0.00 04/11/2021     CMP:    Lab Results   Component Value Date     04/11/2021    K 4.8 04/11/2021    K 4.2 06/20/2020     04/11/2021    CO2 22 04/11/2021    BUN 76 04/11/2021    CREATININE 3.6 04/11/2021    GFRAA 15 04/11/2021    LABGLOM 15 04/11/2021    GLUCOSE 177 04/11/2021    GLUCOSE 77 04/18/2012    PROT 6.4 04/11/2021    LABALBU 2.4 04/11/2021    LABALBU 4.0 03/21/2012    CALCIUM 8.0 04/11/2021    BILITOT 0.3 04/11/2021    ALKPHOS 55 04/11/2021    AST 38 04/11/2021    ALT 22 04/11/2021        Radiology:   XR CHEST PORTABLE   Final Result   Worsening of infiltrates which may be related to CHF/edema. Pneumonia less   likely. Continued follow-up recommended. XR CHEST PORTABLE   Final Result   CHF with findings similar to the prior study.          XR CHEST PORTABLE   Final Result   Mild increased bilateral pulmonary airspace opacities and pleural effusions. Findings may be related pulmonary edema and/or pneumonia. XR CHEST PORTABLE   Final Result   No significant change in appearance of the chest with persistent left pleural   effusion and multifocal airspace opacities. US THORACENTESIS Which side should the procedure be performed? Right   Final Result   Status post left thoracentesis. XR CHEST 1 VIEW   Final Result   No convincing evidence of a pneumothorax following thoracentesis. Unchanged patchy bilateral airspace opacities which may be on the basis of   multifocal pneumonia or pulmonary edema. XR CHEST (2 VW)   Final Result   CHF with likely cardiogenic edema. Fluoroscopy modified barium swallow with video   Final Result   Addendum 1 of 1   ADDENDUM:   The study was demonstrated the due the clinical findings to indicated the   possibility for aspiration or penetration. Difficulty swallowing. Final      CTA PULMONARY W CONTRAST   Final Result   No evidence of pulmonary emboli. Cardiomegaly with prominent coronary atherosclerotic calcifications and small   pericardial effusion. Bilateral pleural effusions, mildly increased in the interval since the prior   examination. Multifocal patchy and ground-glass airspace disease in the lungs bilaterally,   and more confluent airspace disease in the lower lobes bilaterally, most   consistent with multifocal pneumonia. Pulmonary edema cannot be excluded. Reflux of contrast into the IVC and hepatic veins which may be seen with   right heart failure. XR CHEST PORTABLE   Final Result   Cardiomegaly with progressive perihilar and bibasilar infiltrates and pleural   effusions likely CHF/edema and or pneumonia.              Assessment:    Active Problems:    Acute respiratory failure with hypoxia (HCC)    Essential hypertension    Controlled type 2 diabetes mellitus without complication [facility])  Resolved Problems:    * No resolved hospital problems. *      Plan:  1. Acute respiratory failure with hypoxia wean o2 as able pulmonary following   2. Bacterial pneumonia finished abx   3. Acute on chronic diastolic chf improving continue current management  4. Leukocytosis monitor improved  5. Acidosis monitor  Nephrology following. 6. sher on ckd 3b   nephrology following  Monitor pt closely on iv fluids. 7. htn continue med  8.  Dm type 2 controlled monitor and treat prn  9. hypothyroidism continue med        Electronically signed by Jasmina Sinclair DO on 4/11/2021 at 6:13 PM

## 2021-04-12 NOTE — PROGRESS NOTES
Spoke to Dr. Morenita Cleary for vascular surgery consult via Perfect serve text, stated to notify general surgery resident in the a.m 4-13-21. Request written on admission/ discharge/transfer worksheet and will pass to next shift nurse in report.

## 2021-04-12 NOTE — PROGRESS NOTES
Judge Micheal M.D.,[facility]  Bar Krueger D.O., F.A.C.O.I., Stiven Lopez M.D. Wili Epstein M.D., Anila Avilez M.D. Faith Ngo D.O. Daily Pulmonary Progress Note    Patient:  Kamar Del Rio 81 y.o. female MRN: [id number]     Date of Service: 4/12/2021      Synopsis     We are following patient for acute hypoxia and abnormal CT chest    \"CC\" ; Hartness of breath    Code status: Full code      Subjective      Patient personally seen and examined. Getting cleaned up. She is laying in bed in no acute distress. Oxygen 4.5 L of oxygen to maintain saturations 96%. She is at her baseline intermittently pleasantly confused. [place]. Appetite and p.o. intake decreased.     Review of Systems:  Difficult to obtain patient is confused and hard of hearing    24-hour events:  None     Objective   Vitals: BP (!) 122/59   Pulse 74   Temp 98.1 °F (36.7 °C) (Oral)   Resp 18   Ht 5' 4\" (1.626 m)   Wt 171 lb 2.2 oz (77.6 kg)   SpO2 90%   BMI 29.38 kg/m²     I/O:    Intake/Output Summary (Last 24 hours) at 4/12/2021 1301  Last data filed at 4/12/2021 0800  Gross per 24 hour   Intake 150 ml   Output 275 ml   Net -125 ml       Vent Information  Skin Assessment: Clean, dry, & intact  FiO2 : 50 %  SpO2: 90 %  SpO2/FiO2 ratio: 192  I Time/ I Time %: 0.9 s  Mask Type: Full face mask  Mask Size: Medium       IPAP: 15 cmH20  CPAP/EPAP: 6 cmH2O     CURRENT MEDS :  Scheduled Meds:   albuterol sulfate HFA  2 puff Inhalation Q4H While awake    epoetin emile-epbx  10,000 Units Subcutaneous Q7 Days    ascorbic acid  500 mg Oral Daily    zinc sulfate  50 mg Oral Daily    vitamin D  50,000 Units Oral Weekly    guaiFENesin  400 mg Oral TID    hydrALAZINE  25 mg Oral 3 times per day    isosorbide dinitrate  10 mg Oral TID    enoxaparin  30 mg Subcutaneous Daily    metoprolol succinate  37.5 mg Oral BID    aspirin EC  81 mg Oral Daily    levothyroxine  50 mcg Oral Daily    insulin lispro  0-6 HGB 9.6 04/12/2021    HCT 31.7 04/12/2021    MCV 90.8 04/12/2021    MCH 27.5 04/12/2021    MCHC 30.3 04/12/2021    RDW 15.6 04/12/2021     04/12/2021    MPV 10.4 04/12/2021     Lab Results   Component Value Date     04/12/2021    K 4.5 04/12/2021    K 4.2 06/20/2020     04/12/2021    CO2 22 04/12/2021    BUN 84 04/12/2021    CREATININE 4.1 04/12/2021    LABALBU 2.5 04/12/2021    LABALBU 4.0 03/21/2012    CALCIUM 8.1 04/12/2021    GFRAA 13 04/12/2021    LABGLOM 13 04/12/2021     Lab Results   Component Value Date    PROTIME 12.0 03/29/2021    INR 1.1 03/29/2021     No results for input(s): PROBNP in the last 72 hours. No results for input(s): PROCAL in the last 72 hours. This SmartLink has not been configured with any valid records. Micro:  No results for input(s): CULTRESP in the last 72 hours. No results for input(s): LABGRAM in the last 72 hours. No results for input(s): LEGUR in the last 72 hours. No results for input(s): STREPNEUMAGU in the last 72 hours. No results for input(s): LP1UAG in the last 72 hours. Assessment:    1. Acute hypoxic respiratory failure  2. COVID-19 pneumonia  3. combination of acute on chronic CHF  4. healthcare associated pneumonia versus aspiration pneumonia  5. History of heart failure with preserved ejection fraction  6. Mild pharyngeal dysphagia  7. Left pleural fluid diagnostic tap 75 cc removed, exudate. Cytology negative 4/3/21  8. Worsening AMANDEEP      Plan:   1. Wean FiO2 for saturations above 92% 4.5 L HF  2. Continue BiPAP as needed and nightly for respiratory support-not using  3. Follow chest x-ray 4/10  4. Albuterol HFA every 4 hours while awake  5. Completed cefepime 8 total days. 6. Off decadron. Continue vitamins  7. IR only 75 cc pleural fluid was drained from the left side. Cytology is negative. Fluid appears exudate. Fluid cultures negative  8. continue dysphagia 3 soft advance diet per speech recommendations.   9. Worsening AMANDEEP discussed with nephrology  10. PT OT  11. covid negative x 2, out of isolation  This plan of care was reviewed in collaboration with Dr. Yvan Jasmine  Electronically signed by Bonifacio Paget, APRN - CNP on 4/12/2021 at 1:01 PM     I personally saw, examined, and cared for the patient. Labs, medications, radiographs reviewed. I agree with history exam and plans detailed in NP note.   Moris Horowitz MD

## 2021-04-12 NOTE — PROGRESS NOTES
Occupational Therapy  OT BEDSIDE TREATMENT NOTE      Date:2021  Patient Name: Emilia Fontaine  MRN: [id number]  : 1935  Room: [address] [place] [address]-A     Referring Magda Diaz MD     Evaluating OT: Alton Donovan OTR/L [id number]     AM-PAC Daily Activity Raw Score: 15     Recommended Adaptive Equipment: TBD     Diagnosis: acute respiratory failure with hypoxia. Pt presents to ED from home with dyspnea on exertion.      Pertinent Medical History: arthritis, CHF, DM, HTN, CKD, COVID+  Precautions:  Falls, O2 high flow     Home Living: Pt lives alone in a single story home. Bathroom setup: tub/shower combo with indwelling shower chair, has 3:1      Prior Level of Function: Per pt report Mod I with ADLs, son Keke Rodriguez IADLs; completed functional mobility with [employer]. No home O2.     Pain Level: Pt with complaint of discomfort when attempting to have a bowel movement.      Cognition: Awake and alert. Cues for safety during activity.      Functional Assessment:    Initial Eval Status  Date: 21 Treatment session: Short Term Goals      Feeding SBA  Set up of lunch tray, pt reports increasingly hungry as no food this AM for tests. Pt fatigued and cues to attend to meal and take bits Setup while seated in the chair.   Set up   Grooming Min A Mod A   Set up   [address] [facility] A  Management of hospital gown Min A to change gown.   Set up   LB Dressing Mod A  Management of socks  max A slipper socks. SBA   Bathing Max A   SBA   Toileting Max A Max  A toilet hygiene after sitting on the BSC. SBA   Bed Mobility  Supine <> sit: Mod A  Scooting to HOB: SBA Mod A supine to sit      Functional Transfers STS: pt declines this session reporting does not feel well Mod A sit to stand from bed surface. Min A transfer from [facility]. SBA   Functional Mobility Unable to tolerate Min A using w/w pivot   Declined further mobility.   SBA during ADLs   Balance Sitting: fair at EOB     Standing: unable to tolerate

## 2021-04-12 NOTE — PROGRESS NOTES
[facility] Quality Flow/Interdisciplinary Rounds Progress Note        Quality Flow Rounds held on April 12, 2021    Disciplines Attending:  Bedside Nurse, ,  and Nursing Unit Leadership    Robbin Villalba was admitted on 3/29/2021  5:48 PM    Anticipated Discharge Date:  Expected Discharge Date: 04/10/21    Disposition:    Jose F Score:  Jose F Scale Score: 19    Readmission Risk              Risk of Unplanned Readmission:        34           Discussed patient goal for the day, patient clinical progression, and barriers to discharge. The following Goal(s) of the Day/Commitment(s) have been identified:Monitor Labs, wean oxygen as tolerated, and discharge planning.       Qiana Puri  April 12, 2021

## 2021-04-12 NOTE — PROGRESS NOTES
 Arthritis     Bursitis     shoulders    Cervical radiculopathy     CHF (congestive heart failure) ([facility])     CKD (chronic kidney disease) stage 3, GFR 30-59 ml/min     Diabetes mellitus ([facility])     GERD (gastroesophageal reflux disease)     [place] (hard of hearing)     Hypertension     Hypothyroidism     SSS (sick sinus syndrome) ([facility])     stable, per epic note.     Thyroid disease     Unsteadiness     Valvular heart disease     sees Dr. Martina Way        Past Surgical History:        Procedure Laterality Date    FOOT SURGERY      both    JOINT REPLACEMENT  1999    TOTAL KNEE ARTHROPLASTY Right 3/21/2019    RIGHT KNEE TOTAL ARTHROPLASTY (ILENE)++ADDUCTOR CANAL BLOCK++ performed by Anahi Cotto MD at [facility] OR       Current Medications:    Current Facility-Administered Medications: 0.9 % sodium chloride infusion, , Intravenous, Continuous  albuterol sulfate  (90 Base) MCG/ACT inhaler 2 puff, 2 puff, Inhalation, Q4H While awake  epoetin emile-epbx (RETACRIT) injection 10,000 Units, 10,000 Units, Subcutaneous, Q7 Days  ascorbic acid (VITAMIN C) tablet 500 mg, 500 mg, Oral, Daily  zinc sulfate (ZINCATE) capsule 50 mg, 50 mg, Oral, Daily  vitamin D (ERGOCALCIFEROL) capsule 50,000 Units, 50,000 Units, Oral, Weekly  guaiFENesin tablet 400 mg, 400 mg, Oral, TID  hydrALAZINE (APRESOLINE) tablet 25 mg, 25 mg, Oral, 3 times per day  isosorbide dinitrate (ISORDIL) tablet 10 mg, 10 mg, Oral, TID  enoxaparin (LOVENOX) injection 30 mg, 30 mg, Subcutaneous, Daily  metoprolol succinate (TOPROL XL) extended release tablet 37.5 mg, 37.5 mg, Oral, BID  aspirin EC tablet 81 mg, 81 mg, Oral, Daily  levothyroxine (SYNTHROID) tablet 50 mcg, 50 mcg, Oral, Daily  glucose (GLUTOSE) 40 % oral gel 15 g, 15 g, Oral, PRN  dextrose 50 % IV solution, 12.5 g, Intravenous, PRN  glucagon (rDNA) injection 1 mg, 1 mg, Intramuscular, PRN  dextrose 5 % solution, 100 mL/hr, Intravenous, PRN  insulin lispro (HUMALOG) injection vial 0-6 Units, 0-6 Units, Subcutaneous, TID WC  insulin lispro (HUMALOG) injection vial 0-3 Units, 0-3 Units, Subcutaneous, Nightly  sodium chloride flush 0.9 % injection 10 mL, 10 mL, Intravenous, 2 times per day  sodium chloride flush 0.9 % injection 10 mL, 10 mL, Intravenous, PRN  0.9 % sodium chloride infusion, 25 mL, Intravenous, PRN  promethazine (PHENERGAN) tablet 12.5 mg, 12.5 mg, Oral, Q6H PRN **OR** ondansetron (ZOFRAN) injection 4 mg, 4 mg, Intravenous, Q6H PRN  polyethylene glycol (GLYCOLAX) packet 17 g, 17 g, Oral, Daily PRN  acetaminophen (TYLENOL) tablet 650 mg, 650 mg, Oral, Q6H PRN **OR** acetaminophen (TYLENOL) suppository 650 mg, 650 mg, Rectal, Q6H PRN  0.9 % sodium chloride infusion, 25 mL, Intravenous, Q12H  perflutren lipid microspheres (DEFINITY) injection 1.65 mg, 1.5 mL, Intravenous, ONCE PRN    Allergies:  Aspirin    Social History:      reports that she has never smoked. She has never used smokeless tobacco. She reports previous alcohol use. She reports that she does not use drugs.       Family History:     Family History   Problem Relation Age of Onset    No Known Problems Mother     No Known Problems Father          Review of Systems:       Pertinent positives stated above in HPI. All other systems were reviewed and were negative.     Physical exam:   Constitutional:  VITALS:  BP (!) 117/55   Pulse 68   Temp 98.4 °F (36.9 °C) (Oral)   Resp 16   Ht 5' 4\" (1.626 m)   Wt 171 lb 2.2 oz (77.6 kg)   SpO2 95%   BMI 29.38 kg/m²   CURRENT TEMPERATURE:  Temp: 98.4 °F (36.9 °C)  CURRENT RESPIRATORY RATE:  Resp: 16  CURRENT PULSE:  Pulse: 68  CURRENT BLOOD PRESSURE:  BP: (!) 117/55  24HR BLOOD PRESSURE RANGE:  Systolic (98QLX), SLT:834 , Min:112 , JAIN:886   ; Diastolic (30EAK), ZKA:96, Min:54, Max:59    24HR INTAKE/OUTPUT:      Intake/Output Summary (Last 24 hours) at 4/11/2021 2155  Last data filed at 4/11/2021 1507  Gross per 24 hour   Intake 30 ml   Output 675 ml   Net -645 ml     PE from the 4/6/21 visit  General Appearance: appears comfortable in mild acute distress. HEENT: Normocephalic atraumatic without obvious abnormality   Neck: Lips, mucosa, and tongue normal.  Supple, symmetrical, trachea midline, no adenopathy;thyroid:  no enlargement/tenderness/nodules or JVD. Lung: clear and diminished in the bases  Heart: RRR, normal S1, S2. No MRG  Abdomen: Soft, NT, ND. BS present x 4 quadrants. No bruit or organomegaly. Extremities: Pedal pulses 2+ symmetric b/l. Extremities normal, no cyanosis, clubbing, or edema. Musculokeletal: No joint swelling, no muscle tenderness. ROM normal in all joints of extremities.    Neurologic: Mental status: Underlying confusion      DATA:      CBC:   Lab Results   Component Value Date    WBC 7.1 04/11/2021    RBC 4.15 04/11/2021    RBC 3.68 12/19/2017    HGB 11.5 04/11/2021    HCT 37.7 04/11/2021    MCV 90.8 04/11/2021    MCH 27.7 04/11/2021    MCHC 30.5 04/11/2021    RDW 15.5 04/11/2021     04/11/2021    MPV 10.4 04/11/2021     BMP:    Lab Results   Component Value Date     04/11/2021    K 4.8 04/11/2021    K 4.2 06/20/2020     04/11/2021    CO2 22 04/11/2021    BUN 76 04/11/2021    LABALBU 2.4 04/11/2021    LABALBU 4.0 03/21/2012    CREATININE 3.6 04/11/2021    CALCIUM 8.0 04/11/2021    GFRAA 15 04/11/2021    LABGLOM 15 04/11/2021    GLUCOSE 177 04/11/2021    GLUCOSE 77 04/18/2012       RAD:  Echo Limited    Result Date: 3/30/2021  Transthoracic Echocardiography Report (TTE)  Demographics   Patient Name    Jeff Wilson  Gender            Female                  P   Medical Record  [id number]     Room Number       2439  Number   Account #       [de-identified]    Procedure Date    03/30/2021   Corporate ID                 Ordering          Silvana Webb MD                               Physician   Accession       [id number]   Referring         Augie Barnes  Number                       Physician   Date of Birth   1935   Cami Roberts Alarcon RDCS   Age             81 year(s)   Interpreting      [address] [place] [address]                               Physician         Physician Cardiology                                                 Catalino Perez MD                                Any Other  Procedure Type of Study   TTE procedure:Echo Limited Study. Procedure Date Date: 03/30/2021 Start: 07:22 AM Study Location: Portable Technical Quality: Adequate visualization Indications:Congestive heart failure, diastolic dysfunction. Patient Status: Routine Height: 64 inches Weight: 171 pounds BSA: 1.83 m^2 BMI: 29.35 kg/m^2 HR: 61 bpm BP: 144/66 mmHg  Findings   Left Ventricle  Left ventricle is normal in size . Mild left ventricular concentric hypertrophy noted. No regional wall motion abnormalities seen. Normal left ventricular ejection fraction. Ejection fraction is visually estimated at 65%. Indeterminate diastolic function. Right Ventricle  Normal right ventricular size and function. Left Atrium  The left atrium is mildly dilated. Interatrial septum appears intact. Right Atrium  Normal right atrium size. Mitral Valve  Focal calcification mitral valve leaflets. Mild mitral annular calcification. Mild mitral regurgitation is present. Tricuspid Valve  The tricuspid valve appears structurally normal.  Moderate tricuspid regurgitation. RVSP is 50 mmHg. Pulmonary hypertension is mild to moderate . Aortic Valve  The aortic valve appears mildly sclerotic. Mean transaortic gradient (Doppler) 11 mm Hg . Mild aortic stenosis. Pulmonic Valve  The pulmonic valve was not well visualized. Mild pulmonic regurgitation present. Pericardial Effusion  There is a trivial circumferential pericardial effusion noted. Pleural Effusion  Moderate left pleural effusion. Aorta  Aortic root dimension within normal limits. Conclusions   Summary  Left ventricle is normal in size . Mild left ventricular concentric hypertrophy noted.   No regional wall motion abnormalities seen. Normal left ventricular ejection fraction. The left atrium is mildly dilated. Focal calcification mitral valve leaflets. Mild mitral annular calcification. Mild mitral regurgitation is present. Mild aortic stenosis. Moderate tricuspid regurgitation. Pulmonary hypertension is mild to moderate . There is a trivial circumferential pericardial effusion noted. Moderate left pleural effusion. Signature   ----------------------------------------------------------------  Electronically signed by Daniella Nicholas MD(Interpreting  physician) on 2021 05:36 PM  ----------------------------------------------------------------  M-Mode/2D Measurements & Calculations   LV Diastolic       LV Systolic Dimension: 2.5 cm  Dimension: 3.9 cm  LV Volume Diastolic: 00.3 ml  LV K.9 %       LV Volume Systolic: 22 ml  LV PW Diastolic:   LV EDV/LV EDV Index: 65.7 PW/32      RV Diastolic  1.2 cm             ml/m^2LV ESV/LV ESV Index: 22        Dimension: 3 cm  LV PW Systolic:    AJ/85OB/ m^2  1. 6 cm             EF Calculated: 66.5 %  Septum Diastolic:  LV Mass Index: 87 l/min*m^2  1. 2 cm  Septum Systolic:  1.5 cm             LVOT: 2 cm                           IVC Expiration:  CO: 5.36 l/min                                          2.4 cm  CI: 2.93 l/m*m^2  LV Mass: 159.29 g  Doppler Measurements & Calculations                      AV Peak Velocity: 2.29 LVOT Peak Velocity: 1.3 m/s                     m/s                    LVOT Mean Velocity: 0.93 m/s                     AV Peak Gradient:      LVOT Peak Gradient: 6.7 mmHgLVOT                     21.03 mmHg             Mean Gradient: 4 mmHg                     AV Mean Velocity: 1.62  MV E' Septal       m/s  Velocity: 0.05 m/s AV Mean Gradient: 11.3  MV E' Lateral      mmHg                   TR Velocity:3.56 m/s  Velocity: 6 m/s    AV VTI: 47.8 cm        TR Gradient:50.81 mmHg                     AV Area                     (Continuity):1.84 cm^2                      LVOT VTI: 28 cm                     IVRT: 96.9 msec  [email or web address] [id number]    Xr Chest Portable    Result Date: 3/29/2021  EXAMINATION: ONE XRAY VIEW OF THE CHEST 3/29/2021 7:03 pm COMPARISON: 03/23/2021. HISTORY: ORDERING SYSTEM PROVIDED HISTORY: dyspnea TECHNOLOGIST PROVIDED HISTORY: Reason for exam:->dyspnea FINDINGS: There is cardiomegaly with prominence of the superior mediastinum. There is vascular congestion with patchy perihilar infiltrates extending to the lung bases. Pleural effusions are suspected. Cardiomegaly with progressive perihilar and bibasilar infiltrates and pleural effusions likely CHF/edema and or pneumonia. Cta Pulmonary W Contrast    Result Date: 3/30/2021  EXAMINATION: CTA OF THE CHEST 3/29/2021 11:46 pm TECHNIQUE: CTA of the chest was performed after the administration of intravenous contrast.  Multiplanar reformatted images are provided for review. MIP images are provided for review. Dose modulation, iterative reconstruction, and/or weight based adjustment of the mA/kV was utilized to reduce the radiation dose to as low as reasonably achievable. COMPARISON: March 24 HISTORY: ORDERING SYSTEM PROVIDED HISTORY: rule out PE TECHNOLOGIST PROVIDED HISTORY: Reason for exam:->rule out PE Decision Support Exception->Emergency Medical Condition (MA) FINDINGS: Pulmonary Arteries: Pulmonary arteries are adequately opacified for evaluation. No evidence of intraluminal filling defect to suggest pulmonary embolism. Main pulmonary artery is normal in caliber. Mediastinum: Cardiomegaly with prominent coronary atherosclerotic calcifications. Small pericardial effusion. . No hilar or mediastinal adenopathy. Calcified hilar and mediastinal nodes. Lungs/pleura: Bilateral pleural effusions, mildly increased in the interval since the prior examination. Multifocal patchy and ground-glass airspace disease in the lungs bilaterally, also present on the prior examination. There is more confluent airspace disease in the lower lobes bilaterally, most consistent with multifocal pneumonia. Images are degraded by respiratory motion artifact. Calcified granuloma in the right lower lobe. Upper Abdomen: Splenic calcifications which may represent granuloma. Reflux of contrast into the IVC and hepatic veins which may be seen with right heart failure. Soft Tissues/Bones: No acute bone or soft tissue abnormality. No evidence of pulmonary emboli. Cardiomegaly with prominent coronary atherosclerotic calcifications and small pericardial effusion. Bilateral pleural effusions, mildly increased in the interval since the prior examination. Multifocal patchy and ground-glass airspace disease in the lungs bilaterally, and more confluent airspace disease in the lower lobes bilaterally, most consistent with multifocal pneumonia. Pulmonary edema cannot be excluded. Reflux of contrast into the IVC and hepatic veins which may be seen with right heart failure. EXAMINATION:   ONE XRAY VIEW OF THE CHEST       4/6/2021 6:01 am       COMPARISON:   04/03/2021       HISTORY:   ORDERING SYSTEM PROVIDED HISTORY: pna   TECHNOLOGIST PROVIDED HISTORY:   Reason for exam:->pna       FINDINGS:   Stable cardiomediastinal silhouette.  There are bilateral perihilar lung base   airspace opacities and pleural effusions, which appear mildly increased   compared to the prior study.  No pneumothorax.  No acute osseous abnormality.           Impression   Mild increased bilateral pulmonary airspace opacities and pleural effusions. Findings may be related pulmonary edema and/or pneumonia. Assessment/Plan  1.  Stage III AMANDEEP in the setting of the diuresis as well as the recent cefepime and the doses Vanc the last one being on 4/1/21-she did have a CTA of the chest but the timing was off for RCIN  FeNa <1% and FeUrea <35% consistent with a component of decreased effective renal perfusion  Cr up from 3.6-->4.1mg/dl with ongoing infiltrates on the CXR -UO low, but I/O are incomplete-discussed with Pulm and agrees with proceeding with KRT as IHD  PLAN:1. Will plan for IHD-discussed with the pt who is agreeable-I spoke with her 2 sons Darryl Alex and Alethea Barba and updated them to renal status  2. Will consult [facility] for temp dialysis catheter  3. Start IHD     2. Hyocalcemia with hypoalbuminemia in the setting of the AMANDEEP and the Unspecified Vit D Def  Vit D level 9  PO4 WNL  Last Ionized Ca++ WNL  PLAN:1. Continue  Vit D,      3. HFpEF with a Hx of VHD  PLAN:1. Holding the diuretics for present     4. Met alkalosis-progressive-sec to diuresis  Resolved     5. AMS which may reflect the cefepime in the setting of an elderly pt with worsening renal status-improved off cefepime  PLAN:1. Follow     6. Acute hypoxic Resp Failure/ HCAP vs Asp I-COVId 19 (-) and pt out of isolation    Thank you for allowing us to participate in care of Ms. Crow Mckay MD

## 2021-04-12 NOTE — PROGRESS NOTES
[facility]   Progress Note    Admitting Date and Time: 3/29/2021  5:48 PM  Admit Dx: Acute respiratory failure with hypoxia ([facility] [address] [J96.01]    Subjective:    Patient was admitted with Acute respiratory failure with hypoxia ([facility] [address] [J96.01]. Patient denies fever, chills, cp, sob, n/v. Pt had c/o pain prior to me seeing including cp to the night nurse. Day nurse reports that pt states she has pain with light touch over all body.       albuterol sulfate HFA  2 puff Inhalation Q4H While awake    epoetin emile-epbx  10,000 Units Subcutaneous Q7 Days    ascorbic acid  500 mg Oral Daily    zinc sulfate  50 mg Oral Daily    vitamin D  50,000 Units Oral Weekly    guaiFENesin  400 mg Oral TID    hydrALAZINE  25 mg Oral 3 times per day    isosorbide dinitrate  10 mg Oral TID    enoxaparin  30 mg Subcutaneous Daily    metoprolol succinate  37.5 mg Oral BID    aspirin EC  81 mg Oral Daily    levothyroxine  50 mcg Oral Daily    insulin lispro  0-6 Units Subcutaneous TID WC    insulin lispro  0-3 Units Subcutaneous Nightly    sodium chloride flush  10 mL Intravenous 2 times per day     glucose, 15 g, PRN  dextrose, 12.5 g, PRN  glucagon (rDNA), 1 mg, PRN  dextrose, 100 mL/hr, PRN  sodium chloride flush, 10 mL, PRN  sodium chloride, 25 mL, PRN  promethazine, 12.5 mg, Q6H PRN    Or  ondansetron, 4 mg, Q6H PRN  polyethylene glycol, 17 g, Daily PRN  acetaminophen, 650 mg, Q6H PRN    Or  acetaminophen, 650 mg, Q6H PRN  perflutren lipid microspheres, 1.5 mL, ONCE PRN         Objective:    BP (!) 120/59   Pulse 60   Temp 97.9 °F (36.6 °C) (Oral)   Resp 18   Ht 5' 4\" (1.626 m)   Wt 171 lb 2.2 oz (77.6 kg)   SpO2 93%   BMI 29.38 kg/m²   Skin: warm and dry, no rash or erythema  Pulmonary/Chest: clear to auscultation bilaterally- no wheezes, rales or rhonchi, normal air movement, no respiratory distress  Cardiovascular: rhythm reg at rate of 64  Abdomen: soft, non-tender, non-distended, normal bowel sounds, no masses or organomegaly  Extremities: no cyanosis, no clubbing and no edema      Recent Labs     04/10/21  0358 04/11/21  1115 04/12/21  0715    132 134   K 4.8 4.8 4.5    100 102   CO2 20* 22 22   BUN 68* 76* 84*   CREATININE 3.5* 3.6* 4.1*   GLUCOSE 95 177* 107*   CALCIUM 8.4* 8.0* 8.1*       Recent Labs     04/10/21  0358 04/11/21  1115 04/12/21  0715   WBC 9.8 7.1 7.5   RBC 3.63 4.15 3.49*   HGB 10.0* 11.5 9.6*   HCT 33.3* 37.7 31.7*   MCV 91.7 90.8 90.8   MCH 27.5 27.7 27.5   MCHC 30.0* 30.5* 30.3*   RDW 15.0 15.5* 15.6*    212 203   MPV 10.4 10.4 10.4       CBC with Differential:    Lab Results   Component Value Date    WBC 7.5 04/12/2021    RBC 3.49 04/12/2021    RBC 3.68 12/19/2017    HGB 9.6 04/12/2021    HCT 31.7 04/12/2021     04/12/2021    MCV 90.8 04/12/2021    MCH 27.5 04/12/2021    MCHC 30.3 04/12/2021    RDW 15.6 04/12/2021    NRBC 0.0 04/07/2021    SEGSPCT 58 06/24/2013    METASPCT 2.0 04/12/2021    LYMPHOPCT 3.0 04/12/2021    LYMPHOPCT 24.4 09/03/2017    PROMYELOPCT 0.9 04/07/2021    MONOPCT 10.0 04/12/2021    MYELOPCT 0.9 04/04/2021    BASOPCT 0.0 04/12/2021    MONOSABS 0.75 04/12/2021    LYMPHSABS 0.23 04/12/2021    EOSABS 0.30 04/12/2021    BASOSABS 0.00 04/12/2021     CMP:    Lab Results   Component Value Date     04/12/2021    K 4.5 04/12/2021    K 4.2 06/20/2020     04/12/2021    CO2 22 04/12/2021    BUN 84 04/12/2021    CREATININE 4.1 04/12/2021    GFRAA 13 04/12/2021    LABGLOM 13 04/12/2021    GLUCOSE 107 04/12/2021    GLUCOSE 77 04/18/2012    PROT 6.0 04/12/2021    LABALBU 2.5 04/12/2021    LABALBU 4.0 03/21/2012    CALCIUM 8.1 04/12/2021    BILITOT 0.3 04/12/2021    ALKPHOS 49 04/12/2021    AST 31 04/12/2021    ALT 18 04/12/2021        Radiology:   XR CHEST PORTABLE   Final Result   Worsening of infiltrates which may be related to CHF/edema. Pneumonia less   likely. Continued follow-up recommended.          XR CHEST PORTABLE   Final Result   CHF with findings similar to the prior study. XR CHEST PORTABLE   Final Result   Mild increased bilateral pulmonary airspace opacities and pleural effusions. Findings may be related pulmonary edema and/or pneumonia. XR CHEST PORTABLE   Final Result   No significant change in appearance of the chest with persistent left pleural   effusion and multifocal airspace opacities. US THORACENTESIS Which side should the procedure be performed? Right   Final Result   Status post left thoracentesis. XR CHEST 1 VIEW   Final Result   No convincing evidence of a pneumothorax following thoracentesis. Unchanged patchy bilateral airspace opacities which may be on the basis of   multifocal pneumonia or pulmonary edema. XR CHEST (2 VW)   Final Result   CHF with likely cardiogenic edema. Fluoroscopy modified barium swallow with video   Final Result   Addendum 1 of 1   ADDENDUM:   The study was demonstrated the due the clinical findings to indicated the   possibility for aspiration or penetration. Difficulty swallowing. Final      CTA PULMONARY W CONTRAST   Final Result   No evidence of pulmonary emboli. Cardiomegaly with prominent coronary atherosclerotic calcifications and small   pericardial effusion. Bilateral pleural effusions, mildly increased in the interval since the prior   examination. Multifocal patchy and ground-glass airspace disease in the lungs bilaterally,   and more confluent airspace disease in the lower lobes bilaterally, most   consistent with multifocal pneumonia. Pulmonary edema cannot be excluded. Reflux of contrast into the IVC and hepatic veins which may be seen with   right heart failure. XR CHEST PORTABLE   Final Result   Cardiomegaly with progressive perihilar and bibasilar infiltrates and pleural   effusions likely CHF/edema and or pneumonia.              Assessment:    Active Problems:    Acute respiratory failure with hypoxia ([facility] [address]    Essential hypertension    Controlled type 2 diabetes mellitus without complication ([facility] [address]    Acidosis  Resolved Problems:    * No resolved hospital problems. *      Plan:  1. Acute respiratory failure with hypoxia wean o2 as able pulmonary following   2. Bacterial pneumonia finished abx   3. Acute on chronic diastolic chf improving continue current management  4. Leukocytosis monitor improved  5. Acidosis monitor  Nephrology following. 6. sher on ckd 3b   nephrology following  Monitor pt closely on iv fluids. 7. htn continue med  8. Dm type 2 controlled monitor and treat prn  9. hypothyroidism continue med    Nephrology with plans for dialysis.  vasc surg consulted for temp dialysis cath    Electronically signed by Mariella Santana DO on 4/12/2021 at 5:44 PM

## 2021-04-13 NOTE — FLOWSHEET NOTE
Pt ran 2 hours; 3251 bath; 300 mL removed; stable/tolerated well; denies c/o. HD CVC citrate locked per lumen fill volume, capped & clamped post Tx. good Access flow no issues achieving prescribed BFR. Next Tx tomorrow 4-14-21.      04/13/21 1845   Vital Signs   /82   Temp 98.3 °F (36.8 °C)   Pulse 77   Resp 17   Weight 174 lb 6.1 oz (79.1 kg)   Percent Weight Change -0.38   Pain Assessment   Pain Assessment 0-10   Pain Level 4   Pain Location Generalized   Post-Hemodialysis Assessment   Post-Treatment Procedures Blood returned;Catheter capped, clamped with Citrate x 2 ports   Machine Disinfection Process Acid/Vinegar Clean;Heat Disinfect; Exterior Machine Disinfection   Rinseback Volume (ml) 300 ml   Total Liters Processed (l/min) 22.9 l/min   Dialyzer Clearance Lightly streaked   Duration of Treatment (minutes) 120 minutes   Hemodialysis Output (ml) 600 ml   NET Removed (ml) 300 ml   Tolerated Treatment Good   Patient Response to Treatment tx ended without difficulty    Bilateral Breath Sounds Clear   LUE Edema +1   RLE Edema +1   LLE Edema +1   Physician Notified?  Yes

## 2021-04-13 NOTE — PROGRESS NOTES
Progress Note  NEPHROLOGY    Reason for Consult: Management of acute kidney injury      History of Present Lolly from the 4/5/21 note: This is a 23-year-old female with a history of diastolic heart failure, hypothyroidism, diabetes mellitus type 2, hypothyroidism, essential hypertension who has been admitted twice this month for CHF exacerbation with discharge most recently 4 days ago. The night prior to admission she was complaining of generalized weakness and palpitations: therefore presented to the ED for further evaluation on 3/29/2021. Vitals upon arrival included  BP (!) 142/70   Pulse 76   Temp 98.3 °F (36.8 °C)   Resp 26   Ht 5' 3\" (1.6 m)   Wt 159 lb (72.1 kg)   SpO2 98%   BMI 28.17 kg/m². Pertinent labs included WBC 8.2 and hemoglobin 10.4. Initial BMP revealed sodium 140, potassium 5.2, chloride 103, CO2 22, BUN 26 and creatinine 1.3. BNP 3514. Initial lactic acid 6.2. Initial ABGs 7.397, PCO2 35.6, PO2 61.5, bicarb 21 and 90% O2 saturation. Chest x-ray revealed cardiomegaly with progressive perihilar and bibasilar infiltrates and pleural effusions. Patient was subsequently admitted with acute respiratory failure with hypoxia. Patient currently examined sitting up in bed in mild acute distress. She is a very poor historian. ROS difficult to accurately obtain. Per the nursing staff did require 15 L NRB mask this morning (she is refusing the Bipap). She continues to have diffuse interstitial infiltrates on CXR (Pulmonology is following). Cardiology is also following: Patient was initially on IV bumex, but was changed to PO on 4/3/21. She has a history of stage I systolic hypertension.       4/13/21:  She is awake alert and states she feels poorly overall    Past Medical History:        Diagnosis Date    (HFpEF) heart failure with preserved ejection fraction ([address] 05/26/2017    3/31/17- echo- LVEF 60-65%, mild LVH, mild MR    Arthritis     Bursitis     shoulders    Cervical 0-6 Units, Subcutaneous, TID WC  insulin lispro (HUMALOG) injection vial 0-3 Units, 0-3 Units, Subcutaneous, Nightly  sodium chloride flush 0.9 % injection 10 mL, 10 mL, Intravenous, 2 times per day  sodium chloride flush 0.9 % injection 10 mL, 10 mL, Intravenous, PRN  0.9 % sodium chloride infusion, 25 mL, Intravenous, PRN  promethazine (PHENERGAN) tablet 12.5 mg, 12.5 mg, Oral, Q6H PRN **OR** ondansetron (ZOFRAN) injection 4 mg, 4 mg, Intravenous, Q6H PRN  polyethylene glycol (GLYCOLAX) packet 17 g, 17 g, Oral, Daily PRN  acetaminophen (TYLENOL) tablet 650 mg, 650 mg, Oral, Q6H PRN **OR** acetaminophen (TYLENOL) suppository 650 mg, 650 mg, Rectal, Q6H PRN  0.9 % sodium chloride infusion, 25 mL, Intravenous, Q12H  perflutren lipid microspheres (DEFINITY) injection 1.65 mg, 1.5 mL, Intravenous, ONCE PRN    Allergies:  Aspirin    Social History:      reports that she has never smoked. She has never used smokeless tobacco. She reports previous alcohol use. She reports that she does not use drugs.       Family History:     Family History   Problem Relation Age of Onset    No Known Problems Mother     No Known Problems Father          Review of Systems:       Pertinent positives stated above in HPI. All other systems were reviewed and were negative.     Physical exam:   Constitutional:  VITALS:  /61   Pulse 65   Temp 99 °F (37.2 °C) (Oral)   Resp 20   Ht 5' 4\" (1.626 m)   Wt 179 lb 8 oz (81.4 kg)   SpO2 95%   BMI 30.81 kg/m²   CURRENT TEMPERATURE:  Temp: 99 °F (37.2 °C)  CURRENT RESPIRATORY RATE:  Resp: 20  CURRENT PULSE:  Pulse: 65  CURRENT BLOOD PRESSURE:  BP: 118/61  24HR BLOOD PRESSURE RANGE:  Systolic (72JPQ), WUC:202 , Min:106 , QVY:606   ; Diastolic (82ZTK), JTJ:66, Min:51, Max:61    24HR INTAKE/OUTPUT:      Intake/Output Summary (Last 24 hours) at 4/13/2021 1453  Last data filed at 4/13/2021 0005  Gross per 24 hour   Intake --   Output 650 ml   Net -650 ml     PE from the 4/6/21 visit  General Appearance: appears comfortable in mild acute distress. HEENT: Normocephalic atraumatic without obvious abnormality   Neck: Lips, mucosa, and tongue normal.  Supple, symmetrical, trachea midline, no adenopathy;thyroid:  no enlargement/tenderness/nodules or JVD. Lung: clear and diminished in the bases  Heart: RRR, normal S1, S2. No MRG  Abdomen: Soft, NT, ND. BS present x 4 quadrants. No bruit or organomegaly. Extremities: Pedal pulses 2+ symmetric b/l. Extremities normal, no cyanosis, clubbing, or edema. Musculokeletal: No joint swelling, no muscle tenderness. ROM normal in all joints of extremities.    Neurologic: Mental status: Underlying confusion      DATA:      CBC:   Lab Results   Component Value Date    WBC 7.1 04/13/2021    RBC 3.47 04/13/2021    RBC 3.68 12/19/2017    HGB 9.7 04/13/2021    HCT 31.5 04/13/2021    MCV 90.8 04/13/2021    MCH 28.0 04/13/2021    MCHC 30.8 04/13/2021    RDW 15.6 04/13/2021     04/13/2021    MPV 10.0 04/13/2021     BMP:    Lab Results   Component Value Date     04/13/2021    K 4.7 04/13/2021    K 4.2 06/20/2020     04/13/2021    CO2 22 04/13/2021    BUN 89 04/13/2021    LABALBU 2.5 04/13/2021    LABALBU 4.0 03/21/2012    CREATININE 4.6 04/13/2021    CALCIUM 8.2 04/13/2021    GFRAA 11 04/13/2021    LABGLOM 11 04/13/2021    GLUCOSE 119 04/13/2021    GLUCOSE 77 04/18/2012       RAD:  Echo Limited    Result Date: 3/30/2021  Transthoracic Echocardiography Report (TTE)  Demographics   Patient Name    Syed Shea  Gender            Female                  P   Medical Record  [id number]     Room Number       7143  Number   Account #       [de-identified]    Procedure Date    03/30/2021   Corporate ID                 Ordering          Eileen Barbosa MD                               Physician   Accession       [id number]   Referring         Rosendo Glow  Number                       Physician   Date of Birth   1935   Sonographer       [address] [facility] 85 year(s)   Interpreting      [address] [place] [address]                               Physician         Physician Cardiology                                                 Bia Morgan MD                                Any Other  Procedure Type of Study   TTE procedure:Echo Limited Study. Procedure Date Date: 03/30/2021 Start: 07:22 AM Study Location: Portable Technical Quality: Adequate visualization Indications:Congestive heart failure, diastolic dysfunction. Patient Status: Routine Height: 64 inches Weight: 171 pounds BSA: 1.83 m^2 BMI: 29.35 kg/m^2 HR: 61 bpm BP: 144/66 mmHg  Findings   Left Ventricle  Left ventricle is normal in size . Mild left ventricular concentric hypertrophy noted. No regional wall motion abnormalities seen. Normal left ventricular ejection fraction. Ejection fraction is visually estimated at 65%. Indeterminate diastolic function. Right Ventricle  Normal right ventricular size and function. Left Atrium  The left atrium is mildly dilated. Interatrial septum appears intact. Right Atrium  Normal right atrium size. Mitral Valve  Focal calcification mitral valve leaflets. Mild mitral annular calcification. Mild mitral regurgitation is present. Tricuspid Valve  The tricuspid valve appears structurally normal.  Moderate tricuspid regurgitation. RVSP is 50 mmHg. Pulmonary hypertension is mild to moderate . Aortic Valve  The aortic valve appears mildly sclerotic. Mean transaortic gradient (Doppler) 11 mm Hg . Mild aortic stenosis. Pulmonic Valve  The pulmonic valve was not well visualized. Mild pulmonic regurgitation present. Pericardial Effusion  There is a trivial circumferential pericardial effusion noted. Pleural Effusion  Moderate left pleural effusion. Aorta  Aortic root dimension within normal limits. Conclusions   Summary  Left ventricle is normal in size . Mild left ventricular concentric hypertrophy noted. No regional wall motion abnormalities seen. VTI: 28 cm                     IVRT: 96.9 msec  [id number]    Xr Chest Portable    Result Date: 3/29/2021  EXAMINATION: ONE XRAY VIEW OF THE CHEST 3/29/2021 7:03 pm COMPARISON: 03/23/2021. HISTORY: ORDERING SYSTEM PROVIDED HISTORY: dyspnea TECHNOLOGIST PROVIDED HISTORY: Reason for exam:->dyspnea FINDINGS: There is cardiomegaly with prominence of the superior mediastinum. There is vascular congestion with patchy perihilar infiltrates extending to the lung bases. Pleural effusions are suspected. Cardiomegaly with progressive perihilar and bibasilar infiltrates and pleural effusions likely CHF/edema and or pneumonia. Cta Pulmonary W Contrast    Result Date: 3/30/2021  EXAMINATION: CTA OF THE CHEST 3/29/2021 11:46 pm TECHNIQUE: CTA of the chest was performed after the administration of intravenous contrast.  Multiplanar reformatted images are provided for review. MIP images are provided for review. Dose modulation, iterative reconstruction, and/or weight based adjustment of the mA/kV was utilized to reduce the radiation dose to as low as reasonably achievable. COMPARISON: March 24 HISTORY: ORDERING SYSTEM PROVIDED HISTORY: rule out PE TECHNOLOGIST PROVIDED HISTORY: Reason for exam:->rule out PE Decision Support Exception->Emergency Medical Condition (MA) FINDINGS: Pulmonary Arteries: Pulmonary arteries are adequately opacified for evaluation. No evidence of intraluminal filling defect to suggest pulmonary embolism. Main pulmonary artery is normal in caliber. Mediastinum: Cardiomegaly with prominent coronary atherosclerotic calcifications. Small pericardial effusion. . No hilar or mediastinal adenopathy. Calcified hilar and mediastinal nodes. Lungs/pleura: Bilateral pleural effusions, mildly increased in the interval since the prior examination.  Multifocal patchy and ground-glass airspace disease in the lungs bilaterally, also present on the prior examination. There is more confluent airspace disease in the lower lobes bilaterally, most consistent with multifocal pneumonia. Images are degraded by respiratory motion artifact. Calcified granuloma in the right lower lobe. Upper Abdomen: Splenic calcifications which may represent granuloma. Reflux of contrast into the IVC and hepatic veins which may be seen with right heart failure. Soft Tissues/Bones: No acute bone or soft tissue abnormality. No evidence of pulmonary emboli. Cardiomegaly with prominent coronary atherosclerotic calcifications and small pericardial effusion. Bilateral pleural effusions, mildly increased in the interval since the prior examination. Multifocal patchy and ground-glass airspace disease in the lungs bilaterally, and more confluent airspace disease in the lower lobes bilaterally, most consistent with multifocal pneumonia. Pulmonary edema cannot be excluded. Reflux of contrast into the IVC and hepatic veins which may be seen with right heart failure. EXAMINATION:   ONE XRAY VIEW OF THE CHEST       4/6/2021 6:01 am       COMPARISON:   04/03/2021       HISTORY:   ORDERING SYSTEM PROVIDED HISTORY: pna   TECHNOLOGIST PROVIDED HISTORY:   Reason for exam:->pna       FINDINGS:   Stable cardiomediastinal silhouette.  There are bilateral perihilar lung base   airspace opacities and pleural effusions, which appear mildly increased   compared to the prior study.  No pneumothorax.  No acute osseous abnormality.           Impression   Mild increased bilateral pulmonary airspace opacities and pleural effusions. Findings may be related pulmonary edema and/or pneumonia. Assessment/Plan  1.  Stage III AMANDEEP in the setting of the diuresis as well as the recent cefepime and the doses Vanc the last one being on 4/1/21-she did have a CTA of the chest but the timing was off for RCIN  FeNa <1% and FeUrea <35% consistent with a component of decreased effective renal perfusion  Cr up from 3.6-->4.1mg/dl with ongoing infiltrates on the CXR -UO low, but I/O are incomplete-discussed with Pulm and agrees with proceeding with KRT as IHD  PLAN:1. Will plan for IHD-once catheter placed  2. Consulted Vas Surgery for temp dialysis catheter     2. Hyocalcemia with hypoalbuminemia in the setting of the AMANDEEP and the Unspecified Vit D Def and the Sec HPTH of Renal Origin with hyperphosphatemia  Vit D level 9  PO4 6.6  Last Ionized Ca++ WNL  PLAN:1. Start PO4 binder  2. Continue  Vit D,      3. HFpEF with a Hx of VHD  PLAN:1. Holding the diuretics for present     4. Met alkalosis-progressive-sec to diuresis  Resolved     5. AMS which may reflect the cefepime in the setting of an elderly pt with worsening renal status-improved off cefepime  PLAN:1. Follow     6. Acute hypoxic Resp Failure/ HCAP vs Asp I-COVId 19 (-) and pt out of isolation    Thank you for allowing us to participate in care of Ms.  Cornell Abebe MD

## 2021-04-13 NOTE — PROGRESS NOTES
Keisha Cotton M.D.,[facility]  Jennifer Long D.O., F.A.C.O.I., Mor Longo M.D. Deangelo Kiran M.D., Servando Anderson M.D. Shruti Kim D.O. Daily Pulmonary Progress Note    Patient:  Stephanie Gonzalesgm 81 y.o. female MRN: [id number]     Date of Service: 4/13/2021      Synopsis     We are following patient for acute hypoxia and abnormal CT chest    \"CC\" ; Hartness of breath    Code status: Full code      Subjective      Patient personally seen and examined. Getting cleaned up. She is laying in bed in no acute distress. Oxygen 5 L of oxygen to maintain saturations 96%. She is at her baseline intermittently pleasantly confused. [place]. Appetite and p.o. intake decreased.     Review of Systems:  Difficult to obtain patient is confused and hard of hearing    24-hour events:  None     Objective   Vitals: BP (!) 118/56   Pulse 71   Temp 98.4 °F (36.9 °C) (Axillary)   Resp 19   Ht 5' 4\" (1.626 m)   Wt 179 lb 8 oz (81.4 kg)   SpO2 91%   BMI 30.81 kg/m²     I/O:    Intake/Output Summary (Last 24 hours) at 4/13/2021 1542  Last data filed at 4/13/2021 0005  Gross per 24 hour   Intake --   Output 650 ml   Net -650 ml       Vent Information  Skin Assessment: Clean, dry, & intact  FiO2 : 50 %  SpO2: 91 %  SpO2/FiO2 ratio: 192  I Time/ I Time %: 0.9 s  Mask Type: Full face mask  Mask Size: Medium       IPAP: 15 cmH20  CPAP/EPAP: 6 cmH2O     CURRENT MEDS :  Scheduled Meds:   heparin flush  10 mL Intracatheter Once    [START ON 4/14/2021] heparin (porcine)  5,000 Units Subcutaneous Q8H    albuterol sulfate HFA  2 puff Inhalation Q4H While awake    epoetin emile-epbx  10,000 Units Subcutaneous Q7 Days    ascorbic acid  500 mg Oral Daily    zinc sulfate  50 mg Oral Daily    vitamin D  50,000 Units Oral Weekly    guaiFENesin  400 mg Oral TID    hydrALAZINE  25 mg Oral 3 times per day    isosorbide dinitrate  10 mg Oral TID    metoprolol succinate  37.5 mg Oral BID    aspirin EC  81 mg Oral Daily    levothyroxine  50 mcg Oral Daily    insulin lispro  0-6 Units Subcutaneous TID WC    insulin lispro  0-3 Units Subcutaneous Nightly    sodium chloride flush  10 mL Intravenous 2 times per day       Physical Exam:  General Appearance: appears comfortable in no acute distress. HEENT: Normocephalic atraumatic without obvious abnormality   Neck: Lips, mucosa, and tongue normal.  Supple, symmetrical, trachea midline, no adenopathy;thyroid:  no enlargement/tenderness/nodules or JVD. Lung: FEW crackles and diminished in the bases  Heart: RRR, normal S1, S2. No MRG  Abdomen: Soft, NT, ND. BS present x 4 quadrants. No bruit or organomegaly. Extremities: Pedal pulses 2+ symmetric b/l. Extremities normal, no cyanosis, clubbing,BLE edema   Musculokeletal: No joint swelling, no muscle tenderness. ROM normal in all joints of extremities. Neurologic: Mental status: Alert and pleasantly confused. PERTINENT IMAGING:  CXR 4/12/21   FINDINGS:   EKG leads overlie the chest.  Cardiac silhouette is mildly enlarged similar   to previous.  No pneumothorax.  Slight worsening of diffuse bilateral   interstitial and airspace infiltrates and probable small effusions.  No other   change.           Impression   Worsening of infiltrates which may be related to CHF/edema.  Pneumonia less   likely.  Continued follow-up recommended.                CTA chest 3/30/2021    ECHO  3/30/2021  Left ventricle is normal in size . Mild left ventricular concentric hypertrophy noted. No regional wall motion abnormalities seen. Normal left ventricular ejection fraction. The left atrium is mildly dilated. Focal calcification mitral valve leaflets. Mild mitral annular calcification. Mild mitral regurgitation is present. Mild aortic stenosis. Moderate tricuspid regurgitation. Pulmonary hypertension is mild to moderate . There is a trivial circumferential pericardial effusion noted.    Moderate left pleural effusion. Labs:  Lab Results   Component Value Date    WBC 7.1 04/13/2021    HGB 9.7 04/13/2021    HCT 31.5 04/13/2021    MCV 90.8 04/13/2021    MCH 28.0 04/13/2021    MCHC 30.8 04/13/2021    RDW 15.6 04/13/2021     04/13/2021    MPV 10.0 04/13/2021     Lab Results   Component Value Date     04/13/2021    K 4.7 04/13/2021    K 4.2 06/20/2020     04/13/2021    CO2 22 04/13/2021    BUN 89 04/13/2021    CREATININE 4.6 04/13/2021    LABALBU 2.5 04/13/2021    LABALBU 4.0 03/21/2012    CALCIUM 8.2 04/13/2021    GFRAA 11 04/13/2021    LABGLOM 11 04/13/2021     Lab Results   Component Value Date    PROTIME 12.0 03/29/2021    INR 1.1 03/29/2021     No results for input(s): PROBNP in the last 72 hours. No results for input(s): PROCAL in the last 72 hours. This SmartLink has not been configured with any valid records. Micro:  No results for input(s): CULTRESP in the last 72 hours. No results for input(s): LABGRAM in the last 72 hours. No results for input(s): LEGUR in the last 72 hours. No results for input(s): STREPNEUMAGU in the last 72 hours. No results for input(s): LP1UAG in the last 72 hours. Assessment:    1. Acute hypoxic respiratory failure  2. COVID-19 pneumonia  3. combination of acute on chronic CHF  4. healthcare associated pneumonia versus aspiration pneumonia  5. History of heart failure with preserved ejection fraction  6. Mild pharyngeal dysphagia  7. Left pleural fluid diagnostic tap 75 cc removed, exudate. Cytology negative 4/3/21  8. AMANDEEP       Plan:   1. Wean FiO2 for saturations above 92% 5 L HF  2. Continue BiPAP as needed and nightly for respiratory support-not using  3. Follow chest x-ray 4/10  4. Albuterol HFA every 4 hours while awake  5. Completed cefepime 8 total days. 6. Off decadron. Continue vitamins  7. IR only 75 cc pleural fluid was drained from the left side. Cytology is negative. Fluid appears exudate.  Fluid cultures negative  8. continue dysphagia 3 soft advance diet per speech recommendations. 9. Nephrology following-plan for dialysis once catheter inserted, vascular surgery consult placed  10. PT OT  11. covid negative x 2, out of isolation  This plan of care was reviewed in collaboration with Dr. Melburn Fothergill  Electronically signed by KASSIE Bush CNP on 4/13/2021 at 3:42 PM      I personally saw, examined, and cared for the patient. Labs, medications, radiographs reviewed. I agree with history exam and plans detailed in NP note.   Yves Enriquez MD

## 2021-04-13 NOTE — PLAN OF CARE
Problem: Falls - Risk of:  Goal: Will remain free from falls  Description: Will remain free from falls  4/13/2021 0152 by Lucia Bosworth, RN  Outcome: Ongoing  5/92/9390 1023 by Brand Nageotte, RN  Outcome: Ongoing  Goal: Absence of physical injury  Description: Absence of physical injury  4/13/2021 0152 by Lucia Bosworth, RN  Outcome: Ongoing  2/45/3764 4230 by Brand Nageotte, RN  Outcome: Ongoing     Problem:  Activity:  Goal: Will verbalize the importance of balancing activity with adequate rest periods  Description: Will verbalize the importance of balancing activity with adequate rest periods  5/46/8347 1480 by Brand Nageotte, RN  Outcome: Ongoing     Problem: Cardiac:  Goal: Ability to maintain an adequate cardiac output will improve  Description: Ability to maintain an adequate cardiac output will improve  0/88/7889 0511 by Brand Nageotte, RN  Outcome: Ongoing     Problem: Fluid Volume:  Goal: Risk for excess fluid volume will decrease  Description: Risk for excess fluid volume will decrease  4/13/2021 0152 by Lucia Bosworth, RN  Outcome: Ongoing  8/25/2160 2680 by Brand Nageotte, RN  Outcome: Ongoing  Goal: Will show no signs and symptoms of electrolyte imbalance  Description: Will show no signs and symptoms of electrolyte imbalance  4/13/2021 0152 by Lucia Bosworth, RN  Outcome: Ongoing  1/84/4050 5168 by Brand Nageotte, RN  Outcome: Ongoing

## 2021-04-13 NOTE — CARE COORDINATION
Social work / Discharge Planning:       Referral made to [facility] for outpatient HD as patient is planning for admission to [facility] SNF. The [facility] unit currently does not have availability so arrangements will be made for the [facility] unit. Awaiting chair time.   Electronically signed by JANELLE Hawkins on 4/13/2021 at 10:44 AM

## 2021-04-13 NOTE — CARE COORDINATION
Received phone call from Dru Kessler OT#[phone]. Per MMO, they are in agreement with SNF loc at time of discharge. Caprice updated so that they may proceed with their precert process. Chart reviewed--plans for dialysis and hd catheter placement.   Will continue to follow

## 2021-04-13 NOTE — PROCEDURES
Ellen Gomez is a 81 y.o. female patient. 1. Acute respiratory failure with hypoxia ([facility] [address]    2. Congestive heart failure, unspecified HF chronicity, unspecified heart failure type [facility])      Past Medical History:   Diagnosis Date    (HFpEF) heart failure with preserved ejection fraction ([facility] [address] 05/26/2017    3/31/17- echo- LVEF 60-65%, mild LVH, mild MR    Arthritis     Bursitis     shoulders    Cervical radiculopathy     CHF (congestive heart failure) (HCC)     CKD (chronic kidney disease) stage 3, GFR 30-59 ml/min     Diabetes mellitus (HCC)     GERD (gastroesophageal reflux disease)     [place] (hard of hearing)     Hypertension     Hypothyroidism     SSS (sick sinus syndrome) ([facility])     stable, per [facility] note.  Thyroid disease     Unsteadiness     Valvular heart disease     sees Dr. Haney Fails      Blood pressure (!) 118/56, pulse 71, temperature 98.4 °F (36.9 °C), temperature source Axillary, resp. rate 19, height 5' 4\" (1.626 m), weight 179 lb 8 oz (81.4 kg), SpO2 91 %, not currently breastfeeding. Temporary dialysis line    Date/Time: 4/13/2021 4:08 PM  Performed by: Laurita Anderesn MD  Authorized by: Laurita Andersen MD   Consent: Written consent obtained.   Risks and benefits: risks, benefits and alternatives were discussed  Consent given by: patient  Required items: required blood products, implants, devices, and special equipment available  Patient identity confirmed: verbally with patient  Indications: vascular access    Anesthesia:  Local Anesthetic: lidocaine 1% without epinephrine  Preparation: skin prepped with ChloraPrep  Skin prep agent dried: skin prep agent completely dried prior to procedure  Sterile barriers: all five maximum sterile barriers used - cap, mask, sterile gown, sterile gloves, and large sterile sheet  Hand hygiene: hand hygiene performed prior to central venous catheter insertion  Location details: right femoral  Patient position: flat  Catheter size: 14 Fr  Pre-procedure:

## 2021-04-13 NOTE — PROGRESS NOTES
Physical Therapy  Facility/Department: [facility] MED SURG  Daily Treatment Note  NAME: Eddie Hardin  : 1935  MRN: [id number]    Date of Service: 2021    Patient Diagnosis(es): The primary encounter diagnosis was Acute respiratory failure with hypoxia ([address] A diagnosis of Congestive heart failure, unspecified HF chronicity, unspecified heart failure type [facility]) was also pertinent to this visit. has a past medical history of (HFpEF) heart failure with preserved ejection fraction ([address] Arthritis, Bursitis, Cervical radiculopathy, CHF (congestive heart failure) ([address] CKD (chronic kidney disease) stage 3, GFR 30-59 ml/min, Diabetes mellitus ([address] GERD (gastroesophageal reflux disease), [place] (hard of hearing), Hypertension, Hypothyroidism, SSS (sick sinus syndrome) ([address] Thyroid disease, Unsteadiness, and Valvular heart disease. has a past surgical history that includes Foot surgery; Total knee arthroplasty (Right, 3/21/2019); and joint replacement (). Referring Nicole Hess RN     Evaluating PT: Juany Marrero PT, DPT [id number]     Room #:  934  Diagnosis:  Acute respiratory failure  Tests: COVID negative 21  Precautions:  Fall risk, high flow O2 6L  Equipment Needs:  None.  Per chart, pt owns walker and cane.     SUBJECTIVE:     Per chart, pt lives alone in a 1 story home with 3 steps to enter with 1 rail.  Pt's son lives close by.  Pt used walker or cane for ambulation.         OBJECTIVE:    Initial Evaluation  Date:  Treatment    Short Term/ Long Term   Goals   Was pt agreeable to Eval/treatment? yes  yes     Does pt have pain? Abdominal pain LE pain when assisted during bed mobility     Bed Mobility  Rolling: NT  Supine to sit: Mod A  Sit to supine: Mod A  Scooting: independent while in bed. Rolling: MIN A  Supine to sit: Mod A  Sit to supine: Mod A  Scooting: MIN A sitting alone the side of the bed SBA   Transfers Sit to stand: NT  Stand to sit: NT  Stand pivot: NT NT - pt declined stating she felt too weak to try SBA   Ambulation   NT NT 50+ feet with w/w SBA    Stair negotiation: ascended and descended  NT  NA 3 steps with 1 rail SBA   AM-PAC 6 Clicks 03/72 66/64           Patient education  Pt educated on PT objectives during treatment session, posture while sitting EOB, benefits of mobility. Patient response to education:   Pt verbalized understanding Pt demonstrated skill Pt requires further education in this area   yes With cueing yes     ASSESSMENT:    Comments:  Pt found and left in bed with call light in reach, visitor present and bed alarm on. Treatment:  Patient practiced and was instructed in the following treatment:    Functional mobility performed as documented above. No report of dizziness when sitting EOB. Pt required Min A when initially sitting EOB due to posterior lean but pt able to correct to SBA. Pt declined to attempt standing at this time due to weakness. PLAN:    Patient is making fair progress towards established goals. Will continue with current POC.      Time in  1053  Time out  1107    Total Treatment Time  14 minutes     CPT codes:    [x] Therapeutic activities 29213 14minutes  [] Therapeutic exercises 52495  minutes      Kat Cortés, [phone]
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
## 2021-04-14 NOTE — PROGRESS NOTES
04/14/21 0051   NIV Type   $NIV $Daily Charge   Mode Bilevel  (REFUSES USE )   Oxygen Therapy/Pulse Ox   O2 Therapy Oxygen   $Oxygen $Daily Charge

## 2021-04-14 NOTE — PROGRESS NOTES
during tx. Instructed pt to maintain upright seated position and to facilitate pursed lip breathing to improve airflow and decrease SOB. SpO2 was 82%-85% seated on commode. At end of session pt was assisted to bed with SpO2 at 100%, alarm on, all lines and tubes intact and call light within reach. Education: Pursed lip breathing, importance of correct posture, AD management and safety during ADLs. · Pt has made good progress towards set goals. Plan of Care: 2-5 days/week for 1-2 weeks PRN   [x]??? ? ? ADL retraining/adaptive techniques and AE recommendations to increase functional independence within precautions                    [x]??? ? ? Energy conservation techniques to improve tolerance for ADL/IADLs  [x]??? ? ? Functional transfer/mobility training/DME recommendations for increased independence, safety and fall prevention         [x]??? ? ? Patient/family education to increase safety and functional independence during daily routine          [x]??? ? ? Environmental modifications for safe mobility and completion of ADLs                             []??? ?? Cognitive retraining to improve problem solving skills & safe participation in ADLs/IADLs     []??? ? ?Sensory re-education techniques to improve extremity awareness, maintain skin integrity and improve hand function                             []??? ? ? Visual/Perceptual retraining to improve body awareness and safety during transfers and ADLs  []??? ?? Splinting/positioning needs to maintain joint/skin integrity and contracture prevention  [x]??? ? ? Therapeutic activity to improve functional performance during ADLs                                        [x]??? ? ? Therapeutic exercise to improve tolerance and functional strength for ADLs   [x]??? ? ?Balance retraining/tolerance tasks for facilitation of postural control with dynamic challenges during ADLs  []??? ? ?Neuromuscular re-education to facilitate righting/equilibrium reactions, midline orientation, scapular stability/mobility, normalize muscle tone and facilitate active functional movement                        []??? ? ? Delirium prevention/treatment    [x]????Positioning to improve functional independence and decrease risk of skin breakdown  []??? ?? Other:        Treatment Charges: Mins Units   Ther Ex  66506     Manual Therapy Kyara Jeter [phone]    ADL/Home Mgt [id number] [phone]   Neuro Re-ed 30271     Group Therapy      Orthotic manage/training  [id number]     Non-Billable Time       Total Time: [address] [facility] Carol YAÑEZ/CORDELIA [id number]

## 2021-04-14 NOTE — PLAN OF CARE
Problem: Increased nutrient needs (NI-5.1)  Goal: Food and/or Nutrient Delivery  Description: Individualized approach for food/nutrient provision.   4/14/2021 1114 by Makayla Chavez RD, LD  Outcome: Met This Shift  4/14/2021 1114 by Makayla Chavez RD, LD  Outcome: Met This Shift

## 2021-04-14 NOTE — PROGRESS NOTES
[facility] Hospitalist   Progress Note    Admitting Date and Time: 3/29/2021  5:48 PM  Admit Dx: Acute respiratory failure with hypoxia ([facility] [address] [J96.01]    Subjective:    Patient was admitted with Acute respiratory failure with hypoxia ([facility] [address] [J96.01].  Patient denies fever, chills, cp, sob, n/v.     [START ON 4/14/2021] heparin (porcine)  5,000 Units Subcutaneous Q8H    albuterol sulfate HFA  2 puff Inhalation Q4H While awake    epoetin emile-epbx  10,000 Units Subcutaneous Q7 Days    ascorbic acid  500 mg Oral Daily    zinc sulfate  50 mg Oral Daily    vitamin D  50,000 Units Oral Weekly    guaiFENesin  400 mg Oral TID    hydrALAZINE  25 mg Oral 3 times per day    isosorbide dinitrate  10 mg Oral TID    metoprolol succinate  37.5 mg Oral BID    aspirin EC  81 mg Oral Daily    levothyroxine  50 mcg Oral Daily    insulin lispro  0-6 Units Subcutaneous TID WC    insulin lispro  0-3 Units Subcutaneous Nightly    sodium chloride flush  10 mL Intravenous 2 times per day     glucose, 15 g, PRN  dextrose, 12.5 g, PRN  glucagon (rDNA), 1 mg, PRN  dextrose, 100 mL/hr, PRN  sodium chloride flush, 10 mL, PRN  sodium chloride, 25 mL, PRN  promethazine, 12.5 mg, Q6H PRN    Or  ondansetron, 4 mg, Q6H PRN  polyethylene glycol, 17 g, Daily PRN  acetaminophen, 650 mg, Q6H PRN    Or  acetaminophen, 650 mg, Q6H PRN  perflutren lipid microspheres, 1.5 mL, ONCE PRN         Objective:    /67   Pulse 76   Temp 98.3 °F (36.8 °C) (Oral)   Resp 20   Ht 5' 4\" (1.626 m)   Wt 174 lb 6.1 oz (79.1 kg)   SpO2 92%   BMI 29.93 kg/m²   Skin: warm and dry, no rash or erythema  Pulmonary/Chest: clear to auscultation bilaterally- no wheezes, rales or rhonchi, normal air movement, no respiratory distress  Cardiovascular: rhythm reg at rate of 78  Abdomen: soft, non-tender, non-distended, normal bowel sounds, no masses or organomegaly  Extremities: no cyanosis, no clubbing and no edema      Recent Labs recommended. XR CHEST PORTABLE   Final Result   CHF with findings similar to the prior study. XR CHEST PORTABLE   Final Result   Mild increased bilateral pulmonary airspace opacities and pleural effusions. Findings may be related pulmonary edema and/or pneumonia. XR CHEST PORTABLE   Final Result   No significant change in appearance of the chest with persistent left pleural   effusion and multifocal airspace opacities. US THORACENTESIS Which side should the procedure be performed? Right   Final Result   Status post left thoracentesis. XR CHEST 1 VIEW   Final Result   No convincing evidence of a pneumothorax following thoracentesis. Unchanged patchy bilateral airspace opacities which may be on the basis of   multifocal pneumonia or pulmonary edema. XR CHEST (2 VW)   Final Result   CHF with likely cardiogenic edema. Fluoroscopy modified barium swallow with video   Final Result   Addendum 1 of 1   ADDENDUM:   The study was demonstrated the due the clinical findings to indicated the   possibility for aspiration or penetration. Difficulty swallowing. Final      CTA PULMONARY W CONTRAST   Final Result   No evidence of pulmonary emboli. Cardiomegaly with prominent coronary atherosclerotic calcifications and small   pericardial effusion. Bilateral pleural effusions, mildly increased in the interval since the prior   examination. Multifocal patchy and ground-glass airspace disease in the lungs bilaterally,   and more confluent airspace disease in the lower lobes bilaterally, most   consistent with multifocal pneumonia. Pulmonary edema cannot be excluded. Reflux of contrast into the IVC and hepatic veins which may be seen with   right heart failure. XR CHEST PORTABLE   Final Result   Cardiomegaly with progressive perihilar and bibasilar infiltrates and pleural   effusions likely CHF/edema and or pneumonia. Assessment:    Active Problems:    Acute respiratory failure with hypoxia (HCC)    Essential hypertension    Controlled type 2 diabetes mellitus without complication (HCC)    Acidosis  Resolved Problems:    * No resolved hospital problems. *      Plan:  1. Acute respiratory failure with hypoxia wean o2 as able pulmonary following   2. Bacterial pneumonia finished abx   3. Acute on chronic diastolic chf improving continue current management  4. Leukocytosis monitor improved  5. Acidosis monitor  Nephrology following. 6. sher on ckd 3b   nephrology following  plan for temp HD cath with subsequent dialysis. 7. htn continue med  8.  Dm type 2 controlled monitor and treat prn  9. hypothyroidism continue med        Electronically signed by German Blanc DO on 4/13/2021 at 10:25 PM

## 2021-04-14 NOTE — FLOWSHEET NOTE
04/14/21 1115   Vital Signs   BP (!) 146/49   Temp 98.4 °F (36.9 °C)   Pulse 63   Resp 18   Weight 171 lb 11.8 oz (77.9 kg)   Percent Weight Change -0.89   Post-Hemodialysis Assessment   Post-Treatment Procedures Blood returned;Catheter capped, clamped and heparinized x 2 ports   Machine Disinfection Process Acid/Vinegar Clean;Heat Disinfect; Exterior Machine Disinfection   Rinseback Volume (ml) 300 ml   Total Liters Processed (l/min) 42.4 l/min   Dialyzer Clearance Lightly streaked   Duration of Treatment (minutes) 180 minutes   Heparin amount administered during treatment (units) 0 units   Hemodialysis Intake (ml) 300 ml   Hemodialysis Output (ml) 1000 ml   NET Removed (ml) 700 ml   Tolerated Treatment Good   Patient Response to Treatment see note   Bilateral Breath Sounds Clear   Edema Left lower extremity;Right lower extremity   Tolerated  3hr tx well on 3K 3Ca bath per orders,  700ml removed with out difficulty. HD CVC flushed, heparin to dwell, clamped and capped  post tx per policy. Report to floor RN, remains in care of staff.

## 2021-04-14 NOTE — PROGRESS NOTES
Progress Note  NEPHROLOGY    Reason for Consult: Management of acute kidney injury      History of Present Lolly from the 4/5/21 note: This is a 61-year-old female with a history of diastolic heart failure, hypothyroidism, diabetes mellitus type 2, hypothyroidism, essential hypertension who has been admitted twice this month for CHF exacerbation with discharge most recently 4 days ago. The night prior to admission she was complaining of generalized weakness and palpitations: therefore presented to the ED for further evaluation on 3/29/2021. Vitals upon arrival included  BP (!) 142/70   Pulse 76   Temp 98.3 °F (36.8 °C)   Resp 26   Ht 5' 3\" (1.6 m)   Wt 159 lb (72.1 kg)   SpO2 98%   BMI 28.17 kg/m². Pertinent labs included WBC 8.2 and hemoglobin 10.4. Initial BMP revealed sodium 140, potassium 5.2, chloride 103, CO2 22, BUN 26 and creatinine 1.3. BNP 3514. Initial lactic acid 6.2. Initial ABGs 7.397, PCO2 35.6, PO2 61.5, bicarb 21 and 90% O2 saturation. Chest x-ray revealed cardiomegaly with progressive perihilar and bibasilar infiltrates and pleural effusions. Patient was subsequently admitted with acute respiratory failure with hypoxia. Patient currently examined sitting up in bed in mild acute distress. She is a very poor historian. ROS difficult to accurately obtain. Per the nursing staff did require 15 L NRB mask this morning (she is refusing the Bipap). She continues to have diffuse interstitial infiltrates on CXR (Pulmonology is following). Cardiology is also following: Patient was initially on IV bumex, but was changed to PO on 4/3/21. She has a history of stage I systolic hypertension. 4/14/21:  Pt had her 2nd IHD this AM after placement of the R Femoral Temp line.  She states she is too weak to feed herself    Past Medical History:        Diagnosis Date    (HFpEF) heart failure with preserved ejection fraction ([address] 05/26/2017    3/31/17- echo- LVEF 60-65%, mild LVH, mild MR  Arthritis     Bursitis     shoulders    Cervical radiculopathy     [facility]congestive [facility] failure) [facility])     CKD (chronic kidney disease) stage 3, GFR 30-59 ml/min     Diabetes mellitus ([facility])     GERD (gastroesophageal reflux disease)     [place] (hard of hearing)     Hypertension     Hypothyroidism     SSS (sick sinus syndrome) ([facility])     stable, per epic note.     Thyroid disease     Unsteadiness     Valvular heart disease     sees Dr. Eben Webster        Past Surgical History:        Procedure Laterality Date    FOOT SURGERY      both    JOINT REPLACEMENT  1999    TOTAL KNEE ARTHROPLASTY Right 3/21/2019    RIGHT KNEE TOTAL ARTHROPLASTY (ILENE)++ADDUCTOR CANAL BLOCK++ performed by Bessy Escalona MD at [facility] OR       Current Medications:    Current Facility-Administered Medications: heparin (porcine) injection 5,000 Units, 5,000 Units, Subcutaneous, Q8H  albuterol sulfate  (90 Base) MCG/ACT inhaler 2 puff, 2 puff, Inhalation, Q4H While awake  epoetin emile-epbx (RETACRIT) injection 10,000 Units, 10,000 Units, Subcutaneous, Q7 Days  ascorbic acid (VITAMIN C) tablet 500 mg, 500 mg, Oral, Daily  zinc sulfate (ZINCATE) capsule 50 mg, 50 mg, Oral, Daily  vitamin D (ERGOCALCIFEROL) capsule 50,000 Units, 50,000 Units, Oral, Weekly  guaiFENesin tablet 400 mg, 400 mg, Oral, TID  hydrALAZINE (APRESOLINE) tablet 25 mg, 25 mg, Oral, 3 times per day  isosorbide dinitrate (ISORDIL) tablet 10 mg, 10 mg, Oral, TID  metoprolol succinate (TOPROL XL) extended release tablet 37.5 mg, 37.5 mg, Oral, BID  aspirin EC tablet 81 mg, 81 mg, Oral, Daily  levothyroxine (SYNTHROID) tablet 50 mcg, 50 mcg, Oral, Daily  glucose (GLUTOSE) 40 % oral gel 15 g, 15 g, Oral, PRN  dextrose 50 % IV solution, 12.5 g, Intravenous, PRN  glucagon (rDNA) injection 1 mg, 1 mg, Intramuscular, PRN  dextrose 5 % solution, 100 mL/hr, Intravenous, PRN  insulin lispro (HUMALOG) injection vial 0-6 Units, 0-6 Units, Subcutaneous, TID WC  insulin lispro (HUMALOG) injection vial 0-3 Units, 0-3 Units, Subcutaneous, Nightly  sodium chloride flush 0.9 % injection 10 mL, 10 mL, Intravenous, 2 times per day  sodium chloride flush 0.9 % injection 10 mL, 10 mL, Intravenous, PRN  0.9 % sodium chloride infusion, 25 mL, Intravenous, PRN  promethazine (PHENERGAN) tablet 12.5 mg, 12.5 mg, Oral, Q6H PRN **OR** ondansetron (ZOFRAN) injection 4 mg, 4 mg, Intravenous, Q6H PRN  polyethylene glycol (GLYCOLAX) packet 17 g, 17 g, Oral, Daily PRN  acetaminophen (TYLENOL) tablet 650 mg, 650 mg, Oral, Q6H PRN **OR** acetaminophen (TYLENOL) suppository 650 mg, 650 mg, Rectal, Q6H PRN  0.9 % sodium chloride infusion, 25 mL, Intravenous, Q12H  perflutren lipid microspheres (DEFINITY) injection 1.65 mg, 1.5 mL, Intravenous, ONCE PRN    Allergies:  Aspirin    Social History:      reports that she has never smoked. She has never used smokeless tobacco. She reports previous alcohol use. She reports that she does not use drugs.       Family History:     Family History   Problem Relation Age of Onset    No Known Problems Mother     No Known Problems Father          Review of Systems:       Pertinent positives stated above in HPI. All other systems were reviewed and were negative.     Physical exam:   Constitutional: Elderly frail male VITALS:  BP (!) 146/49   Pulse 63   Temp 98.4 °F (36.9 °C)   Resp 18   Ht 5' 4\" (1.626 m)   Wt 171 lb 11.8 oz (77.9 kg)   SpO2 91%   BMI 29.48 kg/m²   CURRENT TEMPERATURE:  Temp: 98.4 °F (36.9 °C)  CURRENT RESPIRATORY RATE:  Resp: 18  CURRENT PULSE:  Pulse: 63  CURRENT BLOOD PRESSURE:  BP: (!) 146/49  24HR BLOOD PRESSURE RANGE:  Systolic (04NUT), UDF:861 , Min:87 , NKM:115   ; Diastolic (11HBT), MCK:10, Min:49, Max:86    24HR INTAKE/OUTPUT:      Intake/Output Summary (Last 24 hours) at 4/14/2021 1411  Last data filed at 4/14/2021 1115  Gross per 24 hour   Intake 300 ml   Output 1950 ml   Net -1650 ml     PE from the 4/6/21 visit  General Appearance: appears comfortable in mild acute distress. HEENT: Normocephalic atraumatic without obvious abnormality   Neck: Lips, mucosa, and tongue normal.  Supple, symmetrical, trachea midline, no adenopathy;thyroid:  no enlargement/tenderness/nodules or JVD. Lung: clear and diminished in the bases  Heart: RRR, normal S1, S2. No MRG  Abdomen: Soft, NT, ND. BS present x 4 quadrants. No bruit or organomegaly. Extremities: Pedal pulses 2+ symmetric b/l. Extremities normal, no cyanosis, clubbing, or edema. R Femoral Vein Temp Dialysis Catheter  Musculokeletal: No joint swelling, no muscle tenderness. ROM normal in all joints of extremities.    Neurologic: Mental status: Underlying confusion      DATA:      CBC:   Lab Results   Component Value Date    WBC 6.7 04/14/2021    RBC 3.35 04/14/2021    RBC 3.68 12/19/2017    HGB 9.1 04/14/2021    HCT 30.5 04/14/2021    MCV 91.0 04/14/2021    MCH 27.2 04/14/2021    MCHC 29.8 04/14/2021    RDW 15.7 04/14/2021     04/14/2021    MPV 9.5 04/14/2021     BMP:    Lab Results   Component Value Date     04/14/2021    K 4.7 04/14/2021    K 4.2 06/20/2020     04/14/2021    CO2 26 04/14/2021    BUN 64 04/14/2021    LABALBU 2.4 04/14/2021    LABALBU 4.0 03/21/2012    CREATININE 3.7 04/14/2021    CALCIUM 8.0 04/14/2021    GFRAA 14 04/14/2021    LABGLOM 14 04/14/2021    GLUCOSE 109 04/14/2021    GLUCOSE 77 04/18/2012       RAD:  Echo Limited    Result Date: 3/30/2021  Transthoracic Echocardiography Report (TTE)  Demographics   Patient Name    Rosina Nicole  Gender            Female                  P   Medical Record  [id number]     Room Number       1515  Number   Account #       [de-identified]    Procedure Date    03/30/2021   Corporate ID                 Ordering          Oziel Kamara MD                               Physician   Accession       [id number]   Referring         Francisco Alexandre  Number                       Physician   Date of Birth   1935   Ankita Hart Alarcon RDCS   Age             81 year(s)   Interpreting      [address] [place] [address]                               Physician         Physician Cardiology                                                 Cassandra Levine MD                                Any Other  Procedure Type of Study   TTE procedure:Echo Limited Study. Procedure Date Date: 03/30/2021 Start: 07:22 AM Study Location: Portable Technical Quality: Adequate visualization Indications:Congestive heart failure, diastolic dysfunction. Patient Status: Routine Height: 64 inches Weight: 171 pounds BSA: 1.83 m^2 BMI: 29.35 kg/m^2 HR: 61 bpm BP: 144/66 mmHg  Findings   Left Ventricle  Left ventricle is normal in size . Mild left ventricular concentric hypertrophy noted. No regional wall motion abnormalities seen. Normal left ventricular ejection fraction. Ejection fraction is visually estimated at 65%. Indeterminate diastolic function. Right Ventricle  Normal right ventricular size and function. Left Atrium  The left atrium is mildly dilated. Interatrial septum appears intact. Right Atrium  Normal right atrium size. Mitral Valve  Focal calcification mitral valve leaflets. Mild mitral annular calcification. Mild mitral regurgitation is present. Tricuspid Valve  The tricuspid valve appears structurally normal.  Moderate tricuspid regurgitation. RVSP is 50 mmHg. Pulmonary hypertension is mild to moderate . Aortic Valve  The aortic valve appears mildly sclerotic. Mean transaortic gradient (Doppler) 11 mm Hg . Mild aortic stenosis. Pulmonic Valve  The pulmonic valve was not well visualized. Mild pulmonic regurgitation present. Pericardial Effusion  There is a trivial circumferential pericardial effusion noted. Pleural Effusion  Moderate left pleural effusion. Aorta  Aortic root dimension within normal limits. Conclusions   Summary  Left ventricle is normal in size . Mild left ventricular concentric hypertrophy noted.   No regional wall motion abnormalities seen. Normal left ventricular ejection fraction. The left atrium is mildly dilated. Focal calcification mitral valve leaflets. Mild mitral annular calcification. Mild mitral regurgitation is present. Mild aortic stenosis. Moderate tricuspid regurgitation. Pulmonary hypertension is mild to moderate . There is a trivial circumferential pericardial effusion noted. Moderate left pleural effusion. Signature   ----------------------------------------------------------------  Electronically signed by Leticia Argueta MD(Interpreting  physician) on 03/30/2021 05:36 PM  ----------------------------------------------------------------  M-Mode/2D Measurements & Calculations   LV Diastolic       LV Systolic Dimension: 2.5 cm  Dimension: 3.9 cm  LV Volume Diastolic: 44.4 ml  LV KJ:17.5 %       LV Volume Systolic: 22 ml  LV PW Diastolic:   LV EDV/LV EDV Index: 65.7 WN/00      RV Diastolic  1.2 cm             ml/m^2LV ESV/LV ESV Index: 22        Dimension: 3 cm  LV PW Systolic:    FW/59XU/ m^2  1. 6 cm             EF Calculated: 66.5 %  Septum Diastolic:  LV Mass Index: 87 l/min*m^2  1. 2 cm  Septum Systolic:  1.5 cm             LVOT: 2 cm                           IVC Expiration:  CO: 5.36 l/min                                          2.4 cm  CI: 2.93 l/m*m^2  LV Mass: 159.29 g  Doppler Measurements & Calculations                      AV Peak Velocity: 2.29 LVOT Peak Velocity: 1.3 m/s                     m/s                    LVOT Mean Velocity: 0.93 m/s                     AV Peak Gradient:      LVOT Peak Gradient: 6.7 mmHgLVOT                     21.03 mmHg             Mean Gradient: 4 mmHg                     AV Mean Velocity: 1.62  MV E' Septal       m/s  Velocity: 0.05 m/s AV Mean Gradient: 11.3  MV E' Lateral      mmHg                   TR Velocity:3.56 m/s  Velocity: 6 m/s    AV VTI: 47.8 cm        TR Gradient:50.81 mmHg                     AV Area                     (Continuity):1.84 cm^2                      LVOT VTI: 28 cm                     IVRT: 96.9 msec  [email or web address] [id number]    Xr Chest Portable    Result Date: 3/29/2021  EXAMINATION: ONE XRAY VIEW OF THE CHEST 3/29/2021 7:03 pm COMPARISON: 03/23/2021. HISTORY: ORDERING SYSTEM PROVIDED HISTORY: dyspnea TECHNOLOGIST PROVIDED HISTORY: Reason for exam:->dyspnea FINDINGS: There is cardiomegaly with prominence of the superior mediastinum. There is vascular congestion with patchy perihilar infiltrates extending to the lung bases. Pleural effusions are suspected. Cardiomegaly with progressive perihilar and bibasilar infiltrates and pleural effusions likely CHF/edema and or pneumonia. Cta Pulmonary W Contrast    Result Date: 3/30/2021  EXAMINATION: CTA OF THE CHEST 3/29/2021 11:46 pm TECHNIQUE: CTA of the chest was performed after the administration of intravenous contrast.  Multiplanar reformatted images are provided for review. MIP images are provided for review. Dose modulation, iterative reconstruction, and/or weight based adjustment of the mA/kV was utilized to reduce the radiation dose to as low as reasonably achievable. COMPARISON: March 24 HISTORY: ORDERING SYSTEM PROVIDED HISTORY: rule out PE TECHNOLOGIST PROVIDED HISTORY: Reason for exam:->rule out PE Decision Support Exception->Emergency Medical Condition (MA) FINDINGS: Pulmonary Arteries: Pulmonary arteries are adequately opacified for evaluation. No evidence of intraluminal filling defect to suggest pulmonary embolism. Main pulmonary artery is normal in caliber. Mediastinum: Cardiomegaly with prominent coronary atherosclerotic calcifications. Small pericardial effusion. . No hilar or mediastinal adenopathy. Calcified hilar and mediastinal nodes. Lungs/pleura: Bilateral pleural effusions, mildly increased in the interval since the prior examination. Multifocal patchy and ground-glass airspace disease in the lungs bilaterally, also present on the prior examination. There is more confluent airspace disease in the lower lobes bilaterally, most consistent with multifocal pneumonia. Images are degraded by respiratory motion artifact. Calcified granuloma in the right lower lobe. Upper Abdomen: Splenic calcifications which may represent granuloma. Reflux of contrast into the IVC and hepatic veins which may be seen with right heart failure. Soft Tissues/Bones: No acute bone or soft tissue abnormality. No evidence of pulmonary emboli. Cardiomegaly with prominent coronary atherosclerotic calcifications and small pericardial effusion. Bilateral pleural effusions, mildly increased in the interval since the prior examination. Multifocal patchy and ground-glass airspace disease in the lungs bilaterally, and more confluent airspace disease in the lower lobes bilaterally, most consistent with multifocal pneumonia. Pulmonary edema cannot be excluded. Reflux of contrast into the IVC and hepatic veins which may be seen with right heart failure. EXAMINATION:   ONE XRAY VIEW OF THE CHEST       4/6/2021 6:01 am       COMPARISON:   04/03/2021       HISTORY:   ORDERING SYSTEM PROVIDED HISTORY: pna   TECHNOLOGIST PROVIDED HISTORY:   Reason for exam:->pna       FINDINGS:   Stable cardiomediastinal silhouette.  There are bilateral perihilar lung base   airspace opacities and pleural effusions, which appear mildly increased   compared to the prior study.  No pneumothorax.  No acute osseous abnormality.           Impression   Mild increased bilateral pulmonary airspace opacities and pleural effusions. Findings may be related pulmonary edema and/or pneumonia. Assessment/Plan  1.  Stage III AMANDEEP in the setting of the diuresis as well as the recent cefepime and the doses Vanc the last one being on 4/1/21-she did have a CTA of the chest but the timing was off for RCIN  FeNa <1% and FeUrea <35% consistent with a component of decreased effective renal perfusion  Cr up from 3.6-->4.1mg/dl with ongoing infiltrates on the CXR -UO low, but I/O are incomplete-discussed with Pulm and agrees with proceeding with KRT as IHD  S/P R Femoral Vein Dialysis Catheter 4/13/21 and 1st dialysis  PLAN:1. 2nd IHD this AM with 700ml vol removal  2. 3rd IHD in the AM     2. Hyocalcemia with hypoalbuminemia in the setting of the AMANDEEP and the Unspecified Vit D Def and the Sec HPTH of Renal Origin with hyperphosphatemia  Vit D level 9  PO4 5.9  Last Ionized Ca++ WNL  PLAN:1. Continue PO4 binder  2. Continue  Vit D,   3. Follow Ca++ and PO4 levels     3. HFpEF with a Hx of VHD  PLAN:1. Holding the diuretics for present     4. Met alkalosis-progressive-sec to diuresis  Resolved     5. AMS which may reflect the cefepime in the setting of an elderly pt with worsening renal status-improved off cefepime  PLAN:1. Follow     6. Acute hypoxic Resp Failure/ HCAP vs Asp I-COVId 19 (-) and pt out of isolation    Thank you for allowing us to participate in care of Ms.  Kassandra Logan MD

## 2021-04-14 NOTE — PROGRESS NOTES
Comprehensive Nutrition Assessment    Type and Reason for Visit:  Reassess    Nutrition Recommendations/Plan: Continue diet and ONS to meet nutritional needs. Nutrition Assessment:  Pt admit for acute resp failure/CHF, dyspnea w/ exertion and essential HTN. Noted in chart worsening of infiltrates maybe related to CHF/Edema. Pt intake has decreased with poor appetite <25-50% at most meals. ONS being consumed. Malnutrition Assessment:  Malnutrition Status: At risk for malnutrition (Comment)(d/t decreased intake and appetite)    Context:  Chronic Illness     Findings of the 6 clinical characteristics of malnutrition:  Energy Intake:  Mild decrease in energy intake (Comment)(From last assessment decreased to 25%)  Weight Loss:  No significant weight loss     Body Fat Loss:  No significant body fat loss     Muscle Mass Loss:  No significant muscle mass loss    Fluid Accumulation:  Unable to assess(Mutliple factors contributing to CHF)     Strength:  Not Performed    Estimated Daily Nutrient Needs:  Energy (kcal):  0616-6819 (MSJx1. 2SF); Weight Used for Energy Requirements:  Current     Protein (g):  70-85g (1.3-1.5g/kg);  Weight Used for Protein Requirements:  Ideal        Fluid (ml/day):  8590-3942; Method Used for Fluid Requirements:  1 ml/kcal      Nutrition Related Findings:  confused at times, BS present, -I/Os, Abd WDL, Edema +1 Pitting BLE      Wounds:  None       Current Nutrition Therapies:    Current Tube Feeding (TF) Orders:  · Feeding Route:    · Formula:    · Schedule:    · Additives/Modulars:    · Water Flushes:    · Current TF & Flush Orders Provides:    · Goal TF & Flush Orders Provides:        Anthropometric Measures:  · Height: 5' 4\" (162.6 cm)  · Current Body Weight: 173 lb 4.5 oz (78.6 kg)(4/14)   · [facility]77[facility]    · Usual Body Weight: 159 lb (72.1 kg)(per EMR x 10 mo ago)     · Ideal Body Weight: 120 lbs; % Ideal Body Weight 136.7 %   · BMI: 29.7      · BMI Categories: Overweight (BMI 25.0-29. 9)       Nutrition Diagnosis:   · Inadequate oral intake related to renal dysfunction as evidenced by intake 26-50%      Nutrition Interventions:   Food and/or Nutrient Delivery:  Continue Current Diet, Continue Oral Nutrition Supplement  Nutrition Education/Counseling:  Education initiated(d/c with pt importance of consuming meals and ONS.)   Coordination of Nutrition Care:  Continue to monitor while inpatient    Goals:  >50% of meals and ONS consumed       Nutrition Monitoring and Evaluation:   Behavioral-Environmental Outcomes:  None Identified   Food/Nutrient Intake Outcomes:  Food and Nutrient Intake, Supplement Intake  Physical Signs/Symptoms Outcomes:  Biochemical Data, Chewing or Swallowing, GI Status, Fluid Status or Edema, Nutrition Focused Physical Findings, Skin, Weight     Discharge Planning:     Too soon to determine     Electronically signed by Gayle Jeffers RD, LD on 4/14/21 at 11:19 AM EDT    Contact: [phone]

## 2021-04-14 NOTE — PROGRESS NOTES
Jaguar Xiao M.D.,[facility]  Nicki Cardenas D.O., F.A.C.O.I., Ira Hull M.D. Ronda Marrero M.D., General Luis Carlos M.D. Krishna Solorzano D.O. Daily Pulmonary Progress Note    Patient:  Goldy Fortune 81 y.o. female MRN: [id number]     Date of Service: 4/14/2021      Synopsis     We are following patient for acute hypoxia and abnormal CT chest    \"CC\" ; Hartness of breath    Code status: Full code      Subjective      Patient personally seen and examined. Getting cleaned up. She is laying in bed in no acute distress. Oxygen 3 L of oxygen to maintain saturations 96%. She is at her baseline intermittently pleasantly confused. [place] [facility]. Received hemodialysis this am 700 cc removed.      Review of Systems:  Difficult to obtain patient is confused and hard of hearing    24-hour events:  None     Objective   Vitals: BP (!) 146/49   Pulse 63   Temp 98.4 °F (36.9 °C)   Resp 18   Ht 5' 4\" (1.626 m)   Wt 171 lb 11.8 oz (77.9 kg)   SpO2 91%   BMI 29.48 kg/m²     I/O:    Intake/Output Summary (Last 24 hours) at 4/14/2021 1214  Last data filed at 4/14/2021 1115  Gross per 24 hour   Intake 300 ml   Output 1950 ml   Net -1650 ml       Vent Information  Skin Assessment: Clean, dry, & intact  FiO2 : 50 %  SpO2: 91 %  SpO2/FiO2 ratio: 192  I Time/ I Time %: 0.9 s  Mask Type: Full face mask  Mask Size: Medium       IPAP: 15 cmH20  CPAP/EPAP: 6 cmH2O     CURRENT MEDS :  Scheduled Meds:   heparin (porcine)  5,000 Units Subcutaneous Q8H    albuterol sulfate HFA  2 puff Inhalation Q4H While awake    epoetin emile-epbx  10,000 Units Subcutaneous Q7 Days    ascorbic acid  500 mg Oral Daily    zinc sulfate  50 mg Oral Daily    vitamin D  50,000 Units Oral Weekly    guaiFENesin  400 mg Oral TID    hydrALAZINE  25 mg Oral 3 times per day    isosorbide dinitrate  10 mg Oral TID    metoprolol succinate  37.5 mg Oral BID    aspirin EC  81 mg Oral Daily    levothyroxine  50 mcg Oral Daily    insulin lispro  0-6 Units Subcutaneous TID WC    insulin lispro  0-3 Units Subcutaneous Nightly    sodium chloride flush  10 mL Intravenous 2 times per day       Physical Exam:  General Appearance: appears comfortable in no acute distress. HEENT: Normocephalic atraumatic without obvious abnormality   Neck: Lips, mucosa, and tongue normal.  Supple, symmetrical, trachea midline, no adenopathy;thyroid:  no enlargement/tenderness/nodules or JVD. Lung: FEW crackles and diminished in the bases  Heart: RRR, normal S1, S2. No MRG  Abdomen: Soft, NT, ND. BS present x 4 quadrants. No bruit or organomegaly. Extremities: Pedal pulses 2+ symmetric b/l. Extremities normal, no cyanosis, clubbing,BLE edema   Musculokeletal: No joint swelling, no muscle tenderness. ROM normal in all joints of extremities. Neurologic: Mental status: Alert and pleasantly confused. PERTINENT IMAGING:  CXR 4/12/21   FINDINGS:   EKG leads overlie the chest.  Cardiac silhouette is mildly enlarged similar   to previous.  No pneumothorax.  Slight worsening of diffuse bilateral   interstitial and airspace infiltrates and probable small effusions.  No other   change.           Impression   Worsening of infiltrates which may be related to CHF/edema.  Pneumonia less   likely.  Continued follow-up recommended.                CTA chest 3/30/2021    ECHO  3/30/2021  Left ventricle is normal in size . Mild left ventricular concentric hypertrophy noted. No regional wall motion abnormalities seen. Normal left ventricular ejection fraction. The left atrium is mildly dilated. Focal calcification mitral valve leaflets. Mild mitral annular calcification. Mild mitral regurgitation is present. Mild aortic stenosis. Moderate tricuspid regurgitation. Pulmonary hypertension is mild to moderate . There is a trivial circumferential pericardial effusion noted. Moderate left pleural effusion.     Labs:  Lab Results   Component Value Date WBC 6.7 04/14/2021    HGB 9.1 04/14/2021    HCT 30.5 04/14/2021    MCV 91.0 04/14/2021    MCH 27.2 04/14/2021    MCHC 29.8 04/14/2021    RDW 15.7 04/14/2021     04/14/2021    MPV 9.5 04/14/2021     Lab Results   Component Value Date     04/14/2021    K 4.7 04/14/2021    K 4.2 06/20/2020     04/14/2021    CO2 26 04/14/2021    BUN 64 04/14/2021    CREATININE 3.7 04/14/2021    LABALBU 2.4 04/14/2021    LABALBU 4.0 03/21/2012    CALCIUM 8.0 04/14/2021    GFRAA 14 04/14/2021    LABGLOM 14 04/14/2021     Lab Results   Component Value Date    PROTIME 12.0 03/29/2021    INR 1.1 03/29/2021     No results for input(s): PROBNP in the last 72 hours. No results for input(s): PROCAL in the last 72 hours. This SmartLink has not been configured with any valid records. Micro:  No results for input(s): CULTRESP in the last 72 hours. No results for input(s): LABGRAM in the last 72 hours. No results for input(s): LEGUR in the last 72 hours. No results for input(s): STREPNEUMAGU in the last 72 hours. No results for input(s): LP1UAG in the last 72 hours. Assessment:    1. Acute hypoxic respiratory failure  2. COVID-19 pneumonia  3. combination of acute on chronic CHF  4. healthcare associated pneumonia versus aspiration pneumonia  5. History of heart failure with preserved ejection fraction  6. Mild pharyngeal dysphagia  7. Left pleural fluid diagnostic tap 75 cc removed, exudate. Cytology negative 4/3/21  8. AMANDEEP       Plan:   1. Wean FiO2 for saturations above 92% 3 L HF  2. Continue BiPAP as needed and nightly for respiratory support-not using  3. Follow chest x-ray 4/10  4. Albuterol HFA every 4 hours while awake  5. Completed cefepime 8 total days. 6. Off decadron. Continue vitamins  7. IR only 75 cc pleural fluid was drained from the left side. Cytology is negative. Fluid appears exudate. Fluid cultures negative  8. continue dysphagia 3 soft advance diet per speech recommendations.   9. iHD per nephrology 700 cc removed today  10. PT OT  11. covid negative x 2, out of isolation  This plan of care was reviewed in collaboration with Dr. Monika Whitlock  Electronically signed by KASSIE Craig CNP on 4/14/2021 at 12:14 PM      I personally saw, examined, and cared for the patient. Labs, medications, radiographs reviewed. I agree with history exam and plans detailed in NP note.   Braulio Garcia MD

## 2021-04-15 PROBLEM — N18.6 ENCOUNTER REGARDING VASCULAR ACCESS FOR DIALYSIS FOR ESRD (HCC): Status: ACTIVE | Noted: 2021-01-01

## 2021-04-15 PROBLEM — Z99.2 ENCOUNTER REGARDING VASCULAR ACCESS FOR DIALYSIS FOR ESRD (HCC): Status: ACTIVE | Noted: 2021-01-01

## 2021-04-15 NOTE — PROGRESS NOTES
Speech Language Pathology      NAME:  Devan Spencer  :  1935  DATE: 4/15/2021  ROOM:  [address] [place] [address]-A      Order received for clinical swallow eval due to decline in respiratory status and increase in oxygen demand. RN not available; charge RN reported pt now on 15L nonrebreather and approved eval. On entering room, pt was attempting to talk and stated \"help me lord I can't breath\". Pt had removed nonrebreather and finger pulse ox and nasal cannula was not present. SPO2 was 49% per SLP's pulse ox. Nonrebreather was replaced and oxygen level improved. Charge RN notified of above and was present when SLP left room. Pt not appropriate for oral trials at this time due to current respiratory status. Recommend pt be NPO until nonrebreather discontinued and pt able to tolerate oxygen per nasal cannula. Contact SLP at  to reassess at that time. Acute respiratory failure with hypoxia ([facility] [J96.01]      Sudhir ARGUELLO CCC/SLP [id number]  Speech-Language Pathologist

## 2021-04-15 NOTE — CONSULTS
INPATIENT CARDIOLOGY RECONSULT    Name: Nessa Sorto    Age: 81 y.o.     Date of Admission: 3/29/2021  5:48 PM    Date of Service: 4/15/2021      Chief Complaint: Acute superimposed upon chronic diastolic heart failure, acute hypoxic respiratory failure, AMANDEEP/CKD with initiation of HD, sinus node dysfunction, hypertension, possible pneumonia, pleural effusion, cognitive dysfunction      Interim History:  - re-consulted for new onset atrial fibrillation with RVR  - complaints of palpitations and shortness of breath  - denies chest pain, pressure, heaviness  - ongoing shortness of breath        Review of Systems:   Cardiac: As per HPI  General: No fever, chills  Pulmonary: As per HPI  HEENT: No visual disturbances, difficult swallowing  GI: No nausea, vomiting  Endocrine: No thyroid disease or DM  Musculoskeletal: QUIGLEY x 4, no focal motor deficits  Skin: Intact, no rashes  Neuro/Psych: No headache or seizures    Problem List:  Patient Active Problem List   Diagnosis    HTN (hypertension), benign    Hypertensive heart disease    Hypothyroidism    DM (diabetes mellitus), type 2 ([facility]    Acute respiratory failure with hypoxia ([facility]    Uncontrolled type 2 diabetes mellitus with hyperglycemia ([facility])    Chronic diastolic CHF (congestive heart failure) ([facility])    Chest pain    LVH (left ventricular hypertrophy)    Non-rheumatic mitral regurgitation    CKD (chronic kidney disease) stage 3, GFR 30-59 ml/min ([facility])    SSS (sick sinus syndrome) ([facility])    Dizziness    Unsteadiness    Leukopenia    Primary osteoarthritis involving multiple joints    Cervical radiculopathy    Bilateral shoulder bursitis    Aspirin intolerance    Primary osteoarthritis of one knee, right    Primary osteoarthritis of right knee    Acute blood loss anemia    HF (heart failure) ([facility])    Chronic pain syndrome    Cervicalgia    Myalgia    Intractable headache    Acute on chronic diastolic congestive heart failure ([address]    Metabolic acidosis    Anemia    Edema    Neck pain    Congestive heart failure of unknown etiology (HCC)    AMANDEEP (acute kidney injury) ([facility]    Elevated troponin    Obesity (BMI 30.0-34. 9)    Essential hypertension    Controlled type 2 diabetes mellitus without complication (HCC)    Acidosis    Encounter regarding vascular access for dialysis for ESRD ([facility]       Allergies:   Allergies   Allergen Reactions    Aspirin Other (See Comments)     \"tears up my stomach\"       Current Medications:  Current Facility-Administered Medications   Medication Dose Route Frequency Provider Last Rate Last Admin    levalbuterol (XOPENEX) nebulization 0.63 mg  0.63 mg Nebulization Q6H Lenis Habermann, MD   0.63 mg at 04/15/21 1219    dilTIAZem 100 mg in dextrose 5 % 100 mL infusion ([facility] [place])  5-15 mg/hr Intravenous Continuous Arnaud R Lockso, DO        heparin (porcine) injection 5,000 Units  5,000 Units Subcutaneous Q8H Arnaud Hric, DO   5,000 Units at 04/15/21 1236    epoetin eimle-epbx (RETACRIT) injection 10,000 Units  10,000 Units Subcutaneous Q7 Days Rabia Art MD   10,000 Units at 04/08/21 1700    ascorbic acid (VITAMIN C) tablet 500 mg  500 mg Oral Daily Corinne Dudley, APRN - CNP   500 mg at 04/15/21 1236    zinc sulfate (ZINCATE) capsule 50 mg  50 mg Oral Daily Corinne Dudley, APRN - CNP   50 mg at 04/15/21 1238    vitamin D (ERGOCALCIFEROL) capsule 50,000 Units  50,000 Units Oral Weekly Maribell Muir MD   50,000 Units at 04/13/21 1270    guaiFENesin tablet 400 mg  400 mg Oral TID Corinne Dudley, APRN - CNP   400 mg at 04/15/21 1237    hydrALAZINE (APRESOLINE) tablet 25 mg  25 mg Oral 3 times per day Lexii Zhao MD   25 mg at 04/15/21 6140    isosorbide dinitrate (ISORDIL) tablet 10 mg  10 mg Oral TID Lexii Zhao MD   10 mg at 04/15/21 1237    metoprolol succinate (TOPROL XL) extended release tablet 37.5 mg  37.5 mg Oral BID Lexii Zhao MD   37.5 mg at 04/15/21 1237    aspirin EC tablet 81 mg  81 mg Oral Daily Tony Fleming MD   81 mg at 04/15/21 1239    levothyroxine (SYNTHROID) tablet 50 mcg  50 mcg Oral Daily Tony Fleming MD   50 mcg at 04/15/21 0605    glucose (GLUTOSE) 40 % oral gel 15 g  15 g Oral PRN Tony Fleming MD        dextrose 50 % IV solution  12.5 g Intravenous PRN Tony Fleming MD        glucagon (rDNA) injection 1 mg  1 mg Intramuscular PRN Tony Fleming MD        dextrose 5 % solution  100 mL/hr Intravenous PRN Tony Fleming MD        insulin lispro (HUMALOG) injection vial 0-6 Units  0-6 Units Subcutaneous TID WC Kitty Gracia MD   Stopped at 04/15/21 0605    insulin lispro (HUMALOG) injection vial 0-3 Units  0-3 Units Subcutaneous Nightly Tony Fleming MD   Stopped at 04/14/21 2148    sodium chloride flush 0.9 % injection 10 mL  10 mL Intravenous 2 times per day Kitty Gracia MD   10 mL at 04/14/21 2148    sodium chloride flush 0.9 % injection 10 mL  10 mL Intravenous PRN Tony Fleming MD   10 mL at 04/14/21 1903    0.9 % sodium chloride infusion  25 mL Intravenous PRN Tony Fleming MD        promethazine (PHENERGAN) tablet 12.5 mg  12.5 mg Oral Q6H PRN Tony Fleming MD        Or    ondansetron (ZOFRAN) injection 4 mg  4 mg Intravenous Q6H PRN Tony Fleming MD   4 mg at 04/14/21 0747    polyethylene glycol (GLYCOLAX) packet 17 g  17 g Oral Daily PRN Tony Fleming MD   17 g at 04/06/21 1308    acetaminophen (TYLENOL) tablet 650 mg  650 mg Oral Q6H PRN Tony Fleming MD   650 mg at 04/13/21 2112    Or    acetaminophen (TYLENOL) suppository 650 mg  650 mg Rectal Q6H PRN Tony Fleming MD        0.9 % sodium chloride infusion  25 mL Intravenous Q12H Tony Fleming MD 12.5 mL/hr at 04/10/21 0115 Restarted at 04/10/21 0115    perflutren lipid microspheres (DEFINITY) injection 1.65 mg  1.5 mL Intravenous ONCE PRN Bland Eisenmenger, MD          dilTIAZem      dextrose      sodium chloride      sodium chloride 12.5 mL/hr at 04/10/21 0115       Physical Exam:  /76   Pulse 122   Temp 98.8 °F (37.1 °C) (Axillary)   Resp 20   Ht 5' 4\" (1.626 m)   Wt 168 lb 14 oz (76.6 kg)   SpO2 98%   BMI 28.99 kg/m²   Wt Readings from Last 3 Encounters:   04/15/21 168 lb 14 oz (76.6 kg)   03/26/21 159 lb 3.2 oz (72.2 kg)   03/23/21 170 lb 6.4 oz (77.3 kg)     Appearance: Awake, alert, in acute respiratory distress  Skin: Intact, no rash  Head: Normocephalic, atraumatic  Eyes: EOMI, no conjunctival erythema  ENMT: No pharyngeal erythema, MMM, no rhinorrhea  Neck: Supple, no elevated JVP, no carotid bruits  Lungs: She has a bronchial breath sounds over the left base and also has few rails. Cardiac: Irregular rate and rhythm, +P4U8, has pansystolic murmur 3/6 heard over the apex.   Abdomen: Soft, nontender, +bowel sounds  Extremities: Moves all extremities x 4, 2+ lower extremity edema  Neurologic: No focal motor deficits apparent, normal mood and affect  Peripheral Pulses: Intact posterior tibial pulses bilaterally      Intake/Output:    Intake/Output Summary (Last 24 hours) at 4/15/2021 1249  Last data filed at 4/15/2021 1139  Gross per 24 hour   Intake 360 ml   Output 1225 ml   Net -865 ml     I/O this shift:  In: 300   Out: 975     Laboratory Tests:  Recent Labs     04/13/21  0905 04/14/21  0800 04/15/21  0616    135 138   K 4.7 4.7 5.2*    101 102   CO2 22 26 27   BUN 89* 64* 43*   CREATININE 4.6* 3.7* 3.5*   GLUCOSE 119* 109* 101*   CALCIUM 8.2* 8.0* 8.6     Lab Results   Component Value Date    MG 2.3 04/15/2021     Recent Labs     04/13/21 0905 04/14/21  0800 04/15/21  0616   ALKPHOS 49 50 50   ALT 18 17 19   AST 30 31 40*   PROT 6.1* 5.9* 6.3*   BILITOT 0.3 0.4 0.4   LABALBU 2.5* 2.4* 2.6*     Recent Labs     04/13/21  0905 04/14/21  0800 04/15/21  0616   WBC 7.1 6.7 6.2   RBC 3.47* 3.35* 3.36*   HGB 9.7* 9.1* 9.3*   HCT 31.5* 30.5* 30.7*   MCV 90.8 91.0 91.4   MCH 28.0 27.2 27.7   MCHC 30.8* 29.8* QUIGLEY x 4, no focal motor deficits  Skin: Intact, no rashes  Neuro/Psych: No headache or seizures    Physical Exam:  /77   Pulse 90   Temp 98.8 °F (37.1 °C) (Axillary)   Resp 24   Ht 5' 4\" (1.626 m)   Wt 168 lb 14 oz (76.6 kg)   SpO2 97%   BMI 28.99 kg/m²   Appearance: Awake, alert, no acute respiratory distress  Skin: Intact, no rash  Head: Normocephalic, atraumatic  Neck: Supple, no carotid bruits  Lungs: Bilateral crackles. Cardiac: Regular rate and rhythm, +S1S2, no murmurs apparent  Abdomen: Soft, +bowel sounds  Extremities: Moves all extremities x 4, no lower extremity edema  Neurologic: No focal motor deficits apparent, normal mood and affect    Assessment/Plan:  81-year-old female with medical history of heart failure preserved ejection fraction and chronic kidney disease and hypertension presents with congestive heart failure and worsening kidney function requiring dialysis. Patient went into new onset atrial fibrillation. Recommendations  Atrial fibrillation was short-lived and spontaneously went into normal sinus rhythm shortly after giving diltiazem IV bolus. Continue diltiazem drip for now. Hold off anticoagulation at this point given her kidney dysfunction requiring dialysis and the fact that the atrial fibrillation was very brief. We will further investigate anticoagulation benefit versus risk in her case after she recovers from her congestive heart failure and kidney failure.     Mere Moreno MD  [facility]) Cardiology

## 2021-04-15 NOTE — PROGRESS NOTES
Dr. Federica Mchugh and Dr. Sharon Nj both notified of pt's increased requirement for O2, pt on 15 L and NRB. Hemo nurse stated difficulty with breathing. Pt on way back to floor.

## 2021-04-15 NOTE — PROGRESS NOTES
Physical Therapy  Facility/Department: [facility] MED SURG    NAME: Devan Spencer  : 1935  MRN: [id number]       Chart reviewed and PT treatment attempted this date. Per chart review, pt requiring increased O2 requirements. Will hold PT treatment at this time. Will continue to follow.      Cady Michael, [phone]

## 2021-04-15 NOTE — CONSULTS
Palliative Care Department  Palliative Care Initial Consult  Provider: Judith HANNAH  [phone]    Hospital Day: 18  Date of Initial Consult: 4/15/2021  Referring Provider: Dr. Timmy Cheek was consulted for assistance with: Code status Discussion and Assist with goals of care    Chief Complaint: Katharina Closs is a 81 y.o. female with chief complaint of SOB    HPI:   Katharina Closs is a 81 y.o. female with significant past medical history of CHF, CKD, with multiple recent hospitalizations for CHF exacerbation, who was admitted on 3/29/2021 due to continued SOB, test + COVID, as well as worsening AMANDEEP leading to need for HD. On 4/15 her O2 sat decreased, requiring 15L NRB, also developing A-fib. Palliative has been consulted to assist with goals of care and code status. ASSESSMENT/PLAN:     Pertinent Hospital Diagnoses:  Current medical issues leading to Palliative Medicine involvement include   Active Hospital Problems    Diagnosis Date Noted    Encounter regarding vascular access for dialysis for ESRD ([facility] [address] [N18.6, Z99.2] 04/15/2021    Acidosis [E87.2]     Essential hypertension [I10]     Controlled type 2 diabetes mellitus without complication ([facility] [address] [S83.0]     Acute respiratory failure with hypoxia ([facility] [address] [J96.01] 03/29/2017     Palliative Care Encounter / Counseling Regarding Goals of Care:  Please see detailed goals of care discussion as below.  At this time, Katharina Closs, Does have capacity for medical decision-making. Capacity is time limited and situation/question specific.  Outcome of goals of care meeting: live longer, improve or maintain function/quality of life and continue current management   Code status: Full Code  o I reviewed with the Patient that given multiple medical co-morbidities, advanced disease process, current severe acute illness and advanced age, in the event of cardiac arrest, the chances of surviving may likely be low.  It was also reviewed that if they survived a cardiac arrest, a return to prior level of function also may be low.  Advanced Directives: None   Surrogate/Legal NOK:   Suri Smith ([phone]) is the patient's son, Justa Valenzuela ([phone]) is the patient's son who lives locally.  Will follow and continued to discuss goals of care pending clinical progression. Referrals: none today    SUBJECTIVE:   Events/Discussions:  4/15/2021:  Patient seen the bedside, no family present. She is alert, oriented, able to voice her needs concerns well, does not seem to be in any acute distress. Introduced myself, the palliative medicine service, we discussed her goals of care, as well as her wishes regarding resuscitation. She currently complains of some mild shortness of breath, and overall just not feeling well, primarily feeling weak. She states that she understands that her oxygen saturation dropped today, that she needs to wear the nonrebreather mask to maintain proper oxygen saturation. She states that her goal is to continue to seek treatment which will assist in giving her a chance to improve overall. States she wishes to continue with the current treatment plan full supportive care, and continue with hemodialysis. She is hopeful her respiratory situation will improve so that she can transfer to skilled nursing facility with plan to continue with hemodialysis. As for resuscitation, she states she was to remain a full code, will be agreeable to mechanical ventilation, and was uncertain as to what she would want if she will require long-term mechanical ventilation nutritional support.     Past Medical History:   Diagnosis Date    (HFpEF) heart failure with preserved ejection fraction ([facility] [address] 05/26/2017    3/31/17- echo- LVEF 60-65%, mild LVH, mild MR    Arthritis     Bursitis     shoulders    Cervical radiculopathy     CHF (congestive heart failure) (HCC)     CKD (chronic kidney disease) stage 3, GFR 30-59 ml/min     Diabetes mellitus ([facility] [address]     Encounter regarding vascular access for dialysis for ESRD ([facility] [address] 4/15/2021    GERD (gastroesophageal reflux disease)     [place] (hard of hearing)     Hypertension     Hypothyroidism     [facility]SS [facility]sick sinus syndrome) ([facility])     stable, per [facility] note.  Thyroid disease     Unsteadiness     Valvular heart disease     sees Dr. Tasha Castillo        Past Surgical History:   Procedure Laterality Date    FOOT SURGERY      both    JOINT REPLACEMENT  1999    TOTAL KNEE ARTHROPLASTY Right 3/21/2019    RIGHT KNEE TOTAL ARTHROPLASTY (ILENE)++ADDUCTOR CANAL BLOCK++ performed by Luciano Mendoza MD at [facility] OR       Family History   Problem Relation Age of Onset    No Known Problems Mother     No Known Problems Father        Allergies   Allergen Reactions    Aspirin Other (See Comments)     \"tears up my stomach\"       ROS: UNLESS STATED ABOVE PATIENT DENIES:  CONSTITUTIONAL:  fever, chill, rigors, nausea, vomiting, fatigue. HEENT: blurry vision, double vision, hearing problem, tinnitus, hoarseness, dysphagia, odynophagia  RESPIRATORY: cough, shortness of breath, sputum expectoration. CARDIOVASCULAR:  Chest pain/pressure, palpitation, syncope, irregular beats  GASTROINTESTINAL:  abdominal or rectal pain, diarrhea, constipation, . GENITOURINARY:  Burning, frequency, urgency, incontinence, discharge  INTEGUMENTARY: rash, wound, pruritis  HEMATOLOGIC/LYMPHATIC:  Swelling, sores, gum bleeding, easy bruising, pica.   MUSCULOSKELETAL:  pain, edema, joint swelling or redness  NEUROLOGICAL:  light headed, dizziness, loss of consciousness, weakness, change in memory, seizures, tremors    OBJECTIVE:   Prognosis: Guarded    Physical Exam:  /77   Pulse 90   Temp 98.8 °F (37.1 °C) (Axillary)   Resp 24   Ht 5' 4\" (1.626 m)   Wt 168 lb 14 oz (76.6 kg)   SpO2 97%   BMI 28.99 kg/m²     Gen:  Alert, appears stated age, well nourished, in no acute distress  HEENT:  Normocephalic, conjunctiva pink, no drainage, mucosa moist  Neck:  Supple  Lungs: Coarse breath sounds with diminished bases  Heart: RRR, no murmur, rub, or gallop noted during exam  Abd:  Soft, non tender, non distended, BS+  M/S/Ext:  Moving all extremities, no edema, pulses present  Skin:  Warm and dry  Neuro:  PERRL, Alert, oriented x 3; following commands    Objective data reviewed: labs, images, records, medication use, vitals and chart    Time/Communication:  Greater than 50% of time spent, total 50 minutes in counseling and coordination of care at the bedside regarding goals of care and see above. Phong Guillory APRN-CNP  Palliative Medicine    Patient and the plan of care discussed with the other IDT members of Palliative Care Team, and with patient and floor nurse, as appropriate and available. Thank you for allowing Palliative Medicine to participate in the care of Gautam Boyd. Note: This report was completed using computer[employer] Port[employer] voiced recognition software. Every effort has been made to ensure accuracy; however, inadvertent computerized transcription errors may be present.

## 2021-04-15 NOTE — PROGRESS NOTES
[facility]) Hospitalist Progress Note    Admission Date  3/29/2021  5:48 PM  Chief Complaint Dehydration, diarrhea  Admit Dx   Acute respiratory failure with hypoxia ([facility] [address] [J96.01]    Subjective  History of Present Illness  Seen at bedside early this afternoon, back from HD. Down there, there were issues w hypoxia, pt was put on HFNC. Apparently also went into Afib RVR in HD and orders given for Cardizem gtt and cardio consult, though on my visit a few minutes after pt got up to room HR was 88. O2 also able to be weaned down to 7L during my time in the room. Pt in no distress though very weak. Asking why she isn't able to breathe well. Pulm contacted regarding pt's SOB and requested consult to palliative care. No family was present during my exam.  Was called after my visit, reported that pt was choking on her pills, pt made NPO w consult to speech for add'l recs.     Review of Systems - 12-point review of systems has been reviewed and is otherwise negative except as listed in the HPI    Objective  Physical Exam  Vitals: BP (!) 117/57   Pulse 85   Temp 100.1 °F (37.8 °C) (Tympanic)   Resp 22   Ht 5' 4\" (1.626 m)   Wt 168 lb 14 oz (76.6 kg)   SpO2 99%   BMI 28.99 kg/m²   General: well-developed, well-nourished, no acute distress but definitely weak-appearing, cooperative  Skin: warm, dry, intact, normal color without cyanosis  HEENT: normocephalic, atraumatic, mucous membranes normal; NRB in place  Respiratory: clear to auscultation bilaterally without respiratory distress  Cardiovascular: irregularly irregular rhythm without murmur / rub / gallop  Abdominal: soft, nontender, nondistended, normoactive bowel sounds  Extremities: no mottling, pulses intact, no edema  Neurologic: awake, alert, no focal deficits  Psychiatric: normal affect, cooperative    Assessment / Plan  Acute hypoxic resp failure - d/t combo of COVID (positive 4/6, negative 4/9 so ??), pneumonia, HFpEF exacerbation, Afib RVR and

## 2021-04-15 NOTE — PROGRESS NOTES
[facility] Hospitalist   Progress Note    Admitting Date and Time: 3/29/2021  5:48 PM  Admit Dx: Acute respiratory failure with hypoxia ([facility] [address] [J96.01]    Subjective:    Patient was admitted with Acute respiratory failure with hypoxia ([facility] [address] [J96.01].  Patient denies fever, chills, cp, sob, n/v.     heparin (porcine)  5,000 Units Subcutaneous Q8H    albuterol sulfate HFA  2 puff Inhalation Q4H While awake    epoetin emile-epbx  10,000 Units Subcutaneous Q7 Days    ascorbic acid  500 mg Oral Daily    zinc sulfate  50 mg Oral Daily    vitamin D  50,000 Units Oral Weekly    guaiFENesin  400 mg Oral TID    hydrALAZINE  25 mg Oral 3 times per day    isosorbide dinitrate  10 mg Oral TID    metoprolol succinate  37.5 mg Oral BID    aspirin EC  81 mg Oral Daily    levothyroxine  50 mcg Oral Daily    insulin lispro  0-6 Units Subcutaneous TID WC    insulin lispro  0-3 Units Subcutaneous Nightly    sodium chloride flush  10 mL Intravenous 2 times per day     glucose, 15 g, PRN  dextrose, 12.5 g, PRN  glucagon (rDNA), 1 mg, PRN  dextrose, 100 mL/hr, PRN  sodium chloride flush, 10 mL, PRN  sodium chloride, 25 mL, PRN  promethazine, 12.5 mg, Q6H PRN    Or  ondansetron, 4 mg, Q6H PRN  polyethylene glycol, 17 g, Daily PRN  acetaminophen, 650 mg, Q6H PRN    Or  acetaminophen, 650 mg, Q6H PRN  perflutren lipid microspheres, 1.5 mL, ONCE PRN         Objective:    137/71   75   20   98.6   91%O2SAT    Skin: warm and dry, no rash or erythema  Pulmonary/Chest: clear to auscultation bilaterally- no wheezes, rales or rhonchi, normal air movement, no respiratory distress  Cardiovascular: rhythm reg at rate of 76  Abdomen: soft, non-tender, non-distended, normal bowel sounds, no masses or organomegaly  Extremities: no cyanosis, no clubbing and pos for mild b/l le edema      Recent Labs     04/12/21  0715 04/13/21  0905 04/14/21  0800    133 135   K 4.5 4.7 4.7    100 101   CO2 22 22 26   BUN 84* 89* 64*   CREATININE 4.1* 4.6* 3.7*   GLUCOSE 107* 119* 109*   CALCIUM 8.1* 8.2* 8.0*       Recent Labs     04/12/21  0715 04/13/21  0905 04/14/21  0800   WBC 7.5 7.1 6.7   RBC 3.49* 3.47* 3.35*   HGB 9.6* 9.7* 9.1*   HCT 31.7* 31.5* 30.5*   MCV 90.8 90.8 91.0   MCH 27.5 28.0 27.2   MCHC 30.3* 30.8* 29.8*   RDW 15.6* 15.6* 15.7*    205 190   MPV 10.4 10.0 9.5       CBC with Differential:    Lab Results   Component Value Date    WBC 6.7 04/14/2021    RBC 3.35 04/14/2021    RBC 3.68 12/19/2017    HGB 9.1 04/14/2021    HCT 30.5 04/14/2021     04/14/2021    MCV 91.0 04/14/2021    MCH 27.2 04/14/2021    MCHC 29.8 04/14/2021    RDW 15.7 04/14/2021    NRBC 0.0 04/13/2021    SEGSPCT 58 06/24/2013    METASPCT 0.9 04/13/2021    LYMPHOPCT 8.4 04/14/2021    LYMPHOPCT 24.4 09/03/2017    PROMYELOPCT 0.9 04/07/2021    MONOPCT 13.8 04/14/2021    MYELOPCT 1.7 04/13/2021    BASOPCT 0.1 04/14/2021    MONOSABS 0.92 04/14/2021    LYMPHSABS 0.56 04/14/2021    EOSABS 0.20 04/14/2021    BASOSABS 0.01 04/14/2021     CMP:    Lab Results   Component Value Date     04/14/2021    K 4.7 04/14/2021    K 4.2 06/20/2020     04/14/2021    CO2 26 04/14/2021    BUN 64 04/14/2021    CREATININE 3.7 04/14/2021    GFRAA 14 04/14/2021    LABGLOM 14 04/14/2021    GLUCOSE 109 04/14/2021    GLUCOSE 77 04/18/2012    PROT 5.9 04/14/2021    LABALBU 2.4 04/14/2021    LABALBU 4.0 03/21/2012    CALCIUM 8.0 04/14/2021    BILITOT 0.4 04/14/2021    ALKPHOS 50 04/14/2021    AST 31 04/14/2021    ALT 17 04/14/2021        Radiology:   XR CHEST PORTABLE   Final Result   Worsening of infiltrates which may be related to CHF/edema. Pneumonia less   likely. Continued follow-up recommended. XR CHEST PORTABLE   Final Result   CHF with findings similar to the prior study. XR CHEST PORTABLE   Final Result   Mild increased bilateral pulmonary airspace opacities and pleural effusions. Findings may be related pulmonary edema and/or pneumonia. XR CHEST PORTABLE   Final Result   No significant change in appearance of the chest with persistent left pleural   effusion and multifocal airspace opacities. US THORACENTESIS Which side should the procedure be performed? Right   Final Result   Status post left thoracentesis. XR CHEST 1 VIEW   Final Result   No convincing evidence of a pneumothorax following thoracentesis. Unchanged patchy bilateral airspace opacities which may be on the basis of   multifocal pneumonia or pulmonary edema. XR CHEST (2 VW)   Final Result   CHF with likely cardiogenic edema. Fluoroscopy modified barium swallow with video   Final Result   Addendum 1 of 1   ADDENDUM:   The study was demonstrated the due the clinical findings to indicated the   possibility for aspiration or penetration. Difficulty swallowing. Final      CTA PULMONARY W CONTRAST   Final Result   No evidence of pulmonary emboli. Cardiomegaly with prominent coronary atherosclerotic calcifications and small   pericardial effusion. Bilateral pleural effusions, mildly increased in the interval since the prior   examination. Multifocal patchy and ground-glass airspace disease in the lungs bilaterally,   and more confluent airspace disease in the lower lobes bilaterally, most   consistent with multifocal pneumonia. Pulmonary edema cannot be excluded. Reflux of contrast into the IVC and hepatic veins which may be seen with   right heart failure. XR CHEST PORTABLE   Final Result   Cardiomegaly with progressive perihilar and bibasilar infiltrates and pleural   effusions likely CHF/edema and or pneumonia. XR CHEST PORTABLE    (Results Pending)       Assessment:    Active Problems:    Acute respiratory failure with hypoxia (HCC)    Essential hypertension    Controlled type 2 diabetes mellitus without complication (HCC)    Acidosis  Resolved Problems:    * No resolved hospital problems. *      Plan:  1. Acute respiratory failure with hypoxia wean o2 as able pulmonary following   2. Bacterial pneumonia finished abx   3. Acute on chronic diastolic chf improving continue current management  4. Leukocytosis monitor improved  5. Acidosis monitor  Nephrology following. 6. sher on ckd 3b   nephrology following  plan for temp HD cath with subsequent dialysis. 7. htn continue med  8.  Dm type 2 controlled monitor and treat prn  9. hypothyroidism continue med    Continue dialysis per renal

## 2021-04-15 NOTE — PROGRESS NOTES
Called Dr. Cedric Kamara office (patients PCP) to schedule her for a follow up hospital visit. Office stated to have the patient or son call once discharged. Not appropriate for the CHF clinic now that she started dialysis.  Secure msg sent to cardio for hospital followup  Electronically signed by Adelso Maier RN on 4/15/2021 at 10:32 AM

## 2021-04-15 NOTE — PROGRESS NOTES
Sundar Moore M.D.,[facility]  Kisha Jacobson D.O., F.A.C.OGuichoI., Sarah Harper M.D. Sonia Qiu M.D., Hermila Burks M.D. Sun Pearson D.O. Daily Pulmonary Progress Note    Patient:  Katharina Closs 81 y.o. female MRN: [id number]     Date of Service: 4/15/2021      Synopsis     We are following patient for acute hypoxia and abnormal CT chest    \"CC\" ; Hartness of breath    Code status: Full code      Subjective      Patient personally seen and examined. Returned early from dialysis due to episode of desaturation now requiring 15 L HF oxygen. CXR repeated volume overload. Moist cough difficulty mobilizing secretions.        Review of Systems:  Difficult to obtain patient is confused and hard of hearing    24-hour events:  None     Objective   Vitals: /77   Pulse 90   Temp 98.8 °F (37.1 °C) (Axillary)   Resp 24   Ht 5' 4\" (1.626 m)   Wt 168 lb 14 oz (76.6 kg)   SpO2 97%   BMI 28.99 kg/m²     I/O:    Intake/Output Summary (Last 24 hours) at 4/15/2021 1516  Last data filed at 4/15/2021 1139  Gross per 24 hour   Intake 360 ml   Output 1225 ml   Net -865 ml       Vent Information  Skin Assessment: Clean, dry, & intact  FiO2 : 50 %  SpO2: 97 %  SpO2/FiO2 ratio: 192  I Time/ I Time %: 0.9 s  Mask Type: Full face mask  Mask Size: Medium       IPAP: 15 cmH20  CPAP/EPAP: 6 cmH2O     CURRENT MEDS :  Scheduled Meds:   levalbuterol  0.63 mg Nebulization Q6H    dilTIAZem  10 mg Intravenous Once    heparin (porcine)  5,000 Units Subcutaneous Q8H    epoetin emile-epbx  10,000 Units Subcutaneous Q7 Days    ascorbic acid  500 mg Oral Daily    zinc sulfate  50 mg Oral Daily    vitamin D  50,000 Units Oral Weekly    guaiFENesin  400 mg Oral TID    hydrALAZINE  25 mg Oral 3 times per day    isosorbide dinitrate  10 mg Oral TID    metoprolol succinate  37.5 mg Oral BID    aspirin EC  81 mg Oral Daily    levothyroxine  50 mcg Oral Daily    insulin lispro  0-6 Units Subcutaneous TID WC    insulin lispro  0-3 Units Subcutaneous Nightly    sodium chloride flush  10 mL Intravenous 2 times per day       Physical Exam:  General Appearance: appears comfortable in no acute distress. HEENT: Normocephalic atraumatic without obvious abnormality   Neck: Lips, mucosa, and tongue normal.  Supple, symmetrical, trachea midline, no adenopathy;thyroid:  no enlargement/tenderness/nodules or JVD. Lung: FEW crackles and diminished in the bases  Heart: RRR, normal S1, S2. No MRG  Abdomen: Soft, NT, ND. BS present x 4 quadrants. No bruit or organomegaly. Extremities: Pedal pulses 2+ symmetric b/l. Extremities normal, no cyanosis, clubbing,BLE edema   Musculokeletal: No joint swelling, no muscle tenderness. ROM normal in all joints of extremities. Neurologic: Mental status: Alert and pleasantly confused. PERTINENT IMAGING:  CXR 4/15       FINDINGS:   EKG leads overlie the chest.  Cardiac silhouette remains enlarged.  Scattered   vascular calcifications.  No pneumothorax.  Again identified are relatively   extensive bilateral mixed interstitial and airspace infiltrates.  Small   effusions.           Impression   No significant change in bilateral infiltrates which may represent edema. Pneumonia less likely.  Continued follow-up recommended. CTA chest 3/30/2021    ECHO  3/30/2021  Left ventricle is normal in size . Mild left ventricular concentric hypertrophy noted. No regional wall motion abnormalities seen. Normal left ventricular ejection fraction. The left atrium is mildly dilated. Focal calcification mitral valve leaflets. Mild mitral annular calcification. Mild mitral regurgitation is present. Mild aortic stenosis. Moderate tricuspid regurgitation. Pulmonary hypertension is mild to moderate . There is a trivial circumferential pericardial effusion noted. Moderate left pleural effusion.     Labs:  Lab Results   Component Value Date    WBC 6.2 04/15/2021    HGB 9.3

## 2021-04-15 NOTE — CARE COORDINATION
CM NOTE: Per QFR--- Day # HD TX. OP chair time arranged by TIFFANIE. Checking need for permanent HD CATH with nephrology. Awaiting precert for [facility]. Will continue to follow pt.

## 2021-04-15 NOTE — PROGRESS NOTES
Progress Note  NEPHROLOGY    Reason for Consult: Management of acute kidney injury      History of Present Lolly from the 4/5/21 note: This is a 81-year-old female with a history of diastolic heart failure, hypothyroidism, diabetes mellitus type 2, hypothyroidism, essential hypertension who has been admitted twice this month for CHF exacerbation with discharge most recently 4 days ago. The night prior to admission she was complaining of generalized weakness and palpitations: therefore presented to the ED for further evaluation on 3/29/2021. Vitals upon arrival included  BP (!) 142/70   Pulse 76   Temp 98.3 °F (36.8 °C)   Resp 26   Ht 5' 3\" (1.6 m)   Wt 159 lb (72.1 kg)   SpO2 98%   BMI 28.17 kg/m². Pertinent labs included WBC 8.2 and hemoglobin 10.4. Initial BMP revealed sodium 140, potassium 5.2, chloride 103, CO2 22, BUN 26 and creatinine 1.3. BNP 3514. Initial lactic acid 6.2. Initial ABGs 7.397, PCO2 35.6, PO2 61.5, bicarb 21 and 90% O2 saturation. Chest x-ray revealed cardiomegaly with progressive perihilar and bibasilar infiltrates and pleural effusions. Patient was subsequently admitted with acute respiratory failure with hypoxia. Patient currently examined sitting up in bed in mild acute distress. She is a very poor historian. ROS difficult to accurately obtain. Per the nursing staff did require 15 L NRB mask this morning (she is refusing the Bipap). She continues to have diffuse interstitial infiltrates on CXR (Pulmonology is following). Cardiology is also following: Patient was initially on IV bumex, but was changed to PO on 4/3/21. She has a history of stage I systolic hypertension. 4/15/21:  Pt had her 3rd IHD this AM with intradialytic hypotension and O2 sat down to 87%. Pt placed on 100% NRB.  Pt went into Afib with a VR 90    Past Medical History:        Diagnosis Date    (HFpEF) heart failure with preserved ejection fraction ([facility] 05/26/2017    3/31/17- echo- LVEF 60-65%, mild LVH, mild MR    Arthritis     Bursitis     shoulders    Cervical radiculopathy     [facility]congestive [facility] failure) [facility]     CKD (chronic kidney disease) stage 3, GFR 30-59 ml/min     Diabetes mellitus ([facility]     [facility] 4/15/2021    GERD (gastroesophageal reflux disease)     [place] (hard of hearing)     Hypertension     Hypothyroidism     SSS (sick sinus syndrome) ([facility])     stable, per epic note.     Thyroid disease     Unsteadiness     Valvular heart disease     sees Dr. Cris Rodriguez        Past Surgical History:        Procedure Laterality Date    FOOT SURGERY      both    JOINT REPLACEMENT  1999    TOTAL KNEE ARTHROPLASTY Right 3/21/2019    RIGHT KNEE TOTAL ARTHROPLASTY (ILENE)++ADDUCTOR CANAL BLOCK++ performed by Lacey Cummings MD at [facility] OR       Current Medications:    Current Facility-Administered Medications: levalbuterol (XOPENEX) nebulization 0.63 mg, 0.63 mg, Nebulization, Q6H  heparin (porcine) injection 5,000 Units, 5,000 Units, Subcutaneous, Q8H  epoetin emile-epbx (RETACRIT) injection 10,000 Units, 10,000 Units, Subcutaneous, Q7 Days  ascorbic acid (VITAMIN C) tablet 500 mg, 500 mg, Oral, Daily  zinc sulfate (ZINCATE) capsule 50 mg, 50 mg, Oral, Daily  vitamin D (ERGOCALCIFEROL) capsule 50,000 Units, 50,000 Units, Oral, Weekly  guaiFENesin tablet 400 mg, 400 mg, Oral, TID  hydrALAZINE (APRESOLINE) tablet 25 mg, 25 mg, Oral, 3 times per day  isosorbide dinitrate (ISORDIL) tablet 10 mg, 10 mg, Oral, TID  metoprolol succinate (TOPROL XL) extended release tablet 37.5 mg, 37.5 mg, Oral, BID  aspirin EC tablet 81 mg, 81 mg, Oral, Daily  levothyroxine (SYNTHROID) tablet 50 mcg, 50 mcg, Oral, Daily  glucose (GLUTOSE) 40 % oral gel 15 g, 15 g, Oral, PRN  dextrose 50 % IV solution, 12.5 g, Intravenous, PRN  glucagon (rDNA) injection 1 mg, 1 mg, Intramuscular, PRN  dextrose 5 % solution, 100 mL/hr, Intravenous, PRN  insulin lispro (HUMALOG) injection vial 0-6 Units, 0-6 Units, Subcutaneous, TID WC  insulin lispro (HUMALOG) injection vial 0-3 Units, 0-3 Units, Subcutaneous, Nightly  sodium chloride flush 0.9 % injection 10 mL, 10 mL, Intravenous, 2 times per day  sodium chloride flush 0.9 % injection 10 mL, 10 mL, Intravenous, PRN  0.9 % sodium chloride infusion, 25 mL, Intravenous, PRN  promethazine (PHENERGAN) tablet 12.5 mg, 12.5 mg, Oral, Q6H PRN **OR** ondansetron (ZOFRAN) injection 4 mg, 4 mg, Intravenous, Q6H PRN  polyethylene glycol (GLYCOLAX) packet 17 g, 17 g, Oral, Daily PRN  acetaminophen (TYLENOL) tablet 650 mg, 650 mg, Oral, Q6H PRN **OR** acetaminophen (TYLENOL) suppository 650 mg, 650 mg, Rectal, Q6H PRN  0.9 % sodium chloride infusion, 25 mL, Intravenous, Q12H  perflutren lipid microspheres (DEFINITY) injection 1.65 mg, 1.5 mL, Intravenous, ONCE PRN    Allergies:  Aspirin    Social History:      reports that she has never smoked. She has never used smokeless tobacco. She reports previous alcohol use. She reports that she does not use drugs.       Family History:     Family History   Problem Relation Age of Onset    No Known Problems Mother     No Known Problems Father          Review of Systems:       Pertinent positives stated above in HPI. All other systems were reviewed and were negative.     Physical exam:   Constitutional: Elderly frail male VITALS:  BP (!) 153/59   Pulse 90   Temp 98 °F (36.7 °C)   Resp 20   Ht 5' 4\" (1.626 m)   Wt 168 lb 14 oz (76.6 kg)   SpO2 93%   BMI 28.99 kg/m²   CURRENT TEMPERATURE:  Temp: 98 °F (36.7 °C)  CURRENT RESPIRATORY RATE:  Resp: 20  CURRENT PULSE:  Pulse: 90  CURRENT BLOOD PRESSURE:  BP: (!) 153/59  24HR BLOOD PRESSURE RANGE:  Systolic (25FKR), EOE:005 , Min:110 , QNJ:200   ; Diastolic (30DMP), NHK:87, Min:59, Max:78    24HR INTAKE/OUTPUT:      Intake/Output Summary (Last 24 hours) at 4/15/2021 1214  Last data filed at 4/15/2021 1139  Gross per 24 hour   Intake 360 ml   Output 1225 ml   Net -865 ml     PE from the 4/6/21 visit  General Appearance: appears comfortable in mild acute distress. HEENT: Normocephalic atraumatic without obvious abnormality   Neck: Lips, mucosa, and tongue normal.  Supple, symmetrical, trachea midline, no adenopathy;thyroid:  no enlargement/tenderness/nodules or JVD. Lung: Crackles bilat  Heart: Irreg Irreg no S3  Abdomen: Soft, NT, ND. BS present x 4 quadrants. No bruit or organomegaly. Extremities: Pedal pulses 2+ symmetric b/l. Extremities normal, no cyanosis, clubbing, or edema. R Femoral Vein Temp Dialysis Catheter  Musculokeletal: No joint swelling, no muscle tenderness. ROM normal in all joints of extremities.    Neurologic: Mental status: Underlying confusion      DATA:      CBC:   Lab Results   Component Value Date    WBC 6.2 04/15/2021    RBC 3.36 04/15/2021    RBC 3.68 12/19/2017    HGB 9.3 04/15/2021    HCT 30.7 04/15/2021    MCV 91.4 04/15/2021    MCH 27.7 04/15/2021    MCHC 30.3 04/15/2021    RDW 15.6 04/15/2021     04/15/2021    MPV 9.2 04/15/2021     BMP:    Lab Results   Component Value Date     04/15/2021    K 5.2 04/15/2021    K 4.2 06/20/2020     04/15/2021    CO2 27 04/15/2021    BUN 43 04/15/2021    LABALBU 2.6 04/15/2021    LABALBU 4.0 03/21/2012    CREATININE 3.5 04/15/2021    CALCIUM 8.6 04/15/2021    GFRAA 15 04/15/2021    LABGLOM 15 04/15/2021    GLUCOSE 101 04/15/2021    GLUCOSE 77 04/18/2012       RAD:  Echo Limited    Result Date: 3/30/2021  Transthoracic Echocardiography Report (TTE)  Demographics   Patient Name    Delia Weissing  Gender            Female                  P   Medical Record  [id number]     Room Number       5800  Number   Account #       [de-identified]    Procedure Date    03/30/2021   Corporate ID                 Ordering          Jose Francisco Fong MD                               Physician   Accession       [id number]   Referring         Linda Ansari                       Physician   Date of Birth 1935   Sonographer       [facility]   Age             81 year(s)   Interpreting      [address]                               Physician         Physician Cardiology                                                 Mary Jane Goyal MD                                Any Other  Procedure Type of Study   TTE procedure:Echo Limited Study. Procedure Date Date: 03/30/2021 Start: 07:22 AM Study Location: Portable Technical Quality: Adequate visualization Indications:Congestive heart failure, diastolic dysfunction. Patient Status: Routine Height: 64 inches Weight: 171 pounds BSA: 1.83 m^2 BMI: 29.35 kg/m^2 HR: 61 bpm BP: 144/66 mmHg  Findings   Left Ventricle  Left ventricle is normal in size . Mild left ventricular concentric hypertrophy noted. No regional wall motion abnormalities seen. Normal left ventricular ejection fraction. Ejection fraction is visually estimated at 65%. Indeterminate diastolic function. Right Ventricle  Normal right ventricular size and function. Left Atrium  The left atrium is mildly dilated. Interatrial septum appears intact. Right Atrium  Normal right atrium size. Mitral Valve  Focal calcification mitral valve leaflets. Mild mitral annular calcification. Mild mitral regurgitation is present. Tricuspid Valve  The tricuspid valve appears structurally normal.  Moderate tricuspid regurgitation. RVSP is 50 mmHg. Pulmonary hypertension is mild to moderate . Aortic Valve  The aortic valve appears mildly sclerotic. Mean transaortic gradient (Doppler) 11 mm Hg . Mild aortic stenosis. Pulmonic Valve  The pulmonic valve was not well visualized. Mild pulmonic regurgitation present. Pericardial Effusion  There is a trivial circumferential pericardial effusion noted. Pleural Effusion  Moderate left pleural effusion. Aorta  Aortic root dimension within normal limits. Conclusions   Summary  Left ventricle is normal in size .   Mild left ventricular concentric hypertrophy noted. No regional wall motion abnormalities seen. Normal left ventricular ejection fraction. The left atrium is mildly dilated. Focal calcification mitral valve leaflets. Mild mitral annular calcification. Mild mitral regurgitation is present. Mild aortic stenosis. Moderate tricuspid regurgitation. Pulmonary hypertension is mild to moderate . There is a trivial circumferential pericardial effusion noted. Moderate left pleural effusion. Signature   ----------------------------------------------------------------  Electronically signed by Silvana Webb MD(Interpreting  physician) on 03/30/2021 05:36 PM  ----------------------------------------------------------------  M-Mode/2D Measurements & Calculations   LV Diastolic       LV Systolic Dimension: 2.5 cm  Dimension: 3.9 cm  LV Volume Diastolic: 69.8 ml  LV AH:65.7 %       LV Volume Systolic: 22 ml  LV PW Diastolic:   LV EDV/LV EDV Index: 65.7 DN/05      RV Diastolic  1.2 cm             ml/m^2LV ESV/LV ESV Index: 22        Dimension: 3 cm  LV PW Systolic:    SX/31UD/ m^2  1. 6 cm             EF Calculated: 66.5 %  Septum Diastolic:  LV Mass Index: 87 l/min*m^2  1. 2 cm  Septum Systolic:  1.5 cm             LVOT: 2 cm                           IVC Expiration:  CO: 5.36 l/min                                          2.4 cm  CI: 2.93 l/m*m^2  LV Mass: 159.29 g  Doppler Measurements & Calculations                      AV Peak Velocity: 2.29 LVOT Peak Velocity: 1.3 m/s                     m/s                    LVOT Mean Velocity: 0.93 m/s                     AV Peak Gradient:      LVOT Peak Gradient: 6.7 mmHgLVOT                     21.03 mmHg             Mean Gradient: 4 mmHg                     AV Mean Velocity: 1.62  MV E' Septal       m/s  Velocity: 0.05 m/s AV Mean Gradient: 11.3  MV E' Lateral      mmHg                   TR Velocity:3.56 m/s  Velocity: 6 m/s    AV VTI: 47.8 cm        TR Gradient:50.81 mmHg                     AV Area                     (Continuity):1.84 cm^2                      LVOT VTI: 28 cm                     IVRT: 96.9 msec  [email or web address] [id number]    Xr Chest Portable    Result Date: 3/29/2021  EXAMINATION: ONE XRAY VIEW OF THE CHEST 3/29/2021 7:03 pm COMPARISON: 03/23/2021. HISTORY: ORDERING SYSTEM PROVIDED HISTORY: dyspnea TECHNOLOGIST PROVIDED HISTORY: Reason for exam:->dyspnea FINDINGS: There is cardiomegaly with prominence of the superior mediastinum. There is vascular congestion with patchy perihilar infiltrates extending to the lung bases. Pleural effusions are suspected. Cardiomegaly with progressive perihilar and bibasilar infiltrates and pleural effusions likely CHF/edema and or pneumonia. Cta Pulmonary W Contrast    Result Date: 3/30/2021  EXAMINATION: CTA OF THE CHEST 3/29/2021 11:46 pm TECHNIQUE: CTA of the chest was performed after the administration of intravenous contrast.  Multiplanar reformatted images are provided for review. MIP images are provided for review. Dose modulation, iterative reconstruction, and/or weight based adjustment of the mA/kV was utilized to reduce the radiation dose to as low as reasonably achievable. COMPARISON: March 24 HISTORY: ORDERING SYSTEM PROVIDED HISTORY: rule out PE TECHNOLOGIST PROVIDED HISTORY: Reason for exam:->rule out PE Decision Support Exception->Emergency Medical Condition (MA) FINDINGS: Pulmonary Arteries: Pulmonary arteries are adequately opacified for evaluation. No evidence of intraluminal filling defect to suggest pulmonary embolism. Main pulmonary artery is normal in caliber. Mediastinum: Cardiomegaly with prominent coronary atherosclerotic calcifications. Small pericardial effusion. . No hilar or mediastinal adenopathy. Calcified hilar and mediastinal nodes.  Lungs/pleura: Bilateral pleural effusions, mildly increased in the PROVIDED HISTORY:   Reason for exam:->pna pl effusions       FINDINGS:   EKG leads overlie the chest.  Cardiac silhouette remains enlarged.  Scattered   vascular calcifications.  No pneumothorax.  Again identified are relatively   extensive bilateral mixed interstitial and airspace infiltrates.  Small   effusions.           Impression   No significant change in bilateral infiltrates which may represent edema. Pneumonia less likely.  Continued follow-up recommended. Assessment/Plan  1. Stage III AMANDEEP in the setting of the diuresis as well as the recent cefepime and the doses Vanc the last one being on 4/1/21-she did have a CTA of the chest but the timing was off for RCIN  FeNa <1% and FeUrea <35% consistent with a component of decreased effective renal perfusion  Cr up from 3.6-->4.1mg/dl with ongoing infiltrates on the CXR -UO low, but I/O are incomplete-discussed with Pulm and agrees with proceeding with KRT as IHD  S/P R Femoral Vein Dialysis Catheter 4/13/21 and 1st dialysis  PLAN:1. 3rd IHD this AM with 600ml vol removal in the setting of the intradialytic hypotension and hypoxia with the Afib  Spoke with he pt son Ketty Hernández and updated to events in dialysis today       2. Hyocalcemia with hypoalbuminemia in the setting of the AMANDEEP and the Unspecified Vit D Def and the Sec HPTH of Renal Origin with hyperphosphatemia  Vit D level 9  PO4 6.6-->5.0  Last Ionized Ca++ WNL  PLAN:1. Continue PO4 binder  2. Continue  Vit D,   3. Follow Ca++ and PO4 levels     3. HFpEF with a Hx of VHD  PLAN:1. Holding the diuretics for present     4. Met alkalosis-progressive-sec to diuresis  Resolved     5. AMS which may reflect the cefepime in the setting of an elderly pt with worsening renal status-improved off cefepime  PLAN:1. Follow     6. Acute hypoxic Resp Failure/ HCAP vs Asp I-COVId 19 (-) and pt out of isolation    Thank you for allowing us to participate in care of Ms. Ina Garibay MD

## 2021-04-16 NOTE — PROGRESS NOTES
Patient is down at dialysis. Received a call from CMR that patient went into A-Fib. Patient is controlled in the 80's. Sent message via perfect serve to Dr. Warren Rob., Cardiology -  notifying of patient's status. Will continue to monitor.  Electronically signed by Jessica Knapp RN on 4/16/2021 at 3:54 PM

## 2021-04-16 NOTE — PROGRESS NOTES
[employer] Hospitalist Progress Note    Admission Date  3/29/2021  5:48 PM  Chief Complaint Dehydration, diarrhea  Admit Dx   Acute respiratory failure with hypoxia (HCC) [J96.01]    Subjective  History of Present Illness  Seen at bedside, resting w HFNC in place. Pt in no distress, very weak and w continued SOB. Had not had attempt at dialysis yet today but did not tolerate well yesterday. Discussed w pulmonary NP, pt son from [place] has driven up and appears realistic, seems to be leaning toward comfort measures. Unfortunately at this point pt appears weak to the point that I don't think she could participate in swallow eval or therapy. Review of Systems - 12-point review of systems has been reviewed and is otherwise negative except as listed in the HPI    Objective  Physical Exam  Vitals: BP (!) 124/50   Pulse 72   Temp 101 °F (38.3 °C) (Oral)   Resp 22   Ht 5' 4\" (1.626 m)   Wt 173 lb 6.4 oz (78.7 kg)   SpO2 90%   BMI 29.76 kg/m²   General: well-developed, well-nourished, no acute distress but definitely weak-appearing, cooperative  Skin: warm, dry, intact, normal color without cyanosis  HEENT: normocephalic, atraumatic, mucous membranes normal; NRB in place  Respiratory: clear to auscultation bilaterally without respiratory distress  Cardiovascular: irregularly irregular rhythm without murmur / rub / gallop  Abdominal: soft, nontender, nondistended, normoactive bowel sounds  Extremities: no mottling, pulses intact, no edema  Neurologic: awake, alert, no focal deficits  Psychiatric: normal affect, cooperative    Assessment / Plan  Acute hypoxic resp failure - d/t combo of COVID (positive 4/6, negative 4/9 so ??), pneumonia, HFpEF exacerbation, Afib RVR and effusions. On 7-8 L NRB currently, CXR 4/15 showed stable b/l infiltrates favored to represent edema. Had 3 days of daily HD w temp catheter, did not tolerate HD well yesterday, further sessions as per nephrology.   S/p course of abx as directed by ID. Not much was able to be done about the effusions. Pulm following who has consulted palliative care. Pt NPO and speech consulted but pt likely too weak to participate in eval   COVID-19 pna - see above   Secondary HCAP vs aspiration pna - see above   Acute on chronic HFpEF - see above   Pleural effusions - see above   Pharyngeal dysphagia, mild - see above    AMANDEEP now on HD - baseline appears to be ~1 though started to slowly increase ~3/4. Nephro consulted and following, temp HD cath placed looks like 4/13 and now on daily HD though not tolerating particularly well. Symptomatic during HD, ~700 cc off during most recent session yd. Follow w nephro recs. Afib RVR, new-onset - went into Afib RVR reportedly in HD today (4/15) w HR controlled and not needing Cardizem gtt. Known HFpEF, echo 3/29 noted w dilated L atrium. Cardio consulted, input reviewed    HTN - on hydralazine and metoprolol, BP stable    Anemia, stable    HypoCa, now resolved    Metabolic acidosis, now resolved    Code status  Full  DVT prophylaxis Heparin  Disposition  To be determined; palliative following and son seems to be leaning more toward comfort measures.     Electronically signed by Feng Causey DO on 4/16/2021 at 1:47 PM

## 2021-04-16 NOTE — PROGRESS NOTES
Palliative Care Department  Palliative Care Progress Note  Provider: Oralia Harris MD  [address] Day: [birth year]  Date of Initial Consult: 4/15/2021  Referring Provider: Dr. Emerson Patino was consulted for assistance with: Code status Discussion and Assist with goals of care    Chief Complaint: Ellen Gomez is a 81 y.o. female with chief complaint of SOB    HPI:   Ellen Gomez is a 81 y.o. female with significant past medical history of CHF, CKD, with multiple recent hospitalizations for CHF exacerbation, who was admitted on 3/29/2021 due to continued SOB, test + COVID, as well as worsening AMANDEEP leading to need for HD. On 4/15 her O2 sat decreased, requiring 15L NRB, also developing A-fib. Palliative has been consulted to assist with goals of care and code status. ASSESSMENT/PLAN:     Pertinent Hospital Diagnoses:  Current medical issues leading to Palliative Medicine involvement include   Active Hospital Problems    Diagnosis Date Noted    Encounter regarding vascular access for dialysis for ESRD ([facility] [address] [N18.6, Z99.2] 04/15/2021    New onset atrial fibrillation ([facility] [facility] [I48.91]     Acidosis [E87.2]     Essential hypertension [I10]     Controlled type 2 diabetes mellitus without complication ([address] [K10.2]     Acute respiratory failure with hypoxia ([facility] [J96.01] 03/29/2017     Palliative Care Encounter / Counseling Regarding Goals of Care:  Please see detailed goals of care discussion as below.  At this time, Ellen Gomez, Does have capacity for medical decision-making. Capacity is time limited and situation/question specific.    Outcome of goals of care meeting: live longer, improve or maintain function/quality of life and continue current management   Code status: Full Code  o I reviewed with the Patient that given multiple medical co-morbidities, advanced disease process, current severe acute illness and advanced age, in the event of cardiac arrest, the chances of surviving may likely be low. It was also reviewed that if they survived a cardiac arrest, a return to prior level of function also may be low.  Advanced Directives: None   Surrogate/Legal NOK:   Christiana Escalante ([phone]) is the patient's son, Aniya Vela ([phone]) is the patient's son who lives locally.  Will follow and continued to discuss goals of care pending clinical progression. Referrals: none today    SUBJECTIVE:   Events/Discussions:  4/16/2021:  Spoke with Ofelia Aguirre about his mother's condition. He flew in from [place] to be with his mother. Explained about her condition, how she has been for the past few days. He has not seen his mother since the past 3 weeks, last time she was admitted to the hospital he told me that she was completed from person. Now she is very lethargic and not able to communicate much. I asked him about advanced directives, he needs to have this discussion with his siblings. Currently they want to pursue full code. OBJECTIVE:   Prognosis: Guarded    Physical Exam:  BP (!) 124/50   Pulse 72   Temp 101 °F (38.3 °C) (Oral)   Resp 22   Ht 5' 4\" (1.626 m)   Wt 173 lb 6.4 oz (78.7 kg)   SpO2 90%   BMI 29.76 kg/m²     Gen:  Alert, appears stated age, well nourished, in no acute distress  HEENT:  Normocephalic, conjunctiva pink, no drainage, mucosa moist  Neck:  Supple  Lungs: Coarse breath sounds with diminished bases  Heart: RRR, no murmur, rub, or gallop noted during exam  Abd:  Soft, non tender, non distended, BS+  M/S/Ext:  Moving all extremities, no edema, pulses present  Skin:  Warm and dry  Neuro:  PERRL, Alert, oriented x 3; following commands    We will sign off    Objective data reviewed: labs, images, records, medication use, vitals and chart    Time/Communication:  Greater than 50% of time spent, total 20 minutes in counseling and coordination of care at the bedside regarding goals of care and see above.     Wilda Walsh MD  Palliative Medicine    Patient and the plan of care discussed with the other IDT members of Palliative Care Team, and with patient and floor nurse, as appropriate and available. Thank you for allowing Palliative Medicine to participate in the care of Nessa Sorto. Note: This report was completed using computer[employer]iddies [employer]ilz voiced recognition software. Every effort has been made to ensure accuracy; however, inadvertent computerized transcription errors may be present.

## 2021-04-16 NOTE — PROGRESS NOTES
Date: 4/15/2021    Time: 8:32 PM    Patient Placed On BIPAP/CPAP/ Non-Invasive Ventilation? No    If no must comment. Facial area red/color change? No           If YES are Blister/Lesion present? No   If yes must notify nursing staff  BIPAP/CPAP skin barrier? No    Skin barrier type:n/a       Comments: Patient refusing BiPAP. Machine in room if needed.         Jolene Oleary

## 2021-04-16 NOTE — CARE COORDINATION
CM NOTE: Now using O2 12L (down from 15L yesterday). Nurse to update family on pt clinical status. Palliative med following. Will need permanent HD cath placement. Per TIFFANIE-- received insurance auth for [facility]---good through weekend. Will continue to follow pt.

## 2021-04-16 NOTE — FLOWSHEET NOTE
04/16/21 1758   Vital Signs   /75   Temp 98.4 °F (36.9 °C)   Pulse 73   Resp 16   Weight 172 lb 13.5 oz (78.4 kg)   Percent Weight Change -0.38   Pain Assessment   Pain Assessment 0-10   Pain Level 0   Post-Hemodialysis Assessment   Post-Treatment Procedures Blood returned   Machine Disinfection Process Acid/Vinegar Clean;Exterior Machine Disinfection; Heat Disinfect   NET Removed (ml) 500 ml   Tolerated Treatment Fair   Patient Response to Treatment tolerated tx. pt unable to maintain B profile with uf on. report given to primary nure. uf 500.    Bilateral Breath Sounds Diminished   RLE Edema +1   LLE Edema +1

## 2021-04-16 NOTE — PROGRESS NOTES
Speech Language Pathology      NAME:  Gerry Adan  :  1935  DATE: 2021  ROOM:  [facility]/0721-B     Re-attempted clinical bedside evaluation today. Son present. Pt in bed, laying flat. Pt declined sitting upright and c/o of abdominal pain and dizziness when head of bed was moved by SLP student. Pt educated she must be upright for safe PO intake, pt continued to decline being seated upright. Pt also educated that without further swallowing assessment, SLP cannot make safe diet recommendations and pt may remain NPO. Pt continued to refuse. Pt unavailable at this time due to:  [] HOLD per RN  [] Off unit for testing/ procedure    [] With medical staff   [x] Declined intervention  [] Sleeping/ Lethargic   [] Other:       Will re-attempt as able. Thank you. Acute respiratory failure with hypoxia ([facility] [address] [J96.01]      WIASM Degroot. Speech-Language Pathology Student        Jonelle ARGUELLO CCC/SLP [id number]  Speech-Language Pathologist

## 2021-04-16 NOTE — PROGRESS NOTES
28-year-old female with medical history of heart failure preserved ejection fraction and chronic kidney disease and hypertension presents with congestive heart failure and worsening kidney function requiring dialysis. Patient went into new onset atrial fibrillation.     Recommendations  Patient continues to be in NSR. Atrial fibrillation was short-lived and spontaneously went into normal sinus rhythm shortly after giving diltiazem IV bolus. DC diltiazem. Continue oral metoprolol. Hold off anticoagulation at this point given her kidney dysfunction requiring dialysis and the fact that the atrial fibrillation was very brief. We will further investigate anticoagulation benefit versus risk in her case as an outpatient.

## 2021-04-16 NOTE — PROGRESS NOTES
Lucero Reyes M.D.,[facility]  Leonor Yen D.O., F.A.C.O.I., Aneesh Biggs M.D. Андрей King M.D., Dima Hayes M.D. Nic Walker D.O. Daily Pulmonary Progress Note    Patient:  Nessa Sorto 81 y.o. female MRN: [id number]     Date of Service: 4/16/2021      Synopsis     We are following patient for acute hypoxia and abnormal CT chest    \"CC\" ; Hartness of breath    Code status: Full code      Subjective      Patient personally seen and examined. Ill-appearing. Appears lethargic. Her son is present at bedside. Questions answered. He recently spoke with the palliative medicine team.  Patient is now requiring 12 L high flow nasal cannula. Weak cough difficulty expectorating. We will attempt dialysis again today. Do not feel she is able to undergo formal swallow study at this point, discussed with primary team.  Recommending comfort measures. Previously required Cardizem for A. fib rate control.   New fever this morning 101 °F.      Review of Systems:  Difficult to obtain patient is confused and hard of hearing    24-hour events:  None     Objective   Vitals: BP (!) 157/88   Pulse 73   Temp 98.4 °F (36.9 °C)   Resp 16   Ht 5' 4\" (1.626 m)   Wt 173 lb 8 oz (78.7 kg)   SpO2 90%   BMI 29.78 kg/m²     I/O:    Intake/Output Summary (Last 24 hours) at 4/16/2021 1625  Last data filed at 4/16/2021 0843  Gross per 24 hour   Intake 83.92 ml   Output 25 ml   Net 58.92 ml       Vent Information  Skin Assessment: Clean, dry, & intact  FiO2 : 50 %  SpO2: 90 %  SpO2/FiO2 ratio: 192  I Time/ I Time %: 0.9 s  Mask Type: Full face mask  Mask Size: Medium       IPAP: 15 cmH20  CPAP/EPAP: 6 cmH2O     CURRENT MEDS :  Scheduled Meds:   levalbuterol  0.63 mg Nebulization Q6H    dilTIAZem  10 mg Intravenous Once    heparin (porcine)  5,000 Units Subcutaneous Q8H    epoetin emile-epbx  10,000 Units Subcutaneous Q7 Days    ascorbic acid  500 mg Oral Daily    zinc sulfate  50 mg Oral Mild mitral regurgitation is present. Mild aortic stenosis. Moderate tricuspid regurgitation. Pulmonary hypertension is mild to moderate . There is a trivial circumferential pericardial effusion noted. Moderate left pleural effusion. Labs:  Lab Results   Component Value Date    WBC 5.3 04/16/2021    HGB 9.2 04/16/2021    HCT 31.3 04/16/2021    MCV 92.1 04/16/2021    MCH 27.1 04/16/2021    MCHC 29.4 04/16/2021    RDW 15.6 04/16/2021     04/16/2021    MPV 9.3 04/16/2021     Lab Results   Component Value Date     04/16/2021    K 4.7 04/16/2021    K 4.2 06/20/2020    CL 99 04/16/2021    CO2 29 04/16/2021    BUN 30 04/16/2021    CREATININE 3.4 04/16/2021    LABALBU 2.5 04/16/2021    LABALBU 4.0 03/21/2012    CALCIUM 8.1 04/16/2021    GFRAA 16 04/16/2021    LABGLOM 16 04/16/2021     Lab Results   Component Value Date    PROTIME 12.0 03/29/2021    INR 1.1 03/29/2021     No results for input(s): PROBNP in the last 72 hours. No results for input(s): PROCAL in the last 72 hours. This SmartLink has not been configured with any valid records. Micro:  No results for input(s): CULTRESP in the last 72 hours. No results for input(s): LABGRAM in the last 72 hours. No results for input(s): LEGUR in the last 72 hours. No results for input(s): STREPNEUMAGU in the last 72 hours. No results for input(s): LP1UAG in the last 72 hours. Assessment:    1. Acute hypoxic respiratory failure  2. COVID-19 pneumonia  3. combination of acute on chronic CHF  4. healthcare associated pneumonia versus aspiration pneumonia  5. History of heart failure with preserved ejection fraction  6. Mild pharyngeal dysphagia  7. Left pleural fluid diagnostic tap 75 cc removed, exudate. Cytology negative 4/3/21  8. AMANDEEP now requiring hemo-dialysis    Plan:   1. Wean FiO2 for saturations above 92% currently 12 L HF  2. Continue BiPAP as needed and nightly for respiratory support-not using  3.  Follow chest x-ray 4/15 increasing infiltrate and effusion episode of desaturation with dialysis  4. Albuterol HFA every 4 hours while awake  5. IR only 75 cc pleural fluid was drained from the left side 4/2/21. Cytology is negative. Fluid appears exudate. Fluid cultures negative  6. continue dysphagia 3 soft advance diet per speech recommendations. 7. iHD per nephrology   8. A. fib, previously requiring Cardizem infusion for rate control. 9. New fevers 101 °F.  10. covid negative x 2, out of isolation  11. Palliative medicine following await family decision on goals of care and CODE STATUS   12. Doing poorly overall recommend comfort measures  This plan of care was reviewed in collaboration with Dr. Angela Marie  Electronically signed by KASSIE Fowler CNP on 4/16/2021        Addendum:  Patient's prognosis overall is grim. Agree with palliative recommendations and making patient comfort care. I personally saw, examined, and cared for the patient. Labs, medications, radiographs reviewed. I agree with history exam and plans detailed in NP note.     Ramy Quiñonez MD

## 2021-04-16 NOTE — PROGRESS NOTES
Progress Note  NEPHROLOGY    Reason for Consult: Management of acute kidney injury      History of Present Iltracey from the 4/5/21 note: This is a 81-year-old female with a history of diastolic heart failure, hypothyroidism, diabetes mellitus type 2, hypothyroidism, essential hypertension who has been admitted twice this month for CHF exacerbation with discharge most recently 4 days ago. The night prior to admission she was complaining of generalized weakness and palpitations: therefore presented to the ED for further evaluation on 3/29/2021. Vitals upon arrival included  BP (!) 142/70   Pulse 76   Temp 98.3 °F (36.8 °C)   Resp 26   Ht 5' 3\" (1.6 m)   Wt 159 lb (72.1 kg)   SpO2 98%   BMI 28.17 kg/m². Pertinent labs included WBC 8.2 and hemoglobin 10.4. Initial BMP revealed sodium 140, potassium 5.2, chloride 103, CO2 22, BUN 26 and creatinine 1.3. BNP 3514. Initial lactic acid 6.2. Initial ABGs 7.397, PCO2 35.6, PO2 61.5, bicarb 21 and 90% O2 saturation. Chest x-ray revealed cardiomegaly with progressive perihilar and bibasilar infiltrates and pleural effusions. Patient was subsequently admitted with acute respiratory failure with hypoxia. Patient currently examined sitting up in bed in mild acute distress. She is a very poor historian. ROS difficult to accurately obtain. Per the nursing staff did require 15 L NRB mask this morning (she is refusing the Bipap). She continues to have diffuse interstitial infiltrates on CXR (Pulmonology is following). Cardiology is also following: Patient was initially on IV bumex, but was changed to PO on 4/3/21. She has a history of stage I systolic hypertension. 4/16/21:  Seen and examined. Appears dyspneic though she denies shortness of breath. Complains of feeling \"cold. \"  No chest pain, abdominal misa, nausea      Past Medical History:        Diagnosis Date    (HFpEF) heart failure with preserved ejection fraction ([facility] [address] 05/26/2017    3/31/17- echo- LVEF 60-65%, mild LVH, mild MR    Arthritis     Bursitis     shoulders    Cervical radiculopathy     CHF (congestive heart failure) (HCC)     CKD (chronic kidney disease) stage 3, GFR 30-59 ml/min     Diabetes mellitus ([facility] [address]     [facility] vascular [facility] 4/15/2021    GERD (gastroesophageal reflux disease)     [place] (hard of hearing)     Hypertension     Hypothyroidism     SSS (sick sinus syndrome) ([facility])     stable, per epic note.     Thyroid disease     Unsteadiness     Valvular heart disease     sees Dr. John Brothers        Past Surgical History:        Procedure Laterality Date    FOOT SURGERY      both    JOINT REPLACEMENT  1999    TOTAL KNEE ARTHROPLASTY Right 3/21/2019    RIGHT KNEE TOTAL ARTHROPLASTY (ILENE)++ADDUCTOR CANAL BLOCK++ performed by Cami Fonseca MD at [facility] OR       Current Medications:    Current Facility-Administered Medications: levalbuterol (XOPENEX) nebulization 0.63 mg, 0.63 mg, Nebulization, Q6H  dilTIAZem 100 mg in dextrose 5 % 100 mL infusion ([facility]-[place]), 5-15 mg/hr, Intravenous, Continuous  dilTIAZem injection 10 mg, 10 mg, Intravenous, Once  heparin (porcine) injection 5,000 Units, 5,000 Units, Subcutaneous, Q8H  epoetin emile-epbx (RETACRIT) injection 10,000 Units, 10,000 Units, Subcutaneous, Q7 Days  ascorbic acid (VITAMIN C) tablet 500 mg, 500 mg, Oral, Daily  zinc sulfate (ZINCATE) capsule 50 mg, 50 mg, Oral, Daily  vitamin D (ERGOCALCIFEROL) capsule 50,000 Units, 50,000 Units, Oral, Weekly  guaiFENesin tablet 400 mg, 400 mg, Oral, TID  hydrALAZINE (APRESOLINE) tablet 25 mg, 25 mg, Oral, 3 times per day  isosorbide dinitrate (ISORDIL) tablet 10 mg, 10 mg, Oral, TID  metoprolol succinate (TOPROL XL) extended release tablet 37.5 mg, 37.5 mg, Oral, BID  aspirin EC tablet 81 mg, 81 mg, Oral, Daily  levothyroxine (SYNTHROID) tablet 50 mcg, 50 mcg, Oral, Daily  glucose (GLUTOSE) 40 % oral gel 15 g, 15 g, Oral, PRN  dextrose 50 % IV solution, 12.5 g, Intravenous, PRN  glucagon (rDNA) injection 1 mg, 1 mg, Intramuscular, PRN  dextrose 5 % solution, 100 mL/hr, Intravenous, PRN  insulin lispro (HUMALOG) injection vial 0-6 Units, 0-6 Units, Subcutaneous, TID WC  insulin lispro (HUMALOG) injection vial 0-3 Units, 0-3 Units, Subcutaneous, Nightly  sodium chloride flush 0.9 % injection 10 mL, 10 mL, Intravenous, 2 times per day  sodium chloride flush 0.9 % injection 10 mL, 10 mL, Intravenous, PRN  0.9 % sodium chloride infusion, 25 mL, Intravenous, PRN  promethazine (PHENERGAN) tablet 12.5 mg, 12.5 mg, Oral, Q6H PRN **OR** ondansetron (ZOFRAN) injection 4 mg, 4 mg, Intravenous, Q6H PRN  polyethylene glycol (GLYCOLAX) packet 17 g, 17 g, Oral, Daily PRN  acetaminophen (TYLENOL) tablet 650 mg, 650 mg, Oral, Q6H PRN **OR** acetaminophen (TYLENOL) suppository 650 mg, 650 mg, Rectal, Q6H PRN  perflutren lipid microspheres (DEFINITY) injection 1.65 mg, 1.5 mL, Intravenous, ONCE PRN    Allergies:  Aspirin    Social History:      reports that she has never smoked. She has never used smokeless tobacco. She reports previous alcohol use. She reports that she does not use drugs.       Family History:     Family History   Problem Relation Age of Onset    No Known Problems Mother     No Known Problems Father          Review of Systems:       Pertinent positives stated above in HPI. All other systems were reviewed and were negative.     Physical exam:   Constitutional: Elderly frail male VITALS:  BP (!) 110/55   Pulse 69   Temp 98.2 °F (36.8 °C) (Oral)   Resp 20   Ht 5' 4\" (1.626 m)   Wt 173 lb 6.4 oz (78.7 kg)   SpO2 98%   BMI 29.76 kg/m²   CURRENT TEMPERATURE:  Temp: 98.2 °F (36.8 °C)  CURRENT RESPIRATORY RATE:  Resp: 20  CURRENT PULSE:  Pulse: 69  CURRENT BLOOD PRESSURE:  BP: (!) 110/55  24HR BLOOD PRESSURE RANGE:  Systolic (46OLI), JHV:137 , Min:110 , DPN:171   ; Diastolic (75IOG), OQR:70, Min:55, Max:77    24HR INTAKE/OUTPUT:      Intake/Output Summary (Last 24 hours) at 4/16/2021 0945  Last data filed at 4/16/2021 0843  Gross per 24 hour   Intake 383.92 ml   Output 1000 ml   Net -616.08 ml       General Appearance:  NAD  HEENT: Normocephalic atraumatic without obvious abnormality   Neck: no JVD   Lung: Crackles bilat  Heart: Irreg Irreg no S3  Abdomen: Soft, NT, ND. NABS   Extremities: No edema.   Femoral Vein Temp Dialysis Catheter  Neurologic: NFD        DATA:      CBC:   Lab Results   Component Value Date    WBC 5.3 04/16/2021    RBC 3.40 04/16/2021    RBC 3.68 12/19/2017    HGB 9.2 04/16/2021    HCT 31.3 04/16/2021    MCV 92.1 04/16/2021    MCH 27.1 04/16/2021    MCHC 29.4 04/16/2021    RDW 15.6 04/16/2021     04/16/2021    MPV 9.3 04/16/2021     BMP:    Lab Results   Component Value Date     04/16/2021    K 4.7 04/16/2021    K 4.2 06/20/2020    CL 99 04/16/2021    CO2 29 04/16/2021    BUN 30 04/16/2021    LABALBU 2.5 04/16/2021    LABALBU 4.0 03/21/2012    CREATININE 3.4 04/16/2021    CALCIUM 8.1 04/16/2021    GFRAA 16 04/16/2021    LABGLOM 16 04/16/2021    GLUCOSE 104 04/16/2021    GLUCOSE 77 04/18/2012       RAD:  Echo Limited    Result Date: 3/30/2021  Transthoracic Echocardiography Report (TTE)  Demographics   Patient Name    Roderick Morrison  Gender            Female                  P   Medical Record  [id number]     Room Number       2594  Number   Account #       [de-identified]    Procedure Date    03/30/2021   Corporate ID                 Ordering          Malvin Farmer MD                               Physician   Accession       [id number]   Referring         Yamilex Pfeiffer  Number                       Physician   Date of Birth   1935   Sonographer       Ryan Ny RDCS   Age             81 year(s)   Interpreting      [address] [place] [address]                               Physician         Physician Cardiology                                                 Malvin Farmer MD                                Any Other  Procedure Type of Study   TTE procedure:Echo Limited Study. Procedure Date Date: 03/30/2021 Start: 07:22 AM Study Location: Portable Technical Quality: Adequate visualization Indications:Congestive heart failure, diastolic dysfunction. Patient Status: Routine Height: 64 inches Weight: 171 pounds BSA: 1.83 m^2 BMI: 29.35 kg/m^2 HR: 61 bpm BP: 144/66 mmHg  Findings   Left Ventricle  Left ventricle is normal in size . Mild left ventricular concentric hypertrophy noted. No regional wall motion abnormalities seen. Normal left ventricular ejection fraction. Ejection fraction is visually estimated at 65%. Indeterminate diastolic function. Right Ventricle  Normal right ventricular size and function. Left Atrium  The left atrium is mildly dilated. Interatrial septum appears intact. Right Atrium  Normal right atrium size. Mitral Valve  Focal calcification mitral valve leaflets. Mild mitral annular calcification. Mild mitral regurgitation is present. Tricuspid Valve  The tricuspid valve appears structurally normal.  Moderate tricuspid regurgitation. RVSP is 50 mmHg. Pulmonary hypertension is mild to moderate . Aortic Valve  The aortic valve appears mildly sclerotic. Mean transaortic gradient (Doppler) 11 mm Hg . Mild aortic stenosis. Pulmonic Valve  The pulmonic valve was not well visualized. Mild pulmonic regurgitation present. Pericardial Effusion  There is a trivial circumferential pericardial effusion noted. Pleural Effusion  Moderate left pleural effusion. Aorta  Aortic root dimension within normal limits. Conclusions   Summary  Left ventricle is normal in size . Mild left ventricular concentric hypertrophy noted. No regional wall motion abnormalities seen. Normal left ventricular ejection fraction. The left atrium is mildly dilated. Focal calcification mitral valve leaflets. Mild mitral annular calcification. Mild mitral regurgitation is present. Mild aortic stenosis.   Moderate tricuspid regurgitation. Pulmonary hypertension is mild to moderate . There is a trivial circumferential pericardial effusion noted. Moderate left pleural effusion. Signature   ----------------------------------------------------------------  Electronically signed by Neeta Reynolds MD(Interpreting  physician) on 03/30/2021 05:36 PM  ----------------------------------------------------------------  M-Mode/2D Measurements & Calculations   LV Diastolic       LV Systolic Dimension: 2.5 cm  Dimension: 3.9 cm  LV Volume Diastolic: 19.1 ml  LV LK:59.4 %       LV Volume Systolic: 22 ml  LV PW Diastolic:   LV EDV/LV EDV Index: 65.7 WD/07      RV Diastolic  1.2 cm             ml/m^2LV ESV/LV ESV Index: 22        Dimension: 3 cm  LV PW Systolic:    WX/12OH/ m^2  1. 6 cm             EF Calculated: 66.5 %  Septum Diastolic:  LV Mass Index: 87 l/min*m^2  1. 2 cm  Septum Systolic:  1.5 cm             LVOT: 2 cm                           IVC Expiration:  CO: 5.36 l/min                                          2.4 cm  CI: 2.93 l/m*m^2  LV Mass: 159.29 g  Doppler Measurements & Calculations                      AV Peak Velocity: 2.29 LVOT Peak Velocity: 1.3 m/s                     m/s                    LVOT Mean Velocity: 0.93 m/s                     AV Peak Gradient:      LVOT Peak Gradient: 6.7 mmHgLVOT                     21.03 mmHg             Mean Gradient: 4 mmHg                     AV Mean Velocity: 1.62  MV E' Septal       m/s  Velocity: 0.05 m/s AV Mean Gradient: 11.3  MV E' Lateral      mmHg                   TR Velocity:3.56 m/s  Velocity: 6 m/s    AV VTI: 47.8 cm        TR Gradient:50.81 mmHg                     AV Area                     (Continuity):1.84 cm^2                      LVOT VTI: 28 cm                     IVRT: 96.9 msec  [id number]    Xr Chest Portable    Result Date: 3/29/2021  EXAMINATION: ONE XRAY VIEW OF THE CHEST 3/29/2021 7:03 pm COMPARISON: 03/23/2021. HISTORY: ORDERING SYSTEM PROVIDED HISTORY: dyspnea TECHNOLOGIST PROVIDED HISTORY: Reason for exam:->dyspnea FINDINGS: There is cardiomegaly with prominence of the superior mediastinum. There is vascular congestion with patchy perihilar infiltrates extending to the lung bases. Pleural effusions are suspected. Cardiomegaly with progressive perihilar and bibasilar infiltrates and pleural effusions likely CHF/edema and or pneumonia. Cta Pulmonary W Contrast    Result Date: 3/30/2021  EXAMINATION: CTA OF THE CHEST 3/29/2021 11:46 pm TECHNIQUE: CTA of the chest was performed after the administration of intravenous contrast.  Multiplanar reformatted images are provided for review. MIP images are provided for review. Dose modulation, iterative reconstruction, and/or weight based adjustment of the mA/kV was utilized to reduce the radiation dose to as low as reasonably achievable. COMPARISON: March 24 HISTORY: ORDERING SYSTEM PROVIDED HISTORY: rule out PE TECHNOLOGIST PROVIDED HISTORY: Reason for exam:->rule out PE Decision Support Exception->Emergency Medical Condition (MA) FINDINGS: Pulmonary Arteries: Pulmonary arteries are adequately opacified for evaluation. No evidence of intraluminal filling defect to suggest pulmonary embolism. Main pulmonary artery is normal in caliber. Mediastinum: Cardiomegaly with prominent coronary atherosclerotic calcifications. Small pericardial effusion. . No hilar or mediastinal adenopathy. Calcified hilar and mediastinal nodes. Lungs/pleura: Bilateral pleural effusions, mildly increased in the interval since the prior examination. Multifocal patchy and ground-glass airspace disease in the lungs bilaterally, also present on the prior examination. There is more confluent airspace disease in the lower lobes bilaterally, most consistent with multifocal pneumonia. Images are degraded by respiratory motion artifact. Calcified granuloma in the right lower lobe. Upper Abdomen: Splenic calcifications which may represent granuloma. Reflux of contrast into the IVC and hepatic veins which may be seen with right heart failure. Soft Tissues/Bones: No acute bone or soft tissue abnormality. No evidence of pulmonary emboli. Cardiomegaly with prominent coronary atherosclerotic calcifications and small pericardial effusion. Bilateral pleural effusions, mildly increased in the interval since the prior examination. Multifocal patchy and ground-glass airspace disease in the lungs bilaterally, and more confluent airspace disease in the lower lobes bilaterally, most consistent with multifocal pneumonia. Pulmonary edema cannot be excluded. Reflux of contrast into the IVC and hepatic veins which may be seen with right heart failure. EXAMINATION:   ONE XRAY VIEW OF THE CHEST       4/6/2021 6:01 am       COMPARISON:   04/03/2021       HISTORY:   ORDERING SYSTEM PROVIDED HISTORY: pna   TECHNOLOGIST PROVIDED HISTORY:   Reason for exam:->pna       FINDINGS:   Stable cardiomediastinal silhouette.  There are bilateral perihilar lung base   airspace opacities and pleural effusions, which appear mildly increased   compared to the prior study.  No pneumothorax.  No acute osseous abnormality.           Impression   Mild increased bilateral pulmonary airspace opacities and pleural effusions. Findings may be related pulmonary edema and/or pneumonia.      EXAMINATION:   ONE XRAY VIEW OF THE CHEST       4/15/2021 6:37 am       COMPARISON:   04/10/2021       HISTORY:   ORDERING SYSTEM PROVIDED HISTORY: pna pl effusions   TECHNOLOGIST PROVIDED HISTORY:   Reason for exam:->pna pl effusions       FINDINGS:   EKG leads overlie the chest.  Cardiac silhouette remains enlarged.  Scattered   vascular calcifications.  No pneumothorax.  Again identified are relatively   extensive bilateral mixed interstitial and airspace infiltrates.  Small   effusions.         Impression   No significant change in bilateral infiltrates which may represent edema. Pneumonia less likely.  Continued follow-up recommended. Assessment/Plan      1. Stage III AMANDEEP in the setting of the diuresis as well as the recent cefepime and the doses Vanc the last one being on 4/1/21-she did have a CTA of the chest but the timing was off for RCIN  FeNa <1% and FeUrea <35% consistent with a component of decreased effective renal perfusion  Cr up from 3.6-->4.1mg/dl with ongoing infiltrates on the CXR -UO low, but I/O are incomplete-discussed with Pulm and agrees with proceeding with KRT as IHD  S/P R Femoral Vein Dialysis Catheter 4/13/21 and 1st dialysis    PLAN: Did not tolerate dialysis well yesterday due to intradialytic hypotension and hypoxia with Afib  Will follow clinically but would likely benefit from dialysis today    2. Hyocalcemia with hypoalbuminemia in the setting of the AMANDEEP and the Unspecified Vit D Def and the Sec HPTH of Renal Origin with hyperphosphatemia  Vit D level 9  Last Ionized Ca++ WNL  PLAN: Follow phosphorous and give binder as needed. Continue vitamin D.      3. HFpEF with a Hx of VHD  PLAN:1. Holding the diuretics for present     4. Anemia   No reported blood loss  PLAN: follow Hgb, continue NIYA    5. AMS which may reflect the cefepime in the setting of an elderly pt with worsening renal status-improved off cefepime  PLAN:1. Follow     6. Acute hypoxic Resp Failure/ HCAP vs Asp I-COVId 19 (-) and pt out of isolation        Thank you for allowing us to participate in care of Ms.  Luisito Akins MD

## 2021-04-17 NOTE — PROGRESS NOTES
Call placed to Dr. Dailey  regarding pt and floor nurse feeling as if pt was too unstable to come to unit for HD. Pt a hold at this time. Awaiting IV team to draw and result labs. This nurse to notify Dr. Ashlie Mcgowan of lab results. Floor RN Eden Marrero made aware.

## 2021-04-17 NOTE — PROGRESS NOTES
SPEECH LANGUAGE PATHOLOGY    Follow-up call placed to patients nurse Twan Sheppard this date. Patient is currently on a NRB (she had her oxygen off earlier this date and her saturations dropped to 55%) and is inappropriate to complete a bedside swallow evaluation. Alerted nursing that status would be checked again on 4/17. She expressed understanding.        [address] [place] [address] [address]  4/17/2021

## 2021-04-17 NOTE — PROGRESS NOTES
INPATIENT CARDIOLOGY FOLLOW-UP    Name: Joe Rinaldi    Age: 81 y.o. Date of Admission: 3/29/2021  5:48 PM    Date of Service: 4/17/2021    Interim History:  Mental status worsening today unable to take p.o. Back into atrial fibrillation after missing metoprolol oral doses. Heart rate around 100 bpm.  Review of Systems:   Cardiac: As per HPI  General: No fever, chills  Pulmonary: As per HPI  HEENT: No visual disturbances, difficult swallowing  GI: No nausea, vomiting  Endocrine: No thyroid disease or DM  Musculoskeletal: QUIGLEY x 4, no focal motor deficits  Skin: Intact, no rashes  Neuro/Psych: No headache or seizures    Problem List:  Patient Active Problem List   Diagnosis    HTN (hypertension), benign    Hypertensive heart disease    Hypothyroidism    DM (diabetes mellitus), type 2 ([address]    [facility] failure with hypoxia [facility]    Uncontrolled type 2 diabetes mellitus with hyperglycemia ([facility]    Chronic diastolic CHF (congestive heart failure) ([facility])    Chest pain    LVH (left ventricular hypertrophy)    Non-rheumatic mitral regurgitation    CKD (chronic kidney disease) stage 3, GFR 30-59 ml/min ([facility])    SSS (sick sinus syndrome) ([facility])    Dizziness    Unsteadiness    Leukopenia    Primary osteoarthritis involving multiple joints    Cervical radiculopathy    Bilateral shoulder bursitis    Aspirin intolerance    Primary osteoarthritis of one knee, right    Primary osteoarthritis of right knee    Acute blood loss anemia    HF (heart failure) ([facility]    Chronic pain syndrome    Cervicalgia    Myalgia    Intractable headache    Acute on chronic diastolic congestive heart failure ([facility])    Metabolic acidosis    Anemia    Edema    Neck pain    Congestive heart failure of unknown etiology ([facility])    AMANDEEP (acute kidney injury) ([facility]    Elevated troponin    Obesity (BMI 30.0-34. 9)    Essential hypertension    Controlled type 2 diabetes mellitus without complication ([address]    Acidosis    Encounter regarding vascular access for dialysis for ESRD ([facility]    New onset atrial fibrillation ([facility]       Allergies:   Allergies   Allergen Reactions    Aspirin Other (See Comments)     \"tears up my stomach\"       Current Medications:  Current Facility-Administered Medications   Medication Dose Route Frequency Provider Last Rate Last Admin    metoprolol (LOPRESSOR) injection 5 mg  5 mg Intravenous TID Xin Anton MD        morphine (PF) injection 2 mg  2 mg Intravenous Q4H PRN Arnaud R Lockso, DO   2 mg at 04/17/21 1221    levalbuterol (XOPENEX) nebulization 0.63 mg  0.63 mg Nebulization Q6H Josias Sullivan MD   0.63 mg at 04/17/21 0140    dilTIAZem injection 10 mg  10 mg Intravenous Once KASSIE Pereyra - CNP   Stopped at 04/15/21 1531    heparin (porcine) injection 5,000 Units  5,000 Units Subcutaneous Q8H Arnaud Hric, DO   5,000 Units at 04/17/21 0826    epoetin emile-epbx (RETACRIT) injection 10,000 Units  10,000 Units Subcutaneous Q7 Days Arnaldo Aguilera MD   10,000 Units at 04/15/21 1254    ascorbic acid (VITAMIN C) tablet 500 mg  500 mg Oral Daily KASSIE Lundberg - CNP   500 mg at 04/15/21 1236    zinc sulfate (ZINCATE) capsule 50 mg  50 mg Oral Daily KASSIE Lundberg - CNP   50 mg at 04/15/21 1238    vitamin D (ERGOCALCIFEROL) capsule 50,000 Units  50,000 Units Oral Weekly Koko Anguiano MD   50,000 Units at 04/13/21 8676    guaiFENesin tablet 400 mg  400 mg Oral TID KASSIE Lundberg - CNP   400 mg at 04/15/21 1237    hydrALAZINE (APRESOLINE) tablet 25 mg  25 mg Oral 3 times per day Alex Logan MD   25 mg at 04/15/21 1538    isosorbide dinitrate (ISORDIL) tablet 10 mg  10 mg Oral TID Alex Logan MD   10 mg at 04/15/21 1237    [Held by provider] metoprolol succinate (TOPROL XL) extended release tablet 37.5 mg  37.5 mg Oral BID Alex Logan MD   37.5 mg at 04/15/21 1237    aspirin EC tablet 81 mg  81 mg Oral Daily Samer Lab Results   Component Value Date    TSH 2.610 03/29/2021     Lab Results   Component Value Date    LABA1C 6.1 (H) 03/30/2021     Lab Results   Component Value Date     12/19/2017     Lab Results   Component Value Date    CHOL 106 03/31/2021    CHOL 134 03/04/2021    CHOL 131 07/27/2020     Lab Results   Component Value Date    TRIG 73 03/31/2021    TRIG 83 03/04/2021    TRIG 45 07/27/2020     Lab Results   Component Value Date    HDL 41 03/31/2021    HDL 65 03/04/2021    HDL 73 07/27/2020     Lab Results   Component Value Date    LDLCALC 50 03/31/2021    LDLCALC 52 03/04/2021    LDLCALC 49 07/27/2020    LDLCHOLESTEROL 73 12/19/2017     Lab Results   Component Value Date    LABVLDL 15 03/31/2021    LABVLDL 17 03/04/2021    LABVLDL 9 07/27/2020     Lab Results   Component Value Date    CHOLHDLRATIO 2 12/19/2017     Recent Labs     04/17/21  0902   PROBNP 8,749*       ASSESSMENT / PLAN:  60-year-old female with medical history of heart failure preserved ejection fraction and chronic kidney disease and hypertension presents with congestive heart failure and worsening kidney function requiring dialysis.  Patient went into new onset atrial fibrillation. Atrial fibrillation was short-lived initially and spontaneously went into normal sinus rhythm shortly after giving diltiazem IV bolus. Mental status worsening today unable to take p.o. Back into atrial fibrillation after missing metoprolol oral doses.       Recommendations  Start IV metoprolol. hold oral metoprolol. Hold off anticoagulation at this point given her kidney dysfunction requiring dialysis and the fact that the atrial fibrillation was very brief.  We will further investigate anticoagulation benefit versus risk in her case as an outpatient.         Pita Encarnacion MD  [facility]) Cardiology

## 2021-04-17 NOTE — PROGRESS NOTES
[facility]) Hospitalist Progress Note    Admission Date  3/29/2021  5:48 PM  Chief Complaint Dehydration, diarrhea  Admit Dx   Acute respiratory failure with hypoxia ([facility] [address] [J96.01]    Subjective  History of Present Illness  Patient was seen at bedside this morning with son, now up from [place], present at bedside. Had a long discussion with the son, reviewing the events that led up to this point as well as a more detailed discussion regarding the patient's current state as well as prognosis. Essentially, patient's main issue at this point is renal function, and she has not been tolerating dialysis. If she cannot get dialysis, she will die; if she can get dialysis, we do stand a chance (albeit small) of starting to work and make some progress on her numerous other issues which were outlined for him. He seems to express understanding and, through our conversation, the patient is now being transitioned to [facility] status, though the son does not want to make any further decisions at this time and would instead like to wait and see if she may be able to get dialysis tomorrow. The patient herself was lying in bed, nonrebreather in place at 13 L/min, somnolent, though in no distress. She did not contribute verbally to our conversation.     Review of Systems - 12-point review of systems has been reviewed and is otherwise negative except as listed in the HPI    Objective  Physical Exam  Vitals: /66   Pulse 90   Temp 99.5 °F (37.5 °C) (Axillary)   Resp 20   Ht 5' 4\" (1.626 m)   Wt 179 lb 8 oz (81.4 kg)   SpO2 95%   BMI 30.81 kg/m²   General: well-developed, well-nourished, no acute distress but definitely weak-appearing, cooperative  Skin: warm, dry, intact, normal color without cyanosis  HEENT: normocephalic, atraumatic, mucous membranes normal; NRB in place  Respiratory: clear to auscultation bilaterally without respiratory distress  Cardiovascular: irregularly irregular rhythm without murmur

## 2021-04-17 NOTE — PROGRESS NOTES
Karla Pineda M.D.,[facility]  Glo Burger D.O., F.A.C.O.I., Lexus Krishnamurthy M.D. Carrie Mayer M.D., Isabelle Andrew M.D. Julito Lopez D.O. Daily Pulmonary Progress Note    Patient:  Mary Alice Najera 81 y.o. female MRN: [id number]     Date of Service: 4/17/2021      Synopsis     We are following patient for acute hypoxia and abnormal CT chest    \"CC\" ; Hartness of breath    Code status: Full code      Subjective      Patient personally seen and examined. Ill-appearing. Appears lethargic. Her son is present at bedside. Questions answered. He recently spoke with the palliative medicine team.  Patient is now requiring 12 L high flow nasal cannula. Weak cough difficulty expectorating. Dialysis has been held today. Do not feel she is able to undergo formal swallow study at this point, discussed with primary team.  Patient was in A. fib this morning with heart rate in 100s started on Lopressor IV.     Review of Systems:  Difficult to obtain patient is confused and hard of hearing    24-hour events:  As above    Objective   Vitals: /60   Pulse 89   Temp 98.6 °F (37 °C) (Oral)   Resp 20   Ht 5' 4\" (1.626 m)   Wt 179 lb 8 oz (81.4 kg)   SpO2 95%   BMI 30.81 kg/m²     I/O:    Intake/Output Summary (Last 24 hours) at 4/17/2021 1647  Last data filed at 4/17/2021 0557  Gross per 24 hour   Intake 0 ml   Output 75 ml   Net -75 ml       Vent Information  Skin Assessment: Clean, dry, & intact  FiO2 : 50 %  SpO2: 95 %  SpO2/FiO2 ratio: 192  I Time/ I Time %: 0.9 s  Mask Type: Full face mask  Mask Size: Medium       IPAP: 15 cmH20  CPAP/EPAP: 6 cmH2O     CURRENT MEDS :  Scheduled Meds:   metoprolol  5 mg Intravenous TID    levalbuterol  0.63 mg Nebulization Q6H    dilTIAZem  10 mg Intravenous Once    heparin (porcine)  5,000 Units Subcutaneous Q8H    epoetin emile-epbx  10,000 Units Subcutaneous Q7 Days    ascorbic acid  500 mg Oral Daily    zinc sulfate  50 mg Oral Daily    vitamin D  50,000 Units Oral Weekly    guaiFENesin  400 mg Oral TID    hydrALAZINE  25 mg Oral 3 times per day    isosorbide dinitrate  10 mg Oral TID    [Held by provider] metoprolol succinate  37.5 mg Oral BID    aspirin EC  81 mg Oral Daily    levothyroxine  50 mcg Oral Daily    insulin lispro  0-6 Units Subcutaneous TID WC    insulin lispro  0-3 Units Subcutaneous Nightly    sodium chloride flush  10 mL Intravenous 2 times per day       Physical Exam:  General Appearance: appears comfortable in no acute distress. HEENT: Normocephalic atraumatic without obvious abnormality   Neck: Lips, mucosa, and tongue normal.  Supple, symmetrical, trachea midline, no adenopathy;thyroid:  no enlargement/tenderness/nodules or JVD. Lung: FEW crackles and diminished in the bases  Heart: RRR, normal S1, S2. No MRG  Abdomen: Soft, NT, ND. BS present x 4 quadrants. No bruit or organomegaly. Extremities: Pedal pulses 2+ symmetric b/l. Extremities normal, no cyanosis, clubbing,BLE edema   Musculokeletal: No joint swelling, no muscle tenderness. ROM normal in all joints of extremities. Neurologic: Mental status: Alert and pleasantly confused. PERTINENT IMAGING:  CXR 4/15       FINDINGS:   EKG leads overlie the chest.  Cardiac silhouette remains enlarged.  Scattered   vascular calcifications.  No pneumothorax.  Again identified are relatively   extensive bilateral mixed interstitial and airspace infiltrates.  Small   effusions.           Impression   No significant change in bilateral infiltrates which may represent edema. Pneumonia less likely.  Continued follow-up recommended. CTA chest 3/30/2021    ECHO  3/30/2021  Left ventricle is normal in size . Mild left ventricular concentric hypertrophy noted. No regional wall motion abnormalities seen. Normal left ventricular ejection fraction. The left atrium is mildly dilated. Focal calcification mitral valve leaflets.    Mild mitral annular calcification. Mild mitral regurgitation is present. Mild aortic stenosis. Moderate tricuspid regurgitation. Pulmonary hypertension is mild to moderate . There is a trivial circumferential pericardial effusion noted. Moderate left pleural effusion. Labs:  Lab Results   Component Value Date    WBC 7.4 04/17/2021    HGB 8.6 04/17/2021    HCT 29.3 04/17/2021    MCV 93.0 04/17/2021    MCH 27.3 04/17/2021    MCHC 29.4 04/17/2021    RDW 15.6 04/17/2021     04/17/2021    MPV 9.8 04/17/2021     Lab Results   Component Value Date     04/17/2021    K 4.7 04/17/2021    K 4.2 06/20/2020     04/17/2021    CO2 27 04/17/2021    BUN 25 04/17/2021    CREATININE 3.5 04/17/2021    LABALBU 2.5 04/17/2021    LABALBU 4.0 03/21/2012    CALCIUM 8.5 04/17/2021    GFRAA 15 04/17/2021    LABGLOM 15 04/17/2021     Lab Results   Component Value Date    PROTIME 12.0 03/29/2021    INR 1.1 03/29/2021     Recent Labs     04/17/21  0902   PROBNP 8,749*     No results for input(s): PROCAL in the last 72 hours. This SmartLink has not been configured with any valid records. Micro:  No results for input(s): CULTRESP in the last 72 hours. No results for input(s): LABGRAM in the last 72 hours. No results for input(s): LEGUR in the last 72 hours. No results for input(s): STREPNEUMAGU in the last 72 hours. No results for input(s): LP1UAG in the last 72 hours. Assessment:    1. Acute hypoxic respiratory failure  2. COVID-19 pneumonia  3. combination of acute on chronic CHF  4. healthcare associated pneumonia versus aspiration pneumonia  5. History of heart failure with preserved ejection fraction  6. Mild pharyngeal dysphagia  7. Left pleural fluid diagnostic tap 75 cc removed, exudate. Cytology negative 4/3/21  8. AMANDEEP now requiring hemo-dialysis    Plan:   1. Patient currently on a nonrebreather mask saturating 95%. Appears comfortable. Son at the bedside. Talk to him and gave him an update.   2. Continue

## 2021-04-17 NOTE — PROGRESS NOTES
Progress Note  NEPHROLOGY    Reason for Consult: Management of acute kidney injury      History of Present Lolly from the 4/5/21 note: This is a 80-year-old female with a history of diastolic heart failure, hypothyroidism, diabetes mellitus type 2, hypothyroidism, essential hypertension who has been admitted twice this month for CHF exacerbation with discharge most recently 4 days ago. The night prior to admission she was complaining of generalized weakness and palpitations: therefore presented to the ED for further evaluation on 3/29/2021. Vitals upon arrival included  BP (!) 142/70   Pulse 76   Temp 98.3 °F (36.8 °C)   Resp 26   Ht 5' 3\" (1.6 m)   Wt 159 lb (72.1 kg)   SpO2 98%   BMI 28.17 kg/m². Pertinent labs included WBC 8.2 and hemoglobin 10.4. Initial BMP revealed sodium 140, potassium 5.2, chloride 103, CO2 22, BUN 26 and creatinine 1.3. BNP 3514. Initial lactic acid 6.2. Initial ABGs 7.397, PCO2 35.6, PO2 61.5, bicarb 21 and 90% O2 saturation. Chest x-ray revealed cardiomegaly with progressive perihilar and bibasilar infiltrates and pleural effusions. Patient was subsequently admitted with acute respiratory failure with hypoxia. Patient currently examined sitting up in bed in mild acute distress. She is a very poor historian. ROS difficult to accurately obtain. Per the nursing staff did require 15 L NRB mask this morning (she is refusing the Bipap). She continues to have diffuse interstitial infiltrates on CXR (Pulmonology is following). Cardiology is also following: Patient was initially on IV bumex, but was changed to PO on 4/3/21. She has a history of stage I systolic hypertension. 4/17/21:  Seen and examined. She was hypoxic this AM per report. Currently, she feels better. She denies shortness of breath.   No chest pain, abdominal pain, nausea      Past Medical History:        Diagnosis Date    (HFpEF) heart failure with preserved ejection fraction ([facility] [address] 05/26/2017 3/31/17- echo- LVEF 60-65%, mild LVH, mild MR    Arthritis     Bursitis     shoulders    Cervical radiculopathy     CHF [facility]congestive heart failure) ([facility])     CKD (chronic kidney disease) stage 3, GFR 30-59 ml/min     Diabetes mellitus ([facility] [address]     Encounter regarding vascular access for dialysis for ESRD ([facility] [address] 4/15/2021    GERD (gastroesophageal reflux disease)     [place] (hard of hearing)     Hypertension     Hypothyroidism     SSS (sick sinus syndrome) ([facility])     stable, per epic note.     Thyroid disease     Unsteadiness     Valvular heart disease     sees Dr. Juancarlos Shea        Past Surgical History:        Procedure Laterality Date    FOOT SURGERY      both    JOINT REPLACEMENT  1999    TOTAL KNEE ARTHROPLASTY Right 3/21/2019    RIGHT KNEE TOTAL ARTHROPLASTY (ILENE)++ADDUCTOR CANAL BLOCK++ performed by Eduardo Burrell MD at [facility] OR       Current Medications:    Current Facility-Administered Medications: metoprolol (LOPRESSOR) injection 5 mg, 5 mg, Intravenous, TID  morphine (PF) injection 2 mg, 2 mg, Intravenous, Q4H PRN  levalbuterol (XOPENEX) nebulization 0.63 mg, 0.63 mg, Nebulization, Q6H  dilTIAZem injection 10 mg, 10 mg, Intravenous, Once  heparin (porcine) injection 5,000 Units, 5,000 Units, Subcutaneous, Q8H  epoetin emile-epbx (RETACRIT) injection 10,000 Units, 10,000 Units, Subcutaneous, Q7 Days  ascorbic acid (VITAMIN C) tablet 500 mg, 500 mg, Oral, Daily  zinc sulfate (ZINCATE) capsule 50 mg, 50 mg, Oral, Daily  vitamin D (ERGOCALCIFEROL) capsule 50,000 Units, 50,000 Units, Oral, Weekly  guaiFENesin tablet 400 mg, 400 mg, Oral, TID  hydrALAZINE (APRESOLINE) tablet 25 mg, 25 mg, Oral, 3 times per day  isosorbide dinitrate (ISORDIL) tablet 10 mg, 10 mg, Oral, TID  [Held by provider] metoprolol succinate (TOPROL XL) extended release tablet 37.5 mg, 37.5 mg, Oral, BID  aspirin EC tablet 81 mg, 81 mg, Oral, Daily  levothyroxine (SYNTHROID) tablet 50 mcg, 50 mcg, Oral, Daily  glucose (GLUTOSE) 40 % oral gel 15 g, 15 g, Oral, PRN  dextrose 50 % IV solution, 12.5 g, Intravenous, PRN  glucagon (rDNA) injection 1 mg, 1 mg, Intramuscular, PRN  dextrose 5 % solution, 100 mL/hr, Intravenous, PRN  insulin lispro (HUMALOG) injection vial 0-6 Units, 0-6 Units, Subcutaneous, TID WC  insulin lispro (HUMALOG) injection vial 0-3 Units, 0-3 Units, Subcutaneous, Nightly  sodium chloride flush 0.9 % injection 10 mL, 10 mL, Intravenous, 2 times per day  sodium chloride flush 0.9 % injection 10 mL, 10 mL, Intravenous, PRN  0.9 % sodium chloride infusion, 25 mL, Intravenous, PRN  promethazine (PHENERGAN) tablet 12.5 mg, 12.5 mg, Oral, Q6H PRN **OR** ondansetron (ZOFRAN) injection 4 mg, 4 mg, Intravenous, Q6H PRN  polyethylene glycol (GLYCOLAX) packet 17 g, 17 g, Oral, Daily PRN  acetaminophen (TYLENOL) tablet 650 mg, 650 mg, Oral, Q6H PRN **OR** acetaminophen (TYLENOL) suppository 650 mg, 650 mg, Rectal, Q6H PRN  perflutren lipid microspheres (DEFINITY) injection 1.65 mg, 1.5 mL, Intravenous, ONCE PRN    Allergies:  Aspirin    Social History:      reports that she has never smoked. She has never used smokeless tobacco. She reports previous alcohol use. She reports that she does not use drugs.       Family History:     Family History   Problem Relation Age of Onset    No Known Problems Mother     No Known Problems Father          Review of Systems:       Pertinent positives stated above in HPI. All other systems were reviewed and were negative.     Physical exam:   Constitutional: Elderly frail male VITALS:  /60   Pulse 89   Temp 98.6 °F (37 °C) (Oral)   Resp 20   Ht 5' 4\" (1.626 m)   Wt 179 lb 8 oz (81.4 kg)   SpO2 95%   BMI 30.81 kg/m²   CURRENT TEMPERATURE:  Temp: 98.6 °F (37 °C)  CURRENT RESPIRATORY RATE:  Resp: 20  CURRENT PULSE:  Pulse: 89  CURRENT BLOOD PRESSURE:  BP: 120/60  24HR BLOOD PRESSURE RANGE:  Systolic (10DWM), NF , Min:108 , EXU:190   ; Diastolic (22BIU), ATD:42, Min:52, Max:66    24HR INTAKE/OUTPUT:      Intake/Output Summary (Last 24 hours) at 4/17/2021 1806  Last data filed at 4/17/2021 1754  Gross per 24 hour   Intake 0 ml   Output 125 ml   Net -125 ml       General Appearance:  NAD  HEENT: Normocephalic atraumatic without obvious abnormality   Neck: no JVD   Lung: Crackles bilat  Heart: Irreg Irreg no S3  Abdomen: Soft, NT, ND. NABS   Extremities: No edema.   Femoral Vein Temp Dialysis Catheter  Neurologic: NFD        DATA:      CBC:   Lab Results   Component Value Date    WBC 7.4 04/17/2021    RBC 3.15 04/17/2021    RBC 3.68 12/19/2017    HGB 8.6 04/17/2021    HCT 29.3 04/17/2021    MCV 93.0 04/17/2021    MCH 27.3 04/17/2021    MCHC 29.4 04/17/2021    RDW 15.6 04/17/2021     04/17/2021    MPV 9.8 04/17/2021     BMP:    Lab Results   Component Value Date     04/17/2021    K 4.7 04/17/2021    K 4.2 06/20/2020     04/17/2021    CO2 27 04/17/2021    BUN 25 04/17/2021    LABALBU 2.5 04/17/2021    LABALBU 4.0 03/21/2012    CREATININE 3.5 04/17/2021    CALCIUM 8.5 04/17/2021    GFRAA 15 04/17/2021    LABGLOM 15 04/17/2021    GLUCOSE 85 04/17/2021    GLUCOSE 77 04/18/2012       RAD:  Echo Limited    Result Date: 3/30/2021  Transthoracic Echocardiography Report (TTE)  Demographics   Patient Name    Jeff Wilson  Gender            Female                  P   Medical Record  [id number]     Room Number       3207  Number   Account #       [de-identified]    Procedure Date    03/30/2021   Corporate ID                 Ordering          Silvana Webb MD                               Physician   Accession       [id number]   Referring         Ghoshirineo Barnes  Number                       Physician   Date of Birth   1935   Sonographer       Joel Calloway RDCS   Age             81 year(s)   Interpreting      [address] [place] [address]                               Physician         Physician Cardiology                                                 Silvana Webb MD                                Any Other  Procedure Type of Study   TTE procedure:Echo Limited Study. Procedure Date Date: 03/30/2021 Start: 07:22 AM Study Location: Portable Technical Quality: Adequate visualization Indications:Congestive heart failure, diastolic dysfunction. Patient Status: Routine Height: 64 inches Weight: 171 pounds BSA: 1.83 m^2 BMI: 29.35 kg/m^2 HR: 61 bpm BP: 144/66 mmHg  Findings   Left Ventricle  Left ventricle is normal in size . Mild left ventricular concentric hypertrophy noted. No regional wall motion abnormalities seen. Normal left ventricular ejection fraction. Ejection fraction is visually estimated at 65%. Indeterminate diastolic function. Right Ventricle  Normal right ventricular size and function. Left Atrium  The left atrium is mildly dilated. Interatrial septum appears intact. Right Atrium  Normal right atrium size. Mitral Valve  Focal calcification mitral valve leaflets. Mild mitral annular calcification. Mild mitral regurgitation is present. Tricuspid Valve  The tricuspid valve appears structurally normal.  Moderate tricuspid regurgitation. RVSP is 50 mmHg. Pulmonary hypertension is mild to moderate . Aortic Valve  The aortic valve appears mildly sclerotic. Mean transaortic gradient (Doppler) 11 mm Hg . Mild aortic stenosis. Pulmonic Valve  The pulmonic valve was not well visualized. Mild pulmonic regurgitation present. Pericardial Effusion  There is a trivial circumferential pericardial effusion noted. Pleural Effusion  Moderate left pleural effusion. Aorta  Aortic root dimension within normal limits. Conclusions   Summary  Left ventricle is normal in size . Mild left ventricular concentric hypertrophy noted. No regional wall motion abnormalities seen. Normal left ventricular ejection fraction. The left atrium is mildly dilated. Focal calcification mitral valve leaflets. Mild mitral annular calcification. Mild mitral regurgitation is present. Mild aortic stenosis. Moderate tricuspid regurgitation. Pulmonary hypertension is mild to moderate . There is a trivial circumferential pericardial effusion noted. Moderate left pleural effusion. Signature   ----------------------------------------------------------------  Electronically signed by Navin Isaac MD(Interpreting  physician) on 03/30/2021 05:36 PM  ----------------------------------------------------------------  M-Mode/2D Measurements & Calculations   LV Diastolic       LV Systolic Dimension: 2.5 cm  Dimension: 3.9 cm  LV Volume Diastolic: 94.0 ml  LV ROGERS:98.7 %       LV Volume Systolic: 22 ml  LV PW Diastolic:   LV EDV/LV EDV Index: 65.7 UC/27      RV Diastolic  1.2 cm             ml/m^2LV ESV/LV ESV Index: 22        Dimension: 3 cm  LV PW Systolic:    VD/53BE/ m^2  1. 6 cm             EF Calculated: 66.5 %  Septum Diastolic:  LV Mass Index: 87 l/min*m^2  1. 2 cm  Septum Systolic:  1.5 cm             LVOT: 2 cm                           IVC Expiration:  CO: 5.36 l/min                                          2.4 cm  CI: 2.93 l/m*m^2  LV Mass: 159.29 g  Doppler Measurements & Calculations                      AV Peak Velocity: 2.29 LVOT Peak Velocity: 1.3 m/s                     m/s                    LVOT Mean Velocity: 0.93 m/s                     AV Peak Gradient:      LVOT Peak Gradient: 6.7 mmHgLVOT                     21.03 mmHg             Mean Gradient: 4 mmHg                     AV Mean Velocity: 1.62  MV E' Septal       m/s  Velocity: 0.05 m/s AV Mean Gradient: 11.3  MV E' Lateral      mmHg                   TR Velocity:3.56 m/s  Velocity: 6 m/s    AV VTI: 47.8 cm        TR Gradient:50.81 mmHg                     AV Area                     (Continuity):1.84 cm^2                      LVOT VTI: 28 cm                     IVRT: 96.9 msec  [id number]    Xr Chest Portable    Result Date: respiratory motion artifact. Calcified granuloma in the right lower lobe. Upper Abdomen: Splenic calcifications which may represent granuloma. Reflux of contrast into the IVC and hepatic veins which may be seen with right heart failure. Soft Tissues/Bones: No acute bone or soft tissue abnormality. No evidence of pulmonary emboli. Cardiomegaly with prominent coronary atherosclerotic calcifications and small pericardial effusion. Bilateral pleural effusions, mildly increased in the interval since the prior examination. Multifocal patchy and ground-glass airspace disease in the lungs bilaterally, and more confluent airspace disease in the lower lobes bilaterally, most consistent with multifocal pneumonia. Pulmonary edema cannot be excluded. Reflux of contrast into the IVC and hepatic veins which may be seen with right heart failure. EXAMINATION:   ONE XRAY VIEW OF THE CHEST       4/6/2021 6:01 am       COMPARISON:   04/03/2021       HISTORY:   ORDERING SYSTEM PROVIDED HISTORY: pna   TECHNOLOGIST PROVIDED HISTORY:   Reason for exam:->pna       FINDINGS:   Stable cardiomediastinal silhouette.  There are bilateral perihilar lung base   airspace opacities and pleural effusions, which appear mildly increased   compared to the prior study.  No pneumothorax.  No acute osseous abnormality.           Impression   Mild increased bilateral pulmonary airspace opacities and pleural effusions. Findings may be related pulmonary edema and/or pneumonia. EXAMINATION:   ONE XRAY VIEW OF THE CHEST       4/15/2021 6:37 am       COMPARISON:   04/10/2021       HISTORY:   ORDERING SYSTEM PROVIDED HISTORY: pna pl effusions   TECHNOLOGIST PROVIDED HISTORY:   Reason for exam:->pna pl effusions       FINDINGS:   EKG leads overlie the chest.  Cardiac silhouette remains enlarged.  Scattered   vascular calcifications.  No pneumothorax.  Again identified are relatively   extensive bilateral mixed interstitial and airspace infiltrates.  Small   effusions.           Impression   No significant change in bilateral infiltrates which may represent edema. Pneumonia less likely.  Continued follow-up recommended. Assessment/Plan      1. Stage III AMANDEEP in the setting of the diuresis as well as the recent cefepime and the doses Vanc the last one being on 4/1/21-she did have a CTA of the chest but the timing was off for RCIN  FeNa <1% and FeUrea <35% consistent with a component of decreased effective renal perfusion  Cr up from 3.6-->4.1mg/dl with ongoing infiltrates on the CXR -UO low, but I/O are incomplete-discussed with Pulm and agrees with proceeding with KRT as IHD  S/P R Femoral Vein Dialysis Catheter 4/13/21 and 1st dialysis      PLAN:  She has not been tolerating dialysis well. Floor nurse reported this morning that she was not stable. Will follow daily for dialysis needed. Dialysis has been complicated by intradialytic hypotension and hypoxia with Afib      2. Hyocalcemia with hypoalbuminemia in the setting of the AMANDEEP and the Unspecified Vit D Def and the Sec HPTH of Renal Origin with hyperphosphatemia  Vit D level 9  Last Ionized Ca++ WNL  PLAN: Follow phosphorous and give binder as needed. Continue vitamin D.      3. HFpEF with a Hx of VHD  PLAN:1. Holding the diuretics for present     4. Anemia   No reported blood loss  PLAN: follow Hgb, continue NIYA    5. AMS which may reflect the cefepime in the setting of an elderly pt with worsening renal status-improved off cefepime  PLAN:1. Follow     6. Acute hypoxic Resp Failure/ HCAP vs Asp I-COVId 19 (-) and pt out of isolation      Discussed with son at bedside    Thank you for allowing us to participate in care of Ms.  Lillie Hermosillo MD

## 2021-04-17 NOTE — PROGRESS NOTES
Patient found without oxygen off. Saturations 55% on continous pulse oximeter on Room Air. Placed back on NRB mask.   initiated in 620, visual confirmed

## 2021-04-17 NOTE — PROGRESS NOTES
Date: 4/16/2021    Time: 8:24 PM    Patient Placed On BIPAP/CPAP/ Non-Invasive Ventilation? No    If no must comment. Facial area red/color change? No           If YES are Blister/Lesion present? No   If yes must notify nursing staff  BIPAP/CPAP skin barrier? No    Skin barrier type:na       Comments: Pt continues to refusing her BiPAP.  Pt remains on 13 HF at this time        Juan Daniel Oswald

## 2021-04-17 NOTE — PROGRESS NOTES
Labs resulted and reviewed with Dr. Jeremi Lindo. Pt will still be a hold for today.  Floor RN Amanda Logan

## 2021-04-18 NOTE — PROGRESS NOTES
[facility]) Hospitalist Progress Note    Admission Date  3/29/2021  5:48 PM  Chief Complaint Dehydration, diarrhea  Admit Dx   Acute respiratory failure with hypoxia ([facility] [address] [J96.01]    Subjective  History of Present Illness  Pt noted to be refusing nocturnal BiPAP despite symptomatic SOB and tachypnea. Seen at bedside this AM, resting w NRB on, essentially obtunded. Discussed w nursing staff, just medicated for pain. Dr. Chaparrita Jarrett w pulm/critical care had long discussion w son who now seems to accept the situation and now hospice to be in for evaluation. Review of Systems - 12-point review of systems has been reviewed and is otherwise negative except as listed in the HPI    Objective  Physical Exam  Vitals: BP (!) 124/58   Pulse 93   Temp 98.5 °F (36.9 °C) (Axillary)   Resp 18   Ht 5' 4\" (1.626 m)   Wt 172 lb 9.6 oz (78.3 kg)   SpO2 92%   BMI 29.63 kg/m²   General: well-developed, well-nourished, no acute distress but definitely weak-appearing, cooperative  Skin: warm, dry, intact, normal color without cyanosis  HEENT: normocephalic, atraumatic, mucous membranes normal; NRB in place  Respiratory: clear to auscultation bilaterally without respiratory distress  Cardiovascular: irregularly irregular rhythm without murmur / rub / gallop  Abdominal: soft, nontender, nondistended, normoactive bowel sounds  Extremities: no mottling, pulses intact, no edema  Neurologic: awake, alert, no focal deficits  Psychiatric: normal affect, cooperative    Assessment / Plan  Acute hypoxic resp failure - d/t combo of COVID (positive 4/6, negative 4/9 so ??), pneumonia, HFpEF exacerbation, Afib RVR and effusions. S/p abx per ID. Not much was able to be done about effusions. Pulm following who has consulted palliative care. Pt NPO and speech consulted but pt likely too weak to participate in eval.  Temp HD catheter placed 4/13 unfortunately not tolerating HD well.   On NRB this AM w CXR showing unchanging volume overload   COVID-19 pna - see above   Secondary HCAP vs aspiration pna - see above   Acute on chronic HFpEF - see above   Pleural effusions - see above   Pharyngeal dysphagia, mild - see above    AMANDEEP now on HD - baseline appears to be ~1 though started to slowly increase ~3/4. Nephro consulted and following, temp HD cath placed looks like 4/13. Follow w nephro recs. Patient not tolerating dialysis, had a long conversation with son, [facility] as of 4/17     Afib - went into Afib RVR in HD 4/15 w HR controlled and not needing Cardizem gtt. Known HFpEF, echo 3/29 noted w dilated L atrium. Cardio consulted, input reviewed, on IV metoprolol now as pt had been on PO metoprolol and Afib more pronounced off of the beta blocker    HTN - on hydralazine and metoprolol, BP stable    Anemia, stable    HypoCa, now resolved    Metabolic acidosis, now resolved    Code status  DNRCCA  DVT prophylaxis Heparin  Disposition  To be determined; palliative following and son seems to be leaning more toward comfort measures.     Electronically signed by Kulwinder Burrell DO on 4/18/2021 at 1:13 PM

## 2021-04-18 NOTE — PROGRESS NOTES
Progress Note  NEPHROLOGY    Reason for Consult: Management of acute kidney injury      History of Present Lolly from the 4/5/21 note: This is a 70-year-old female with a history of diastolic heart failure, hypothyroidism, diabetes mellitus type 2, hypothyroidism, essential hypertension who has been admitted twice this month for CHF exacerbation with discharge most recently 4 days ago. The night prior to admission she was complaining of generalized weakness and palpitations: therefore presented to the ED for further evaluation on 3/29/2021. Vitals upon arrival included  BP (!) 142/70   Pulse 76   Temp 98.3 °F (36.8 °C)   Resp 26   Ht 5' 3\" (1.6 m)   Wt 159 lb (72.1 kg)   SpO2 98%   BMI 28.17 kg/m². Pertinent labs included WBC 8.2 and hemoglobin 10.4. Initial BMP revealed sodium 140, potassium 5.2, chloride 103, CO2 22, BUN 26 and creatinine 1.3. BNP 3514. Initial lactic acid 6.2. Initial ABGs 7.397, PCO2 35.6, PO2 61.5, bicarb 21 and 90% O2 saturation. Chest x-ray revealed cardiomegaly with progressive perihilar and bibasilar infiltrates and pleural effusions. Patient was subsequently admitted with acute respiratory failure with hypoxia. Patient currently examined sitting up in bed in mild acute distress. She is a very poor historian. ROS difficult to accurately obtain. Per the nursing staff did require 15 L NRB mask this morning (she is refusing the Bipap). She continues to have diffuse interstitial infiltrates on CXR (Pulmonology is following). Cardiology is also following: Patient was initially on IV bumex, but was changed to PO on 4/3/21. She has a history of stage I systolic hypertension. 4/18/21:  Seen and examined. She appears about the same today. She denies shortness of breath.   No chest pain, abdominal pain, nausea      Past Medical History:        Diagnosis Date    (HFpEF) heart failure with preserved ejection fraction ([facility] [address] 05/26/2017    3/31/17- echo- LVEF 60-65%, mild LVH, mild MR    Arthritis     Bursitis     shoulders    Cervical radiculopathy     CHF (congestive heart failure) (HCC)     CKD (chronic kidney disease) stage 3, GFR 30-59 ml/min     Diabetes mellitus ([facility] [address]     Encounter regarding vascular access for dialysis for ESRD ([facility] [address] 4/15/2021    GERD (gastroesophageal reflux disease)     [place] (hard of hearing)     Hypertension     Hypothyroidism     SSS (sick sinus syndrome) ([facility]     stable, per epic note.     Thyroid disease     Unsteadiness     Valvular heart disease     sees Dr. Phoebe Mckeon        Past Surgical History:        Procedure Laterality Date    FOOT SURGERY      both    JOINT REPLACEMENT  1999    TOTAL KNEE ARTHROPLASTY Right 3/21/2019    RIGHT KNEE TOTAL ARTHROPLASTY (ILENE)++ADDUCTOR CANAL BLOCK++ performed by Leo Kirk MD at [facility] OR       Current Medications:    Current Facility-Administered Medications: metoprolol (LOPRESSOR) injection 5 mg, 5 mg, Intravenous, TID  morphine (PF) injection 2 mg, 2 mg, Intravenous, Q4H PRN  levalbuterol (XOPENEX) nebulization 0.63 mg, 0.63 mg, Nebulization, Q6H  dilTIAZem injection 10 mg, 10 mg, Intravenous, Once  heparin (porcine) injection 5,000 Units, 5,000 Units, Subcutaneous, Q8H  epoetin emile-epbx (RETACRIT) injection 10,000 Units, 10,000 Units, Subcutaneous, Q7 Days  ascorbic acid (VITAMIN C) tablet 500 mg, 500 mg, Oral, Daily  zinc sulfate (ZINCATE) capsule 50 mg, 50 mg, Oral, Daily  vitamin D (ERGOCALCIFEROL) capsule 50,000 Units, 50,000 Units, Oral, Weekly  guaiFENesin tablet 400 mg, 400 mg, Oral, TID  hydrALAZINE (APRESOLINE) tablet 25 mg, 25 mg, Oral, 3 times per day  isosorbide dinitrate (ISORDIL) tablet 10 mg, 10 mg, Oral, TID  [Held by provider] metoprolol succinate (TOPROL XL) extended release tablet 37.5 mg, 37.5 mg, Oral, BID  aspirin EC tablet 81 mg, 81 mg, Oral, Daily  levothyroxine (SYNTHROID) tablet 50 mcg, 50 mcg, Oral, Daily  glucose (GLUTOSE) 40 % oral gel 15 g, 15 g, Oral, PRN  dextrose 50 % IV solution, 12.5 g, Intravenous, PRN  glucagon (rDNA) injection 1 mg, 1 mg, Intramuscular, PRN  dextrose 5 % solution, 100 mL/hr, Intravenous, PRN  insulin lispro (HUMALOG) injection vial 0-6 Units, 0-6 Units, Subcutaneous, TID WC  insulin lispro (HUMALOG) injection vial 0-3 Units, 0-3 Units, Subcutaneous, Nightly  sodium chloride flush 0.9 % injection 10 mL, 10 mL, Intravenous, 2 times per day  sodium chloride flush 0.9 % injection 10 mL, 10 mL, Intravenous, PRN  0.9 % sodium chloride infusion, 25 mL, Intravenous, PRN  promethazine (PHENERGAN) tablet 12.5 mg, 12.5 mg, Oral, Q6H PRN **OR** ondansetron (ZOFRAN) injection 4 mg, 4 mg, Intravenous, Q6H PRN  polyethylene glycol (GLYCOLAX) packet 17 g, 17 g, Oral, Daily PRN  acetaminophen (TYLENOL) tablet 650 mg, 650 mg, Oral, Q6H PRN **OR** acetaminophen (TYLENOL) suppository 650 mg, 650 mg, Rectal, Q6H PRN  perflutren lipid microspheres (DEFINITY) injection 1.65 mg, 1.5 mL, Intravenous, ONCE PRN    Allergies:  Aspirin    Social History:      reports that she has never smoked. She has never used smokeless tobacco. She reports previous alcohol use. She reports that she does not use drugs.       Family History:     Family History   Problem Relation Age of Onset    No Known Problems Mother     No Known Problems Father          Review of Systems:       Pertinent positives stated above in HPI. All other systems were reviewed and were negative.     Physical exam:   Constitutional: Elderly frail male VITALS:  BP (!) 140/59   Pulse 88   Temp 98 °F (36.7 °C) (Axillary)   Resp 20   Ht 5' 4\" (1.626 m)   Wt 172 lb 9.6 oz (78.3 kg)   SpO2 93%   BMI 29.63 kg/m²   CURRENT TEMPERATURE:  Temp: 98 °F (36.7 °C)  CURRENT RESPIRATORY RATE:  Resp: 20  CURRENT PULSE:  Pulse: 88  CURRENT BLOOD PRESSURE:  BP: (!) 140/59  24HR BLOOD PRESSURE RANGE:  Systolic (34EFS), QHB:039 , Min:120 , QYY:264   ; Diastolic (08RCD), JZF:72, Min:58, Max:70    24HR INTAKE/OUTPUT: Intake/Output Summary (Last 24 hours) at 4/18/2021 1545  Last data filed at 4/18/2021 0423  Gross per 24 hour   Intake --   Output 150 ml   Net -150 ml       General Appearance:  NAD  HEENT: Normocephalic atraumatic without obvious abnormality   Neck: no JVD   Lung: Crackles bilat  Heart: Irreg Irreg no S3  Abdomen: Soft, NT, ND. NABS   Extremities: No edema.   Femoral Vein Temp Dialysis Catheter  Neurologic: NFD        DATA:      CBC:   Lab Results   Component Value Date    WBC 8.2 04/18/2021    RBC 3.26 04/18/2021    RBC 3.68 12/19/2017    HGB 8.8 04/18/2021    HCT 30.3 04/18/2021    MCV 92.9 04/18/2021    MCH 27.0 04/18/2021    MCHC 29.0 04/18/2021    RDW 15.7 04/18/2021     04/18/2021    MPV 9.3 04/18/2021     BMP:    Lab Results   Component Value Date     04/18/2021    K 4.8 04/18/2021    K 4.2 06/20/2020    CL 99 04/18/2021    CO2 25 04/18/2021    BUN 39 04/18/2021    LABALBU 2.4 04/18/2021    LABALBU 4.0 03/21/2012    CREATININE 4.6 04/18/2021    CALCIUM 8.4 04/18/2021    GFRAA 11 04/18/2021    LABGLOM 11 04/18/2021    GLUCOSE 74 04/18/2021    GLUCOSE 77 04/18/2012       RAD:  Echo Limited    Result Date: 3/30/2021  Transthoracic Echocardiography Report (TTE)  Demographics   Patient Name    Milo Singleton  Gender            Female                  P   Medical Record  [id number]     Room Number       4298  Number   Account #       [de-identified]    Procedure Date    03/30/2021   Corporate ID                 Ordering          Nicol Jenkins MD                               Physician   Accession       [id number]   Referring         Sada Mcgill  Number                       Physician   Date of Birth   1935   Sonographer       Tonya Kate RDCS   Age             81 year(s)   Interpreting      [address] [place] [address]                               Physician         Physician Cardiology                                                 Nicol Jenkins MD                                Any Other  Procedure Type of Study   TTE procedure:Echo Limited Study. Procedure Date Date: 03/30/2021 Start: 07:22 AM Study Location: Portable Technical Quality: Adequate visualization Indications:Congestive heart failure, diastolic dysfunction. Patient Status: Routine Height: 64 inches Weight: 171 pounds BSA: 1.83 m^2 BMI: 29.35 kg/m^2 HR: 61 bpm BP: 144/66 mmHg  Findings   Left Ventricle  Left ventricle is normal in size . Mild left ventricular concentric hypertrophy noted. No regional wall motion abnormalities seen. Normal left ventricular ejection fraction. Ejection fraction is visually estimated at 65%. Indeterminate diastolic function. Right Ventricle  Normal right ventricular size and function. Left Atrium  The left atrium is mildly dilated. Interatrial septum appears intact. Right Atrium  Normal right atrium size. Mitral Valve  Focal calcification mitral valve leaflets. Mild mitral annular calcification. Mild mitral regurgitation is present. Tricuspid Valve  The tricuspid valve appears structurally normal.  Moderate tricuspid regurgitation. RVSP is 50 mmHg. Pulmonary hypertension is mild to moderate . Aortic Valve  The aortic valve appears mildly sclerotic. Mean transaortic gradient (Doppler) 11 mm Hg . Mild aortic stenosis. Pulmonic Valve  The pulmonic valve was not well visualized. Mild pulmonic regurgitation present. Pericardial Effusion  There is a trivial circumferential pericardial effusion noted. Pleural Effusion  Moderate left pleural effusion. Aorta  Aortic root dimension within normal limits. Conclusions   Summary  Left ventricle is normal in size . Mild left ventricular concentric hypertrophy noted. No regional wall motion abnormalities seen. Normal left ventricular ejection fraction. The left atrium is mildly dilated. Focal calcification mitral valve leaflets. Mild mitral annular calcification. Mild mitral regurgitation is present. Mild aortic stenosis.   Moderate tricuspid regurgitation. Pulmonary hypertension is mild to moderate . There is a trivial circumferential pericardial effusion noted. Moderate left pleural effusion. Signature   ----------------------------------------------------------------  Electronically signed by Flavio Ott MD(Interpreting  physician) on 03/30/2021 05:36 PM  ----------------------------------------------------------------  M-Mode/2D Measurements & Calculations   LV Diastolic       LV Systolic Dimension: 2.5 cm  Dimension: 3.9 cm  LV Volume Diastolic: 34.9 ml  LV EB:52.9 %       LV Volume Systolic: 22 ml  LV PW Diastolic:   LV EDV/LV EDV Index: 65.7 DN/52      RV Diastolic  1.2 cm             ml/m^2LV ESV/LV ESV Index: 22        Dimension: 3 cm  LV PW Systolic:    QO/09AU/ m^2  1. 6 cm             EF Calculated: 66.5 %  Septum Diastolic:  LV Mass Index: 87 l/min*m^2  1. 2 cm  Septum Systolic:  1.5 cm             LVOT: 2 cm                           IVC Expiration:  CO: 5.36 l/min                                          2.4 cm  CI: 2.93 l/m*m^2  LV Mass: 159.29 g  Doppler Measurements & Calculations                      AV Peak Velocity: 2.29 LVOT Peak Velocity: 1.3 m/s                     m/s                    LVOT Mean Velocity: 0.93 m/s                     AV Peak Gradient:      LVOT Peak Gradient: 6.7 mmHgLVOT                     21.03 mmHg             Mean Gradient: 4 mmHg                     AV Mean Velocity: 1.62  MV E' Septal       m/s  Velocity: 0.05 m/s AV Mean Gradient: 11.3  MV E' Lateral      mmHg                   TR Velocity:3.56 m/s  Velocity: 6 m/s    AV VTI: 47.8 cm        TR Gradient:50.81 mmHg                     AV Area                     (Continuity):1.84 cm^2                      LVOT VTI: 28 cm                     IVRT: 96.9 msec  [email or web address] [id number]    Xr Chest Portable    Result Date: 3/29/2021  EXAMINATION: ONE XRAY VIEW OF THE CHEST 3/29/2021 7:03 pm COMPARISON: 03/23/2021. HISTORY: ORDERING SYSTEM PROVIDED HISTORY: dyspnea TECHNOLOGIST PROVIDED HISTORY: Reason for exam:->dyspnea FINDINGS: There is cardiomegaly with prominence of the superior mediastinum. There is vascular congestion with patchy perihilar infiltrates extending to the lung bases. Pleural effusions are suspected. Cardiomegaly with progressive perihilar and bibasilar infiltrates and pleural effusions likely CHF/edema and or pneumonia. Cta Pulmonary W Contrast    Result Date: 3/30/2021  EXAMINATION: CTA OF THE CHEST 3/29/2021 11:46 pm TECHNIQUE: CTA of the chest was performed after the administration of intravenous contrast.  Multiplanar reformatted images are provided for review. MIP images are provided for review. Dose modulation, iterative reconstruction, and/or weight based adjustment of the mA/kV was utilized to reduce the radiation dose to as low as reasonably achievable. COMPARISON: March 24 HISTORY: ORDERING SYSTEM PROVIDED HISTORY: rule out PE TECHNOLOGIST PROVIDED HISTORY: Reason for exam:->rule out PE Decision Support Exception->Emergency Medical Condition (MA) FINDINGS: Pulmonary Arteries: Pulmonary arteries are adequately opacified for evaluation. No evidence of intraluminal filling defect to suggest pulmonary embolism. Main pulmonary artery is normal in caliber. Mediastinum: Cardiomegaly with prominent coronary atherosclerotic calcifications. Small pericardial effusion. . No hilar or mediastinal adenopathy. Calcified hilar and mediastinal nodes. Lungs/pleura: Bilateral pleural effusions, mildly increased in the interval since the prior examination. Multifocal patchy and ground-glass airspace disease in the lungs bilaterally, also present on the prior examination. There is more confluent airspace disease in the lower lobes bilaterally, most consistent with multifocal pneumonia. Images are degraded by respiratory motion artifact. Calcified granuloma in the right lower lobe. Upper Abdomen: Splenic calcifications which may represent granuloma. Reflux of contrast into the IVC and hepatic veins which may be seen with right heart failure. Soft Tissues/Bones: No acute bone or soft tissue abnormality. No evidence of pulmonary emboli. Cardiomegaly with prominent coronary atherosclerotic calcifications and small pericardial effusion. Bilateral pleural effusions, mildly increased in the interval since the prior examination. Multifocal patchy and ground-glass airspace disease in the lungs bilaterally, and more confluent airspace disease in the lower lobes bilaterally, most consistent with multifocal pneumonia. Pulmonary edema cannot be excluded. Reflux of contrast into the IVC and hepatic veins which may be seen with right heart failure. EXAMINATION:   ONE XRAY VIEW OF THE CHEST       4/6/2021 6:01 am       COMPARISON:   04/03/2021       HISTORY:   ORDERING SYSTEM PROVIDED HISTORY: pna   TECHNOLOGIST PROVIDED HISTORY:   Reason for exam:->pna       FINDINGS:   Stable cardiomediastinal silhouette.  There are bilateral perihilar lung base   airspace opacities and pleural effusions, which appear mildly increased   compared to the prior study.  No pneumothorax.  No acute osseous abnormality.           Impression   Mild increased bilateral pulmonary airspace opacities and pleural effusions. Findings may be related pulmonary edema and/or pneumonia.      EXAMINATION:   ONE XRAY VIEW OF THE CHEST       4/15/2021 6:37 am       COMPARISON:   04/10/2021       HISTORY:   ORDERING SYSTEM PROVIDED HISTORY: pna pl effusions   TECHNOLOGIST PROVIDED HISTORY:   Reason for exam:->pna pl effusions       FINDINGS:   EKG leads overlie the chest.  Cardiac silhouette remains enlarged.  Scattered   vascular calcifications.  No pneumothorax.  Again identified are relatively   extensive bilateral mixed interstitial and airspace infiltrates.  Small   effusions.         Impression   No significant change in bilateral infiltrates which may represent edema. Pneumonia less likely.  Continued follow-up recommended. Assessment/Plan      1. Stage III AMANDEEP in the setting of the diuresis as well as the recent cefepime and the doses Vanc the last one being on 4/1/21-she did have a CTA of the chest but the timing was off for RCIN  FeNa <1% and FeUrea <35% consistent with a component of decreased effective renal perfusion  Cr up from 3.6-->4.1mg/dl with ongoing infiltrates on the CXR -UO low, but I/O are incomplete-discussed with Pulm and agrees with proceeding with KRT as IHD  S/P R Femoral Vein Dialysis Catheter 4/13/21 and 1st dialysis      PLAN:  She has not been tolerating dialysis well. Dialysis has been complicated by intradialytic hypotension and hypoxia with Afib. Yesterday morning, floor nurse stated she was not stable to leave floor for dialysis. Will follow daily for dialysis needs. Cardiology following for atrial fibrillation. No renal recovery at this time. 2. Hyocalcemia with hypoalbuminemia in the setting of the AMANDEEP and the Unspecified Vit D Def and the Sec HPTH of Renal Origin with hyperphosphatemia  Vit D level 9  Last Ionized Ca++ WNL  PLAN: Follow phosphorous and give binders as needed. Continue vitamin D.      3. HFpEF with a Hx of VHD  PLAN:1. Holding the diuretics for present     4. Anemia   No reported blood loss  PLAN: follow Hgb, continue NIYA    5. AMS which may reflect the cefepime in the setting of an elderly pt with worsening renal status-improved off cefepime  PLAN:1. Follow     6. Acute hypoxic Resp Failure/ HCAP vs Asp I-COVId 19 (-) and pt out of isolation      Discussed with son at bedside  Overall prognosis appears poor  Palliative care following    Thank you for allowing us to participate in care of Ms.  Twan Luna MD

## 2021-04-18 NOTE — CARE COORDINATION
CM NOTE: CM received request to meet with son at bedside regarding hospice consult. CM provided choice list & explained/answered questions about hospice house. Son states he will discuss with his siblings this evening & provide choice in am. States he does not want to provide choice without discussion with family.  CM/SW will follow up in am for choice & referral.

## 2021-04-18 NOTE — PROGRESS NOTES
effusion. Labs:  Lab Results   Component Value Date    WBC 8.2 04/18/2021    HGB 8.8 04/18/2021    HCT 30.3 04/18/2021    MCV 92.9 04/18/2021    MCH 27.0 04/18/2021    MCHC 29.0 04/18/2021    RDW 15.7 04/18/2021     04/18/2021    MPV 9.3 04/18/2021     Lab Results   Component Value Date     04/18/2021    K 4.8 04/18/2021    K 4.2 06/20/2020    CL 99 04/18/2021    CO2 25 04/18/2021    BUN 39 04/18/2021    CREATININE 4.6 04/18/2021    LABALBU 2.4 04/18/2021    LABALBU 4.0 03/21/2012    CALCIUM 8.4 04/18/2021    GFRAA 11 04/18/2021    LABGLOM 11 04/18/2021     Lab Results   Component Value Date    PROTIME 12.0 03/29/2021    INR 1.1 03/29/2021     Recent Labs     04/17/21  0902   PROBNP 8,749*     Recent Labs     04/17/21  0902   PROCAL 1.65*     This SmartLink has not been configured with any valid records. Micro:  No results for input(s): CULTRESP in the last 72 hours. No results for input(s): LABGRAM in the last 72 hours. No results for input(s): LEGUR in the last 72 hours. No results for input(s): STREPNEUMAGU in the last 72 hours. No results for input(s): LP1UAG in the last 72 hours. Assessment:    1. Acute hypoxic respiratory failure  2. COVID-19 pneumonia  3. combination of acute on chronic CHF  4. healthcare associated pneumonia versus aspiration pneumonia  5. History of heart failure with preserved ejection fraction  6. Mild pharyngeal dysphagia  7. Left pleural fluid diagnostic tap 75 cc removed, exudate. Cytology negative 4/3/21  8. AMANDEEP now requiring hemo-dialysis    Plan:   1. Patient continues to have high oxygen requirements. ON NRBM , could not tolerate BiPAP last night. She apperas uncomfortable and moans with moving her around. 2. Talked with son who is very caring and understanding. Given her multiple comorbidities and progressive decline without any improvement in the last 4 weeks he is agreeable to proceed with making her more comfortable and meet withh hospice.  Order to hospice was placed. 3. Albuterol HFA every 4 hours while awake  4. continue dysphagia 3 soft advance diet per speech recommendations.       Electronically signed by Danyel Murray MD on 4/18/2021

## 2021-04-19 NOTE — PROGRESS NOTES
R fem temp HD line pulled per order. Pressure held until no further bleeding. Kyleigh MACHUCA verified dressing R fem CDI. Tolerated well, remains in care of staff.

## 2021-04-19 NOTE — PROGRESS NOTES
Nutrition Assessment     Type and Reason for Visit: Reassess    Nutrition Assessment:  Pt to be re-assessed for follow-up visit. Chart reviewed. Unfortunately, pt w/ noted plan for hospice at this time. Will sign-off. Please consult if RD needed.     Electronically signed by Haley Ramos RD, JUANITA on 4/19/21 at 11:50 AM EDT    Contact: ext [phone]

## 2021-04-19 NOTE — PROGRESS NOTES
Occupational Therapy  Patient treatment attempted this AM.  Patient on hold per RN due to decline in medical status. Will attempt at a later time as appropriate.                                                  Roslyn YAÑEZ/CORDELIA [id number]

## 2021-04-19 NOTE — PLAN OF CARE
Problem: Falls - Risk of:  Goal: Will remain free from falls  Description: Will remain free from falls  Outcome: Met This Shift  Goal: Absence of physical injury  Description: Absence of physical injury  Outcome: Met This Shift     Problem:  Activity:  Goal: Will verbalize the importance of balancing activity with adequate rest periods  Description: Will verbalize the importance of balancing activity with adequate rest periods  Outcome: Not Met This Shift     Problem: Respiratory:  Goal: Respiratory status will improve  Description: Respiratory status will improve  Outcome: Ongoing

## 2021-04-19 NOTE — PROGRESS NOTES
[facility]    Liaison Information Visit Note              Patient Name: Devan Spencer   :  1935  MRN:  [id number]    Admit date:  3/29/2021    Admitted from: [facility] Admitting Physician:  Santana Snow MD   PCP:  Johnny Light MD      Primary Insurance: Payor: [employer] [place] [employer] ADVANTAGE /  /  /    Secondary Insurance:  None    Emergency Contact:      Contact/Relation:  Quinton Goldberg       Phone:   [phone]    Advance Directive  Advance directives received No  Patient has NOT completed an advance directive  and Patient does not have a documented healthcare surrogate  Discussed with: Family member  DPOA-HC Name-Relation:    Phone:       Terminal Diagnosis End Stage CHF, End Stage Renal Disease as confirmed by Dr. Kevin Soler Problem List:   Patient Active Problem List   Diagnosis Code    HTN (hypertension), benign I10    Hypertensive heart disease I11.9    Hypothyroidism E03.9    DM (diabetes mellitus), type 2 ([facility] E11.9    Acute respiratory failure with hypoxia ([facility] J96.01    Uncontrolled type 2 diabetes mellitus with hyperglycemia ([facility] E11.65    Chronic diastolic CHF (congestive heart failure) ([facility] I50.32    Chest pain R07.9    LVH (left ventricular hypertrophy) I51.7    Non-rheumatic mitral regurgitation I34.0    CKD (chronic kidney disease) stage 3, GFR 30-59 ml/min ([facility]) N18.30    SSS (sick sinus syndrome) ([facility]) I49.5    Dizziness R42    Unsteadiness R26.81    Leukopenia D72.819    Primary osteoarthritis involving multiple joints M89.49    Cervical radiculopathy M54.12    Bilateral shoulder bursitis M75.51, M75.52    Aspirin intolerance Z78.9    Primary osteoarthritis of one knee, right M17.11    Primary osteoarthritis of right knee M17.11    Acute blood loss anemia D62    HF (heart failure) ([facility]) I50.9    Chronic pain syndrome G89.4    Cervicalgia M54.2    Myalgia M79.10    Intractable headache R51.9    Acute on chronic diastolic congestive heart failure (HCC) J61.13    Metabolic acidosis F67.3    Anemia D64.9    Edema R60.9    Neck pain M54.2    Congestive heart failure of unknown etiology (HCC) I50.9    AMANDEEP (acute kidney injury) ([facility] N17.9    Elevated troponin R77.8    Obesity (BMI 30.0-34. 9) E66.9    Essential hypertension I10    Controlled type 2 diabetes mellitus without complication ([facility]) Q82.5    Acidosis E87.2    Encounter regarding vascular access for dialysis for ESRD ([facility] N18.6, Z99.2    New onset atrial fibrillation (HCC) I48.91       Code Status Order: DNR-CCA     Past Medical History:        Diagnosis Date    (HFpEF) heart failure with preserved ejection fraction ([facility] 05/26/2017    3/31/17- echo- LVEF 60-65%, mild LVH, mild MR    Arthritis     Bursitis     shoulders    Cervical radiculopathy     CHF (congestive heart failure) ([facility])     CKD (chronic kidney disease) stage 3, GFR 30-59 ml/min     Diabetes mellitus ([facility]     Encounter regarding vascular access for dialysis for ESRD ([facility] 4/15/2021    GERD (gastroesophageal reflux disease)     [place] (hard of hearing)     Hypertension     Hypothyroidism     SSS (sick sinus syndrome) ([facility])     stable, per epic note.  Thyroid disease     Unsteadiness     Valvular heart disease     sees Dr. Cecilia Alegria      Past Surgical History:        Procedure Laterality Date    FOOT SURGERY      both    JOINT REPLACEMENT  1999    TOTAL KNEE ARTHROPLASTY Right 3/21/2019    RIGHT KNEE TOTAL ARTHROPLASTY (ILENE)++ADDUCTOR CANAL BLOCK++ performed by Hugo Eaton MD at [facility] OR       Allergies:  Aspirin    Family Goal: Comfort Care    Meeting held with [facility]    The hospice benefit and philosophy were explained including that hospice is end of life care in which, per Medicare, a patient has a terminal diagnosis that life expectancy would be 6 months or less. Hospice care is a service that is covered by most insurance plans.   The following Discharge Plan:  Discharge Disposition; unknown  Facility Name: Unknown    hospitals plan:  1. Evaluate for GIP  2. Please call [facility] [phone] with any questions. 3. Patient not currently under the care of hospice. Electronically signed by Darylene Spindle, RN on 4/19/2021 at 10:30 AM     GIP Assessment:    Scarlet Davalos is an 81year old female with End Stage Congestive Heart Failure due to diastolic dysfunction with preserved EF. She has had frequent visits to the hospital. BNP elavated and pulmonary congestion. She has required bipap at time and more oxygen then baseline. Recently tried temporary dialysis due to worsening kidney function and fluid overload. Ana Moulton has not tolerated dialysis well and has been able to attend treatments due to condition. Ana Moulton has a constant grimace on her face despite being medicated 9x with 2mg of morphine in the last 36 hours. She moans and grimaces more when with light palpation of legs and arms. She has edema. She is on a non-rebreather at [address]. Respirations are with the use of abdominal muscles or very short and shallow. Audible secretions are heard. Rhonchi throughout. Unable to clear secretions. Other than morphine no other comfort medications are ordered. No nausea or agitation at this time. Patient does have a tele-sitter for agitation over the weekend. Discussed with Ciara Saul CNP. Patient accepted for inpatient level of care hospice for uncontrolled pain and dyspnea. Nurse to Nurse report given to [facility]. Room 116 at [facility] at 12:30 was requested. Set up transportation via [address] ambulance for 1230:     Updated charge nurse and case management.

## 2021-04-19 NOTE — PROGRESS NOTES
Progress Note  NEPHROLOGY    Reason for Consult: Management of acute kidney injury      History of Present Lolly from the 4/5/21 note: This is a 52-year-old female with a history of diastolic heart failure, hypothyroidism, diabetes mellitus type 2, hypothyroidism, essential hypertension who has been admitted twice this month for CHF exacerbation with discharge most recently 4 days ago. The night prior to admission she was complaining of generalized weakness and palpitations: therefore presented to the ED for further evaluation on 3/29/2021. Vitals upon arrival included  BP (!) 142/70   Pulse 76   Temp 98.3 °F (36.8 °C)   Resp 26   Ht 5' 3\" (1.6 m)   Wt 159 lb (72.1 kg)   SpO2 98%   BMI 28.17 kg/m². Pertinent labs included WBC 8.2 and hemoglobin 10.4. Initial BMP revealed sodium 140, potassium 5.2, chloride 103, CO2 22, BUN 26 and creatinine 1.3. BNP 3514. Initial lactic acid 6.2. Initial ABGs 7.397, PCO2 35.6, PO2 61.5, bicarb 21 and 90% O2 saturation. Chest x-ray revealed cardiomegaly with progressive perihilar and bibasilar infiltrates and pleural effusions. Patient was subsequently admitted with acute respiratory failure with hypoxia. Patient currently examined sitting up in bed in mild acute distress. She is a very poor historian. ROS difficult to accurately obtain. Per the nursing staff did require 15 L NRB mask this morning (she is refusing the Bipap). She continues to have diffuse interstitial infiltrates on CXR (Pulmonology is following). Cardiology is also following: Patient was initially on IV bumex, but was changed to PO on 4/3/21. She has a history of stage I systolic hypertension. 4/19/21- seen and examined. Per son plan for hospice and no further HD.        Past Medical History:        Diagnosis Date    (HFpEF) heart failure with preserved ejection fraction ([facility] 05/26/2017    3/31/17- echo- LVEF 60-65%, mild LVH, mild MR    Arthritis     Bursitis     shoulders    PRN  glucagon (rDNA) injection 1 mg, 1 mg, Intramuscular, PRN  dextrose 5 % solution, 100 mL/hr, Intravenous, PRN  insulin lispro (HUMALOG) injection vial 0-6 Units, 0-6 Units, Subcutaneous, TID WC  insulin lispro (HUMALOG) injection vial 0-3 Units, 0-3 Units, Subcutaneous, Nightly  sodium chloride flush 0.9 % injection 10 mL, 10 mL, Intravenous, 2 times per day  sodium chloride flush 0.9 % injection 10 mL, 10 mL, Intravenous, PRN  0.9 % sodium chloride infusion, 25 mL, Intravenous, PRN  promethazine (PHENERGAN) tablet 12.5 mg, 12.5 mg, Oral, Q6H PRN **OR** ondansetron (ZOFRAN) injection 4 mg, 4 mg, Intravenous, Q6H PRN  polyethylene glycol (GLYCOLAX) packet 17 g, 17 g, Oral, Daily PRN  acetaminophen (TYLENOL) tablet 650 mg, 650 mg, Oral, Q6H PRN **OR** acetaminophen (TYLENOL) suppository 650 mg, 650 mg, Rectal, Q6H PRN  perflutren lipid microspheres (DEFINITY) injection 1.65 mg, 1.5 mL, Intravenous, ONCE PRN    Allergies:  Aspirin    Social History:      reports that she has never smoked. She has never used smokeless tobacco. She reports previous alcohol use. She reports that she does not use drugs.       Family History:     Family History   Problem Relation Age of Onset    No Known Problems Mother     No Known Problems Father          Review of Systems:       Pertinent positives stated above in HPI. All other systems were reviewed and were negative.     Physical exam:   Constitutional: Elderly frail male VITALS:  BP (!) 176/65   Pulse 96   Temp 99.1 °F (37.3 °C) (Axillary)   Resp 28   Ht 5' 4\" (1.626 m)   Wt 169 lb 12.8 oz (77 kg)   SpO2 90%   BMI 29.15 kg/m²   CURRENT TEMPERATURE:  Temp: 99.1 °F (37.3 °C)  CURRENT RESPIRATORY RATE:  Resp: 28  CURRENT PULSE:  Pulse: 96  CURRENT BLOOD PRESSURE:  BP: (!) 176/65  24HR BLOOD PRESSURE RANGE:  Systolic (19OAP), XR , Min:132 , PFS:846   ; Diastolic (89UAP), PUD:60, Min:59, Max:65    24HR INTAKE/OUTPUT:      Intake/Output Summary (Last 24 hours) at 2021 5300  Last data filed at 4/19/2021 0406  Gross per 24 hour   Intake --   Output 150 ml   Net -150 ml       General Appearance:  NAD  HEENT: Normocephalic atraumatic without obvious abnormality   Neck: no JVD   Lung: Crackles bilat  Heart: Irreg Irreg no S3  Abdomen: Soft, NT, ND. NABS   Extremities: No edema. Femoral Vein Temp Dialysis Catheter  Neurologic: NFD        DATA:      CBC:   Lab Results   Component Value Date    WBC 9.4 04/19/2021    RBC 3.30 04/19/2021    RBC 3.68 12/19/2017    HGB 9.0 04/19/2021    HCT 30.9 04/19/2021    MCV 93.6 04/19/2021    MCH 27.3 04/19/2021    MCHC 29.1 04/19/2021    RDW 15.6 04/19/2021     04/19/2021    MPV 9.6 04/19/2021     BMP:    Lab Results   Component Value Date     04/19/2021    K 5.4 04/19/2021    K 4.2 06/20/2020     04/19/2021    CO2 23 04/19/2021    BUN 54 04/19/2021    LABALBU 2.2 04/19/2021    LABALBU 4.0 03/21/2012    CREATININE 5.7 04/19/2021    CALCIUM 8.1 04/19/2021    GFRAA 9 04/19/2021    LABGLOM 9 04/19/2021    GLUCOSE 87 04/19/2021    GLUCOSE 77 04/18/2012       RAD:  Echo Limited    Result Date: 3/30/2021  Transthoracic Echocardiography Report (TTE)  Demographics   Patient Name    Milo Singleton  Gender            Female                  P   Medical Record  [id number]     Room Number       1951  Number   Account #       [de-identified]    Procedure Date    03/30/2021   Corporate ID                 Ordering          Nicol Jenkins MD                               Physician   Accession       [id number]   Referring         Sada Mcgill  Number                       Physician   Date of Birth   1935   Sonographer       Tonya Kate RDCS   Age             81 year(s)   Interpreting      [address] [place] [address]                               Physician         Physician Cardiology                                                 Nicol Jenkins MD                                Any Other  Procedure Type of Study   TTE procedure:Echo Limited Study.   Procedure Date Date: 03/30/2021 Start: 07:22 AM Study Location: Portable Technical Quality: Adequate visualization Indications:Congestive heart failure, diastolic dysfunction. Patient Status: Routine Height: 64 inches Weight: 171 pounds BSA: 1.83 m^2 BMI: 29.35 kg/m^2 HR: 61 bpm BP: 144/66 mmHg  Findings   Left Ventricle  Left ventricle is normal in size . Mild left ventricular concentric hypertrophy noted. No regional wall motion abnormalities seen. Normal left ventricular ejection fraction. Ejection fraction is visually estimated at 65%. Indeterminate diastolic function. Right Ventricle  Normal right ventricular size and function. Left Atrium  The left atrium is mildly dilated. Interatrial septum appears intact. Right Atrium  Normal right atrium size. Mitral Valve  Focal calcification mitral valve leaflets. Mild mitral annular calcification. Mild mitral regurgitation is present. Tricuspid Valve  The tricuspid valve appears structurally normal.  Moderate tricuspid regurgitation. RVSP is 50 mmHg. Pulmonary hypertension is mild to moderate . Aortic Valve  The aortic valve appears mildly sclerotic. Mean transaortic gradient (Doppler) 11 mm Hg . Mild aortic stenosis. Pulmonic Valve  The pulmonic valve was not well visualized. Mild pulmonic regurgitation present. Pericardial Effusion  There is a trivial circumferential pericardial effusion noted. Pleural Effusion  Moderate left pleural effusion. Aorta  Aortic root dimension within normal limits. Conclusions   Summary  Left ventricle is normal in size . Mild left ventricular concentric hypertrophy noted. No regional wall motion abnormalities seen. Normal left ventricular ejection fraction. The left atrium is mildly dilated. Focal calcification mitral valve leaflets. Mild mitral annular calcification. Mild mitral regurgitation is present. Mild aortic stenosis. Moderate tricuspid regurgitation. Pulmonary hypertension is mild to moderate . There is a trivial circumferential pericardial effusion noted. Moderate left pleural effusion. Signature   ----------------------------------------------------------------  Electronically signed by Paula Maldonado MD(Interpreting  physician) on 03/30/2021 05:36 PM  ----------------------------------------------------------------  M-Mode/2D Measurements & Calculations   LV Diastolic       LV Systolic Dimension: 2.5 cm  Dimension: 3.9 cm  LV Volume Diastolic: 01.9 ml  LV YY:19.2 %       LV Volume Systolic: 22 ml  LV PW Diastolic:   LV EDV/LV EDV Index: 65.7 UK/88      RV Diastolic  1.2 cm             ml/m^2LV ESV/LV ESV Index: 22        Dimension: 3 cm  LV PW Systolic:    MS/95MU/ m^2  1. 6 cm             EF Calculated: 66.5 %  Septum Diastolic:  LV Mass Index: 87 l/min*m^2  1. 2 cm  Septum Systolic:  1.5 cm             LVOT: 2 cm                           IVC Expiration:  CO: 5.36 l/min                                          2.4 cm  CI: 2.93 l/m*m^2  LV Mass: 159.29 g  Doppler Measurements & Calculations                      AV Peak Velocity: 2.29 LVOT Peak Velocity: 1.3 m/s                     m/s                    LVOT Mean Velocity: 0.93 m/s                     AV Peak Gradient:      LVOT Peak Gradient: 6.7 mmHgLVOT                     21.03 mmHg             Mean Gradient: 4 mmHg                     AV Mean Velocity: 1.62  MV E' Septal       m/s  Velocity: 0.05 m/s AV Mean Gradient: 11.3  MV E' Lateral      mmHg                   TR Velocity:3.56 m/s  Velocity: 6 m/s    AV VTI: 47.8 cm        TR Gradient:50.81 mmHg                     AV Area                     (Continuity):1.84 cm^2                      LVOT VTI: 28 cm                     IVRT: 96.9 msec  [email or web address] [id number]    Xr Chest Portable    Result Date: 3/29/2021  EXAMINATION: ONE XRAY VIEW OF THE CHEST 3/29/2021 7:03 pm COMPARISON: infiltrates which may represent edema. Pneumonia less likely.  Continued follow-up recommended. Assessment/Plan      1. Stage III AMANDEEP in the setting of the diuresis as well as the recent cefepime and the doses Vanc the last one being on 4/1/21-she did have a CTA of the chest but the timing was off for RCIN  FeNa <1% and FeUrea <35% consistent with a component of decreased effective renal perfusion  Cr up from 3.6-->4.1mg/dl with ongoing infiltrates on the CXR -UO low, but I/O are incomplete-discussed with Pulm and agrees with proceeding with KRT as IHD  S/P R Femoral Vein Dialysis Catheter 4/13/21 and 1st dialysis  Will remove temp line as now plan for hospice and no further HD    2. Hyocalcemia with hypoalbuminemia in the setting of the AMANDEEP and the Unspecified Vit D Def and the Sec HPTH of Renal Origin with hyperphosphatemia  Vit D level 9  Last Ionized Ca++ WNL    3. HFpEF with a Hx of VHD  PLAN:1. Holding the diuretics for present     4. Anemia   No reported blood loss  PLAN: follow Hgb, continue NIYA    5. AMS which may reflect the cefepime in the setting of an elderly pt with worsening renal status-improved off cefepime  PLAN:1. Follow     6. Acute hypoxic Resp Failure/ HCAP vs Asp I-COVId 19 (-) and pt out of isolation    Little else to add as pt going to hospice and stopping further treatment. Renal will sign off. Thank you for allowing us to participate in care of Ms.  Joshua Edgar, APRN - CNP

## 2021-04-19 NOTE — PROGRESS NOTES
INPATIENT CARDIOLOGY FOLLOW-UP    Name: Wilfredo Hutchison    Age: 81 y.o. Date of Admission: 3/29/2021  5:48 PM    Date of Service: 4/19/2021    Primary Cardiologist: Kaitlin Street    Chief Complaint: Follow-up for AF, CHF    Interim History:  Plans for transition to hospice per pulmonary note yesterday and they have been consulted. Son at the bedside today, had some concerns about recent medication changes prior to her hospitalization and decline. Patient on nonrebreather, but denies specific complaints. Review of Systems:   Negative except as described above    Problem List:  Patient Active Problem List   Diagnosis    HTN (hypertension), benign    Hypertensive heart disease    Hypothyroidism    DM (diabetes mellitus), type 2 ([address]    Acute respiratory failure with hypoxia ([address]    Uncontrolled type 2 diabetes mellitus with hyperglycemia ([facility])    Chronic diastolic CHF (congestive heart failure) ([facility])    Chest pain    LVH (left ventricular hypertrophy)    Non-rheumatic mitral regurgitation    CKD (chronic kidney disease) stage 3, GFR 30-59 ml/min ([facility])    SSS (sick sinus syndrome) ([facility])    Dizziness    Unsteadiness    Leukopenia    Primary osteoarthritis involving multiple joints    Cervical radiculopathy    Bilateral shoulder bursitis    Aspirin intolerance    Primary osteoarthritis of one knee, right    Primary osteoarthritis of right knee    Acute blood loss anemia    HF (heart failure) ([facility])    Chronic pain syndrome    Cervicalgia    Myalgia    Intractable headache    Acute on chronic diastolic congestive heart failure ([facility])    Metabolic acidosis    Anemia    Edema    Neck pain    Congestive heart failure of unknown etiology ([facility])    AMANDEEP (acute kidney injury) ([address]    Elevated troponin    Obesity (BMI 30.0-34. 9)    Essential hypertension    Controlled type 2 diabetes mellitus without complication ([address]    Acidosis    Encounter regarding vascular access for dialysis for ESRD [facility])    New onset atrial fibrillation (HCC)       Current Medications:    Current Facility-Administered Medications:     morphine (PF) injection 2 mg, 2 mg, Intravenous, Q2H PRN, Arnaud Estevez, DO, 2 mg at 04/19/21 0434    metoprolol (LOPRESSOR) injection 5 mg, 5 mg, Intravenous, TID, Renee Rodríguez MD, 5 mg at 04/19/21 0222    levalbuterol (XOPENEX) nebulization 0.63 mg, 0.63 mg, Nebulization, Q6H, Jahaira Gonzalez MD, 0.63 mg at 04/19/21 0221    dilTIAZem injection 10 mg, 10 mg, Intravenous, Once, KASSIE Wilkins CNP, Stopped at 04/15/21 1531    heparin (porcine) injection 5,000 Units, 5,000 Units, Subcutaneous, Q8H, Arnaud Becker DO, 5,000 Units at 04/19/21 0222    epoetin emile-epbx (RETACRIT) injection 10,000 Units, 10,000 Units, Subcutaneous, Q7 Days, Hemant Piña MD, 10,000 Units at 04/15/21 1254    ascorbic acid (VITAMIN C) tablet 500 mg, 500 mg, Oral, Daily, KASSIE Lock - CNP, 500 mg at 04/15/21 1236    zinc sulfate (ZINCATE) capsule 50 mg, 50 mg, Oral, Daily, KASSIE Lock - CNP, 50 mg at 04/15/21 1238    vitamin D (ERGOCALCIFEROL) capsule 50,000 Units, 50,000 Units, Oral, Weekly, Checo Estrada MD, 50,000 Units at 04/13/21 3908    guaiFENesin tablet 400 mg, 400 mg, Oral, TID, KASSIE Lock CNP, 400 mg at 04/15/21 1237    hydrALAZINE (APRESOLINE) tablet 25 mg, 25 mg, Oral, 3 times per day, Pierre Harrell MD, 25 mg at 04/15/21 1538    isosorbide dinitrate (ISORDIL) tablet 10 mg, 10 mg, Oral, TID, Pierre Harrell MD, Stopped at 04/18/21 1429    [Held by provider] metoprolol succinate (TOPROL XL) extended release tablet 37.5 mg, 37.5 mg, Oral, BID, Pierre Harrell MD, 37.5 mg at 04/15/21 1237    aspirin EC tablet 81 mg, 81 mg, Oral, Daily, Tony Fleming MD, 81 mg at 04/15/21 1239    levothyroxine (SYNTHROID) tablet 50 mcg, 50 mcg, Oral, Daily, Tony Fleming MD, 50 mcg at 04/15/21 0605    glucose (GLUTOSE) 40 % oral gel 15 g, 15 g, Oral, PRN, Amirah Jacobs MD    dextrose 50 % IV solution, 12.5 g, Intravenous, PRN, Amirah Jacobs MD, 12.5 g at 04/18/21 2021    glucagon (rDNA) injection 1 mg, 1 mg, Intramuscular, PRN, Amirah Jacobs MD    dextrose 5 % solution, 100 mL/hr, Intravenous, PRN, Amirah Jacobs MD    insulin lispro (HUMALOG) injection vial 0-6 Units, 0-6 Units, Subcutaneous, TID WC, Tony Fleming MD, Stopped at 04/15/21 0605    insulin lispro (HUMALOG) injection vial 0-3 Units, 0-3 Units, Subcutaneous, Nightly, Tony Fleming MD, Stopped at 04/14/21 2148    sodium chloride flush 0.9 % injection 10 mL, 10 mL, Intravenous, 2 times per day, Amirah Jacobs MD, 10 mL at 04/18/21 2014    sodium chloride flush 0.9 % injection 10 mL, 10 mL, Intravenous, PRN, Tony Fleming MD, 10 mL at 04/17/21 1753    0.9 % sodium chloride infusion, 25 mL, Intravenous, PRN, Amirah Jacobs MD    promethazine (PHENERGAN) tablet 12.5 mg, 12.5 mg, Oral, Q6H PRN **OR** ondansetron (ZOFRAN) injection 4 mg, 4 mg, Intravenous, Q6H PRN, Amirah Jacobs MD, 4 mg at 04/14/21 0747    polyethylene glycol (GLYCOLAX) packet 17 g, 17 g, Oral, Daily PRN, Amirah Jacobs MD, 17 g at 04/06/21 1308    acetaminophen (TYLENOL) tablet 650 mg, 650 mg, Oral, Q6H PRN, 650 mg at 04/13/21 2112 **OR** acetaminophen (TYLENOL) suppository 650 mg, 650 mg, Rectal, Q6H PRN, Amirah Jacobs MD, 650 mg at 04/16/21 1811    perflutren lipid microspheres (DEFINITY) injection 1.65 mg, 1.5 mL, Intravenous, ONCE PRN, Rina Hyde MD    Physical Exam:  BP (!) 176/65   Pulse 96   Temp 99.1 °F (37.3 °C) (Axillary)   Resp 28   Ht 5' 4\" (1.626 m)   Wt 169 lb 12.8 oz (77 kg)   SpO2 90%   BMI 29.15 kg/m²   Wt Readings from Last 3 Encounters:   04/19/21 169 lb 12.8 oz (77 kg)   03/26/21 159 lb 3.2 oz (72.2 kg)   03/23/21 170 lb 6.4 oz (77.3 kg)     Appearance: Elderly female, awake, on nonrebreather  Skin: Intact, no rash  Head: Normocephalic, atraumatic  Eyes: EOMI, no conjunctival erythema  ENMT: No pharyngeal erythema, MMM, no rhinorrhea  Neck: Supple, no elevated JVP, no carotid bruits  Lungs: Diffuse bilateral rales  Cardiac: PMI nondisplaced, Regular rhythm with a normal rate, S1 & S2 normal, no murmurs  Abdomen: Soft, nontender, +bowel sounds  Extremities: Moves all extremities x 4, 1+ lower extremity edema  Neurologic: No focal motor deficits apparent, normal mood and affect  Peripheral Pulses: Intact posterior tibial pulses bilaterally    Intake/Output:    Intake/Output Summary (Last 24 hours) at 4/19/2021 0716  Last data filed at 4/19/2021 0406  Gross per 24 hour   Intake --   Output 150 ml   Net -150 ml     No intake/output data recorded.     Laboratory Tests:  Recent Labs     04/17/21 0902 04/18/21  0737 04/19/21 0445    135 139   K 4.7 4.8 5.4*    99 102   CO2 27 25 23   BUN 25* 39* 54*   CREATININE 3.5* 4.6* 5.7*   GLUCOSE 85 74 87   CALCIUM 8.5* 8.4* 8.1*     Lab Results   Component Value Date    MG 2.4 04/19/2021     Recent Labs     04/17/21  0902 04/18/21  0737 04/19/21  0445   ALKPHOS 48 50 52   ALT 22 23 25   AST 47* 46* 46*   PROT 6.1* 6.2* 6.3*   BILITOT 0.4 0.4 0.4   LABALBU 2.5* 2.4* 2.2*     Recent Labs     04/17/21 0902 04/18/21  0737 04/19/21  0445   WBC 7.4 8.2 9.4   RBC 3.15* 3.26* 3.30*   HGB 8.6* 8.8* 9.0*   HCT 29.3* 30.3* 30.9*   MCV 93.0 92.9 93.6   MCH 27.3 27.0 27.3   MCHC 29.4* 29.0* 29.1*   RDW 15.6* 15.7* 15.6*    151 172   MPV 9.8 9.3 9.6     Lab Results   Component Value Date    CKTOTAL 521 (H) 03/30/2021    CKMB 6.0 (H) 03/30/2021    TROPONINI 0.13 (H) 03/30/2021    TROPONINI 0.14 (H) 03/30/2021    TROPONINI 0.09 (H) 03/29/2021     Lab Results   Component Value Date    INR 1.1 03/29/2021    INR 1.1 03/24/2021    INR 1.1 03/04/2021    PROTIME 12.0 03/29/2021    PROTIME 12.4 03/24/2021    PROTIME 12.0 03/04/2021     Lab Results   Component Value Date    TSH 2.610 03/29/2021     Lab Results Component Value Date    LABA1C 6.1 (H) 2021     Lab Results   Component Value Date     2017     Lab Results   Component Value Date    CHOL 106 2021    CHOL 134 2021    CHOL 131 2020     Lab Results   Component Value Date    TRIG 73 2021    TRIG 83 2021    TRIG 45 2020     Lab Results   Component Value Date    HDL 41 2021    HDL 65 2021    HDL 73 2020     Lab Results   Component Value Date    LDLCALC 50 2021    LDLCALC 52 2021    LDLCALC 49 2020    LDLCHOLESTEROL 73 2017     Lab Results   Component Value Date    LABVLDL 15 2021    LABVLDL 17 2021    LABVLDL 9 2020     Lab Results   Component Value Date    CHOLHDLRATIO 2 2017     Recent Labs     21  0902   PROBNP 8,749*       Cardiac Tests:    EK/15/2021: Sinus rhythm with frequent PACs, 77 beats minute. Borderline left axis deviation. Telemetry: Sinus rhythm 90s, PACs, episodes of SVT and nonsustained PAF    Chest X-ray:   21, personally reviewed    Impression   Congestive heart failure, unchanged       Echocardiogram:   Transthoracic echo 3/30/2021     Summary   Left ventricle is normal in size . Mild left ventricular concentric hypertrophy noted. No regional wall motion abnormalities seen. Normal left ventricular ejection fraction. EF 65%   The left atrium is mildly dilated. Focal calcification mitral valve leaflets. Mild mitral annular calcification. Mild mitral regurgitation is present. Mild aortic stenosis. Moderate tricuspid regurgitation. Pulmonary hypertension is mild to moderate . There is a trivial circumferential pericardial effusion noted. Moderate left pleural effusion. Stress test:    2017  Image properties:  Imaging information: The study was gated. The patient    was    imaged in the supine position. The image quality was good.     Myocardial perfusion imaging:   There is mild breast tissue attenuation. No    significant perfusion abnormalities are visualized. Gated SPECT:  No segmental wall motion abnormalities are visualized. The    computer estimated left ventricular ejection fraction is probably    overestimated. Visually, the LVEF is within normal limits. Cardiac catheterization:     ----------------------------------------------------------------------------------------------------------------------------------------------------------------  IMPRESSION:  1. Acute on chronic heart failure with preserved ejection fraction. proBNP 8800. Remains volume overloaded  2. New onset paroxysmal atrial fibrillation, maintaining sinus. Frequent PACs with brief SVT/PAF on monitor  3. Acute hypoxic respiratory failure on nonrebreather  4. Pleural effusion status post left thoracentesis 4/2/2021  5. Left lung pneumonia. Procalcitonin 1.65  6. AMANDEEP/CKD newly on HD creatinine 5.7  7. Type 2 diabetes  8. Cognitive impairment/dementia  9. Anemia Hgb 9.0  10. Hypoalbuminemia    RECOMMENDATIONS:     Patient remains n.p.o. due to swallowing issues   Hospice has been consulted   Consider advancing diet, eating for comfort or swallow eval   Continue IV metoprolol for now until transition to comfort measures only   Discussed with son, who is wondering if recent medication changes prior to hospitalization if contributed to her decline, I am not familiar with her care prior to hospitalization, will defer to others   Agree with transition to hospice care   Will be available as needed, please call with questions    35 minutes spent on this encounter, including review of medical record and personal review of radiography images and evaluation of the patient. Greater than 50% of the time counseling coordinating care, discussing with the son regarding the above issues. Mary Lipscomb MD, [facility] Cardiology    NOTE: This report was transcribed using voice recognition software.  Every effort was made to ensure accuracy; however, inadvertent computerized transcription errors may be present.

## 2021-04-19 NOTE — PROGRESS NOTES
Patient will be transferred to [facility]. Request by hospice to keep patient's IV and kumar  in place. Patient is resting comfortably on non rebreather.  Electronically signed by Emy Sawyer RN on 4/19/2021 at 12:14 PM

## 2021-04-19 NOTE — CARE COORDINATION
CM NOTE: Per QFR--- CM met with pt & son yesterday to discuss hospice choices. Met with Kyleigh this am & chose [facility]. CM made referral to [facility]. Await call back from Samantha. Son here at bedside for meeting with hospice when available.

## 2021-04-19 NOTE — DISCHARGE SUMMARY
[facility] Physician Discharge Summary     [facility] [phone]  [phone]        Activity level   As tolerated    Disposition   Hospice House      Condition on discharge Stable    Patient ID   Sharyl Holstein, 81 y. o.female /  1935  / MRN [id number]    Admit date   3/29/2021    Discharge date  2021  11:44 AM    Admission diagnoses Active Problems:    Acute respiratory failure with hypoxia (HCC)    Essential hypertension    Controlled type 2 diabetes mellitus without complication ([facility]    Acidosis    Encounter regarding vascular access for dialysis for ESRD [facility])    New onset atrial fibrillation ([facility]  Resolved Problems:    * No resolved hospital problems. *    Discharge diagnoses Same    Consults   IP CONSULT TO HOSPITALIST  IP CONSULT TO HEART FAILURE NURSE/COORDINATOR  IP CONSULT TO DIETITIAN  IP CONSULT TO CARDIOLOGY  IP CONSULT TO PHARMACY  IP CONSULT TO IV TEAM  IP CONSULT TO PULMONOLOGY  IP CONSULT TO IV TEAM  IP CONSULT TO IV TEAM  IP CONSULT TO NEPHROLOGY  IP CONSULT TO IV TEAM  IP CONSULT TO IV TEAM  IP CONSULT TO VASCULAR SURGERY  IP CONSULT TO IV TEAM  IP CONSULT TO PALLIATIVE CARE  IP CONSULT TO CARDIOLOGY  IP CONSULT TO IV TEAM  IP CONSULT TO IV TEAM  IP CONSULT TO HOSPICE    Procedures   See hospital course    Hospital Course  From H&P: 64-year-old female with a history of diastolic heart failure, hypothyroidism, diabetes mellitus type 2, hypothyroidism, essential hypertension. Admitted twice this month downw for CHF exacerbation discharge most recently 4 days ago. Reportedly had discharge she still had some dyspnea on exertion. This has progressed since then. Has been adherent to her diuretics and low-salt diet per patient. Last night when she walks she was too weak to do so and felt palpitations therefore presented to the ED for further evaluation.   Found to be hypoxic as low as in the 40s, placed on nonrebreather subsequently on BiPAP. Feels some improvement since then. She does have a cough that she states is dry. No significant change in lower extremity edema. She reports intermittent left-sided chest pain the last for few seconds, approximately 3-4 times a day. Has been ongoing for a while. · Acute hypoxic resp failure - d/t combo of COVID (positive 4/6, negative 4/9 so ??), pneumonia, HFpEF exacerbation, Afib RVR and effusions. S/p abx per ID. Not much was able to be done about effusions. Pulm following who has consulted palliative care. Pt NPO and speech consulted but pt likely too weak to participate in eval.  Temp HD catheter placed 4/13 unfortunately not tolerating HD well. On NRB this AM w CXR showing unchanging volume overload  ·             COVID-19 pna - see above  ·             Secondary HCAP vs aspiration pna - see above  ·             Acute on chronic HFpEF - see above  ·             Pleural effusions - see above  ·             Pharyngeal dysphagia, mild - see above     · AMANDEEP now on HD - baseline appears to be ~1 though started to slowly increase ~3/4. Nephro consulted and following, temp HD cath placed looks like 4/13. Follow w nephro recs. Patient not tolerating dialysis, had a long conversation with son, [facility] as of 4/17, hospice as of 4/18     · Afib - went into Afib RVR in HD 4/15 w HR controlled and not needing Cardizem gtt. Known HFpEF, echo 3/29 noted w dilated L atrium. Cardio consulted, input reviewed, on IV metoprolol now as pt had been on PO metoprolol and Afib more pronounced off of the beta blocker     · HTN - on hydralazine and metoprolol, BP stable     · Anemia, stable     · HypoCa, now resolved     · Metabolic acidosis, now resolved    With pt not tolerating dialysis, palliative care then hospice consults placed. Hospice was in to evaluate the patient this morning, temp HD line being removed, and pt for dc to [facility] around lunch time today. Discharge Exam  /60   Pulse 87   Temp 99.5 °F (37.5 °C) (Axillary)   Resp 20   Ht 5' 4\" (1.626 m)   Wt 169 lb 12.8 oz (77 kg)   SpO2 90%   BMI 29.15 kg/m²   General Appearance: thin, frail, obtunded, weak  Skin: warm and dry  Head: normocephalic and atraumatic; NRB in place  Eyes: pupils equal, round, and reactive to light, extraocular eye movements intact, conjunctivae pale  Neck: neck supple and non tender without mass   Pulmonary/Chest: coarse to auscultation bilaterally- no wheezes, rales or rhonchi, normal air movement, no respiratory distress  Cardiovascular: irreg irreg rhythm / normal rate, normal S1 and S2 and no carotid bruits  Abdomen: soft, non-tender, non-distended, normal bowel sounds, no masses or organomegaly  Extremities: no cyanosis, no clubbing and no edema  Neurologic: no cranial nerve deficit and speech normal    I/O last 3 completed shifts:  In: -   Out: 150 [Urine:150]  No intake/output data recorded. Labs  Recent Labs     04/17/21  0902 04/18/21  0737 04/19/21  0445    135 139   K 4.7 4.8 5.4*    99 102   CO2 27 25 23   BUN 25* 39* 54*   CREATININE 3.5* 4.6* 5.7*   GLUCOSE 85 74 87   CALCIUM 8.5* 8.4* 8.1*   WBC 7.4 8.2 9.4   RBC 3.15* 3.26* 3.30*   HGB 8.6* 8.8* 9.0*   HCT 29.3* 30.3* 30.9*   MCV 93.0 92.9 93.6   MCH 27.3 27.0 27.3   MCHC 29.4* 29.0* 29.1*   RDW 15.6* 15.7* 15.6*    151 172   MPV 9.8 9.3 9.6       Imaging  Xr Chest Portable    Result Date: 4/17/2021  Congestive heart failure, unchanged     Xr Chest Portable    Result Date: 4/15/2021  No significant change in bilateral infiltrates which may represent edema. Pneumonia less likely. Continued follow-up recommended.      Patient Instructions     Medication List      CONTINUE taking these medications    acetaminophen 500 MG tablet  Commonly known as: TYLENOL     acetaminophen-codeine 300-30 MG per tablet  Commonly known as: TYLENOL #3     aspirin EC 81 MG EC tablet  Take 1 tablet by mouth

## 2025-02-18 NOTE — PROGRESS NOTES
Veronica Phillips was ordered azelastine 0.005% eye drops which is a nonformulary medication. The patient has indicated that the home supply of this medication will be brought in to the hospital for inpatient use. If the medication has not been administered by 1400 on the following day from the time the order was placed, a pharmacist will follow-up with the nurse of the patient to assess the capability of the patient to bring in the medication. If it is determined that the patient cannot supply the medication and it is not available to be dispensed from the pharmacy, a call will be placed to the ordering provider to discuss alternative options.       Gabi Mane, PharmD 6/20/2020 12:09 PM Protocol For Photochemotherapy: Baby Oil And Nbuvb: The patient received Photochemotherapy: Baby Oil and NBUVB (baby oil applied to all lesions prior to phototherapy). Protocol For Photochemotherapy: Tar And Broad Band Uvb (Goeckerman Treatment): The patient received Photochemotherapy: Tar and Broad Band UVB (Goeckerman treatment). Consent: Written consent obtained.  The risks were reviewed with the patient including but not limited to: burn, pigmentary changes, pain, blistering, scabbing, redness, increased risk of skin cancers, and the remote possibility of scarring. Protocol For Photochemotherapy For Severe Photoresponsive Dermatoses: Petrolatum And Nbuvb: The patient received Photochemotherapy for severe photoresponsive dermatoses: Petrolatum and NBUVB requiring at least 4 to 8 hours of care under direct physician supervision. Protocol For Photochemotherapy: Petrolatum And Nbuvb: The patient received Photochemotherapy: Petrolatum and NBUVB (petrolatum applied to all lesions prior to phototherapy). Protocol For Photochemotherapy For Severe Photoresponsive Dermatoses: Petrolatum And Broad Band Uvb: The patient received Photochemotherapyfor severe photoresponsive dermatoses: Petrolatum and Broad Band UVB requiring at least 4 to 8 hours of care under direct physician supervision. Protocol For Uva: The patient received UVA. Total Body Energy: 1219 Protocol For Photochemotherapy: Mineral Oil And Nbuvb: The patient received Photochemotherapy: Mineral Oil and NBUVB (mineral oil applied to all lesions prior to phototherapy). Protocol For Photochemotherapy: Petrolatum And Broad Band Uvb: The patient received Photochemotherapy: Petrolatum and Broad Band UVB. Render Post-Care In The Note: no Protocol For Puva: The patient received PUVA. Protocol For Nbuvb: The patient received NBUVB. Skin Type: III Treatment Number: [id number] Protocol For Nb Uva: The patient received NB UVA. Protocol For Bath Puva: The patient received Bath PUVA. Protocol For Photochemotherapy: Tar And Nbuvb (Goeckerman Treatment): The patient received Photochemotherapy: Tar and NBUVB (Goeckerman treatment). Total Body Time: 3:20 Protocol For Photochemotherapy: Triamcinolone Ointment And Nbuvb: The patient received Photochemotherapy: Triamcinolone and NBUVB (triamcinolone ointment applied to all lesions prior to phototherapy). Protocol For Photochemotherapy: Mineral Oil And Broad Band Uvb: The patient received Photochemotherapy: Mineral Oil and Broad Band UVB. Protocol For Photochemotherapy For Severe Photoresponsive Dermatoses: Puva: The patient received Photochemotherapy for severe photoresponsive dermatoses: PUVA requiring at least 4 to 8 hours of care under direct physician supervision. Protocol For Broad Band Uvb: The patient received Broad Band UVB. Protocol For Protocol For Photochemotherapy For Severe Photoresponsive Dermatoses: Bath Puva: The patient received Photochemotherapy for severe photoresponsive dermatoses: Bath PUVA requiring at least 4 to 8 hours of care under direct physician supervision. Detail Level: Zone Post-Care Instructions: I reviewed with the patient in detail post-care instructions. Patient is to wear sun protection. Patients may expect sunburn like redness, discomfort and scabbing. Protocol For Photochemotherapy For Severe Photoresponsive Dermatoses: Tar And Nbuvb (Goeckerman Treatment): The patient received Photochemotherapy for severe photoresponsive dermatoses: Tar and NBUVB (Goeckerman treatment) requiring at least 4 to 8 hours of care under direct physician supervision. Protocol: NBUVB Protocol For Photochemotherapy For Severe Photoresponsive Dermatoses: Tar And Broad Band Uvb (Goeckerman Treatment): The patient received Photochemotherapy for severe photoresponsive dermatoses: Tar and Broad Band UVB (Goeckerman treatment) requiring at least 4 to 8 hours of care under direct physician supervision. Protocol For Uva1: The patient received UVA1.

## (undated) DEVICE — PATIENT RETURN ELECTRODE, SINGLE-USE, CONTACT QUALITY MONITORING, ADULT, WITH 9FT CORD, FOR PATIENTS WEIGING OVER 33LBS. (15KG): Brand: MEGADYNE

## (undated) DEVICE — 2108 SERIES SAGITTAL BLADE FAN, OFFSET  (34.5 X 0.8 X 64.0MM)

## (undated) DEVICE — CONVERTORS STOCKINETTE: Brand: CONVERTORS

## (undated) DEVICE — STERILE HOOK LOCK LATEX FREE ELASTIC BANDAGE 4INX5YD: Brand: HOOK LOCK™

## (undated) DEVICE — 4-PORT MANIFOLD: Brand: NEPTUNE 2

## (undated) DEVICE — STANDARD HYPODERMIC NEEDLE,POLYPROPYLENE HUB: Brand: MONOJECT

## (undated) DEVICE — Z DISCONTINUED PER MEDLINE USE 2741942 DRESSING AQUACEL 6 IN ALG W9XL15CM SIL CVR WTRPRF VIR BACT BARR ANTIMIC

## (undated) DEVICE — DRAPE,TOP,102X53,STERILE: Brand: MEDLINE

## (undated) DEVICE — CHLORAPREP 26ML ORANGE

## (undated) DEVICE — 3M™ IOBAN™ 2 ANTIMICROBIAL INCISE DRAPE 6650EZ: Brand: IOBAN™ 2

## (undated) DEVICE — KIT SURG W7XL11IN 2 PKT UNTREATED NA

## (undated) DEVICE — BNDG,ELSTC,MATRIX,STRL,6"X5YD,LF,HOOK&LP: Brand: MEDLINE

## (undated) DEVICE — KIT EVAC 400CC DIA1/8IN H PAT 12.5IN 3 SPR RND SHP PVC DRN

## (undated) DEVICE — DRESSING FOAM W6.3XL7.9IN TAN SACR SELF ADH MEPILEX BORD

## (undated) DEVICE — SOLUTION IV IRRIG WATER 1000ML POUR BRL 2F7114

## (undated) DEVICE — DRAPE,REIN 53X77,STERILE: Brand: MEDLINE

## (undated) DEVICE — PACK PROCEDURE SURG GEN CUST

## (undated) DEVICE — GOWN,SIRUS,FABRNF,2XL,18/CS: Brand: MEDLINE

## (undated) DEVICE — MARKER,SKIN,WI/RULER AND LABELS: Brand: MEDLINE

## (undated) DEVICE — SYRINGE MED 20ML STD CLR PLAS LUERLOCK TIP N CTRL DISP

## (undated) DEVICE — STRYKER PERFORMANCE SERIES SAGITTAL BLADE: Brand: STRYKER PERFORMANCE SERIES

## (undated) DEVICE — BASIC SINGLE BASIN 1-LF: Brand: MEDLINE INDUSTRIES, INC.

## (undated) DEVICE — STRIP,CLOSURE,WOUND,MEDI-STRIP,1/2X4: Brand: MEDLINE

## (undated) DEVICE — DOUBLE BASIN SET: Brand: MEDLINE INDUSTRIES, INC.

## (undated) DEVICE — SYSTEM VAC MIX SGL DBL CLEARMIX 10 PER CA

## (undated) DEVICE — STERILE PVP: Brand: MEDLINE INDUSTRIES, INC.

## (undated) DEVICE — SPONGE LAP W18XL18IN WHT COT 4 PLY FLD STRUNG RADPQ DISP ST

## (undated) DEVICE — TOTAL KNEE PK

## (undated) DEVICE — TOTAL TRAY, DB, 100% SILI FOLEY, 16FR 10: Brand: MEDLINE

## (undated) DEVICE — COVER HNDL LT DISP

## (undated) DEVICE — TUBE IRRIG HNDPC HI FLO TP INTRPULS W/SUCTION TUBE

## (undated) DEVICE — SPONGE,DRAIN,NONWVN,4"X4",6PLY,STRL,LF: Brand: MEDLINE

## (undated) DEVICE — SUTURE VCRL SZ 1 L27IN ABSRB VLT L70MM XLH 1/2 CIR J583G

## (undated) DEVICE — SUTURE STRATAFIX SYMMETRIC PDS + SZ 1 L18IN ABSRB VLT OS-6 SXPP1A201

## (undated) DEVICE — 3M™ STERI-DRAPE™ U-DRAPE 1015: Brand: STERI-DRAPE™

## (undated) DEVICE — PADDING,UNDERCAST,COTTON, 4"X4YD STERILE: Brand: MEDLINE

## (undated) DEVICE — BLADE SAW W11XL77.5MM THK1.23MM CUT THK1.17MM S STL RECIP

## (undated) DEVICE — BANDAGE COMPR W6INXL12FT SMOOTH FOR LIMB EXSANG ESMARCH

## (undated) DEVICE — TOWEL,OR,DSP,ST,BLUE,STD,6/PK,12PK/CS: Brand: MEDLINE

## (undated) DEVICE — SOLUTION IV 1000ML 0.9% SOD CHL PH 5 INJ USP VIAFLX PLAS

## (undated) DEVICE — GOWN,SIRUS,POLYRNF,BRTHSLV,LG,30/CS: Brand: MEDLINE

## (undated) DEVICE — ZIMMER® STERILE DISPOSABLE TOURNIQUET CUFF WITH PLC, DUAL PORT, SINGLE BLADDER, 30 IN. (76 CM)

## (undated) DEVICE — TUBING, SUCTION, 9/32" X 10', STRAIGHT: Brand: MEDLINE